# Patient Record
Sex: FEMALE | Race: OTHER | Employment: FULL TIME | URBAN - METROPOLITAN AREA
[De-identification: names, ages, dates, MRNs, and addresses within clinical notes are randomized per-mention and may not be internally consistent; named-entity substitution may affect disease eponyms.]

---

## 2019-04-29 LAB — PAP SMEAR: NORMAL

## 2019-09-10 LAB — HIV AG/AB: NORMAL

## 2020-07-10 ENCOUNTER — OFFICE VISIT (OUTPATIENT)
Dept: PRIMARY CARE CLINIC | Age: 35
End: 2020-07-10
Payer: COMMERCIAL

## 2020-07-10 VITALS
SYSTOLIC BLOOD PRESSURE: 130 MMHG | WEIGHT: 207 LBS | HEART RATE: 79 BPM | HEIGHT: 67 IN | DIASTOLIC BLOOD PRESSURE: 93 MMHG | OXYGEN SATURATION: 96 % | TEMPERATURE: 98.2 F | BODY MASS INDEX: 32.49 KG/M2

## 2020-07-10 PROCEDURE — 99205 OFFICE O/P NEW HI 60 MIN: CPT | Performed by: PHYSICIAN ASSISTANT

## 2020-07-10 PROCEDURE — G8417 CALC BMI ABV UP PARAM F/U: HCPCS | Performed by: PHYSICIAN ASSISTANT

## 2020-07-10 PROCEDURE — 1036F TOBACCO NON-USER: CPT | Performed by: PHYSICIAN ASSISTANT

## 2020-07-10 PROCEDURE — G8427 DOCREV CUR MEDS BY ELIG CLIN: HCPCS | Performed by: PHYSICIAN ASSISTANT

## 2020-07-10 RX ORDER — ESCITALOPRAM OXALATE 10 MG/1
TABLET ORAL
COMMUNITY
Start: 2020-07-06 | End: 2021-02-19

## 2020-07-10 RX ORDER — NAPROXEN 500 MG/1
500 TABLET ORAL 2 TIMES DAILY WITH MEALS
Qty: 60 TABLET | Refills: 0 | Status: SHIPPED | OUTPATIENT
Start: 2020-07-10 | End: 2020-12-01

## 2020-07-10 RX ORDER — CYCLOBENZAPRINE HCL 5 MG
5 TABLET ORAL 2 TIMES DAILY PRN
Qty: 7 TABLET | Refills: 0 | Status: SHIPPED | OUTPATIENT
Start: 2020-07-10 | End: 2020-08-12 | Stop reason: SDUPTHER

## 2020-07-10 RX ORDER — GABAPENTIN 100 MG/1
CAPSULE ORAL
Qty: 30 CAPSULE | Refills: 0 | Status: SHIPPED | OUTPATIENT
Start: 2020-07-10 | End: 2020-09-11 | Stop reason: SDUPTHER

## 2020-07-10 SDOH — HEALTH STABILITY: MENTAL HEALTH: HOW MANY STANDARD DRINKS CONTAINING ALCOHOL DO YOU HAVE ON A TYPICAL DAY?: 1 OR 2

## 2020-07-10 SDOH — HEALTH STABILITY: MENTAL HEALTH: HOW OFTEN DO YOU HAVE A DRINK CONTAINING ALCOHOL?: MONTHLY OR LESS

## 2020-07-10 ASSESSMENT — PATIENT HEALTH QUESTIONNAIRE - PHQ9
2. FEELING DOWN, DEPRESSED OR HOPELESS: 0
SUM OF ALL RESPONSES TO PHQ QUESTIONS 1-9: 0
1. LITTLE INTEREST OR PLEASURE IN DOING THINGS: 0
SUM OF ALL RESPONSES TO PHQ QUESTIONS 1-9: 0
SUM OF ALL RESPONSES TO PHQ9 QUESTIONS 1 & 2: 0

## 2020-07-10 NOTE — PROGRESS NOTES
Visit Information    Have you changed or started any medications since your last visit including any over-the-counter medicines, vitamins, or herbal medicines? no   Are you having any side effects from any of your medications? -  no  Have you stopped taking any of your medications? Is so, why? -  no    Have you seen any other physician or provider since your last visit? Yes - Records Obtained  Have you had any other diagnostic tests since your last visit? No  Have you been seen in the emergency room and/or had an admission to a hospital since we last saw you? No  Have you had your routine dental cleaning in the past 6 months? no    Have you activated your [facility] account? If not, what are your barriers?  Yes     Patient Care Team:  Shilpa Mckeon PA-C as PCP - General (Physician Assistant)    Medical History Review  Past Medical, Family, and Social History reviewed and does contribute to the patient presenting condition    Health Maintenance   Topic Date Due    Varicella vaccine (1 of 2 - 2-dose childhood series) 10/17/1985    HIV screen  10/17/1999    DTaP/Tdap/Td vaccine (1 - Tdap) 10/17/2003    Cervical cancer screen  10/17/2005    Flu vaccine (1) 09/01/2020    Hepatitis A vaccine  Aged Out    Hepatitis B vaccine  Aged Out    Hib vaccine  Aged Out    Meningococcal (ACWY) vaccine  Aged Out    Pneumococcal 0-64 years Vaccine  Aged Out

## 2020-07-10 NOTE — PROGRESS NOTES
[facility]  [facility]  [address] [place] [address]  Dept: [phone]  Dept Fax: [phone]    New Patient Visit Note  Date of patient's visit: 7/10/2020  Patient's Name:  Kia Morel YOB: 1985            Patient Care Team:  Larissa Albarran PA-C as PCP - General (Physician Assistant)  Larissa Albarran PA-C as PCP - [facility] Provider  ______________________________________________________________________    Reason for visit: Establish care/Preventative care  ______________________________________________________________________  Chief Compliant   Establish Care and Back Pain (sciatic pain, with leg pain)      ______________________________________________________________________  History of Presenting Illness:  History was obtained from the patient. Kia Morel is a 27 y.o. is here to establish care. Patient states \"last time seen PCP was at the end of 2019 in [facility]". Patient has chronic past medical history that includes chronic back pain, thyroid nodule. Patient states \"neuropathy is in bilateral lower extremities, sciatica pain in right leg\". Patient states she is being seen by neurology for symptoms, \"Cymbalta and Elavil in the past that did not help, currently on Lexapro which is not helping\", \"gabapentin did help in the past\". Patient is requesting second opinion from another neurologist.  Patient had MRI lumbar completed in 1/2020 that showed mild disc bulge at L4-5 and L5-S1, mild facet arthritis in lower lumbar spine.   Discussed chronic back pain, medications in depth with patient, informed patient she will be referred to another neurologist for second opinion, informed patient she will be prescribed 7-day supply of muscle relaxer, informed patient we will restart her on gabapentin and refill NSAID, naproxen, instructed patient to contact PCP office to update on symptoms, informed patient she will be referred to physical therapy, patient voiced understanding. Patient is complaining of cervical radiculopathy symptoms in bilateral arms, \"worse in the left\". Patient had EMG completed on 2020 that showed upper extremities was normal.  Discussed cervical radiculopathy in depth with patient, informed patient she will be sent for MRI cervical to further evaluate. Patient has history of thyroid nodule, denies any thyroid disease, informed patient we will check thyroid levels. Patient recently had postpartum visit with gynecology in 2019, patient denies any issue with her pregnancy. Patient blood pressure is elevated in office. Discussed elevated blood pressure and risk in depth with patient, informed patient will reevaluate at next appointment if still elevated will discuss, patient voiced understanding. Patient is obese. Discussed weight loss in depth with patient, encourage patient make changes in diet. Patient is a non-smoker. Labs reviewed, informed patient labs will be ordered and to have completed before follow-up appointment, patient voiced understanding. Health maintenance reviewed, will update health maintenance with Pap smear, HIV, hep C results which are in care everywhere. OARRS reviewed, prescribed Gabapentin 100 mg taken 1 tablet daily for 3 days, if no improvement increase to 2 tablets daily for 3 days, if no improvement increase to 3 tablets daily for 3 days. Medications reviewed, prescribed Flexeril 5 mg twice daily PRN for 7 days, naproxen 500 mg twice daily.      HPI  ______________________________________________________________________  Past Medical/Surgical History:        Diagnosis Date    Anxiety     Chronic back pain     Thyroid nodule            Procedure Laterality Date     SECTION      x3    TUBAL LIGATION         ______________________________________________________________________  Health Maintenance Due Topic Date Due    Varicella vaccine (1 of 2 - 2-dose childhood series) 10/17/1985    HIV screen  10/17/1999    Cervical cancer screen  10/17/2005       No Known Allergies    Current Outpatient Medications   Medication Sig Dispense Refill    gabapentin (NEURONTIN) 100 MG capsule Take 1 tablet daily daily for 3 days, if no improvement increase to 2 tablets daily for 3 days, if no improvement increase to 3 tablets daily for 3 days 30 capsule 0    naproxen (NAPROSYN) 500 MG tablet Take 1 tablet by mouth 2 times daily (with meals) 60 tablet 0    escitalopram (LEXAPRO) 10 MG tablet take 1 tablet by mouth once daily       No current facility-administered medications for this visit. Social History     Tobacco Use    Smoking status: Never Smoker    Smokeless tobacco: Never Used   Substance Use Topics    Alcohol use: Not Currently     Frequency: Monthly or less     Drinks per session: 1 or 2     Binge frequency: Less than monthly    Drug use: Not on file       Family History   Problem Relation Age of Onset    Diabetes Mother     High Blood Pressure Mother     Other Mother         Tremor    Seizures Mother     Heart Disease Father     Hypertension Father     Cancer Sister         Lymphoblastic lymphoma    Diabetes Maternal Uncle     Heart Attack Maternal Grandmother     Diabetes Maternal Grandmother     Kidney Disease Maternal Grandmother     Breast Cancer Paternal Aunt       ______________________________________________________________________  Review of Systems:    Review of Systems   Constitutional: Negative for chills, fatigue and fever. HENT: Negative for congestion, ear pain, hearing loss, rhinorrhea and sore throat. Eyes: Negative for visual disturbance. Respiratory: Negative for cough, shortness of breath and wheezing. Cardiovascular: Negative for chest pain. Gastrointestinal: Negative for abdominal pain, constipation, diarrhea, nausea and vomiting.    Genitourinary: Negative for dysuria, frequency and urgency. Musculoskeletal: Positive for back pain and neck pain. Negative for myalgias. Skin: Negative for rash. Neurological: Positive for numbness. Negative for dizziness and headaches. Psychiatric/Behavioral: The patient is not nervous/anxious. ______________________________________________________________________  Physical Exam:  Vitals:    07/10/20 0833 07/10/20 1005   BP: (!) 126/92 (!) 130/93   Site: Left Upper Arm Left Upper Arm   Position: Sitting Sitting   Cuff Size: Medium Adult Large Adult   Pulse: 85 79   Temp: 98.2 °F (36.8 °C)    SpO2: 96%    Weight: 207 lb (93.9 kg)    Height: 5' 7\" (1.702 m)      BP Readings from Last 3 Encounters:   07/10/20 (!) 130/93        Physical Exam  Vitals signs reviewed. Constitutional:       Appearance: She is well-developed. She is obese. HENT:      Head: Normocephalic and atraumatic. Right Ear: Hearing and external ear normal.      Left Ear: Hearing and external ear normal.      Nose: Nose normal.      Mouth/Throat:      Pharynx: Uvula midline. Eyes:      Conjunctiva/sclera: Conjunctivae normal.      Pupils: Pupils are equal, round, and reactive to light. Neck:      Musculoskeletal: Normal range of motion. Cardiovascular:      Rate and Rhythm: Normal rate and regular rhythm. Heart sounds: Normal heart sounds. Pulmonary:      Effort: Pulmonary effort is normal.      Breath sounds: Normal breath sounds. Abdominal:      Palpations: Abdomen is soft. There is no mass. Tenderness: There is no abdominal tenderness. Musculoskeletal:      Cervical back: She exhibits tenderness, bony tenderness and pain. Lumbar back: She exhibits tenderness, bony tenderness and pain. Back:       Right lower leg: She exhibits tenderness. She exhibits no swelling. Left lower leg: She exhibits tenderness. She exhibits no swelling. Skin:     General: Skin is warm and dry.    Neurological:      Mental Status: She is alert and oriented to person, place, and time. Psychiatric:         Speech: Speech normal.         Behavior: Behavior normal. Behavior is cooperative. Thought Content: Thought content normal.         Judgment: Judgment normal.         ______________________________________________________________________  Diagnostic findings:  CBC:No results found for: WBC, HGB, PLT    BMP:  No results found for: NA, K, CL, CO2, BUN, CREATININE, GLUCOSE    HEMOGLOBIN A1C: No results found for: LABA1C    FASTING LIPID PANEL:No results found for: CHOL, HDL, TRIG  ______________________________________________________________________  Assessment and Plan:  Odilia Arteaga was seen today for establish care and back pain. Diagnoses and all orders for this visit:    Chronic low back pain with sciatica, sciatica laterality unspecified, unspecified back pain laterality  -     gabapentin (NEURONTIN) 100 MG capsule; Take 1 tablet daily daily for 3 days, if no improvement increase to 2 tablets daily for 3 days, if no improvement increase to 3 tablets daily for 3 days  -     [facility], Neurosurgery, [facility]  -     naproxen (NAPROSYN) 500 MG tablet; Take 1 tablet by mouth 2 times daily (with meals)  -     cyclobenzaprine (FLEXERIL) 5 MG tablet; Take 1 tablet by mouth 2 times daily as needed for Muscle spasms  -     [facility] - [facility]'s    Cervical radiculopathy  -     MRI CERVICAL SPINE W WO CONTRAST; Future  -     gabapentin (NEURONTIN) 100 MG capsule;  Take 1 tablet daily daily for 3 days, if no improvement increase to 2 tablets daily for 3 days, if no improvement increase to 3 tablets daily for 3 days  -     [facility], Neurosurgery, [address] [place] [address] Drive [place]'s    Thyroid nodule  -     TSH With Reflex Ft4; Future    Class 1 obesity due to excess calories with body mass index (BMI) of 32.0 to 32.9 in adult, unspecified whether serious comorbidity

## 2020-07-10 NOTE — LETTER
[facility]  [facility] [address]  Phone: [phone]  Fax: [phone]    Luke Pate PA-C        July 10, 2020     Patient: Arabella Underwood   YOB: 1985   Date of Visit: 7/10/2020       To Whom It May Concern: It is my medical opinion that Arabella Underwood may return to light duty immediately with the following restrictions: lifting/carrying not to exceed 10 lbs. , bending/stooping not to exceed 10 x per hour, Duration of restrictions (days):  60.    If you have any questions or concerns, please don't hesitate to call.     Sincerely,            Luke Pate PA-C

## 2020-07-19 ENCOUNTER — TELEPHONE (OUTPATIENT)
Dept: PRIMARY CARE CLINIC | Age: 35
End: 2020-07-19

## 2020-07-19 PROBLEM — E04.1 THYROID NODULE: Status: ACTIVE | Noted: 2020-07-19

## 2020-07-19 PROBLEM — G89.29 CHRONIC LOW BACK PAIN WITH SCIATICA: Status: ACTIVE | Noted: 2020-07-19

## 2020-07-19 PROBLEM — M54.40 CHRONIC LOW BACK PAIN WITH SCIATICA: Status: ACTIVE | Noted: 2020-07-19

## 2020-07-19 ASSESSMENT — ENCOUNTER SYMPTOMS
SHORTNESS OF BREATH: 0
VOMITING: 0
SORE THROAT: 0
BACK PAIN: 1
WHEEZING: 0
RHINORRHEA: 0
NAUSEA: 0
DIARRHEA: 0
ABDOMINAL PAIN: 0
CONSTIPATION: 0
COUGH: 0

## 2020-07-19 NOTE — TELEPHONE ENCOUNTER
Will update cervical cancer screening and HIV and health maintenance, results are in care everywhere in 4/2019 and 9/2019, respectively

## 2020-07-23 ENCOUNTER — TELEPHONE (OUTPATIENT)
Dept: PRIMARY CARE CLINIC | Age: 35
End: 2020-07-23

## 2020-08-08 ENCOUNTER — HOSPITAL ENCOUNTER (OUTPATIENT)
Age: 35
Discharge: HOME | End: 2020-08-10
Payer: COMMERCIAL

## 2020-08-08 ENCOUNTER — HOSPITAL ENCOUNTER (OUTPATIENT)
Dept: GENERAL RADIOLOGY | Age: 35
Discharge: HOME | End: 2020-08-10
Payer: COMMERCIAL

## 2020-08-08 PROCEDURE — 72040 X-RAY EXAM NECK SPINE 2-3 VW: CPT

## 2020-08-12 ENCOUNTER — TELEPHONE (OUTPATIENT)
Dept: PRIMARY CARE CLINIC | Age: 35
End: 2020-08-12

## 2020-08-12 RX ORDER — CYCLOBENZAPRINE HCL 5 MG
5 TABLET ORAL 2 TIMES DAILY PRN
Qty: 60 TABLET | Refills: 1 | Status: SHIPPED | OUTPATIENT
Start: 2020-08-12 | End: 2020-09-11 | Stop reason: SDUPTHER

## 2020-08-12 NOTE — TELEPHONE ENCOUNTER
Will refill Flexeril, patient was referred to neurosurgery at office visit, will provide information she does not have.

## 2020-08-12 NOTE — TELEPHONE ENCOUNTER
----- Message from Merlin [place] sent at 8/11/2020  9:31 AM EDT -----  No not yet she will call to schedule, she states she having burning feeling in her lower back. She would like a refill on flexeril. Should she schedule a appointment to get her back checked out.

## 2020-09-11 ENCOUNTER — HOSPITAL ENCOUNTER (OUTPATIENT)
Dept: GENERAL RADIOLOGY | Age: 35
Discharge: HOME | End: 2020-09-13
Payer: COMMERCIAL

## 2020-09-11 ENCOUNTER — HOSPITAL ENCOUNTER (OUTPATIENT)
Age: 35
Discharge: HOME | End: 2020-09-13
Payer: COMMERCIAL

## 2020-09-11 ENCOUNTER — OFFICE VISIT (OUTPATIENT)
Dept: PRIMARY CARE CLINIC | Age: 35
End: 2020-09-11
Payer: COMMERCIAL

## 2020-09-11 VITALS
DIASTOLIC BLOOD PRESSURE: 84 MMHG | WEIGHT: 211 LBS | BODY MASS INDEX: 33.05 KG/M2 | OXYGEN SATURATION: 97 % | TEMPERATURE: 97.3 F | HEART RATE: 79 BPM | RESPIRATION RATE: 16 BRPM | SYSTOLIC BLOOD PRESSURE: 111 MMHG

## 2020-09-11 PROCEDURE — 73630 X-RAY EXAM OF FOOT: CPT

## 2020-09-11 PROCEDURE — 73562 X-RAY EXAM OF KNEE 3: CPT

## 2020-09-11 PROCEDURE — 99395 PREV VISIT EST AGE 18-39: CPT | Performed by: PHYSICIAN ASSISTANT

## 2020-09-11 RX ORDER — CYCLOBENZAPRINE HCL 5 MG
5 TABLET ORAL NIGHTLY PRN
Qty: 30 TABLET | Refills: 3 | Status: SHIPPED | OUTPATIENT
Start: 2020-09-11 | End: 2020-12-01 | Stop reason: SDUPTHER

## 2020-09-11 RX ORDER — MELOXICAM 7.5 MG/1
7.5 TABLET ORAL 2 TIMES DAILY
Qty: 14 TABLET | Refills: 0 | Status: SHIPPED | OUTPATIENT
Start: 2020-09-11 | End: 2020-12-01 | Stop reason: SDUPTHER

## 2020-09-11 RX ORDER — GABAPENTIN 100 MG/1
100 CAPSULE ORAL 2 TIMES DAILY
Qty: 60 CAPSULE | Refills: 2 | Status: SHIPPED | OUTPATIENT
Start: 2020-09-11 | End: 2021-02-01

## 2020-09-11 SDOH — ECONOMIC STABILITY: FOOD INSECURITY: WITHIN THE PAST 12 MONTHS, THE FOOD YOU BOUGHT JUST DIDN'T LAST AND YOU DIDN'T HAVE MONEY TO GET MORE.: NEVER TRUE

## 2020-09-11 SDOH — ECONOMIC STABILITY: FOOD INSECURITY: WITHIN THE PAST 12 MONTHS, YOU WORRIED THAT YOUR FOOD WOULD RUN OUT BEFORE YOU GOT MONEY TO BUY MORE.: NEVER TRUE

## 2020-09-11 SDOH — ECONOMIC STABILITY: INCOME INSECURITY: HOW HARD IS IT FOR YOU TO PAY FOR THE VERY BASICS LIKE FOOD, HOUSING, MEDICAL CARE, AND HEATING?: NOT HARD AT ALL

## 2020-09-11 SDOH — ECONOMIC STABILITY: TRANSPORTATION INSECURITY
IN THE PAST 12 MONTHS, HAS LACK OF TRANSPORTATION KEPT YOU FROM MEETINGS, WORK, OR FROM GETTING THINGS NEEDED FOR DAILY LIVING?: NO

## 2020-09-11 SDOH — ECONOMIC STABILITY: TRANSPORTATION INSECURITY
IN THE PAST 12 MONTHS, HAS THE LACK OF TRANSPORTATION KEPT YOU FROM MEDICAL APPOINTMENTS OR FROM GETTING MEDICATIONS?: NO

## 2020-09-11 ASSESSMENT — ENCOUNTER SYMPTOMS: CONSTIPATION: 1

## 2020-09-11 NOTE — PATIENT INSTRUCTIONS
· Call and schedule appointment with neurology for second opinion  · Get labs and x-rays done  · Contact PCP office to update on symptoms after finishing medication, Meloxicam (Mobic)          Patient Education        Plantar Fasciitis: Care Instructions  Your Care Instructions     Plantar fasciitis is pain and inflammation of the plantar fascia, the tissue at the bottom of your foot that connects the heel bone to the toes. The plantar fascia also supports the arch. If you strain the plantar fascia, it can develop small tears and cause heel pain when you stand or walk. Plantar fasciitis can be caused by running or other sports. It also may occur in people who are overweight or who have high arches or flat feet. You may get plantar fasciitis if you walk or stand for long periods, or have a tight Achilles tendon or calf muscles. You can improve your foot pain with rest and other care at home. It might take a few weeks to a few months for your foot to heal completely. Follow-up care is a key part of your treatment and safety. Be sure to make and go to all appointments, and call your doctor if you are having problems. It's also a good idea to know your test results and keep a list of the medicines you take. How can you care for yourself at home? · Rest your feet often. Reduce your activity to a level that lets you avoid pain. If possible, do not run or walk on hard surfaces. · Take pain medicines exactly as directed. ? If the doctor gave you a prescription medicine for pain, take it as prescribed. ? If you are not taking a prescription pain medicine, take an over-the-counter anti-inflammatory medicine for pain and swelling, such as ibuprofen (Advil, Motrin) or naproxen (Aleve). Read and follow all instructions on the label. · Use ice massage to help with pain and swelling. You can use an ice cube or an ice cup several times a day. To make an ice cup, fill a paper cup with water and freeze it.  Cut off the top of the cup until a half-inch of ice shows. Hold onto the remaining paper to use the cup. Rub the ice in small circles over the area for 5 to 7 minutes. · Contrast baths, which alternate hot and cold water, can also help reduce swelling. But because heat alone may make pain and swelling worse, end a contrast bath with a soak in cold water. · Wear a night splint if your doctor suggests it. A night splint holds your foot with the toes pointed up and the foot and ankle at a 90-degree angle. This position gives the bottom of your foot a constant, gentle stretch. · Do simple exercises such as calf stretches and towel stretches 2 to 3 times each day, especially when you first get up in the morning. These can help the plantar fascia become more flexible. They also make the muscles that support your arch stronger. Hold these stretches for 15 to 30 seconds per stretch. Repeat 2 to 4 times. ? Stand about 1 foot from a wall. Place the palms of both hands against the wall at chest level. Lean forward against the wall, keeping one leg with the knee straight and heel on the ground while bending the knee of the other leg.  ? Sit down on the floor or a mat with your feet stretched in front of you. Roll up a towel lengthwise, and loop it over the ball of your foot. Holding the towel at both ends, gently pull the towel toward you to stretch your foot. · Wear shoes with good arch support. Athletic shoes or shoes with a well-cushioned sole are good choices. · Try heel cups or shoe inserts (orthotics) to help cushion your heel. You can buy these at many shoe stores. · Put on your shoes as soon as you get out of bed. Going barefoot or wearing slippers may make your pain worse. · Reach and stay at a good weight for your height. This puts less strain on your feet. When should you call for help? Call your doctor now or seek immediate medical care if:  · You have heel pain with fever, redness, or warmth in your heel.   · You cannot put leg.  2. Keeping your back heel on the floor, bend your front knee until you feel a stretch in the back leg. 3. Hold the stretch for 15 to 30 seconds. Repeat 2 to 4 times. Plantar fascia and calf stretch   Stretching the plantar fascia and calf muscles can increase flexibility and decrease heel pain. You can do this exercise several times each day and before and after activity. 1. Stand on a step as shown above. Be sure to hold on to the banister. 2. Slowly let your heels down over the edge of the step as you relax your calf muscles. You should feel a gentle stretch across the bottom of your foot and up the back of your leg to your knee. 3. Hold the stretch about 15 to 30 seconds, and then tighten your calf muscle a little to bring your heel back up to the level of the step. Repeat 2 to 4 times. Towel curls   Make this exercise more challenging by placing a weighted object, such as a soup can, on the other end of the towel. 1. While sitting, place your foot on a towel on the floor and scrunch the towel toward you with your toes. 2. Then, also using your toes, push the towel away from you. [place] pickups   1. Put marbles on the floor next to a cup.  2. Using your toes, try to lift the marbles up from the floor and put them in the cup. Follow-up care is a key part of your treatment and safety. Be sure to make and go to all appointments, and call your doctor if you are having problems. It's also a good idea to know your test results and keep a list of the medicines you take. Where can you learn more? Go to https://[employer]. org and sign in to your [employer] account. Enter W823 in the [employer] box to learn more about \"Plantar Fasciitis: Exercises. \"     If you do not have an account, please click on the \"Sign Up Now\" link. Current as of: March 2, 2020               Content Version: 12.5  © 2308-5928 Healthwise, Incorporated.    Care instructions adapted under license by [facility] ([facility]). If you have questions about a medical condition or this instruction, always ask your healthcare professional. [place] [address] any warranty or liability for your use of this information.

## 2020-09-11 NOTE — PROGRESS NOTES
problem. The current episode started more than 1 month ago. There has been no history of extremity trauma. The problem occurs daily. The problem has been unchanged. The quality of the pain is described as burning and aching. The pain is at a severity of 6/10. Pertinent negatives include no fever, inability to bear weight, joint locking, joint swelling, numbness, stiffness or tingling. She has tried acetaminophen for the symptoms. The treatment provided mild relief. Patient states bilateral feet pain \"worse when wake up to get out of bed\". Upon physical examination, tenderness across bilateral plantar surfaces. Discussed plantar fasciitis, feet pain, medications in depth with patient, informed patient she will be sent for bilateral foot x-ray to evaluate, informed patient she will be provided stretches, informed patient she will be prescribed NSAID, Mobic, to take for the next 7 days to improve symptoms, instructed patient to contact PCP office update on symptoms, pending x-ray result, possible podiatry referral for plantar fasciitis if no improvement with stretches. Discussed neck and back pain in depth with patient, patient states has not started PT as of yet due to \"hard to find after hours PT location\" due to job. Patient states naproxen \"took the edge off\", taking Flexeril \"nightly\" for back pain. Discussed neck in back pain, medications in depth with patient, instructed patient to hold naproxen due to recent prescription for Mobic, informed patient will provide letter to excuse from work for PT evaluation appointment and will also provide letter to excuse from work for PT sessions, patient voiced understanding. Patient states \"improved\" symptoms with medication, gabapentin, \"benefited when taking twice daily\".   Discussed neuropathy, medications in depth with patient, informed patient will refill gabapentin, pt was referred to neurology for second opinion but did not have information, will provide information for second opinion. Patient blood pressure controlled in office. Patient weight is stable. Discussed weight loss in depth with patient, encourage patient make changes in diet. Labs reviewed, patient will have labs completed, reprinted labs and coverage patient to have completed next several weeks. Health maintenance reviewed, discussed influenza vaccination in depth with patient, patient declined influenza vaccine in office. Medications reviewed, prescribed Gabapentin 100 mg twice daily, meloxicam 7.5 mg twice daily for 7 days, refill Flexeril 5 mg. HPI    Patient Active Problem List   Diagnosis    Chronic low back pain with sciatica    Thyroid nodule       Health Maintenance Due   Topic Date Due    Varicella vaccine (1 of 2 - 2-dose childhood series) 10/17/1985    Flu vaccine (1) 09/01/2020       No Known Allergies      Current Outpatient Medications   Medication Sig Dispense Refill    meloxicam (MOBIC) 7.5 MG tablet Take 1 tablet by mouth 2 times daily for 7 days 14 tablet 0    gabapentin (NEURONTIN) 100 MG capsule Take 1 capsule by mouth 2 times daily for 90 days. 60 capsule 2    cyclobenzaprine (FLEXERIL) 5 MG tablet Take 1 tablet by mouth nightly as needed for Muscle spasms 30 tablet 3    naproxen (NAPROSYN) 500 MG tablet Take 1 tablet by mouth 2 times daily (with meals) 60 tablet 0    escitalopram (LEXAPRO) 10 MG tablet take 1 tablet by mouth once daily       No current facility-administered medications for this visit.         Social History     Tobacco Use    Smoking status: Never Smoker    Smokeless tobacco: Never Used   Substance Use Topics    Alcohol use: Not Currently     Frequency: Monthly or less     Drinks per session: 1 or 2     Binge frequency: Less than monthly    Drug use: Not on file       Family History   Problem Relation Age of Onset    Diabetes Mother     High Blood Pressure Mother     Other Mother         Tremor    Seizures Mother     Heart Disease Father     Hypertension Father     Cancer Sister         Lymphoblastic lymphoma    Diabetes Maternal Uncle     Heart Attack Maternal Grandmother     Diabetes Maternal Grandmother     Kidney Disease Maternal Grandmother     Breast Cancer Paternal Aunt         REVIEW OFSYSTEMS:  Review of Systems   Constitutional: Negative for chills and fever. HENT: Negative for congestion, hearing loss, rhinorrhea and sore throat. Eyes: Negative for pain and visual disturbance. Respiratory: Negative for cough, shortness of breath and wheezing. Cardiovascular: Positive for palpitations. Negative for chest pain. Gastrointestinal: Positive for constipation. Negative for abdominal pain, diarrhea, nausea and vomiting. Genitourinary: Negative for difficulty urinating, dysuria, frequency and urgency. Musculoskeletal: Positive for arthralgias and myalgias. Negative for back pain and stiffness. Neurological: Negative for dizziness, tingling, numbness and headaches. PHYSICAL EXAM:  Vitals:    09/11/20 0902   BP: 111/84   Pulse: 79   Resp: 16   Temp: 97.3 °F (36.3 °C)   TempSrc: Skin   SpO2: 97%   Weight: 211 lb (95.7 kg)     BP Readings from Last 3 Encounters:   09/11/20 111/84   07/10/20 (!) 130/93        Physical Exam  Vitals signs reviewed. Constitutional:       Appearance: Normal appearance. She is well-developed and well-groomed. She is obese. HENT:      Head: Normocephalic and atraumatic. Eyes:      Conjunctiva/sclera: Conjunctivae normal.      Pupils: Pupils are equal, round, and reactive to light. Neck:      Musculoskeletal: Normal range of motion. Cardiovascular:      Rate and Rhythm: Normal rate and regular rhythm. Pulses:           Dorsalis pedis pulses are 1+ on the right side and 1+ on the left side. Posterior tibial pulses are 1+ on the right side and 1+ on the left side. Heart sounds: Normal heart sounds.    Pulmonary:      Effort: Pulmonary effort is normal. Breath sounds: Normal breath sounds. Abdominal:      Palpations: Abdomen is soft. There is no mass. Tenderness: There is no abdominal tenderness. Musculoskeletal: Normal range of motion. Right knee: Tenderness found. Medial joint line and lateral joint line tenderness noted. Left knee: Tenderness found. Medial joint line, MCL, LCL and patellar tendon tenderness noted. No lateral joint line tenderness noted. Cervical back: She exhibits pain. Lumbar back: She exhibits pain. Right lower leg: She exhibits tenderness. Left lower leg: She exhibits tenderness. Legs:       Left foot: Tenderness present. Feet:    Skin:     General: Skin is warm and dry. Neurological:      Mental Status: She is alert and oriented to person, place, and time. Psychiatric:         Behavior: Behavior is cooperative. DIAGNOSTIC FINDINGS:  CBC:No results found for: WBC, HGB, PLT    BMP:  No results found for: NA, K, CL, CO2, BUN, CREATININE, GLUCOSE    HEMOGLOBIN A1C: No results found for: LABA1C    FASTING LIPID PANEL:No results found for: CHOL, HDL, TRIG    ASSESSMENT AND PLAN:  Sukhwinder Kingsley was seen today for results, health maintenance, knee pain and foot pain. Diagnoses and all orders for this visit:    Acute pain of left knee  -     XR KNEE LEFT (3 VIEWS); Future  -     meloxicam (MOBIC) 7.5 MG tablet; Take 1 tablet by mouth 2 times daily for 7 days    Bilateral foot pain  -     XR FOOT LEFT (MIN 3 VIEWS); Future  -     XR FOOT RIGHT (MIN 3 VIEWS); Future  -     meloxicam (MOBIC) 7.5 MG tablet; Take 1 tablet by mouth 2 times daily for 7 days    Pain in both knees, unspecified chronicity  -     XR KNEE LEFT (3 VIEWS); Future  -     meloxicam (MOBIC) 7.5 MG tablet; Take 1 tablet by mouth 2 times daily for 7 days  -     XR KNEE RIGHT (3 VIEWS);  Future    Chronic low back pain with sciatica, sciatica laterality unspecified, unspecified back pain laterality  [facility] [place], Neurology, [facility]  -     meloxicam (MOBIC) 7.5 MG tablet; Take 1 tablet by mouth 2 times daily for 7 days  -     gabapentin (NEURONTIN) 100 MG capsule; Take 1 capsule by mouth 2 times daily for 90 days. -     cyclobenzaprine (FLEXERIL) 5 MG tablet; Take 1 tablet by mouth nightly as needed for Muscle spasms    Cervical radiculopathy  -     [facility]rology, [facility]  -     gabapentin (NEURONTIN) 100 MG capsule; Take 1 capsule by mouth 2 times daily for 90 days. Class 1 obesity due to excess calories with serious comorbidity and body mass index (BMI) of 33.0 to 33.9 in adult    Annual physical exam      FOLLOW UP AND INSTRUCTIONS:  Return in about 2 months (around 11/11/2020) for Lab Review, Knee Pain, Foot Pain, Back Pain, Neck Pain, Imaging Review. · Dorothea received counseling on the following healthy behaviors:nutrition and exercise    · Discussed use, benefit, and side effects of prescribed medications. Barriers to medication compliance addressed. All patient questions answered. Pt voiced understanding. · Patient given educational materials - see patient instructions    Electronically signed by Yaron Blair PA-C on 9/11/20 at 9:14 AM EDT    This note is created with the assistance of a speech-recognition program. While intending to generate a document that actually reflects the content of the visit, the document can still have some mistakes which may not have been identified and corrected by editing.

## 2020-09-11 NOTE — LETTER
[address] [place] [address] Primary Care  [address] [place] [address]  Phone: [phone]  Fax: [phone]    Genaro Villegas PA-C        September 11, 2020     Patient: Ginny Mendoza   YOB: 1985   Date of Visit: 9/11/2020       To Whom It May Concern: It is my medical opinion that Ginny Mendoza will require to off work for 3 hours to be evaluated at physical therapy due to unable to schedule after work due to physical therapy office hours. If you have any questions or concerns, please don't hesitate to call.     Sincerely,            Genaro Villegas PA-C

## 2020-09-14 ENCOUNTER — TELEPHONE (OUTPATIENT)
Dept: PRIMARY CARE CLINIC | Age: 35
End: 2020-09-14

## 2020-09-14 NOTE — TELEPHONE ENCOUNTER
Patient had x-rays completed, negative bilateral feet x-rays, negative bilateral knee x-rays. Will inform patient will reorder physical therapy to include bilateral knees and feet along with neck and lower back for evaluation, encourage patient to call and schedule evaluation date.

## 2020-09-20 ASSESSMENT — ENCOUNTER SYMPTOMS
RHINORRHEA: 0
ABDOMINAL PAIN: 0
WHEEZING: 0
COUGH: 0
EYE PAIN: 0
SHORTNESS OF BREATH: 0
NAUSEA: 0
BACK PAIN: 0
VOMITING: 0
SORE THROAT: 0
DIARRHEA: 0

## 2020-09-21 ENCOUNTER — HOSPITAL ENCOUNTER (OUTPATIENT)
Dept: PHYSICAL THERAPY | Age: 35
Setting detail: THERAPIES SERIES
Discharge: HOME | End: 2020-09-21
Payer: COMMERCIAL

## 2020-09-21 NOTE — FLOWSHEET NOTE
[x] [facility]) [facility] &  Therapy  [address]    [phone]  F: [phone]   [] [address]  [facility]   [address]  P: [phone]  F: [phone]  [] [address]  [address]  P: [phone]  F: [phone]  [] [address]  [facility]   [address]   [place]: [phone]  F: [phone]   [] [address]  [place]: [phone]   F: [phone]     Physical Therapy Cancel/No Show note    Date: 2020  Patient: Allison Jose  : 1985  MRN: [id number]    Cancels/No Shows to date:     For today's appointment patient:    [x]  Cancelled    [] Rescheduled appointment    [] No-show     Reason given by patient:    []  Patient ill    []  Conflicting appointment    [] No transportation      [] Conflict with work    [x] No reason given    [] Weather related    [] COVID-19    [] Other:      Comments: Pt cancelled initial evaluation this morning for neck pain, low back pain, bilateral knee and bilateral foot pain.        [] Next appointment was confirmed    Electronically signed by: Gabriella Vaca, PT

## 2020-12-01 ENCOUNTER — TELEMEDICINE (OUTPATIENT)
Dept: PRIMARY CARE CLINIC | Age: 35
End: 2020-12-01
Payer: COMMERCIAL

## 2020-12-01 PROCEDURE — G8427 DOCREV CUR MEDS BY ELIG CLIN: HCPCS | Performed by: PHYSICIAN ASSISTANT

## 2020-12-01 PROCEDURE — 99214 OFFICE O/P EST MOD 30 MIN: CPT | Performed by: PHYSICIAN ASSISTANT

## 2020-12-01 RX ORDER — CYCLOBENZAPRINE HCL 5 MG
5 TABLET ORAL NIGHTLY PRN
Qty: 30 TABLET | Refills: 3 | Status: SHIPPED | OUTPATIENT
Start: 2020-12-01 | End: 2021-04-20

## 2020-12-01 RX ORDER — MELOXICAM 7.5 MG/1
7.5 TABLET ORAL 2 TIMES DAILY
Qty: 60 TABLET | Refills: 3 | Status: SHIPPED | OUTPATIENT
Start: 2020-12-01 | End: 2021-02-26

## 2020-12-01 ASSESSMENT — ENCOUNTER SYMPTOMS: BACK PAIN: 1

## 2020-12-01 NOTE — PATIENT INSTRUCTIONS
· Get labs done  · Call and schedule orthopedic for evalatuation for knee pain  · Call and schedule appointmtent with neurosurgery  · Call and schedule MRI cervical

## 2020-12-01 NOTE — LETTER
[facility] Primary Care  [address] [place] [address]  Phone: [phone]  Fax: [phone]    Vera Romberg, PA-C        December 1, 2020     Patient: Malena Mccullough   YOB: 1985   Date of Visit: 12/1/2020       To Whom It May Concern: It is my medical opinion that Malena Mccullough should work reduce hours to allow minimum three hours for physical therapy sessions until 2/1/2021. If you have any questions or concerns, please don't hesitate to call.     Sincerely,          Vera Romberg, PA-C

## 2020-12-01 NOTE — PROGRESS NOTES
Visit Information    Have you changed or started any medications since your last visit including any over-the-counter medicines, vitamins, or herbal medicines? no   Are you having any side effects from any of your medications? -  no  Have you stopped taking any of your medications? Is so, why? -  no    Have you seen any other physician or provider since your last visit? No  Have you had any other diagnostic tests since your last visit? No  Have you been seen in the emergency room and/or had an admission to a hospital since we last saw you? No  Have you had your routine dental cleaning in the past 6 months? no    Have you activated your [employer] account? If not, what are your barriers?  Yes     Patient Care Team:  Kianna Bailey PA-C as PCP - General (Physician Assistant)  Kianna Bailey PA-C as PCP - [facility] Provider    Medical History Review  Past Medical, Family, and Social History reviewed and does not contribute to the patient presenting condition    Health Maintenance   Topic Date Due    Varicella vaccine (1 of 2 - 2-dose childhood series) 10/17/1985    Flu vaccine (1) 09/01/2020    Cervical cancer screen  04/29/2022    DTaP/Tdap/Td vaccine (2 - Td) 09/10/2029    HIV screen  Completed    Hepatitis A vaccine  Aged Out    Hepatitis B vaccine  Aged Out    Hib vaccine  Aged Out    Meningococcal (ACWY) vaccine  Aged Out    Pneumococcal 0-64 years Vaccine  Aged Out no

## 2020-12-01 NOTE — PROGRESS NOTES
Marty Harper is a 38 y.o. female evaluated virtual visit via [address] video on 2020. Consent:  She and/or health care decision maker is aware that that she may receive a bill for this telephone service, depending on her insurance coverage, and has provided verbal consent to proceed: Yes      Documentation:  I communicated with the patient and/or health care decision maker about arthralgia, cervical radiculopathy, lumbar radiculopathy. Details of this discussion including any medical advice provided below      I affirm this is a Patient Initiated Episode with an Established Patient who has not had a related appointment within my department in the past 7 days or scheduled within the next 24 hours. Total Time:  MyChart video    Note: not billable if this call serves to triage the patient into an appointment for the relevant concern      Erendira Florentinoelizabeth                  2020    TELEHEALTH EVALUATION -- Audio/Visual (During CBXVD-21 public health emergency)    Patient and physician are located in their individual homes    HPI:    Marty General (:  1985) has requested an audio only/video evaluation for the following concern(s):    Patient is here to follow-up for cervical radiculopathy, lumbar radiculopathy, arthralgia of multiple joints. Patient states she is compliant with medications. Patient had x-rays of bilateral knees, bilateral feet completed, all were unremarkable. Patient states \"unable to been\" left knee. Patient was referred to physical therapy for arthralgia complaints but unable to go \"due to work schedule\".   Patient confirms arthralgia improved with medication, meloxicam.  Discussed arthralgia, left knee pain in depth with patient, informed patient letter will be provided to excuse from work for 3 hours for PT sessions, informed patient she will be referred to orthopedic for evaluation of the left knee, informed patient will refill NSAID, patient voiced understanding. Patient has cervical spine x-ray completed that was unremarkable. Instructed patient to call and schedule cervical spine MRI to further evaluate cervical radiculopathy. Patient states gabapentin \"helped\" for lumbar radiculopathy. patient requested medication refill, cyclobenzaprine. Patient reporting no weight change. Discussed weight loss in depth with patient, encourage patient make changes in diet and the importance of physical activity by exercising multiple times weekly. Labs reviewed, patient have labs completed as of yet, encourage patient have labs completed in next several weeks, patient voiced understanding. Health maintenance reviewed. Medications reviewed, refilled Flexeril 5 mg, Mobic 7.5 mg. HPI    Review of Systems   Constitutional: Negative for chills and fever. HENT: Negative for congestion. Respiratory: Negative for cough, shortness of breath and wheezing. Cardiovascular: Positive for palpitations. Negative for chest pain. Gastrointestinal: Negative for constipation, diarrhea, nausea and vomiting. Genitourinary: Negative for dysuria, frequency and urgency. Musculoskeletal: Positive for arthralgias, back pain and neck pain. Negative for myalgias. Neurological: Negative for headaches. Prior to Visit Medications    Medication Sig Taking? Authorizing Provider   meloxicam (MOBIC) 7.5 MG tablet Take 1 tablet by mouth 2 times daily Yes Toño Rosado PA-C   cyclobenzaprine (FLEXERIL) 5 MG tablet Take 1 tablet by mouth nightly as needed for Muscle spasms Yes Toño Rosado PA-C   gabapentin (NEURONTIN) 100 MG capsule Take 1 capsule by mouth 2 times daily for 90 days.  Yes Toño Rosado PA-C   escitalopram (LEXAPRO) 10 MG tablet take 1 tablet by mouth once daily Yes Historical Provider, MD       Social History     Tobacco Use    Smoking status: Never Smoker    Smokeless tobacco: Never Used   Substance Use Topics    Alcohol use: Not family, medical and social histories were reviewed and unchanged with the exceptions of what is mentioned in this note. Due to this being a TeleHealth encounter, evaluation of the following organ systems is limited: Vitals/Constitutional/EENT/Resp/CV/GI//MS/Neuro/Skin/Heme-Lymph-Imm. ASSESSMENT/PLAN:  Dieter Atkinson was seen today for follow-up. Diagnoses and all orders for this visit:    Acute pain of left knee  -     [address] [facility]  -     meloxicam (MOBIC) 7.5 MG tablet; Take 1 tablet by mouth 2 times daily    Bilateral foot pain  -     meloxicam (MOBIC) 7.5 MG tablet; Take 1 tablet by mouth 2 times daily    Pain in both knees, unspecified chronicity  -     [address] [facility]  -     meloxicam (MOBIC) 7.5 MG tablet; Take 1 tablet by mouth 2 times daily    Cervical radiculopathy    Chronic low back pain with sciatica, sciatica laterality unspecified, unspecified back pain laterality  -     meloxicam (MOBIC) 7.5 MG tablet; Take 1 tablet by mouth 2 times daily  -     cyclobenzaprine (FLEXERIL) 5 MG tablet; Take 1 tablet by mouth nightly as needed for Muscle spasms    Class 1 obesity due to excess calories with serious comorbidity in adult, unspecified BMI        Return in about 2 months (around 2/1/2021) for Neck Pain, Back Pain, Lab Review. An  electronic signature was used to authenticate this note. --Eren Chicas PA-C on 12/6/2020 at 2:20 PM    This note is created with the assistance of a speech-recognition program. While intending to generate a document that actually reflects the content of the visit, the document can still have some mistakes which may not have been identified and corrected by editing. 9    Pursuant to the emergency declaration under the [facility], [address] [place] [address] and the [employer] Act, this Virtual  Visit was conducted, with patient's consent, to reduce the patient's risk of exposure to COVID-19 and provide continuity of care for an established patient. Services were provided through a video synchronous discussion virtually to substitute for in-person clinic visit.

## 2020-12-06 ASSESSMENT — ENCOUNTER SYMPTOMS
VOMITING: 0
CONSTIPATION: 0
SHORTNESS OF BREATH: 0
WHEEZING: 0
NAUSEA: 0
COUGH: 0
DIARRHEA: 0

## 2020-12-10 ENCOUNTER — HOSPITAL ENCOUNTER (OUTPATIENT)
Age: 35
Discharge: HOME | End: 2020-12-10
Payer: COMMERCIAL

## 2020-12-10 ENCOUNTER — OFFICE VISIT (OUTPATIENT)
Dept: ORTHOPEDIC SURGERY | Age: 35
End: 2020-12-10
Payer: COMMERCIAL

## 2020-12-10 VITALS — WEIGHT: 213 LBS | BODY MASS INDEX: 33.43 KG/M2 | HEIGHT: 67 IN

## 2020-12-10 LAB
ABSOLUTE EOS #: 0.17 K/UL (ref 0–0.44)
ABSOLUTE IMMATURE GRANULOCYTE: <0.03 K/UL (ref 0–0.3)
ABSOLUTE LYMPH #: 2.48 K/UL (ref 1.1–3.7)
ABSOLUTE MONO #: 0.54 K/UL (ref 0.1–1.2)
ALBUMIN SERPL-MCNC: 3.8 G/DL (ref 3.5–5.2)
ALBUMIN/GLOBULIN RATIO: 1.4 (ref 1–2.5)
ALP BLD-CCNC: 86 U/L (ref 35–104)
ALT SERPL-CCNC: 12 U/L (ref 5–33)
ANION GAP SERPL CALCULATED.3IONS-SCNC: 7 MMOL/L (ref 9–17)
AST SERPL-CCNC: 12 U/L
BASOPHILS # BLD: 1 % (ref 0–2)
BASOPHILS ABSOLUTE: 0.07 K/UL (ref 0–0.2)
BILIRUB SERPL-MCNC: 0.41 MG/DL (ref 0.3–1.2)
BUN BLDV-MCNC: 14 MG/DL (ref 6–20)
BUN/CREAT BLD: ABNORMAL (ref 9–20)
CALCIUM SERPL-MCNC: 8.8 MG/DL (ref 8.6–10.4)
CHLORIDE BLD-SCNC: 107 MMOL/L (ref 98–107)
CHOLESTEROL/HDL RATIO: 2.6
CHOLESTEROL: 157 MG/DL
CO2: 25 MMOL/L (ref 20–31)
CREAT SERPL-MCNC: 0.73 MG/DL (ref 0.5–0.9)
DIFFERENTIAL TYPE: NORMAL
EOSINOPHILS RELATIVE PERCENT: 2 % (ref 1–4)
ESTIMATED AVERAGE GLUCOSE: 100 MG/DL
GFR AFRICAN AMERICAN: >60 ML/MIN
GFR NON-AFRICAN AMERICAN: >60 ML/MIN
GFR SERPL CREATININE-BSD FRML MDRD: ABNORMAL ML/MIN/{1.73_M2}
GFR SERPL CREATININE-BSD FRML MDRD: ABNORMAL ML/MIN/{1.73_M2}
GLUCOSE BLD-MCNC: 91 MG/DL (ref 70–99)
HBA1C MFR BLD: 5.1 % (ref 4–6)
HCT VFR BLD CALC: 43 % (ref 36.3–47.1)
HDLC SERPL-MCNC: 60 MG/DL
HEMOGLOBIN: 13.7 G/DL (ref 11.9–15.1)
IMMATURE GRANULOCYTES: 0 %
LDL CHOLESTEROL: 83 MG/DL (ref 0–130)
LYMPHOCYTES # BLD: 33 % (ref 24–43)
MCH RBC QN AUTO: 29.7 PG (ref 25.2–33.5)
MCHC RBC AUTO-ENTMCNC: 31.9 G/DL (ref 28.4–34.8)
MCV RBC AUTO: 93.3 FL (ref 82.6–102.9)
MONOCYTES # BLD: 7 % (ref 3–12)
NRBC AUTOMATED: 0 PER 100 WBC
PDW BLD-RTO: 13 % (ref 11.8–14.4)
PLATELET # BLD: 348 K/UL (ref 138–453)
PLATELET ESTIMATE: NORMAL
PMV BLD AUTO: 10.4 FL (ref 8.1–13.5)
POTASSIUM SERPL-SCNC: 4.8 MMOL/L (ref 3.7–5.3)
RBC # BLD: 4.61 M/UL (ref 3.95–5.11)
RBC # BLD: NORMAL 10*6/UL
SEG NEUTROPHILS: 57 % (ref 36–65)
SEGMENTED NEUTROPHILS ABSOLUTE COUNT: 4.27 K/UL (ref 1.5–8.1)
SODIUM BLD-SCNC: 139 MMOL/L (ref 135–144)
THYROXINE, FREE: 1.13 NG/DL (ref 0.93–1.7)
TOTAL PROTEIN: 6.5 G/DL (ref 6.4–8.3)
TRIGL SERPL-MCNC: 71 MG/DL
TSH SERPL DL<=0.05 MIU/L-ACNC: 0.24 MIU/L (ref 0.3–5)
VLDLC SERPL CALC-MCNC: NORMAL MG/DL (ref 1–30)
WBC # BLD: 7.6 K/UL (ref 3.5–11.3)
WBC # BLD: NORMAL 10*3/UL

## 2020-12-10 PROCEDURE — 1036F TOBACCO NON-USER: CPT | Performed by: ORTHOPAEDIC SURGERY

## 2020-12-10 PROCEDURE — G8484 FLU IMMUNIZE NO ADMIN: HCPCS | Performed by: ORTHOPAEDIC SURGERY

## 2020-12-10 PROCEDURE — 84439 ASSAY OF FREE THYROXINE: CPT

## 2020-12-10 PROCEDURE — G8427 DOCREV CUR MEDS BY ELIG CLIN: HCPCS | Performed by: ORTHOPAEDIC SURGERY

## 2020-12-10 PROCEDURE — 99203 OFFICE O/P NEW LOW 30 MIN: CPT | Performed by: ORTHOPAEDIC SURGERY

## 2020-12-10 PROCEDURE — 80053 COMPREHEN METABOLIC PANEL: CPT

## 2020-12-10 PROCEDURE — 85025 COMPLETE CBC W/AUTO DIFF WBC: CPT

## 2020-12-10 PROCEDURE — 83036 HEMOGLOBIN GLYCOSYLATED A1C: CPT

## 2020-12-10 PROCEDURE — 84443 ASSAY THYROID STIM HORMONE: CPT

## 2020-12-10 PROCEDURE — 36415 COLL VENOUS BLD VENIPUNCTURE: CPT

## 2020-12-10 PROCEDURE — G8417 CALC BMI ABV UP PARAM F/U: HCPCS | Performed by: ORTHOPAEDIC SURGERY

## 2020-12-10 PROCEDURE — 80061 LIPID PANEL: CPT

## 2020-12-10 RX ORDER — ACETAMINOPHEN 500 MG
1000 TABLET ORAL EVERY 6 HOURS PRN
Qty: 120 TABLET | Refills: 3 | Status: SHIPPED | OUTPATIENT
Start: 2020-12-10 | End: 2021-04-02

## 2020-12-10 NOTE — PROGRESS NOTES
[facility]  [facility]  [address] [address]  Dept: [address] [facility] New Patient Visit      Subjective:   CHIEF COMPLAINT:    Chief Complaint   Patient presents with    Knee Pain     bilateral       HISTORY OF PRESENT ILLNESS:    The patient is a 38 y.o. female who is being seen as a new patient for  B/l knee pain since . Pain has however gotten worse in the left knee since November last year when she experienced a fall. Patient states that her pain is exacerbated by walking up and down the stairs. Patient states that the pain is located around her kneecap. She has tried Mobic that has been given by her primary care physician. Patient has not tried any physical therapy for pain. Pain is rated 6 out of 10 there is no alleviating factors. Right knee pain is similar in nature but is less severe than the left knee pain. Past Medical History:    Past Medical History:   Diagnosis Date    Anxiety     Chronic back pain     Thyroid nodule        Past Surgical History:    Past Surgical History:   Procedure Laterality Date     SECTION      x3    TUBAL LIGATION         Current Medications:   Current Outpatient Medications   Medication Sig Dispense Refill    acetaminophen (APAP EXTRA STRENGTH) 500 MG tablet Take 2 tablets by mouth every 6 hours as needed for Pain 120 tablet 3    meloxicam (MOBIC) 7.5 MG tablet Take 1 tablet by mouth 2 times daily 60 tablet 3    cyclobenzaprine (FLEXERIL) 5 MG tablet Take 1 tablet by mouth nightly as needed for Muscle spasms 30 tablet 3    gabapentin (NEURONTIN) 100 MG capsule Take 1 capsule by mouth 2 times daily for 90 days. 60 capsule 2    escitalopram (LEXAPRO) 10 MG tablet take 1 tablet by mouth once daily       No current facility-administered medications for this visit. Allergies:    Patient has no known allergies.     Social History:   Social History     Socioeconomic History    Marital status:      Spouse name: Not on file    Number of children: Not on file    Years of education: Not on file    Highest education level: Not on file   Occupational History    Not on file   Social Needs    Financial resource strain: Not hard at all    Food insecurity     Worry: Never true     Inability: Never true   [employer] needs     Medical: No     Non-medical: No   Tobacco Use    Smoking status: Never Smoker    Smokeless tobacco: Never Used   Substance and Sexual Activity    Alcohol use: Not Currently     Frequency: Monthly or less     Drinks per session: 1 or 2     Binge frequency: Less than monthly    Drug use: Not on file    Sexual activity: Not on file   Lifestyle    Physical activity     Days per week: Not on file     Minutes per session: Not on file    Stress: Not on file   Relationships    Social connections     Talks on phone: Not on file     Gets together: Not on file     Attends Faith service: Not on file     Active member of club or organization: Not on file     Attends meetings of clubs or organizations: Not on file     Relationship status: Not on file    Intimate partner violence     Fear of current or ex partner: Not on file     Emotionally abused: Not on file     Physically abused: Not on file     Forced sexual activity: Not on file   Other Topics Concern    Not on file   Social History Narrative    Not on file       Family History:  Family History   Problem Relation Age of Onset    Diabetes Mother     High Blood Pressure Mother     Other Mother         Tremor    Seizures Mother     Heart Disease Father     Hypertension Father     Cancer Sister         Lymphoblastic lymphoma    Diabetes Maternal Uncle     Heart Attack Maternal Grandmother     Diabetes Maternal Grandmother     Kidney Disease Maternal Grandmother     Breast Cancer Paternal Aunt        REVIEW OF SYSTEMS:  Gen: no fever, chills, malaise  CV: no chest pain or palpitations  Resp: no cough or shortness of breath  GI: no nausea, vomiting, diarrhea, or constipation  Neuro: no numbness, tingling, or weakness  Msk: Tenderness around the patellar region of the left knee and right knee  10 remaining systems reviewed and negative    Objective :   PHYSICAL EXAM:  Ht 5' 7\" (1.702 m)   Wt 213 lb (96.6 kg)   BMI 33.36 kg/m² Body mass index is 33.36 kg/m². Physical Exam  Gen: Alert and oriented, no acute distress, resting comfortably in the clinic  Psych: Patient has good fund of knowledge and displays understanging of exam, diagnosis, and plan  Neuro: Alert, oriented  Head: Normocephalic atraumatic   Eyes: Extra-ocular muscles intact  Chest: Symmetric chest excursion, respirations unlabored and regular  Skin: Warm, well perfused  MSK: tenderness to palpation around the patellofemoral joint region of the right and left knee. No joint effusion appreciated. No bruising noted. Range of motion to flexion at the knee restricted to pain. Radiology:   History: Chronic left knee pain    Findings:4 views of the right and the left knee showing no significant findings    Impression: patellofemoral strain of the left and right knee    Assessment:   38y.o. year old female with presenting with chronic left and right knee pain started in 2004. Pain has been exacerbated since last November after she experienced a fall. Plan:      Case was discussed with  and x-rays reviewed. Patient presenting symptoms patient's pain is likely secondary to patellofemoral strain of the left knee. Physical therapy was offered to patient which she agreed to proceed with. Patient is currently receiving Mobic prescription from her primary care physician. We will also provide her with extra strength Tylenol. Follow-up in 4 weeks for reassessment in office. Return in about 4 weeks (around 1/7/2021) for left knee patellofemoral strain.     Orders Placed This Encounter   Medications    acetaminophen (APAP EXTRA STRENGTH) 500 MG tablet     Sig: Take 2 tablets by mouth every 6 hours as needed for Pain     Dispense:  120 tablet     Refill:  3       Orders Placed This Encounter   Procedures   [address] [facility]     Referral Priority:   Routine     Referral Type:   Eval and Treat     Referral Reason:   Specialty Services Required     Requested Specialty:   Physical Therapy     Number of Visits Requested:   1        Electronically signed by Rebecca Savage MD on12/10/2020 at 8:52 AM

## 2020-12-28 ENCOUNTER — TELEPHONE (OUTPATIENT)
Dept: PRIMARY CARE CLINIC | Age: 35
End: 2020-12-28

## 2020-12-28 DIAGNOSIS — N64.4 PAIN OF BOTH BREASTS: Primary | ICD-10-CM

## 2021-01-11 ENCOUNTER — HOSPITAL ENCOUNTER (OUTPATIENT)
Dept: MAMMOGRAPHY | Age: 36
Discharge: HOME | End: 2021-01-13
Payer: COMMERCIAL

## 2021-01-11 ENCOUNTER — OFFICE VISIT (OUTPATIENT)
Dept: PRIMARY CARE CLINIC | Age: 36
End: 2021-01-11
Payer: COMMERCIAL

## 2021-01-11 ENCOUNTER — HOSPITAL ENCOUNTER (OUTPATIENT)
Dept: ULTRASOUND IMAGING | Age: 36
Discharge: HOME | End: 2021-01-13
Payer: COMMERCIAL

## 2021-01-11 VITALS
TEMPERATURE: 97.9 F | BODY MASS INDEX: 32.26 KG/M2 | DIASTOLIC BLOOD PRESSURE: 88 MMHG | HEART RATE: 84 BPM | SYSTOLIC BLOOD PRESSURE: 123 MMHG | WEIGHT: 206 LBS | OXYGEN SATURATION: 96 %

## 2021-01-11 DIAGNOSIS — N61.0 MASTITIS, LEFT, ACUTE: Primary | ICD-10-CM

## 2021-01-11 DIAGNOSIS — N64.4 PAIN OF BOTH BREASTS: ICD-10-CM

## 2021-01-11 DIAGNOSIS — R92.8 ABNORMAL MAMMOGRAM: ICD-10-CM

## 2021-01-11 PROCEDURE — 1036F TOBACCO NON-USER: CPT | Performed by: NURSE PRACTITIONER

## 2021-01-11 PROCEDURE — 76642 ULTRASOUND BREAST LIMITED: CPT

## 2021-01-11 PROCEDURE — G8484 FLU IMMUNIZE NO ADMIN: HCPCS | Performed by: NURSE PRACTITIONER

## 2021-01-11 PROCEDURE — G8427 DOCREV CUR MEDS BY ELIG CLIN: HCPCS | Performed by: NURSE PRACTITIONER

## 2021-01-11 PROCEDURE — G0279 TOMOSYNTHESIS, MAMMO: HCPCS

## 2021-01-11 PROCEDURE — G8417 CALC BMI ABV UP PARAM F/U: HCPCS | Performed by: NURSE PRACTITIONER

## 2021-01-11 PROCEDURE — 99213 OFFICE O/P EST LOW 20 MIN: CPT | Performed by: NURSE PRACTITIONER

## 2021-01-11 RX ORDER — CEPHALEXIN 500 MG/1
500 CAPSULE ORAL 4 TIMES DAILY
Qty: 28 CAPSULE | Refills: 0 | Status: SHIPPED | OUTPATIENT
Start: 2021-01-11 | End: 2021-01-18

## 2021-01-11 ASSESSMENT — PATIENT HEALTH QUESTIONNAIRE - PHQ9
2. FEELING DOWN, DEPRESSED OR HOPELESS: 0
1. LITTLE INTEREST OR PLEASURE IN DOING THINGS: 0
SUM OF ALL RESPONSES TO PHQ QUESTIONS 1-9: 0
SUM OF ALL RESPONSES TO PHQ9 QUESTIONS 1 & 2: 0

## 2021-01-11 ASSESSMENT — ENCOUNTER SYMPTOMS
ABDOMINAL PAIN: 0
VOMITING: 0
COUGH: 0
SHORTNESS OF BREATH: 0
BACK PAIN: 0
NAUSEA: 0

## 2021-01-11 NOTE — PROGRESS NOTES
Visit Information    Have you changed or started any medications since your last visit including any over-the-counter medicines, vitamins, or herbal medicines? no   Are you having any side effects from any of your medications? -  no  Have you stopped taking any of your medications? Is so, why? -  no    Have you seen any other physician or provider since your last visit? No  Have you had any other diagnostic tests since your last visit? Yes, grecia, CT scan   Have you been seen in the emergency room and/or had an admission to a hospital since we last saw you? Yes   Have you had your routine dental cleaning in the past 6 months? no    Have you activated your [email or web address] account? If not, what are your barriers?  Yes     Patient Care Team:  Ihsan Davidson PA-C as PCP - General (Physician Assistant)  Ihsan Davidson PA-C as PCP - [facility]    Medical History Review  Past Medical, Family, and Social History reviewed and does contribute to the patient presenting condition    Health Maintenance   Topic Date Due    Hepatitis C screen  1984    Varicella vaccine (1 of 2 - 2-dose childhood series) 10/17/1985    Flu vaccine (1) 09/01/2020    Cervical cancer screen  04/29/2022    DTaP/Tdap/Td vaccine (2 - Td) 09/10/2029    HIV screen  Completed    Hepatitis A vaccine  Aged Out    Hepatitis B vaccine  Aged Out    Hib vaccine  Aged Out    Meningococcal (ACWY) vaccine  Aged Out    Pneumococcal 0-64 years Vaccine  Aged Out
escitalopram (LEXAPRO) 10 MG tablet take 1 tablet by mouth once daily Yes Historical Provider, MD   gabapentin (NEURONTIN) 100 MG capsule Take 1 capsule by mouth 2 times daily for 90 days. Mane Howell PA-C        Social History     Tobacco Use    Smoking status: Never Smoker    Smokeless tobacco: Never Used   Substance Use Topics    Alcohol use: Not Currently     Frequency: Monthly or less     Drinks per session: 1 or 2     Binge frequency: Less than monthly        Vitals:    01/11/21 1356   BP: 123/88   Site: Right Upper Arm   Position: Sitting   Cuff Size: Medium Adult   Pulse: 84   Temp: 97.9 °F (36.6 °C)   SpO2: 96%   Weight: 206 lb (93.4 kg)     Estimated body mass index is 32.26 kg/m² as calculated from the following:    Height as of 12/10/20: 5' 7\" (1.702 m). Weight as of this encounter: 206 lb (93.4 kg). DIAGNOSTIC FINDINGS:  CBC:  Lab Results   Component Value Date    WBC 7.6 12/10/2020    HGB 13.7 12/10/2020     12/10/2020       BMP:    Lab Results   Component Value Date     12/10/2020    K 4.8 12/10/2020     12/10/2020    CO2 25 12/10/2020    BUN 14 12/10/2020    CREATININE 0.73 12/10/2020    GLUCOSE 91 12/10/2020       HEMOGLOBIN A1C:   Lab Results   Component Value Date    LABA1C 5.1 12/10/2020       FASTING LIPID PANEL:  Lab Results   Component Value Date    CHOL 157 12/10/2020    HDL 60 12/10/2020    TRIG 71 12/10/2020       Physical Exam  Constitutional:       Appearance: Normal appearance. Neck:      Musculoskeletal: Neck supple. Cardiovascular:      Rate and Rhythm: Normal rate and regular rhythm. Pulmonary:      Effort: Pulmonary effort is normal.      Breath sounds: Normal breath sounds. Chest:      Chest wall: Tenderness present. No deformity or crepitus. Breasts: Breasts are symmetrical.         Right: Normal.         Left: Tenderness present. No swelling, bleeding, inverted nipple, mass, nipple discharge or skin change.

## 2021-01-12 ENCOUNTER — TELEPHONE (OUTPATIENT)
Dept: PRIMARY CARE CLINIC | Age: 36
End: 2021-01-12

## 2021-01-12 NOTE — TELEPHONE ENCOUNTER
patient called the office stating that she went home after her appointment and did another self exam, she reports finding a knot on the underside of her left breast that feels like a hard knot that increases the pain when she touches/ pushes it. Please advise.

## 2021-01-12 NOTE — TELEPHONE ENCOUNTER
That would be consistent with an infectious process. Please go ahead and continue with antibiotics. The pain should not prove initially, it may take the body little longer to absorb the hard knot.

## 2021-01-15 NOTE — TELEPHONE ENCOUNTER
Have her complete the course of medication, she is roughly shelter through. We will forward her message to her PCP.

## 2021-01-15 NOTE — TELEPHONE ENCOUNTER
Patient called back she states that she has had no relief from medication therapy. She has not yet finished script but would like to inform of update.

## 2021-01-21 ENCOUNTER — TELEPHONE (OUTPATIENT)
Dept: PRIMARY CARE CLINIC | Age: 36
End: 2021-01-21

## 2021-01-21 DIAGNOSIS — M54.12 CERVICAL RADICULOPATHY: ICD-10-CM

## 2021-01-21 DIAGNOSIS — M54.14 THORACIC RADICULOPATHY: Primary | ICD-10-CM

## 2021-01-21 NOTE — TELEPHONE ENCOUNTER
Patient called w/ c/o still having sharp pain and burning sensation in both breast, stated the more pain on left side than versus right side. Patient stated that on 1/19 she completed the 7 day supply of antibiotics that were prescribed to her, denies sonido other symptoms, pls advise.     Health Maintenance   Topic Date Due    Hepatitis C screen  1985    Varicella vaccine (1 of 2 - 2-dose childhood series) 10/17/1985    Flu vaccine (1) 09/01/2020    Cervical cancer screen  04/29/2022    DTaP/Tdap/Td vaccine (2 - Td) 09/10/2029    HIV screen  Completed    Hepatitis A vaccine  Aged Out    Hepatitis B vaccine  Aged Out    Hib vaccine  Aged Out    Meningococcal (ACWY) vaccine  Aged Out    Pneumococcal 0-64 years Vaccine  Aged Out             (applicable per patient's age: Cancer Screenings, Depression Screening, Fall Risk Screening, Immunizations)    Hemoglobin A1C (%)   Date Value   12/10/2020 5.1     LDL Cholesterol (mg/dL)   Date Value   12/10/2020 83     AST (U/L)   Date Value   12/10/2020 12     ALT (U/L)   Date Value   12/10/2020 12     BUN (mg/dL)   Date Value   12/10/2020 14      (goal A1C is < 7)   (goal LDL is <100) need 30-50% reduction from baseline     BP Readings from Last 3 Encounters:   01/11/21 123/88   09/11/20 111/84   07/10/20 (!) 130/93    (goal /80)      All Future Testing planned in [facility]:  Lab Frequency Next Occurrence   MRI CERVICAL SPINE W WO CONTRAST Once 10/28/2020       Next Visit Date:  Future Appointments   Date Time Provider Thuy Tao   2/1/2021  3:20 PM Valentina Funez PA-C [facility]            Patient Active Problem List:     Chronic low back pain with sciatica     Thyroid nodule

## 2021-01-22 ENCOUNTER — HOSPITAL ENCOUNTER (OUTPATIENT)
Dept: GENERAL RADIOLOGY | Age: 36
Discharge: HOME | End: 2021-01-24
Payer: COMMERCIAL

## 2021-01-22 ENCOUNTER — HOSPITAL ENCOUNTER (OUTPATIENT)
Age: 36
Discharge: HOME | End: 2021-01-24
Payer: COMMERCIAL

## 2021-01-22 DIAGNOSIS — M54.14 THORACIC RADICULOPATHY: ICD-10-CM

## 2021-01-22 PROCEDURE — 72072 X-RAY EXAM THORAC SPINE 3VWS: CPT

## 2021-01-22 NOTE — TELEPHONE ENCOUNTER
Patient still complaining of \"sharp and burning sensation in both breasts, more on the left side\". Patient was treated for mastitis and completed antibiotic already. At this point in time will consider neuropathy possibly due to thoracic spine, will order thoracic spine x-ray to evaluate. Patient was prescribed gabapentin in the past, will confirm if patient has been taking medication and if so if any improvement, if not will refill medication to see if that will help.

## 2021-01-25 RX ORDER — PREGABALIN 50 MG/1
50-100 CAPSULE ORAL DAILY
Qty: 14 CAPSULE | Refills: 0 | Status: SHIPPED | OUTPATIENT
Start: 2021-01-25 | End: 2021-02-01

## 2021-01-25 NOTE — TELEPHONE ENCOUNTER
Pt informed. Provided scheduling info. Pt states gabapentin has not been helpful. Would like to try to Lyrica for a trial run.  Please advise

## 2021-01-25 NOTE — TELEPHONE ENCOUNTER
Patient had thoracic spine x-ray completed that was unremarkable. With patient have symptoms of thoracic radiculopathy, with some history of cervical radiculopathy, will order MRI thoracic spine to evaluate. MRI cervical spine was ordered back in July 2020, will instruct patient to schedule both imaging.   Will confirm if patient has been taking gabapentin, and any improvement with possible medication adjustment or Lyrica trial.

## 2021-02-01 ENCOUNTER — TELEMEDICINE (OUTPATIENT)
Dept: PRIMARY CARE CLINIC | Age: 36
End: 2021-02-01
Payer: COMMERCIAL

## 2021-02-01 DIAGNOSIS — E66.09 CLASS 1 OBESITY DUE TO EXCESS CALORIES WITH SERIOUS COMORBIDITY AND BODY MASS INDEX (BMI) OF 32.0 TO 32.9 IN ADULT: ICD-10-CM

## 2021-02-01 DIAGNOSIS — M50.30 DDD (DEGENERATIVE DISC DISEASE), CERVICAL: Primary | ICD-10-CM

## 2021-02-01 DIAGNOSIS — M54.12 CERVICAL RADICULOPATHY: ICD-10-CM

## 2021-02-01 DIAGNOSIS — M54.14 THORACIC RADICULOPATHY: ICD-10-CM

## 2021-02-01 PROCEDURE — 99214 OFFICE O/P EST MOD 30 MIN: CPT | Performed by: PHYSICIAN ASSISTANT

## 2021-02-01 PROCEDURE — G8427 DOCREV CUR MEDS BY ELIG CLIN: HCPCS | Performed by: PHYSICIAN ASSISTANT

## 2021-02-01 RX ORDER — PREGABALIN 75 MG/1
75 CAPSULE ORAL 2 TIMES DAILY
Qty: 20 CAPSULE | Refills: 0 | Status: SHIPPED | OUTPATIENT
Start: 2021-02-01 | End: 2021-02-11 | Stop reason: SDUPTHER

## 2021-02-01 ASSESSMENT — ENCOUNTER SYMPTOMS: BACK PAIN: 1

## 2021-02-01 NOTE — PROGRESS NOTES
James Cortes is a 38 y.o. female evaluated virtual visit via [address] video on 2021. Consent:  She and/or health care decision maker is aware that that she may receive a bill for this telephone service, depending on her insurance coverage, and has provided verbal consent to proceed: NA - Consent obtained within past 12 months      Documentation:  I communicated with the patient and/or health care decision maker about back pain. Details of this discussion including any medical advice provided below      I affirm this is a Patient Initiated Episode with an Established Patient who has not had a related appointment within my department in the past 7 days or scheduled within the next 24 hours. Total Time: minutes: 11-20 minutes    Note: not billable if this call serves to triage the patient into an appointment for the relevant concern      Daiana Milan                  2021    TELEHEALTH EVALUATION -- Audio/Visual (During [id number] public health emergency)    Patient and physician are located in their individual homes    HPI:    James Cortes (:  1985) has requested an audio only/video evaluation for the following concern(s):    Patient is here for virtual visit via video for follow-up for back pain, radiculopathy. Patient states he is compliant with medications. Patient was treated for mastitis d/t breast discharge and pain, mammogram was also completed that was negative, symptoms did improve with antibiotics. Patient had thoracic spine x-ray completed for thoracic radiculopathy symptoms, negative, x-ray did show degenerative changes at C4-C6, greatest at C5-C6. Patient had MRI cervical spine ordered in 2020 but \"insurance denied it\". Patient does voice \"some improvement\" with symptoms with Lyrica. Discussed thoracic and cervical radiculopathy, medications in depth with patient, informed patient will reorder MRI thoracic and cervical for evaluation, appointment with neurology due to imaging to be completed, encourage patient to call and schedule that appointment now, informed patient will increase Lyrica to 75 mg twice daily, instructed patient to contact PCP office to update on symptoms after finishing medication, patient voiced understanding. Patient is limited at work, discussed FMLA paperwork which PCP agrees to complete 2 restricted duty and to allow to be excused from work during flareups and for medical appointments, patient was provided fax number. Patient reporting no weight change. Discussed weight loss in depth with patient, encourage patient make changes in diet. Labs reviewed. Health maintenance reviewed. OARRS reviewed. Medication reviewed and prescribed. HPI    Review of Systems   Constitutional: Negative for chills and fever. HENT: Negative for congestion. Respiratory: Negative for cough, shortness of breath and wheezing. Cardiovascular: Positive for palpitations. Negative for chest pain. Gastrointestinal: Negative for constipation, diarrhea, nausea and vomiting. Genitourinary: Negative for dysuria, frequency and urgency. Musculoskeletal: Positive for back pain. Negative for myalgias and neck pain. Neurological: Negative for headaches. Prior to Visit Medications    Medication Sig Taking? Authorizing Provider   pregabalin (LYRICA) 75 MG capsule Take 1 capsule by mouth 2 times daily for 10 days.  Yes Ward Fitzgerald PA-C Nose: Nose normal.      Mouth/Throat:      Lips: Pink. Eyes:      General: Lids are normal.      Conjunctiva/sclera: Conjunctivae normal.   Neck:      Musculoskeletal: Neck supple. Pulmonary:      Effort: Pulmonary effort is normal.   Musculoskeletal:         General: No deformity or signs of injury. Neurological:      Mental Status: She is alert and oriented to person, place, and time. Psychiatric:         Attention and Perception: Attention and perception normal.         Mood and Affect: Mood normal.         Speech: Speech normal.         Behavior: Behavior is cooperative. Thought Content: Thought content normal.         Cognition and Memory: Cognition normal.         Judgment: Judgment normal.           Other pertinent observable physical exam findings:-     RECORD REVIEW: Previous medical records were reviewed at today's visit. The past family, medical and social histories were reviewed and unchanged with the exceptions of what is mentioned in this note. Due to this being a TeleHealth encounter, evaluation of the following organ systems is limited: Vitals/Constitutional/EENT/Resp/CV/GI//MS/Neuro/Skin/Heme-Lymph-Imm. ASSESSMENT/PLAN:  Yayo Dawson was seen today for follow-up. Diagnoses and all orders for this visit:    DDD (degenerative disc disease), cervical  -     MRI CERVICAL SPINE W WO CONTRAST; Future    Thoracic radiculopathy  -     pregabalin (LYRICA) 75 MG capsule; Take 1 capsule by mouth 2 times daily for 10 days. Cervical radiculopathy  -     pregabalin (LYRICA) 75 MG capsule; Take 1 capsule by mouth 2 times daily for 10 days.  -     MRI CERVICAL SPINE W WO CONTRAST; Future    Class 1 obesity due to excess calories with serious comorbidity and body mass index (BMI) of 32.0 to 32.9 in adult        Return in about 1 month (around 3/1/2021) for Imaging Review, Back Pain. An  electronic signature was used to authenticate this note. --Valentina Funez PA-C on 2/4/2021 at 10:58 AM    This note is created with the assistance of a speech-recognition program. While intending to generate a document that actually reflects the content of the visit, the document can still have some mistakes which may not have been identified and corrected by editing. 9    Pursuant to the emergency declaration under the [address] [place] [address], [facility] waiver authority and the [employer] Act, this Virtual  Visit was conducted, with patient's consent, to reduce the patient's risk of exposure to COVID-19 and provide continuity of care for an established patient. Services were provided through a video synchronous discussion virtually to substitute for in-person clinic visit.

## 2021-02-01 NOTE — PROGRESS NOTES
Visit Information    Have you changed or started any medications since your last visit including any over-the-counter medicines, vitamins, or herbal medicines? no   Are you having any side effects from any of your medications? -  no  Have you stopped taking any of your medications? Is so, why? -  no    Have you seen any other physician or provider since your last visit? No  Have you had any other diagnostic tests since your last visit? No  Have you been seen in the emergency room and/or had an admission to a hospital since we last saw you? No  Have you had your routine dental cleaning in the past 6 months? no    Have you activated your [employer] account? If not, what are your barriers?  Yes     Patient Care Team:  Yarelis Buchanan PA-C as PCP - General (Physician Assistant)  Yarelis Buchanan PA-C as PCP - [facility]    Medical History Review  Past Medical, Family, and Social History reviewed and does not contribute to the patient presenting condition    Health Maintenance   Topic Date Due    Hepatitis C screen  1984    Varicella vaccine (1 of 2 - 2-dose childhood series) 10/17/1985    Flu vaccine (1) 09/01/2020    Cervical cancer screen  04/29/2022    DTaP/Tdap/Td vaccine (2 - Td) 09/10/2029    HIV screen  Completed    Hepatitis A vaccine  Aged Out    Hepatitis B vaccine  Aged Out    Hib vaccine  Aged Out    Meningococcal (ACWY) vaccine  Aged Out    Pneumococcal 0-64 years Vaccine  Aged Out

## 2021-02-01 NOTE — PATIENT INSTRUCTIONS
· Call and schedule appointment with neurology for evaluation of radiculopathy pain  · Call and schedule MRI cervical and thoracic spine, [phone]  · Take Lyrica 75 mg twice daily for the next 10 days, contact PCP office to update on symptoms after finishing medication

## 2021-02-04 ASSESSMENT — ENCOUNTER SYMPTOMS
NAUSEA: 0
SHORTNESS OF BREATH: 0
VOMITING: 0
DIARRHEA: 0
WHEEZING: 0
CONSTIPATION: 0
COUGH: 0

## 2021-02-09 ENCOUNTER — HOSPITAL ENCOUNTER (OUTPATIENT)
Dept: MRI IMAGING | Age: 36
Discharge: HOME | End: 2021-02-11
Payer: COMMERCIAL

## 2021-02-09 DIAGNOSIS — M54.12 CERVICAL RADICULOPATHY: ICD-10-CM

## 2021-02-09 DIAGNOSIS — M50.30 DDD (DEGENERATIVE DISC DISEASE), CERVICAL: ICD-10-CM

## 2021-02-09 PROCEDURE — 72156 MRI NECK SPINE W/O & W/DYE: CPT

## 2021-02-09 PROCEDURE — A9579 GAD-BASE MR CONTRAST NOS,1ML: HCPCS | Performed by: PHYSICIAN ASSISTANT

## 2021-02-09 PROCEDURE — 6360000004 HC RX CONTRAST MEDICATION: Performed by: PHYSICIAN ASSISTANT

## 2021-02-09 RX ADMIN — GADOTERIDOL 17 ML: 279.3 INJECTION, SOLUTION INTRAVENOUS at 16:09

## 2021-02-11 ENCOUNTER — TELEPHONE (OUTPATIENT)
Dept: PRIMARY CARE CLINIC | Age: 36
End: 2021-02-11

## 2021-02-11 DIAGNOSIS — M54.12 CERVICAL RADICULOPATHY: ICD-10-CM

## 2021-02-11 DIAGNOSIS — M54.14 THORACIC RADICULOPATHY: ICD-10-CM

## 2021-02-11 DIAGNOSIS — R59.0 LOCALIZED ENLARGED LYMPH NODES: Primary | ICD-10-CM

## 2021-02-11 DIAGNOSIS — M50.30 DDD (DEGENERATIVE DISC DISEASE), CERVICAL: ICD-10-CM

## 2021-02-11 RX ORDER — PREGABALIN 100 MG/1
100 CAPSULE ORAL 2 TIMES DAILY
Qty: 20 CAPSULE | Refills: 0 | Status: SHIPPED | OUTPATIENT
Start: 2021-02-11 | End: 2021-02-23 | Stop reason: SDUPTHER

## 2021-02-11 NOTE — TELEPHONE ENCOUNTER
Patient had MRI cervical spine completed that showed disc protrusion at C5-6 causing moderate stenosis, also multiple small lymph nodes scattered throughout the neck that are nonspecific specific, radiology believes possible reactive but concerning due to the number and patient age. Will order CT soft neck to further evaluate lymph nodes. Will refer patient to neurosurgery for evaluation.

## 2021-02-11 NOTE — TELEPHONE ENCOUNTER
Pt informed. Gave verbal understanding. States she would like a refill on Muscle relexar, and almost done with 10day lyrica. States still in pain. Referral can go to [facility], [facility] or surrounding area.

## 2021-02-12 NOTE — TELEPHONE ENCOUNTER
Pt informed. Gave verbal understanding. Requesting motrin refill to take for pain in between the times of taking lyrica.

## 2021-02-13 RX ORDER — IBUPROFEN 800 MG/1
800 TABLET ORAL 2 TIMES DAILY PRN
Qty: 60 TABLET | Refills: 2 | Status: SHIPPED | OUTPATIENT
Start: 2021-02-13 | End: 2021-04-20 | Stop reason: SDUPTHER

## 2021-02-15 ENCOUNTER — HOSPITAL ENCOUNTER (OUTPATIENT)
Dept: CT IMAGING | Age: 36
Discharge: HOME | End: 2021-02-17
Payer: COMMERCIAL

## 2021-02-15 DIAGNOSIS — R59.0 LOCALIZED ENLARGED LYMPH NODES: ICD-10-CM

## 2021-02-15 PROCEDURE — 6360000004 HC RX CONTRAST MEDICATION: Performed by: PHYSICIAN ASSISTANT

## 2021-02-15 PROCEDURE — 70491 CT SOFT TISSUE NECK W/DYE: CPT

## 2021-02-15 PROCEDURE — 2580000003 HC RX 258: Performed by: PHYSICIAN ASSISTANT

## 2021-02-15 RX ORDER — 0.9 % SODIUM CHLORIDE 0.9 %
80 INTRAVENOUS SOLUTION INTRAVENOUS ONCE
Status: COMPLETED | OUTPATIENT
Start: 2021-02-15 | End: 2021-02-15

## 2021-02-15 RX ORDER — SODIUM CHLORIDE 0.9 % (FLUSH) 0.9 %
10 SYRINGE (ML) INJECTION PRN
Status: DISCONTINUED | OUTPATIENT
Start: 2021-02-15 | End: 2021-02-18 | Stop reason: HOSPADM

## 2021-02-15 RX ADMIN — IOPAMIDOL 75 ML: 755 INJECTION, SOLUTION INTRAVENOUS at 16:35

## 2021-02-15 RX ADMIN — Medication 10 ML: at 16:36

## 2021-02-15 RX ADMIN — SODIUM CHLORIDE 80 ML: 9 INJECTION, SOLUTION INTRAVENOUS at 16:36

## 2021-02-17 ENCOUNTER — TELEPHONE (OUTPATIENT)
Dept: PRIMARY CARE CLINIC | Age: 36
End: 2021-02-17

## 2021-02-17 DIAGNOSIS — R59.0 LOCALIZED ENLARGED LYMPH NODES: Primary | ICD-10-CM

## 2021-02-17 RX ORDER — DOXYCYCLINE HYCLATE 100 MG
100 TABLET ORAL 2 TIMES DAILY
Qty: 14 TABLET | Refills: 0 | Status: SHIPPED | OUTPATIENT
Start: 2021-02-17 | End: 2021-02-23

## 2021-02-17 NOTE — TELEPHONE ENCOUNTER
Patient has CT soft tissue neck completed that revealed shotty cervical adenopathy with mildly enlarged lymph nodes without evidence of necrosis. Will prescribe patient doxycycline 100 mg twice daily for 7 days for improvement of possible infection.

## 2021-02-19 ENCOUNTER — HOSPITAL ENCOUNTER (OUTPATIENT)
Age: 36
Discharge: HOME | End: 2021-02-21
Payer: COMMERCIAL

## 2021-02-19 ENCOUNTER — HOSPITAL ENCOUNTER (OUTPATIENT)
Dept: GENERAL RADIOLOGY | Age: 36
Discharge: HOME | End: 2021-02-21
Payer: COMMERCIAL

## 2021-02-19 ENCOUNTER — OFFICE VISIT (OUTPATIENT)
Dept: NEUROLOGY | Age: 36
End: 2021-02-19
Payer: COMMERCIAL

## 2021-02-19 ENCOUNTER — HOSPITAL ENCOUNTER (OUTPATIENT)
Age: 36
Discharge: HOME | End: 2021-02-19
Payer: COMMERCIAL

## 2021-02-19 ENCOUNTER — OFFICE VISIT (OUTPATIENT)
Dept: NEUROSURGERY | Age: 36
End: 2021-02-19
Payer: COMMERCIAL

## 2021-02-19 VITALS
WEIGHT: 206 LBS | DIASTOLIC BLOOD PRESSURE: 79 MMHG | HEIGHT: 67 IN | OXYGEN SATURATION: 98 % | SYSTOLIC BLOOD PRESSURE: 117 MMHG | BODY MASS INDEX: 32.33 KG/M2 | HEART RATE: 73 BPM

## 2021-02-19 VITALS
HEART RATE: 77 BPM | WEIGHT: 206 LBS | BODY MASS INDEX: 32.33 KG/M2 | SYSTOLIC BLOOD PRESSURE: 117 MMHG | DIASTOLIC BLOOD PRESSURE: 79 MMHG | OXYGEN SATURATION: 98 % | HEIGHT: 67 IN

## 2021-02-19 DIAGNOSIS — G89.29 CHRONIC LOW BACK PAIN WITH SCIATICA, SCIATICA LATERALITY UNSPECIFIED, UNSPECIFIED BACK PAIN LATERALITY: Primary | ICD-10-CM

## 2021-02-19 DIAGNOSIS — R20.2 PARESTHESIA AND PAIN OF BOTH UPPER EXTREMITIES: ICD-10-CM

## 2021-02-19 DIAGNOSIS — M79.602 PARESTHESIA AND PAIN OF BOTH UPPER EXTREMITIES: ICD-10-CM

## 2021-02-19 DIAGNOSIS — G99.2 STENOSIS OF CERVICAL SPINE WITH MYELOPATHY (HCC): Primary | ICD-10-CM

## 2021-02-19 DIAGNOSIS — M54.40 CHRONIC LOW BACK PAIN WITH SCIATICA, SCIATICA LATERALITY UNSPECIFIED, UNSPECIFIED BACK PAIN LATERALITY: Primary | ICD-10-CM

## 2021-02-19 DIAGNOSIS — G89.29 CHRONIC NECK PAIN: ICD-10-CM

## 2021-02-19 DIAGNOSIS — M79.609 PARESTHESIA AND PAIN OF EXTREMITY: ICD-10-CM

## 2021-02-19 DIAGNOSIS — M79.601 PARESTHESIA AND PAIN OF BOTH UPPER EXTREMITIES: ICD-10-CM

## 2021-02-19 DIAGNOSIS — G99.2 STENOSIS OF CERVICAL SPINE WITH MYELOPATHY (HCC): ICD-10-CM

## 2021-02-19 DIAGNOSIS — R20.2 PARESTHESIA AND PAIN OF EXTREMITY: ICD-10-CM

## 2021-02-19 DIAGNOSIS — M62.838 MUSCLE SPASMS OF BOTH LOWER EXTREMITIES: ICD-10-CM

## 2021-02-19 DIAGNOSIS — M48.02 STENOSIS OF CERVICAL SPINE WITH MYELOPATHY (HCC): ICD-10-CM

## 2021-02-19 DIAGNOSIS — M54.2 CHRONIC NECK PAIN: ICD-10-CM

## 2021-02-19 DIAGNOSIS — M48.02 STENOSIS OF CERVICAL SPINE WITH MYELOPATHY (HCC): Primary | ICD-10-CM

## 2021-02-19 LAB
C-REACTIVE PROTEIN: 3.4 MG/L (ref 0–5)
CERULOPLASMIN: 28 MG/DL (ref 16–45)
FOLATE: 12.4 NG/ML
SEDIMENTATION RATE, ERYTHROCYTE: 4 MM (ref 0–20)
VITAMIN B-12: 577 PG/ML (ref 232–1245)

## 2021-02-19 PROCEDURE — G8417 CALC BMI ABV UP PARAM F/U: HCPCS | Performed by: STUDENT IN AN ORGANIZED HEALTH CARE EDUCATION/TRAINING PROGRAM

## 2021-02-19 PROCEDURE — 86140 C-REACTIVE PROTEIN: CPT

## 2021-02-19 PROCEDURE — 84446 ASSAY OF VITAMIN E: CPT

## 2021-02-19 PROCEDURE — 99205 OFFICE O/P NEW HI 60 MIN: CPT | Performed by: STUDENT IN AN ORGANIZED HEALTH CARE EDUCATION/TRAINING PROGRAM

## 2021-02-19 PROCEDURE — 85652 RBC SED RATE AUTOMATED: CPT

## 2021-02-19 PROCEDURE — 86038 ANTINUCLEAR ANTIBODIES: CPT

## 2021-02-19 PROCEDURE — 82390 ASSAY OF CERULOPLASMIN: CPT

## 2021-02-19 PROCEDURE — G8417 CALC BMI ABV UP PARAM F/U: HCPCS | Performed by: NURSE PRACTITIONER

## 2021-02-19 PROCEDURE — G8484 FLU IMMUNIZE NO ADMIN: HCPCS | Performed by: NURSE PRACTITIONER

## 2021-02-19 PROCEDURE — 99203 OFFICE O/P NEW LOW 30 MIN: CPT | Performed by: NURSE PRACTITIONER

## 2021-02-19 PROCEDURE — 1036F TOBACCO NON-USER: CPT | Performed by: NURSE PRACTITIONER

## 2021-02-19 PROCEDURE — 82746 ASSAY OF FOLIC ACID SERUM: CPT

## 2021-02-19 PROCEDURE — 84207 ASSAY OF VITAMIN B-6: CPT

## 2021-02-19 PROCEDURE — 36415 COLL VENOUS BLD VENIPUNCTURE: CPT

## 2021-02-19 PROCEDURE — 1036F TOBACCO NON-USER: CPT | Performed by: STUDENT IN AN ORGANIZED HEALTH CARE EDUCATION/TRAINING PROGRAM

## 2021-02-19 PROCEDURE — 82607 VITAMIN B-12: CPT

## 2021-02-19 PROCEDURE — 72040 X-RAY EXAM NECK SPINE 2-3 VW: CPT

## 2021-02-19 PROCEDURE — G8484 FLU IMMUNIZE NO ADMIN: HCPCS | Performed by: STUDENT IN AN ORGANIZED HEALTH CARE EDUCATION/TRAINING PROGRAM

## 2021-02-19 PROCEDURE — G8427 DOCREV CUR MEDS BY ELIG CLIN: HCPCS | Performed by: NURSE PRACTITIONER

## 2021-02-19 PROCEDURE — 82525 ASSAY OF COPPER: CPT

## 2021-02-19 PROCEDURE — G8427 DOCREV CUR MEDS BY ELIG CLIN: HCPCS | Performed by: STUDENT IN AN ORGANIZED HEALTH CARE EDUCATION/TRAINING PROGRAM

## 2021-02-19 RX ORDER — DULOXETIN HYDROCHLORIDE 30 MG/1
30 CAPSULE, DELAYED RELEASE ORAL DAILY
Qty: 30 CAPSULE | Refills: 3 | Status: SHIPPED | OUTPATIENT
Start: 2021-02-19 | End: 2021-04-02 | Stop reason: SDUPTHER

## 2021-02-19 ASSESSMENT — ENCOUNTER SYMPTOMS
PHOTOPHOBIA: 0
SHORTNESS OF BREATH: 0
COUGH: 0
SINUS PAIN: 0
BACK PAIN: 1
NAUSEA: 0
CONSTIPATION: 0
VOMITING: 0
ABDOMINAL PAIN: 0
EYE PAIN: 0
EYE DISCHARGE: 0
SORE THROAT: 0
DIARRHEA: 0
EYE REDNESS: 0

## 2021-02-19 NOTE — PROGRESS NOTES
Marisol Garcia  [facility] [address] [facility] [address]  Dept: [phone]    Patient:  Kale Wells  YOB: 1985  Date: 2/19/21    The patient is a 38 y.o. female who presents today for consult of the following problems:     Chief Complaint   Patient presents with    New Patient     Cervical Radiculopathy         HPI:     Kale Wells is a 38 y.o. female on whom neurosurgical consultation was requested by Jammie Martinez PA-C for management of neck pain and arm symptoms. Patient has had neck pain for the last several years. Pain has progressed and is affecting her ability to use her arms/function. Pain today is 8-9/10, described as sharp, pulsating. Movement worsens the pain, being still helps improve the pain. Mild relief with heat/cold. Has been having new issues with fingers \"locking. \" Does have tingling to bilateral palms, aching sensation to fingers. Decreased  strength. Frequently drops objects. Trouble with handwriting. Occasional dexterity problems, usually following increased activity/fatigue. Had PT more than a year ago while pregnant, aquatic therapy was the most beneficial. No recent PT. Reports feeling overall generalized fatigue to arms and legs. Occasional gait instability, does have numbness and tingling to bilateral lower extremities in varying, nondermatomal patterns.     Nocturnal Awakening: Yes  Reason: pain in legs    MYELOPATHY    Frequently dropping things: Yes  Difficulty with buttoning clothes, using zipper, putting on watch OR jewelry: Yes  Changes in handwriting: Yes  Numbness or tingling: Yes    LIMITATIONS    Pain significantly limiting on a daily basis   Daily pharmacologic pain control include: lyrica; mobic  Neck : Arm pain: 75/25    MANAGEMENT     Prior Surgery: No  Prior to 1yr ago: PT  In the last year:    Physical Therapy: No   Chiropractic Interventions: No   Injections: No  Improvement: n/a    Injections/response: n/a    History:     Past Medical History:   Diagnosis Date    Anxiety     Chronic back pain     Thyroid nodule      Past Surgical History:   Procedure Laterality Date     SECTION      x3    TUBAL LIGATION       Family History   Problem Relation Age of Onset    Diabetes Mother     High Blood Pressure Mother     Other Mother         Tremor    Seizures Mother     Heart Disease Father     Hypertension Father     Cancer Sister         Lymphoblastic lymphoma    Diabetes Maternal Uncle     Heart Attack Maternal Grandmother     Diabetes Maternal Grandmother     Kidney Disease Maternal Grandmother     Breast Cancer Paternal Aunt      Current Outpatient Medications on File Prior to Visit   Medication Sig Dispense Refill    doxycycline hyclate (VIBRA-TABS) 100 MG tablet Take 1 tablet by mouth 2 times daily for 7 days 14 tablet 0    ibuprofen (ADVIL;MOTRIN) 800 MG tablet Take 1 tablet by mouth 2 times daily as needed for Pain 60 tablet 2    pregabalin (LYRICA) 100 MG capsule Take 1 capsule by mouth 2 times daily for 10 days. 20 capsule 0    acetaminophen (APAP EXTRA STRENGTH) 500 MG tablet Take 2 tablets by mouth every 6 hours as needed for Pain 120 tablet 3    meloxicam (MOBIC) 7.5 MG tablet Take 1 tablet by mouth 2 times daily 60 tablet 3    cyclobenzaprine (FLEXERIL) 5 MG tablet Take 1 tablet by mouth nightly as needed for Muscle spasms 30 tablet 3    escitalopram (LEXAPRO) 10 MG tablet take 1 tablet by mouth once daily       No current facility-administered medications on file prior to visit.       Social History     Tobacco Use    Smoking status: Never Smoker    Smokeless tobacco: Never Used   Substance Use Topics    Alcohol use: Not Currently     Frequency: Monthly or less     Drinks per session: 1 or 2     Binge frequency: Less than monthly    Drug use: Not on file       No Known Allergies    Review of Systems  Constitutional: Negative for activity change and extension 4/5  L intrinsics 4/5; R intrinsics 4/5     L iliopsoas 5/5 , R iliopsoas 5/5  L quadriceps 5/5; R quadriceps 5/5  L Dorsiflexion 5/5; R dorsiflexion 5/5  L Plantarflexion 5/5; R plantarflexion 5/5  L EHL 5/5; R EHL 5/5    Reflexes  L Brachioradialis 2+/4; R brachioradialis 2+/4  L Biceps 2+/4; R Biceps 2+/4  L Triceps 2+/4; R Triceps 2+/4  L Patellar 1+/4: R Patellar 1+/4  L Achilles 2+/4; R Achilles 2+/4    hoffmans L: neg  hoffmans R: neg  Clonus L: neg  Clonus R: neg  Babinski L: neg  Babinski R: neg    Spurlings: Yes  Lhermittes: Yes    Tinels at elbow: Yes  Tinels at wrist: Yes  Phalens: Yes  Ok sign: No  Froments: No  Benedictine Hand: No    Atrophy of thenar: No  Atrophy of hypothenar: No    Studies Review:     EMG 7/8/2020:  1- Normal study of the bilateral upper extremities.  There is no   electrodiagnostic evidence of a generalized large fiber peripheral   polyneuropathy or a cervical radiculopathy.  Clinical correlation is   required.  Please see full EMG report. MRI cervical spine 2/9/2021:  FINDINGS:   BONES/ALIGNMENT: There is normal alignment of the spine. The vertebral body   heights are maintained.  The bone marrow signal appears unremarkable.  There   is degenerative disc disease with disc space narrowing and osteophytes at   C4-5, C5-6 and C6-7.       SPINAL CORD:  No abnormal signal is identified within the spinal cord.       SOFT TISSUES: No abnormal enhancement of the cervical spine. No paraspinal   mass identified.       There are small lymph nodes scattered throughout the neck that are   nonspecific.  They could be reactive.  There are slightly prominent for a   patient of this age.  Clinically correlate to exclude underlying pathology.       C2-C3: The thecal sac and neural foramina are intact.       C3-C4: There is a disc protrusion measuring 2 mm. The thecal sac and neural   foramina are intact.       C4-C5: There is a disc protrusion measuring 2 mm.  The thecal sac and neural foramina are intact.       C5-C6: There is a disc protrusion measuring 3-4 mm toward the left causing an   anterior indentation on the thecal sac.  The thecal sac is moderately   stenotic measuring 8.3 mm. Disc and/or osteophytes result in mild narrowing   of the neural foramina bilaterally.       C6-C7: Disc and/or osteophytes cause minimal narrowing of the neural foramina   bilaterally.  The thecal sac is not stenotic.       C7-T1:  The thecal sac and neural foramina are intact. Assessment and Plan:      1. Stenosis of cervical spine with myelopathy (HCC)          Plan: Patient presents with several year history of neck pain that has been progressive, and is now affecting both of her arms. Reports that she has a constant feeling of fatigue, weakness to bilateral upper extremities. Has started to appreciate some issues with dexterity, dropping objects, difficulty with dexterity at times, particularly after increased physical activity. Did have recent MRI complete that does show stenosis at C5-6 with indentation of the anterior portion of cord. Patient has had remote physical therapy, nothing recent. Recommend initiation of physical therapy, will also obtain flexion-extension imaging to ensure no instability. We will have the patient follow-up in the next 4 weeks with surgeon for review/recommendations. Followup: Return in about 4 weeks (around 3/19/2021), or if symptoms worsen or fail to improve. Prescriptions Ordered:  No orders of the defined types were placed in this encounter.        Orders Placed:  Orders Placed This Encounter   Procedures    XR CERVICAL SPINE FLEXION AND EXTENSION     Standing Status:   Future     Standing Expiration Date:   5/20/2021     Scheduling Instructions:      Standing flex/ex     Order Specific Question:   Reason for exam:     Answer:   evaluate for instability    [facility]     Referral Priority:   Routine     Referral Type:   [place] and

## 2021-02-19 NOTE — PROGRESS NOTES
[address] [place] [address], [facility] [address], [facility] [address], [address] [facility], [address] [place] [address]  P: [phone]  F: [phone]    NEUROLOGY CLINIC NOTE     PATIENT NAME: Edgardo Galvin  PATIENT MRN: [id number]  PRIMARY CARE PHYSICIAN: Melani Calvert PA-C    HPI:      Edgardo Galvin is a 38 y.o. right handed  female with PMH significant for chronic back pain, thyroid nodule, anxiety was seen in the clinic for neuropathy     History obtained from Patient and medical chart review. Patient has a history of chronic neck pain, getting worse for last 1-1/2-year, endorses sharp stabbing pain in the neck radiating down to bilateral upper extremities, worse on the left side as compared to the right side. She endorses constant tingling and numbness in her hands bilaterally, endorses intermittent tingling and numbness in bilateral upper extremities involving the forearm and. She also complains of weakness in her bilateral upper extremities, endorses dropping things from her hands, difficulty with her hand , difficulty holding the objects. Difficulty holding baby, endorses her hands locking up while eating. Difficulty with zipping and buttoning and dressing. She had MRI of the cervical spine done on 2/9/2021 which showed disc protrusion at C5-C6 causing moderate stenosis of the thecal sac and narrowing of the neural foramina. Multiple small lymph nodes scattered throughout the neck, mostly nonspecific and reactive. Patient was evaluated by neurosurgery today, recommend cervical flexion-extension x-rays and was referred to physical therapy. She also had EMG nerve conduction study done of bilateral upper extremities on 7/8/2020, this was done at [facility].  No images available to review, as per the report EMG study was normal.  There was no electrodiagnostic evidence of generalized large fiber peripheral polyneuropathy or cervical radiculopathy.       Patient also gives a history mg as needed for muscle spasm, endorses improvement in her symptoms with that. Denies any side effects from Flexeril.   Taking Mobic 7.5 mg 2 times a day every day      PATIENT HISTORY:     Past Medical History:   Diagnosis Date    Anxiety     Chronic back pain     Thyroid nodule         Past Surgical History:   Procedure Laterality Date     SECTION      x3    TUBAL LIGATION          Social History     Socioeconomic History    Marital status:      Spouse name: Not on file    Number of children: Not on file    Years of education: Not on file    Highest education level: Not on file   Occupational History    Not on file   Social Needs    Financial resource strain: Not hard at all   shoply insecurity     Worry: Never true     Inability: Never true   [employer] needs     Medical: No     Non-medical: No   Tobacco Use    Smoking status: Never Smoker    Smokeless tobacco: Never Used   Substance and Sexual Activity    Alcohol use: Not Currently     Frequency: Monthly or less     Drinks per session: 1 or 2     Binge frequency: Less than monthly    Drug use: Not on file    Sexual activity: Not on file   Lifestyle    Physical activity     Days per week: Not on file     Minutes per session: Not on file    Stress: Not on file   Relationships    Social connections     Talks on phone: Not on file     Gets together: Not on file     Attends Sabianist service: Not on file     Active member of club or organization: Not on file     Attends meetings of clubs or organizations: Not on file     Relationship status: Not on file    Intimate partner violence     Fear of current or ex partner: Not on file     Emotionally abused: Not on file     Physically abused: Not on file     Forced sexual activity: Not on file   Other Topics Concern    Not on file   Social History Narrative    Not on file        Current Outpatient Medications   Medication Sig Dispense Refill    DULoxetine (CYMBALTA) 30 MG extended release capsule Take 1 capsule by mouth daily 30 capsule 3    doxycycline hyclate (VIBRA-TABS) 100 MG tablet Take 1 tablet by mouth 2 times daily for 7 days 14 tablet 0    ibuprofen (ADVIL;MOTRIN) 800 MG tablet Take 1 tablet by mouth 2 times daily as needed for Pain 60 tablet 2    pregabalin (LYRICA) 100 MG capsule Take 1 capsule by mouth 2 times daily for 10 days. 20 capsule 0    acetaminophen (APAP EXTRA STRENGTH) 500 MG tablet Take 2 tablets by mouth every 6 hours as needed for Pain 120 tablet 3    meloxicam (MOBIC) 7.5 MG tablet Take 1 tablet by mouth 2 times daily 60 tablet 3    cyclobenzaprine (FLEXERIL) 5 MG tablet Take 1 tablet by mouth nightly as needed for Muscle spasms 30 tablet 3     No current facility-administered medications for this visit. No Known Allergies     REVIEW OF SYSTEMS:     Review of Systems   Constitutional: Negative for chills, diaphoresis, fever and unexpected weight change. HENT: Negative for congestion, ear discharge, ear pain, hearing loss, sinus pain and sore throat. Eyes: Negative for photophobia, pain, discharge, redness and visual disturbance. Respiratory: Negative for cough and shortness of breath. Cardiovascular: Negative for chest pain, palpitations and leg swelling. Gastrointestinal: Negative for abdominal pain, constipation, diarrhea, nausea and vomiting. Endocrine: Negative for polydipsia and polyuria. Genitourinary: Negative for difficulty urinating and hematuria. Musculoskeletal: Positive for arthralgias, back pain, gait problem, neck pain and neck stiffness. Skin: Negative for pallor and rash. Neurological: Positive for numbness. Negative for dizziness, tremors, seizures, syncope, facial asymmetry, speech difficulty, weakness, light-headedness and headaches. Hematological: Does not bruise/bleed easily. Psychiatric/Behavioral: Negative for agitation, behavioral problems and hallucinations.         VITALS  /79 (Site: Right Upper equivocal.     Gait                  Normal station and gait  Unable to perform heel walking, was able to perform toe and tandem walking. PRIOR TESTS AND IMAGING: Following images and Labs were reviewed by the examiner     MRI lumbar spine without contrast 1/24/2020  FINDINGS: No comparisons. Normal alignment of the lumbar spine. No acute fractures or subluxations. The conus terminates at L1 which is within normal limits. Marrow signal is within normal limits. No significant spinal stenosis. At T12-L1, L1-L2, L2-L3, and L3-L4 there is no significant disc   herniation, spinal stenosis, or neuroforaminal narrowing. At L4-L5 there is mild broad base disc bulge and mild degenerative facet joint disease causing minimal narrowing of the inferior neural foramina bilaterally without effacement of the exiting nerve roots. At L5-S1, there is minimal posterior disc bulge not causing significant spinal stenosis or neuroforaminal narrowing. IMPRESSION:  1. Mild disc bulge at L4-5 and L5-S1 not causing significant spinal stenosis or neuroforaminal narrowing. 2.  Mild facet joint arthritis in the lower lumbar spine. 3.  Normal alignment of the lumbar spine without fractures or subluxations seen. EMG nerve conduction study 12/23/2019 bilateral lower extremities  1-Abnormal study of the left lower extremity and normal study of the right   lower extremity.  There is electrodiagnostic evidence suggestive of a   chronic mild left L5 radiculopathy without active denervation.  There is   no electrodiagnostic evidence of a generalized large fiber peripheral   polyneuropathy.  Clinical correlation is required.  Please see full EMG   report.       EMG nerve conduction studies bilateral upper extremity 7/8/2020  1- Normal study of the bilateral upper extremities.  There is no   electrodiagnostic evidence of a generalized large fiber peripheral   polyneuropathy or a cervical radiculopathy.  Clinical correlation is   required.  Please see full EMG report. MRI brain without contrast 7/15/2020  IMPRESSION:  Unremarkable unenhanced MRI of the brain. MRI cervical spine without contrast 2/9/2021  FINDINGS:   BONES/ALIGNMENT: There is normal alignment of the spine. The vertebral body   heights are maintained.  The bone marrow signal appears unremarkable.  There   is degenerative disc disease with disc space narrowing and osteophytes at   C4-5, C5-6 and C6-7.       SPINAL CORD:  No abnormal signal is identified within the spinal cord.       SOFT TISSUES: No abnormal enhancement of the cervical spine. No paraspinal   mass identified.       There are small lymph nodes scattered throughout the neck that are   nonspecific.  They could be reactive.  There are slightly prominent for a   patient of this age.  Clinically correlate to exclude underlying pathology.       C2-C3: The thecal sac and neural foramina are intact.       C3-C4: There is a disc protrusion measuring 2 mm. The thecal sac and neural   foramina are intact.       C4-C5: There is a disc protrusion measuring 2 mm. The thecal sac and neural   foramina are intact.       C5-C6: There is a disc protrusion measuring 3-4 mm toward the left causing an   anterior indentation on the thecal sac.  The thecal sac is moderately   stenotic measuring 8.3 mm.  Disc and/or osteophytes result in mild narrowing   of the neural foramina bilaterally.       C6-C7: Disc and/or osteophytes cause minimal narrowing of the neural foramina   bilaterally.  The thecal sac is not stenotic.       C7-T1:  The thecal sac and neural foramina are intact.           Impression   Disc protrusion at C5-6 causing moderate stenosis of the thecal sac and   narrowing of the neural foramina as discussed above.       Multiple small lymph nodes scattered throughout the neck that are   nonspecific.  They may be reactive.  The number of lymph nodes is concerning   in a patient of this age.  Clinically correlate habits and coordinating care.         Electronically signed by Sam Thibodeaux MD on 2/19/2021 at 11:05 AM

## 2021-02-21 LAB — COPPER: 140.9 UG/DL (ref 80–155)

## 2021-02-22 DIAGNOSIS — M54.14 THORACIC RADICULOPATHY: ICD-10-CM

## 2021-02-22 DIAGNOSIS — Z76.0 MEDICATION REFILL: Primary | ICD-10-CM

## 2021-02-22 DIAGNOSIS — M54.12 CERVICAL RADICULOPATHY: ICD-10-CM

## 2021-02-22 LAB
ALPHA-TOCOPHEROL: 8.1 MG/L (ref 5.5–18)
ANTI-NUCLEAR ANTIBODY (ANA): NEGATIVE
GAMMA-TOCOPHEROL: 1.2 MG/L (ref 0–6)

## 2021-02-23 ENCOUNTER — TELEPHONE (OUTPATIENT)
Dept: PAIN MANAGEMENT | Age: 36
End: 2021-02-23

## 2021-02-23 LAB — VITAMIN B6: 34.1 NMOL/L (ref 20–125)

## 2021-02-23 RX ORDER — PREGABALIN 100 MG/1
100 CAPSULE ORAL 2 TIMES DAILY
Qty: 60 CAPSULE | Refills: 0 | Status: SHIPPED | OUTPATIENT
Start: 2021-02-23 | End: 2021-03-15 | Stop reason: SDUPTHER

## 2021-02-23 NOTE — TELEPHONE ENCOUNTER
Spoke with patient and given consult appt. For 4/12/21 at 1:20pm. Chart review with RN scheduled for 4/5/21 at 1:00pm. Patient verbalized understanding.

## 2021-02-23 NOTE — TELEPHONE ENCOUNTER
Patient states she is still having symptoms. She is nauseous but she is trying to finish her last 2 tabs. She also wanted you to know that the lyrica 100 mg is working and that she is taking it with Cymbalta at night. They work well together. She is out of Lyrica and need a refill.

## 2021-02-25 ENCOUNTER — HOSPITAL ENCOUNTER (OUTPATIENT)
Dept: PHYSICAL THERAPY | Age: 36
Setting detail: THERAPIES SERIES
Discharge: HOME | End: 2021-02-25
Payer: COMMERCIAL

## 2021-02-25 ENCOUNTER — TELEPHONE (OUTPATIENT)
Dept: PAIN MANAGEMENT | Age: 36
End: 2021-02-25

## 2021-02-25 PROCEDURE — 97110 THERAPEUTIC EXERCISES: CPT

## 2021-02-25 PROCEDURE — 97162 PT EVAL MOD COMPLEX 30 MIN: CPT

## 2021-02-25 NOTE — CONSULTS
[x] HERMIN[facility]  Outpatient Physical Therapy  [address]  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 She endorses constant tingling and numbness in her hands bilaterally, endorses intermittent tingling and numbness in bilateral upper extremities involving the forearm and. She also complains of weakness in her bilateral upper extremities, endorses dropping things from her hands, difficulty with her hand , difficulty holding the objects. Difficulty holding baby, endorses her hands locking up while eating. Difficulty with zipping and buttoning and dressing. Patient also gives a history of chronic back pain, started in . She had received epidural injection for her labor, post epidural injection she started having back pain. She endorses sharp stabbing pain in her lower back radiating to bilateral lower extremities, pain worse on the right side specially her right buttock radiating to bilateral lower extremities, she feels more weaker in the left lower extremity. Endorses constant tingling and numbness in her bilateral lower extremities, specially at nighttime she feels as if her repeat feet on fire. Endorses difficulty with walking specially for a long period of time.   Denies any problem with bowel or bladder function, has a history of urinary urgency but denies any bladder accidents. Feels unsteady while ambulation, denies any falls. Does not use any aids for walking. Denies any perineal numbness or saddle anesthesia. PMHx: [] Unremarkable [] Diabetes [] HTN  [] Pacemaker   [] MI/Heart Problems [] Cancer [] Arthritis [] Asthma                         [x] refer to full medical chart  In [facility]  [] Other:        Comorbidities:   [x] Obesity [] Dialysis  [] Other:   [] Asthma/COPD [] Dementia [] Other:   [] Stroke [] Sleep apnea [] Other:   [] Vascular disease [] Rheumatic disease [] Other:     Tests: [x] X-Ray: [x] MRI:  [x] Other: EMG  She had MRI of the cervical spine done on 2/9/2021 which showed disc protrusion at C5-C6 causing moderate stenosis of the thecal sac and narrowing of the neural foramina. She also had EMG nerve conduction study done of bilateral upper extremities on 7/8/2020, this was done at [facility].  No images available to review, as per the report EMG study was normal.  There was no electrodiagnostic evidence of generalized large fiber peripheral polyneuropathy or cervical radiculopathy. MRI of the lumbar spine done on 1/24/2020 showed mild disc bulge at L4-L5 and L5-S1 not causing significant spinal stenosis or neuroforaminal narrowing. Mild facet joint arthritis in the lower lumbar spine. Normal alignment of the lumbar spine without fractures or subluxations. EMG nerve conduction study of bilateral lower extremities was done on 12/23/2019 which showed chronic mild left L5 radiculopathy without active denervation. No electrodiagnostic evidence of generalized large fiber peripheral polyneuropathy.     Medications: [x] Refer to full medical record [] None [] Other:  Allergies:      [x] Refer to full medical record  [] None [] Other:    Working:  [x] Full-time  [] Part-time  [] Off d/t condition  [] Retired  [] Disability  [] N/A  Job/Employer:  Works at home on [employer], [employer]    Pain:  [x] Yes  [] No Location: neck,B UE, low back  Pain Rating: (0-10 scale) 7/10  Pain altered Tx:  [] Yes  [x] No  Action:    Objective: p = pain, L = Lacks      ROM  ° A/P STRENGTH  ROM    Left Right Left Right Cervical    Shoulder Flex   4-/5 4-/5 Flexion 30   Abduction   4-/5 4-/5 Extension 10,p   ER   4-/5 4-/5 Rotation L 40 R 40   IR   4-/5 4-/5 Side bending L 30 R 30   Elbow Flex   4-/5 4-/5      Ext   4-/5 4-/5      Wrist Flex         Ext     Lumbar    Hand    25 25 Flexion 50%   Hip Flexion      Extension 10%,p   Ext     Rotation L 75% R 75%   Abd     Sidebend L 75% R75%   Add     -------------------- --------- --------   ER     STRENGTH     IR     Abdominals 4-/5    Knee Flex     Erector Spinae 4-/5    Ext      Scapular L R   Ankle DF     -Scaption     PF      -Retraction     Inversion     -Horz Abd     Eversion     -Extension        Special Tests:   Positive:Spurlings bilateral        Negative:     OBSERVATION Comments   Posture Mild fwd head    Joint Alignment No deficit    Gait Slow speed, wide SHAE, no instance of instability during today's session, patient does report near falls where left leg gives out   Palpation Diffuse tenderness along cervical and lumbar paraspinals   Edema No deficit    Neurological Bilateral numbness, tingling, weakness in UE and LE. Patient notes severe burning sensation on the soles of her feet daily. Assessment: Patient presents with significant cervical myelopathy and lumbar radiculopathy. Patient may benefit form PT to restore UE strength, core strength, tolerance to physical activity, and attenuate pain. Problems:    [x] ? Pain: 7/10 neck, UE, low back   [x] ? ROM: Cervical all planes of motion, Lumbar flexion and extension   [x] ? Flexibility: upper traps, hamstrings  [x] ? Strength: deltoids, , general weakness in LE   [x] ? Function: NDI 52% impaired  [] Other:    STG: (to be met in 8 treatments)  1. ? Pain: 4/10 neck and UE pain with daily activity. 2. ? ROM: Improve cervical rotation to 50 degrees and extension to any pain free motion. 3. ? Strength:B  strength to 35 lbs to improve grasping and lifting. 4. ? Function: NDI score to 32% impaired or better to improve ADLs. 5. Independent with Home Exercise Programs    LTG: (to be met in 12 treatments)  1. Patient is will report improved sleep quality. 2. Patient will be able to lift and carry her child with decreased pain. Patient goals: Reduce neck and back pain, improve UE function and activity tolerance. Rehab Potential:  [x] Good  [] Fair  [] Poor   Suggested Professional Referral:  [x] No  [] Yes:  Barriers to Goal Achievement[de-identified]  [x] No  [] Yes:  Domestic Concerns:  [x] No  [] Yes:    Pt. Education:  [x] Plans/Goals, Risks/Benefits discussed  [x] Home exercise program: See chart below  Method of Education: [x] Verbal  [x] Demo  [x] Written  Comprehension of Education:  [x] Verbalizes understanding. [x] Demonstrates understanding. [x] Needs Review. [] Demonstrates/verbalizes understanding of HEP/Ed previously given. Treatment Plan:  [x] Therapeutic Exercise   27449  [x] Vasopneumatic cold with compression  31811    [x] Therapeutic Activity  34171 [x] Cold/hotpack    [x] Gait Training   71602 [x] Lumbar/Cervical Traction  [id number]   [x] Neuromuscular Re-education  08890 [x] Electrical Stimulation Unattended  32912   [x] Manual Therapy    84153 [x] Electrical Stimulation Attended  [id number]   [] Iontophoresis: 4 mg/mL Dexamethasone Sodium Phosphate  mAmin  29260 []  Medication allergies reviewed for use of   Dexamethasone Sodium Phosphate 4mg/ml with iontophoresis treatments. Pt is not allergic. Frequency:  2x/week for 12 visits    Todays Treatment:  Precautions:  Modalities:   Exercises:  Exercise Reps/ Time Weight/ Level Comments   Cervical ROM  10x     Scap retraction  10x     Rev.  Shoulder rolls  10x      Ball   10x Other:Patient very limited with all attempted exercises    Specific Instructions for next treatment[de-identified] Gentle cervical ROM and postural exercises trial manual cervical traction.        Evaluation Complexity:  History (Personal factors, comorbidities) [] 0 [] 1-2 [x] 3+   Exam (limitations, restrictions) [] 1-2 [] 3 [x] 4+   Clinical presentation (progression) [] Stable [x] Evolving  [] Unstable   Decision Making [] Low [x] Moderate [] High    [] Low Complexity [x] Moderate Complexity [] High Complexity       Treatment Charges: Mins Units   [x] Evaluation       []  Low       [x]  Moderate       []  High 30 1   []  Modalities     [x]  Ther Exercise 15 1   []  Manual Therapy     []  Ther Activities     []  Aquatics     []  Vasocompression     []  Other       TOTAL TREATMENT TIME: 45      Time DR:9289     Time out:1700    Electronically signed by: Justin Luciano PT

## 2021-02-26 ENCOUNTER — TELEPHONE (OUTPATIENT)
Dept: PRIMARY CARE CLINIC | Age: 36
End: 2021-02-26

## 2021-02-26 ASSESSMENT — PAIN DESCRIPTION - ORIENTATION: ORIENTATION: RIGHT;LEFT

## 2021-02-26 ASSESSMENT — ENCOUNTER SYMPTOMS
SINUS PRESSURE: 1
BACK PAIN: 1
EYES NEGATIVE: 1
CONSTIPATION: 1
ALLERGIC/IMMUNOLOGIC NEGATIVE: 1
RESPIRATORY NEGATIVE: 1

## 2021-02-26 ASSESSMENT — PAIN - FUNCTIONAL ASSESSMENT: PAIN_FUNCTIONAL_ASSESSMENT: PREVENTS OR INTERFERES SOME ACTIVE ACTIVITIES AND ADLS

## 2021-02-26 ASSESSMENT — PAIN DESCRIPTION - FREQUENCY: FREQUENCY: CONTINUOUS

## 2021-02-26 ASSESSMENT — PAIN DESCRIPTION - ONSET: ONSET: ON-GOING
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
## 2021-02-26 NOTE — TELEPHONE ENCOUNTER
Patient called on 2/25/21, is requesting a work excuse for needing time off due to PT and other appointments.  Patients stated that she has discussed this previously with provider and agreed, pls advise    Health Maintenance   Topic Date Due    Hepatitis C screen  1985    Varicella vaccine (1 of 2 - 2-dose childhood series) 10/17/1985    Flu vaccine (1) 09/01/2020    Cervical cancer screen  04/29/2022    DTaP/Tdap/Td vaccine (2 - Td) 09/10/2029    HIV screen  Completed    Hepatitis A vaccine  Aged Out    Hepatitis B vaccine  Aged Out    Hib vaccine  Aged Out    Meningococcal (ACWY) vaccine  Aged Out    Pneumococcal 0-64 years Vaccine  Aged Out             (applicable per patient's age: Cancer Screenings, Depression Screening, Fall Risk Screening, Immunizations)    Hemoglobin A1C (%)   Date Value   12/10/2020 5.1     LDL Cholesterol (mg/dL)   Date Value   12/10/2020 83     AST (U/L)   Date Value   12/10/2020 12     ALT (U/L)   Date Value   12/10/2020 12     BUN (mg/dL)   Date Value   12/10/2020 14      (goal A1C is < 7)   (goal LDL is <100) need 30-50% reduction from baseline     BP Readings from Last 3 Encounters:   02/19/21 117/79   02/19/21 117/79   01/11/21 123/88    (goal /80)      All Future Testing planned in [facility]:  Lab Frequency Next Occurrence   MRI THORACIC SPINE W WO CONTRAST Once 05/09/2021       Next Visit Date:  Future Appointments   Date Time Provider Thuy Tao   3/1/2021  5:15 PM Kelly Carrington PT [facility]   3/2/2021 11:40 AM Cliff Delong PA-C [address]   3/4/2021  2:00 PM Andre Montemayor MD [address]   3/4/2021  5:15 PM Kelly Carrington PT [facility]   3/19/2021  3:30 PM [address], APRN - CNP [facility]   5/20/2021  8:30 AM [facility]            Patient Active Problem List:     Chronic low back pain with sciatica     Thyroid nodule

## 2021-03-01 ENCOUNTER — HOSPITAL ENCOUNTER (OUTPATIENT)
Dept: PHYSICAL THERAPY | Age: 36
Setting detail: THERAPIES SERIES
Discharge: HOME | End: 2021-03-01
Payer: COMMERCIAL

## 2021-03-01 PROCEDURE — 97110 THERAPEUTIC EXERCISES: CPT

## 2021-03-01 NOTE — FLOWSHEET NOTE
[x] Pl[facility]  Outpatient Rehabilitation &  Therapy  [address]  [phone]  F: [phone] [] [address]  [facility]   [address]  P: [phone]  F: [phone] [] [place]  [address]  P: [phone]  F: [phone] [] [address]  P: [phone]  F: [phone] [] [address]  [facility]   [address]   [place]: [phone]  F: [phone]      Physical Therapy Daily Treatment Note    Date:  3/1/2021  Patient Name:  Jerel Arizmendi    :  1985  MRN: [id number]  Physician: Denise Earl CNP         Insurance: [facility] 21 remaining  Medical Diagnosis: Stenosis of cervical spine with myelopathy M48.02                        Rehab Codes: M48.02, M54.5, R26.2  Onset Date: 21                                Next 's appt: Pain Mgmt 3/4, NS 3/19  Visit# / total visits:     Cancels/No Shows: 0/0    Subjective:    Pain:  [x] Yes  [] No Location: neck, B UE  Pain Rating: (0-10 scale) 8/10  Pain altered Tx:  [] No  [x] Yes  Action:limited exercise   Comments:Patient reports intense pain yesterday and today neck to mid back and R shoulder, very tender to touch and moving her R shoulder increases her pain.      Objective:  Modalities:   Precautions:  Exercises:  Exercise Reps/ Time Weight/ Level Comments   UBE  2 min  0 Unable to tolerate beyond listed time   Band rows 8x           Seated shoulder rolls  5x  Unable to tolerate   Foam   15x green    Seated bicep curl  20x 1 lb    Seated chest press  10x     Seated shoulder flexion  15x     Foam blocks  20x  Left hand only, transfer blocks from lid to bucket                      Other:      Treatment Charges: Mins Units   []  Modalities     [x]  Ther Exercise 30 2   [] Manual Therapy     []  Ther Activities     []  Aquatics     []  Vasocompression     []  Other     Total Treatment time 30 2       Assessment: [] Progressing toward goals. [x] No change. Very poor tolerance to all attempted exercises. Repetitions we're very limited due to patient's severe pain with all UE movement. B UE weakness is unchanged. Very challenging treatment this date. [] Other:  [x]  Patient may benefit form PT to restore UE strength, core strength, tolerance to physical activity, and attenuate pain. STG/LTG  STG: (to be met in 8 treatments)  1. ? Pain: 4/10 neck and UE pain with daily activity. 2. ? ROM: Improve cervical rotation to 50 degrees and extension to any pain free motion. 3. ? Strength:B  strength to 35 lbs to improve grasping and lifting. 4. ? Function: NDI score to 32% impaired or better to improve ADLs. 5. Independent with Home Exercise Programs     LTG: (to be met in 12 treatments)  1. Patient is will report improved sleep quality. 2. Patient will be able to lift and carry her child with decreased pain.      Pt. Education:  [x] Yes  [] No  [x] Reviewed Prior HEP/Ed  Method of Education: [x] Verbal  [] Demo  [] Written  Comprehension of Education:  [x] Verbalizes understanding. [x] Demonstrates understanding. [x] Needs review. [] Demonstrates/verbalizes HEP/Ed previously given. Plan: [x] Continue current frequency toward long and short term goals.     [x] Specific Instructions for subsequent treatments: B shoulder ROM exercises      Time In:1725          Time Out: 1800    Electronically signed by:  Kimberly Ness PT

## 2021-03-02 ENCOUNTER — TELEMEDICINE (OUTPATIENT)
Dept: PRIMARY CARE CLINIC | Age: 36
End: 2021-03-02
Payer: COMMERCIAL

## 2021-03-02 DIAGNOSIS — M50.30 DDD (DEGENERATIVE DISC DISEASE), CERVICAL: ICD-10-CM

## 2021-03-02 DIAGNOSIS — G89.29 CHRONIC BACK PAIN, UNSPECIFIED BACK LOCATION, UNSPECIFIED BACK PAIN LATERALITY: ICD-10-CM

## 2021-03-02 DIAGNOSIS — E66.09 CLASS 1 OBESITY DUE TO EXCESS CALORIES WITH SERIOUS COMORBIDITY AND BODY MASS INDEX (BMI) OF 32.0 TO 32.9 IN ADULT: ICD-10-CM

## 2021-03-02 DIAGNOSIS — R29.898 WEAKNESS OF BOTH UPPER EXTREMITIES: ICD-10-CM

## 2021-03-02 DIAGNOSIS — M54.12 CERVICAL RADICULOPATHY: Primary | ICD-10-CM

## 2021-03-02 DIAGNOSIS — M54.9 CHRONIC BACK PAIN, UNSPECIFIED BACK LOCATION, UNSPECIFIED BACK PAIN LATERALITY: ICD-10-CM

## 2021-03-02 PROCEDURE — 99214 OFFICE O/P EST MOD 30 MIN: CPT | Performed by: PHYSICIAN ASSISTANT

## 2021-03-02 PROCEDURE — G8427 DOCREV CUR MEDS BY ELIG CLIN: HCPCS | Performed by: PHYSICIAN ASSISTANT

## 2021-03-02 ASSESSMENT — ENCOUNTER SYMPTOMS
CHEST TIGHTNESS: 1
SHORTNESS OF BREATH: 0
WHEEZING: 0

## 2021-03-02 NOTE — PROGRESS NOTES
Visit Information    Have you changed or started any medications since your last visit including any over-the-counter medicines, vitamins, or herbal medicines? no   Are you having any side effects from any of your medications? -  no  Have you stopped taking any of your medications? Is so, why? -  no    Have you seen any other physician or provider since your last visit? No  Have you had any other diagnostic tests since your last visit? No  Have you been seen in the emergency room and/or had an admission to a hospital since we last saw you? No  Have you had your routine dental cleaning in the past 6 months? no    Have you activated your fitaborate account? If not, what are your barriers?  Yes     Patient Care Team:  Mackenzie Yen PA-C as PCP - General (Physician Assistant)  Mackenzie Yen PA-C as PCP - [facility]    Medical History Review  Past Medical, Family, and Social History reviewed and does not contribute to the patient presenting condition    Health Maintenance   Topic Date Due    Hepatitis C screen  1985    Varicella vaccine (1 of 2 - 2-dose childhood series) 10/17/1985    Flu vaccine (1) 09/01/2020    Cervical cancer screen  04/29/2022    DTaP/Tdap/Td vaccine (2 - Td) 09/10/2029    HIV screen  Completed    Hepatitis A vaccine  Aged Out    Hepatitis B vaccine  Aged Out    Hib vaccine  Aged Out    Meningococcal (ACWY) vaccine  Aged Out    Pneumococcal 0-64 years Vaccine  Aged Out

## 2021-03-02 NOTE — PROGRESS NOTES
James Mohr is a 38 y.o. female evaluated virtual visit via Doxy. me video on 3/2/2021. Consent:  She and/or health care decision maker is aware that that she may receive a bill for this telephone service, depending on her insurance coverage, and has provided verbal consent to proceed: NA - Consent obtained within past 12 months      Documentation:  I communicated with the patient and/or health care decision maker about cervical radiculopathy. Details of this discussion including any medical advice provided below      I affirm this is a Patient Initiated Episode with an Established Patient who has not had a related appointment within my department in the past 7 days or scheduled within the next 24 hours. Total Time: minutes: 21-30 minutes    Note: not billable if this call serves to triage the patient into an appointment for the relevant concern      Ihsan Davidson                  3/2/2021    TELEHEALTH EVALUATION -- Audio/Visual (During MTRVS-27 public health emergency)    Patient and physician are located in their individual homes    HPI:    James Mohr (:  1985) has requested an audio only/video evaluation for the following concern(s):    Patient is here for virtual visit via video for follow-up for cervical radiculopathy. Patient states she is compliant with medications. Patient was seen by neurosurgery in 2021, cervical spine x-ray was repeated. Patient was referred to physical therapy, patient has completed 2/12 sessions, will be follow-up with neurosurgery in 3/2021. Patient was seen by neurology in 2021, started on Cymbalta which has had \"some improvement\" with her symptoms. Patient was sent for lab work that she completed, unremarkable. Patient is scheduled to see pain management on 3/4/2021 for chronic back pain. Patient voiced \"worsening weakness\" in upper extremities, patient states \"have not been able to drive\".     Patient was offered FMLA to be completed for her job but states \"have not been a job for a year, do not qualify for [employer]". Discussed letter to excuse patient from work but patient was informed job does not have to keep her position open for her, also instructed patient to see if she qualifies for disability for her job or from the state. Discussed chronic back pain, cervical radiculopathy, medications in depth with patient, patient is on Lyrica which PCP instructed patient is increased to 3 times daily, per [place] she will have 15 days of medication from today, will need a refill on 3/1720 21, instructed patient to contact PCP office on 3/10/2021, inform patient we will also increase his Cymbalta to 60 mg to see if better improvement of pain, patient voiced understanding. Patient is reporting 5 pound weight loss. Discussed weight loss in depth with patient. Labs reviewed. Health maintenance reviewed. Medication reviewed. HPI    Review of Systems   Constitutional: Negative for chills and fever. HENT: Negative for congestion. Respiratory: Positive for chest tightness. Negative for cough, shortness of breath and wheezing. Cardiovascular: Negative for chest pain. Gastrointestinal: Negative for constipation, diarrhea, nausea and vomiting. Genitourinary: Positive for urgency (\"not urgency\"). Negative for dysuria and frequency. Musculoskeletal: Negative for back pain, myalgias and neck pain. Neurological: Negative for headaches. Prior to Visit Medications    Medication Sig Taking? Authorizing Provider   pregabalin (LYRICA) 100 MG capsule Take 1 capsule by mouth 2 times daily for 30 days.  Yes Socorro Lau PA-C   DULoxetine (CYMBALTA) 30 MG extended release capsule Take 1 capsule by mouth daily Yes Deacon Chavarria MD   ibuprofen (ADVIL;MOTRIN) 800 MG tablet Take 1 tablet by mouth 2 times daily as needed for Pain Yes Socorro Lau PA-C   acetaminophen (APAP EXTRA STRENGTH) 500 MG tablet Take 2 tablets by mouth every 6 hours as needed for Pain Yes Carmen Porras MD   cyclobenzaprine (FLEXERIL) 5 MG tablet Take 1 tablet by mouth nightly as needed for Muscle spasms Yes Malinda Barba PA-C   Handicap Placard MISC by Does not apply route 1 years  Malinda Barba PA-C       Social History     Tobacco Use    Smoking status: Never Smoker    Smokeless tobacco: Never Used   Substance Use Topics    Alcohol use: Yes     Frequency: Monthly or less     Drinks per session: 1 or 2     Binge frequency: Less than monthly     Comment: occasional    Drug use: Never        Past Medical History:   Diagnosis Date    Anxiety     Chronic back pain     Thyroid nodule             CBC:  Lab Results   Component Value Date    WBC 7.6 12/10/2020    HGB 13.7 12/10/2020     12/10/2020       BMP:    Lab Results   Component Value Date     12/10/2020    K 4.8 12/10/2020     12/10/2020    CO2 25 12/10/2020    BUN 14 12/10/2020    CREATININE 0.73 12/10/2020    GLUCOSE 91 12/10/2020       HEMOGLOBIN A1C:   Lab Results   Component Value Date    LABA1C 5.1 12/10/2020       FASTING LIPID PANEL:  Lab Results   Component Value Date    CHOL 157 12/10/2020    HDL 60 12/10/2020    TRIG 71 12/10/2020         RECORD REVIEW: Previous medical records were reviewed at today's visit. PHYSICAL EXAMINATION:    Vital Signs: (As obtained by patient/caregiver at home)    Patient-Reported Vitals 12/1/2020   Patient-Reported Weight 210.0lb   Patient-Reported Height 5'7.5          Physical Exam  Constitutional:       General: She is awake. Appearance: Normal appearance. She is well-developed and well-groomed. She is obese. HENT:      Head: Normocephalic and atraumatic. Right Ear: Hearing and external ear normal.      Left Ear: Hearing and external ear normal.      Nose: Nose normal.      Mouth/Throat:      Lips: Pink.    Eyes:      General: Lids are normal.      Conjunctiva/sclera: Conjunctivae normal.   Neck: Musculoskeletal: Neck supple. Pulmonary:      Effort: Pulmonary effort is normal.   Musculoskeletal:         General: No deformity or signs of injury. Neurological:      Mental Status: She is alert and oriented to person, place, and time. Psychiatric:         Attention and Perception: Attention and perception normal.         Mood and Affect: Mood normal.         Speech: Speech normal.         Behavior: Behavior is cooperative. Thought Content: Thought content normal.         Cognition and Memory: Cognition normal.         Judgment: Judgment normal.           Other pertinent observable physical exam findings:-     RECORD REVIEW: Previous medical records were reviewed at today's visit. The past family, medical and social histories were reviewed and unchanged with the exceptions of what is mentioned in this note. Due to this being a TeleHealth encounter, evaluation of the following organ systems is limited: Vitals/Constitutional/EENT/Resp/CV/GI//MS/Neuro/Skin/Heme-Lymph-Imm. ASSESSMENT/PLAN:  Ronni Carl was seen today for follow-up. Diagnoses and all orders for this visit:    Cervical radiculopathy    Chronic back pain, unspecified back location, unspecified back pain laterality    Weakness of both upper extremities    DDD (degenerative disc disease), cervical    Class 1 obesity due to excess calories with serious comorbidity and body mass index (BMI) of 32.0 to 32.9 in adult        Return in about 1 month (around 4/2/2021) for Neck Pain, Back Pain. An  electronic signature was used to authenticate this note. --Jesus Alberto Garcia PA-C on 3/14/2021 at 7:42 PM    This note is created with the assistance of a speech-recognition program. While intending to generate a document that actually reflects the content of the visit, the document can still have some mistakes which may not have been identified and corrected by editing. 9    Pursuant to the emergency declaration under the [address] and the [employer] Act, [facility] waiver authority and the Coronavirus Preparedness and [employer] Act, this Virtual  Visit was conducted, with patient's consent, to reduce the patient's risk of exposure to COVID-19 and provide continuity of care for an established patient. Services were provided through a video synchronous discussion virtually to substitute for in-person clinic visit.

## 2021-03-04 ENCOUNTER — HOSPITAL ENCOUNTER (OUTPATIENT)
Dept: PAIN MANAGEMENT | Age: 36
Discharge: HOME | End: 2021-03-04
Payer: COMMERCIAL

## 2021-03-04 VITALS
HEART RATE: 85 BPM | HEIGHT: 67 IN | OXYGEN SATURATION: 98 % | DIASTOLIC BLOOD PRESSURE: 89 MMHG | SYSTOLIC BLOOD PRESSURE: 127 MMHG | BODY MASS INDEX: 32.33 KG/M2 | TEMPERATURE: 98.7 F | RESPIRATION RATE: 16 BRPM | WEIGHT: 206 LBS

## 2021-03-04 DIAGNOSIS — M51.36 DEGENERATION OF INTERVERTEBRAL DISC OF LUMBAR SPINE WITHOUT DISC HERNIATION: ICD-10-CM

## 2021-03-04 DIAGNOSIS — G89.29 CHRONIC LOW BACK PAIN WITH SCIATICA, SCIATICA LATERALITY UNSPECIFIED, UNSPECIFIED BACK PAIN LATERALITY: ICD-10-CM

## 2021-03-04 DIAGNOSIS — M54.16 LUMBAR RADICULOPATHY: Primary | ICD-10-CM

## 2021-03-04 DIAGNOSIS — M54.40 CHRONIC LOW BACK PAIN WITH SCIATICA, SCIATICA LATERALITY UNSPECIFIED, UNSPECIFIED BACK PAIN LATERALITY: ICD-10-CM

## 2021-03-04 PROCEDURE — 99203 OFFICE O/P NEW LOW 30 MIN: CPT

## 2021-03-04 PROCEDURE — 80307 DRUG TEST PRSMV CHEM ANLYZR: CPT

## 2021-03-04 ASSESSMENT — ENCOUNTER SYMPTOMS
ABDOMINAL PAIN: 0
NAUSEA: 0
DIARRHEA: 0
EYE PAIN: 0
SORE THROAT: 0
SHORTNESS OF BREATH: 0
APNEA: 0

## 2021-03-08 ENCOUNTER — HOSPITAL ENCOUNTER (OUTPATIENT)
Dept: PHYSICAL THERAPY | Age: 36
Setting detail: THERAPIES SERIES
Discharge: HOME | End: 2021-03-08
Payer: COMMERCIAL

## 2021-03-08 PROCEDURE — 97110 THERAPEUTIC EXERCISES: CPT

## 2021-03-08 NOTE — FLOWSHEET NOTE
[x] [facility]) [facility] &  Therapy  [address]  [phone]  F: [phone] [] [address]  [facility]   [address]  P: [phone]  F: [phone] [] [address] &  Therapy  [address]  P: [phone]  F: [phone] [] [address]  P: [phone]  F: [phone] [] [address]  [facility]   [address]   [place]: [phone]  F: [phone]      Physical Therapy Daily Treatment Note    Date:  3/8/2021  Patient Name:  Kale Wells    :  1985  MRN: [id number]  Physician: Cristi Murcia CNP         Insurance: [facility] 20 remaining  Medical Diagnosis: Stenosis of cervical spine with myelopathy M48.02                        Rehab Codes: M48.02, M54.5, R26.2  Onset Date: 21                                Next 's appt: Pain Mgmt 3/4, NS 3/19  Visit# / total visits: 3/12    Cancels/No Shows: 0/0    Subjective:    Pain:  [x] Yes  [] No Location: neck, B UE  Pain Rating: (0-10 scale) 7/10  Pain altered Tx:  [] No  [x] Yes  Action:limited exercise   Comments:Patient reports shoulders, low back and R LE are painful today. Had appointment with pain management, offering an Epidural for her lumbar symptoms.      Objective:  Modalities:   Precautions:  Exercises:  Exercise Reps/ Time Weight/ Level Comments   Nustep    Try next   UBE   min  0 Unable to tolerate beyond listed time   Band rows x           Seated shoulder rolls  5x  Unable to tolerate   Foam   15x green    Seated bicep curl  20x 1 lb    Seated chest press  10x     Seated shoulder flexion  15x     Foam blocks  xea  Left hand only, transfer blocks from lid to bucket    Flexbar  10x  red    Shoulder abduction  10x     Wrist flexion/extension Standing       March  10x     Hip abd  10x     Alternating step taps 10x  Poor balance                      Other:Frequent breaks due to pain. Treatment Charges: Mins Units   []  Modalities     [x]  Ther Exercise 40 3   []  Manual Therapy     []  Ther Activities     []  Aquatics     []  Vasocompression     []  Other     Total Treatment time 40 3       Assessment: [] Progressing toward goals. [x] No change. Patient continues to demonstrate poor tolerance to all exercise. She also demonstrated poor coordination and balance with LE tasks requiring UE support at all times  [x]  Patient may benefit form PT to restore UE strength, core strength, tolerance to physical activity, and attenuate pain. STG/LTG  STG: (to be met in 8 treatments)  1. ? Pain: 4/10 neck and UE pain with daily activity. 2. ? ROM: Improve cervical rotation to 50 degrees and extension to any pain free motion. 3. ? Strength:B  strength to 35 lbs to improve grasping and lifting. 4. ? Function: NDI score to 32% impaired or better to improve ADLs. 5. Independent with Home Exercise Programs     LTG: (to be met in 12 treatments)  1. Patient is will report improved sleep quality. 2. Patient will be able to lift and carry her child with decreased pain.      Pt. Education:  [x] Yes  [] No  [x] Reviewed Prior HEP/Ed  Method of Education: [x] Verbal  [] Demo  [] Written  Comprehension of Education:  [x] Verbalizes understanding. [x] Demonstrates understanding. [x] Needs review. [] Demonstrates/verbalizes HEP/Ed previously given. Plan: [x] Continue current frequency toward long and short term goals.     [x] Specific Instructions for subsequent treatments: B shoulder ROM exercises      Time In:1720         Time Out: 1800    Electronically signed by:  Lyle Naylor PT

## 2021-03-10 ENCOUNTER — TELEPHONE (OUTPATIENT)
Dept: PRIMARY CARE CLINIC | Age: 36
End: 2021-03-10

## 2021-03-10 DIAGNOSIS — R26.81 UNSTABLE GAIT: ICD-10-CM

## 2021-03-10 DIAGNOSIS — M50.30 DDD (DEGENERATIVE DISC DISEASE), CERVICAL: ICD-10-CM

## 2021-03-10 DIAGNOSIS — M54.14 THORACIC RADICULOPATHY: Primary | ICD-10-CM

## 2021-03-10 DIAGNOSIS — M54.12 CERVICAL RADICULOPATHY: ICD-10-CM

## 2021-03-10 NOTE — TELEPHONE ENCOUNTER
Patient is requesting a prescription for a temporary Handicap Placard due to unable to walk long distances, pls advise    Health Maintenance   Topic Date Due    Hepatitis C screen  Never done    Varicella vaccine (1 of 2 - 2-dose childhood series) Never done    Flu vaccine (1) 09/01/2020    Cervical cancer screen  04/29/2022    DTaP/Tdap/Td vaccine (2 - Td) 09/10/2029    HIV screen  Completed    Hepatitis A vaccine  Aged Out    Hepatitis B vaccine  Aged Out    Hib vaccine  Aged Out    Meningococcal (ACWY) vaccine  Aged Out    Pneumococcal 0-64 years Vaccine  Aged Out             (applicable per patient's age: Cancer Screenings, Depression Screening, Fall Risk Screening, Immunizations)    Hemoglobin A1C (%)   Date Value   12/10/2020 5.1     LDL Cholesterol (mg/dL)   Date Value   12/10/2020 83     AST (U/L)   Date Value   12/10/2020 12     ALT (U/L)   Date Value   12/10/2020 12     BUN (mg/dL)   Date Value   12/10/2020 14      (goal A1C is < 7)   (goal LDL is <100) need 30-50% reduction from baseline     BP Readings from Last 3 Encounters:   03/04/21 127/89   02/19/21 117/79   02/19/21 117/79    (goal /80)      All Future Testing planned in [facility]:  Lab Frequency Next Occurrence   MRI THORACIC SPINE W WO CONTRAST Once 05/09/2021   Saline lock IV Once 09/04/2021   FLUORO FOR SURGICAL PROCEDURES Once 09/04/2021   Lumbar Epidural Steroid Injection/Caudal Once 03/04/2021   PT eval and treat Once 03/04/2021   PT aquatic therapy Once 03/04/2021       Next Visit Date:  Future Appointments   Date Time Provider Thuy Tao   3/11/2021  5:30 PM Dc Jay PTA [facility]   3/19/2021  3:30 PM [address], APRN - CNP [facility]   3/22/2021  5:15 PM Russell Williamson, PT [facility]   3/25/2021  9:40 AM Fer Juares MD [address]   3/25/2021  5:15 PM Russell Williamson, PT [facility]   4/2/2021  8:20 AM KHLOE Augiar

## 2021-03-10 NOTE — LETTER
[facility]  [address] [place] [id number]  Phone: [phone]  Fax: [phone]    Faylene Dubin, PA-C        March 12, 2021     Patient: Peyton Garvin   YOB: 1985   Date of Visit: 3/10/2021       To Whom It May Concern: It is my medical opinion that Stephen Boswell requires a disability parking placard for the following reasons:  She cannot walk 200 feet without stopping to rest.  She has limited walking ability due to an arthritic and a neurologic condition. Duration of need: 1 year    If you have any questions or concerns, please don't hesitate to call.     Sincerely,          Faylene Dubin, PA-C

## 2021-03-11 ENCOUNTER — HOSPITAL ENCOUNTER (OUTPATIENT)
Dept: PHYSICAL THERAPY | Age: 36
Setting detail: THERAPIES SERIES
Discharge: HOME | End: 2021-03-11
Payer: COMMERCIAL

## 2021-03-11 ENCOUNTER — CLINICAL DOCUMENTATION (OUTPATIENT)
Dept: PAIN MANAGEMENT | Age: 36
End: 2021-03-11

## 2021-03-11 LAB
6-ACETYLMORPHINE, UR: NOT DETECTED
7-AMINOCLONAZEPAM, URINE: NOT DETECTED
ALPHA-OH-ALPRAZ, URINE: NOT DETECTED
ALPHA-OH-MIDAZOLAM, URINE: NOT DETECTED
ALPRAZOLAM, URINE: NOT DETECTED
AMPHETAMINES, URINE: NOT DETECTED
BARBITURATES, URINE: NOT DETECTED
BENZOYLECGONINE, UR: NOT DETECTED
BUPRENORPHINE URINE: NOT DETECTED
CARISOPRODOL, UR: NOT DETECTED
CLONAZEPAM, URINE: NOT DETECTED
CODEINE, URINE: NOT DETECTED
CREATININE URINE: 137 MG/DL (ref 20–400)
DIAZEPAM, URINE: NOT DETECTED
DRUGS EXPECTED, UR: NORMAL
EER HI RES INTERP UR: NORMAL
ETHYL GLUCURONIDE UR: NOT DETECTED
FENTANYL URINE: NOT DETECTED
GABAPENTIN: NOT DETECTED
HYDROCODONE, URINE: NOT DETECTED
HYDROMORPHONE, URINE: NOT DETECTED
LORAZEPAM, URINE: NOT DETECTED
MARIJUANA METAB, UR: NOT DETECTED
MDA, UR: NOT DETECTED
MDEA, EVE, UR: NOT DETECTED
MDMA URINE: NOT DETECTED
MEPERIDINE METAB, UR: NOT DETECTED
METHADONE, URINE: NOT DETECTED
METHAMPHETAMINE, URINE: NOT DETECTED
METHYLPHENIDATE: NOT DETECTED
MIDAZOLAM, URINE: NOT DETECTED
MORPHINE URINE: NOT DETECTED
NALOXONE URINE: NOT DETECTED
NORBUPRENORPHINE, URINE: NOT DETECTED
NORDIAZEPAM, URINE: NOT DETECTED
NORFENTANYL, URINE: NOT DETECTED
NORHYDROCODONE, URINE: NOT DETECTED
NOROXYCODONE, URINE: NOT DETECTED
NOROXYMORPHONE, URINE: NOT DETECTED
OXAZEPAM, URINE: NOT DETECTED
OXYCODONE URINE: NOT DETECTED
OXYMORPHONE, URINE: NOT DETECTED
PAIN MANAGEMENT DRUG PANEL INTERP, URINE: NORMAL
PAIN MGT DRUG PANEL, HI RES, UR: NORMAL
PCP,URINE: NOT DETECTED
PHENTERMINE, UR: NOT DETECTED
PREGABALIN: PRESENT
TAPENTADOL, URINE: NOT DETECTED
TAPENTADOL-O-SULFATE, URINE: NOT DETECTED
TEMAZEPAM, URINE: NOT DETECTED
TRAMADOL, URINE: NOT DETECTED
ZOLPIDEM METABOLITE (ZCA), URINE: NOT DETECTED
ZOLPIDEM, URINE: NOT DETECTED

## 2021-03-11 PROCEDURE — 97110 THERAPEUTIC EXERCISES: CPT

## 2021-03-11 PROCEDURE — G0283 ELEC STIM OTHER THAN WOUND: HCPCS

## 2021-03-11 NOTE — FLOWSHEET NOTE
[x] [facility]) [facility] &  Therapy  [address]  [phone]  F: [phone] [] [address]  [facility]   [address]  P: [phone]  F: [phone] [] [address] &  Therapy  [address]  P: [phone]  F: [phone] [] [address]  P: [phone]  F: [phone] [] [address]  [facility]   [address]   [place]: [phone]  F: [phone]      Physical Therapy Daily Treatment Note    Date:  3/11/2021  Patient Name:  Darcy Barreto    :  1985  MRN: [id number]  Physician: Nisha Lee CNP         Insurance: [facility] 21 remaining  Medical Diagnosis: Stenosis of cervical spine with myelopathy M48.02                        Rehab Codes: M48.02, M54.5, R26.2  Onset Date: 21                                Next 's appt: Pain Mgmt 3/4, NS 3/19  Visit# / total visits:     Cancels/No Shows: 0/0    Subjective:    Pain:  [x] Yes  [] No Location: neck, B UE  Pain Rating: (0-10 scale) 7-8/10  Pain altered Tx:  [] No  [x] Yes  Action:limited exercise   Comments: Patient with 7-8/10 pain in back of arms and tingling in legs, feet hurt patient did not give numeric rating.     Objective:  Modalities: HP/estim to neck with patient in supine with LE elevated x15 min   Precautions:  Exercises:  Exercise Reps/ Time Weight/ Level Comments   Nustep  5min  2 Added 3/11   UBE   min  0 Held 3/11   Band rows      Anoop ER  Lime  Attempted, painful   Horizontal abd   Lime  Attempted, painful                     Seated shoulder rolls  10x     Foam   15x green    Seated bicep curl  20x 1 lb    Seated chest press  10x     Seated shoulder flexion  15x     Foam blocks  xea  Left hand only, transfer blocks from lid to bucket    Amnis 10x  red    Shoulder abduction  10x     Wrist flexion/extension             Standing       March  10x     Hip abd  10x     Alternating step taps 10x                       Other:Frequent breaks due to pain. Treatment Charges: Mins Units   [x]  Modalities HP/stim 15/15 0/1   [x]  Ther Exercise 30 2   []  Manual Therapy     []  Ther Activities     []  Aquatics     []  Vasocompression     []  Other     Total Treatment time 45 3       Assessment: [] Progressing toward goals. [x] No change. Patient displayed poor tolerance to treatment this date due to increased pain level noted upon arrival. Attempted resisted emily ER and horizontal abduction, patient was unable to tolerate secondary to pain thus held exercises. Patient willing to try all exercises given however, unable to complete due to complaints of sharp radiating pain in arms and shoulders throughout, states R UE is more intense. Educated and discussed benefits of HP/estim for pain. Patient states she will try anything to relieve symptoms of pain thus ended treatment with modality application as noted above. No adverse response noted post treatment. Will continue with progressions as patient is able to tolerate. [x]  Patient may benefit form PT to restore UE strength, core strength, tolerance to physical activity, and attenuate pain. STG/LTG  STG: (to be met in 8 treatments)  1. ? Pain: 4/10 neck and UE pain with daily activity. 2. ? ROM: Improve cervical rotation to 50 degrees and extension to any pain free motion. 3. ? Strength:B  strength to 35 lbs to improve grasping and lifting. 4. ? Function: NDI score to 32% impaired or better to improve ADLs. 5. Independent with Home Exercise Programs     LTG: (to be met in 12 treatments)  1. Patient is will report improved sleep quality. 2. Patient will be able to lift and carry her child with decreased pain.      Pt.  Education:  [x] Yes  [] No  [x] Reviewed Prior HEP/Ed  Method of Education: [x] Verbal  [] Demo  [] Written  Comprehension of Education:  [x] Verbalizes understanding. [x] Demonstrates understanding. [x] Needs review. [] Demonstrates/verbalizes HEP/Ed previously given. Plan: [x] Continue current frequency toward long and short term goals. [x] Specific Instructions for subsequent treatments: B shoulder ROM exercises      Time In: 5:30pm         Time Out: 6:20pm    Electronically signed by:  Zhanna Graham  SPTA  Patient treated and note written by Zhanna Graham, Student Physical Therapist Assistant under supervision of Candi Fitch PTA.

## 2021-03-14 ASSESSMENT — ENCOUNTER SYMPTOMS
COUGH: 0
VOMITING: 0
DIARRHEA: 0
BACK PAIN: 0
NAUSEA: 0
CONSTIPATION: 0

## 2021-03-15 ENCOUNTER — HOSPITAL ENCOUNTER (OUTPATIENT)
Dept: PHYSICAL THERAPY | Age: 36
Setting detail: THERAPIES SERIES
Discharge: HOME | End: 2021-03-15
Payer: COMMERCIAL

## 2021-03-15 DIAGNOSIS — M54.12 CERVICAL RADICULOPATHY: ICD-10-CM

## 2021-03-15 DIAGNOSIS — M54.14 THORACIC RADICULOPATHY: ICD-10-CM

## 2021-03-15 DIAGNOSIS — Z76.0 MEDICATION REFILL: ICD-10-CM

## 2021-03-15 PROCEDURE — 97110 THERAPEUTIC EXERCISES: CPT

## 2021-03-15 RX ORDER — PREGABALIN 100 MG/1
100 CAPSULE ORAL 3 TIMES DAILY
Qty: 90 CAPSULE | Refills: 0 | Status: SHIPPED | OUTPATIENT
Start: 2021-03-15 | End: 2021-03-26 | Stop reason: SDUPTHER

## 2021-03-15 NOTE — TELEPHONE ENCOUNTER
Patient is requesting med refill on this medication, stating that she hs vy taking it 3 x daily as per directed by PCP and has seen improvements with this increase, pls advise    Health Maintenance   Topic Date Due    Hepatitis C screen  Never done    Varicella vaccine (1 of 2 - 2-dose childhood series) Never done    Flu vaccine (1) 09/01/2020    Cervical cancer screen  04/29/2022    DTaP/Tdap/Td vaccine (2 - Td) 09/10/2029    HIV screen  Completed    Hepatitis A vaccine  Aged Out    Hepatitis B vaccine  Aged Out    Hib vaccine  Aged Out    Meningococcal (ACWY) vaccine  Aged Out    Pneumococcal 0-64 years Vaccine  Aged Out             (applicable per patient's age: Cancer Screenings, Depression Screening, Fall Risk Screening, Immunizations)    Hemoglobin A1C (%)   Date Value   12/10/2020 5.1     LDL Cholesterol (mg/dL)   Date Value   12/10/2020 83     AST (U/L)   Date Value   12/10/2020 12     ALT (U/L)   Date Value   12/10/2020 12     BUN (mg/dL)   Date Value   12/10/2020 14      (goal A1C is < 7)   (goal LDL is <100) need 30-50% reduction from baseline     BP Readings from Last 3 Encounters:   03/04/21 127/89   02/19/21 117/79   02/19/21 117/79    (goal /80)      All Future Testing planned in [facility]:  Lab Frequency Next Occurrence   MRI THORACIC SPINE W WO CONTRAST Once 05/09/2021   Saline lock IV Once 09/04/2021   FLUORO FOR SURGICAL PROCEDURES Once 09/04/2021   Lumbar Epidural Steroid Injection/Caudal Once 03/04/2021   PT eval and treat Once 03/04/2021   PT aquatic therapy Once 03/04/2021       Next Visit Date:  Future Appointments   Date Time Provider Thuy Tao   3/19/2021  3:30 PM MORE Krueger - CNP [facility]   3/22/2021  5:15 PM Jhonny Rodas PT [facility]   3/25/2021  9:40 AM Silvia López MD [address]   3/25/2021  5:15 PM Jhonny Rodas PT [facility]   4/2/2021  8:20 AM KHLOE Purdy WALK IN [facility]   4/8/2021  8:30 AM MORE Bardales - CNP [address]   5/20/2021  8:30 AM Duane Carrasquillo MD Neuro [facility] 20            Patient Active Problem List:     Chronic low back pain with sciatica     Thyroid nodule

## 2021-03-15 NOTE — FLOWSHEET NOTE
[x] [facility]) [facility] &  Therapy  [address]  [phone]  F: [phone] [] [address]  [facility]   [address]  P: [phone]  F: [phone] [] [address] &  Therapy  [address]  P: [phone]  F: [phone] [] [address]  P: [phone]  F: [phone] [] [address]  [facility]   [address]   [place]: [phone]  F: [phone]      Physical Therapy Daily Treatment Note    Date:  3/15/2021  Patient Name:  Ramona Wynn    :  1985  MRN: [id number]  Physician: Lashay Stubbs CNP         Insurance: [facility] 21 remaining  Medical Diagnosis: Stenosis of cervical spine with myelopathy M48.02                        Rehab Codes: M48.02, M54.5, R26.2  Onset Date: 21                                Next 's appt: Pain Mgmt 3/4, NS 3/19  Visit# / total visits:     Cancels/No Shows: 0/0    Subjective:    Pain:  [x] Yes  [] No Location: neck, B UE  Pain Rating: (0-10 scale) 7-8/10  Pain altered Tx:  [] No  [x] Yes  Action:limited exercise   Comments: Patient bought a weighted heating pad for her shoulders and it helps to some degree. Patient arrived on a day she was not scheduled, possible unintentional error by staff, was able to accommodate. R hand feels weak and has been cramping.     Objective:  Modalities: HP/estim to neck with patient in supine with LE elevated x15 min   Precautions:  Exercises:  Exercise Reps/ Time Weight/ Level Comments   Nustep    2 Added 3/11   UBE   0 Held 3/11   Band rows      Anoop ER  Lime  Attempted, painful   Horizontal abd   Lime  Attempted, painful               Seated shoulder rolls  15x     Foam   15x red    Seated bicep curl  20x 1 lb    Seated chest press  15x Seated shoulder flexion  15x     Shoulder ER 15x     Foam blocks  xea  Left hand only, transfer blocks from lid to bucket    Flexbar  10x  red    Shoulder abduction  10x  Painful    Wrist flexion/extension  15x 1 lb    Wrist supination and pronation  15x .5 lb    Finger thump opposition 15x           Standing       March  x     Hip abd  x     Alternating step taps x                       Other:       Treatment Charges: Mins Units   [x]  Modalities HP/stim     [x]  Ther Exercise 30 2   []  Manual Therapy     []  Ther Activities     []  Aquatics     []  Vasocompression     []  Other     Total Treatment time 30 2       Assessment: [] Progressing toward goals. [x] No change. Patient remains extremely limited with all exercises. Noted R coordination deficits with finger thumb opposition and poor control of supination/pronation. [x]  Patient may benefit form PT to restore UE strength, core strength, tolerance to physical activity, and attenuate pain. STG/LTG  STG: (to be met in 8 treatments)  1. ? Pain: 4/10 neck and UE pain with daily activity. 2. ? ROM: Improve cervical rotation to 50 degrees and extension to any pain free motion. 3. ? Strength:B  strength to 35 lbs to improve grasping and lifting. 4. ? Function: NDI score to 32% impaired or better to improve ADLs. 5. Independent with Home Exercise Programs     LTG: (to be met in 12 treatments)  1. Patient is will report improved sleep quality. 2. Patient will be able to lift and carry her child with decreased pain.      Pt. Education:  [x] Yes  [] No  [x] Reviewed Prior HEP/Ed  Method of Education: [x] Verbal  [] Demo  [] Written  Comprehension of Education:  [x] Verbalizes understanding. [x] Demonstrates understanding. [x] Needs review. [] Demonstrates/verbalizes HEP/Ed previously given. Plan: [x] Continue current frequency toward long and short term goals.     [x] Specific Instructions for subsequent treatments: UE ROM exercise as patient tolerates.        Time In: 5:30pm         Time Out: 6:00 pm    Electronically signed by:  Ra Justice PT

## 2021-03-19 ENCOUNTER — OFFICE VISIT (OUTPATIENT)
Dept: NEUROSURGERY | Age: 36
End: 2021-03-19
Payer: COMMERCIAL

## 2021-03-19 VITALS
OXYGEN SATURATION: 96 % | SYSTOLIC BLOOD PRESSURE: 121 MMHG | HEART RATE: 100 BPM | BODY MASS INDEX: 32.33 KG/M2 | WEIGHT: 206 LBS | HEIGHT: 67 IN | DIASTOLIC BLOOD PRESSURE: 88 MMHG

## 2021-03-19 DIAGNOSIS — G89.29 CHRONIC LOW BACK PAIN WITH SCIATICA, SCIATICA LATERALITY UNSPECIFIED, UNSPECIFIED BACK PAIN LATERALITY: ICD-10-CM

## 2021-03-19 DIAGNOSIS — M54.40 CHRONIC LOW BACK PAIN WITH SCIATICA, SCIATICA LATERALITY UNSPECIFIED, UNSPECIFIED BACK PAIN LATERALITY: ICD-10-CM

## 2021-03-19 DIAGNOSIS — M48.02 STENOSIS OF CERVICAL SPINE WITH MYELOPATHY (HCC): Primary | ICD-10-CM

## 2021-03-19 DIAGNOSIS — G99.2 STENOSIS OF CERVICAL SPINE WITH MYELOPATHY (HCC): Primary | ICD-10-CM

## 2021-03-19 PROCEDURE — 1036F TOBACCO NON-USER: CPT | Performed by: NURSE PRACTITIONER

## 2021-03-19 PROCEDURE — G8484 FLU IMMUNIZE NO ADMIN: HCPCS | Performed by: NURSE PRACTITIONER

## 2021-03-19 PROCEDURE — G8427 DOCREV CUR MEDS BY ELIG CLIN: HCPCS | Performed by: NURSE PRACTITIONER

## 2021-03-19 PROCEDURE — G8417 CALC BMI ABV UP PARAM F/U: HCPCS | Performed by: NURSE PRACTITIONER

## 2021-03-19 PROCEDURE — 99214 OFFICE O/P EST MOD 30 MIN: CPT | Performed by: NURSE PRACTITIONER

## 2021-03-19 NOTE — PROGRESS NOTES
Marisol Garcia  [facility] [address]  [address]  Dept: [phone]    Patient:  Deyanira Staley  YOB: 1985  Date: 2/19/21    The patient is a 38 y.o. female who presents today for consult of the following problems:     Chief Complaint   Patient presents with    Follow-up    Results         HPI:     Deyanira Staley is a 38 y.o. female on whom neurosurgical consultation was requested by Valentina Funez PA-C for management of neck pain and arm symptoms. Patient has had ongoing progressive neck and back pain for several years. Does feel that she has had progression in upper extremity function. Pain 8/10 on average, described as sharp. Any movement worsens the pain, remaining still, heat/ice somewhat helps. Reports having progressive issues with bilateral hands particularly strength, frequently dropping objects. Reports that she just recently dropped and broke glass bowls. Has started wearing pants with no buttons and has stopped tying and untying shoes as her dexterity worsens. Has also experienced some episodes of fingers locking. Has tingling to bilateral hands. Does have some gait instability. Has also had worsening of handwriting. Has been undergoing physical therapy with no significant improvement.     Additionally has issues with chronic low back pain, was recently evaluated by pain management, does have upcoming plans for lumbar epidural.    Nocturnal Awakening: Yes  Reason: pain in legs    MYELOPATHY    Frequently dropping things: Yes  Difficulty with buttoning clothes, using zipper, putting on watch OR jewelry: Yes  Changes in handwriting: Yes  Numbness or tingling: Yes    LIMITATIONS    Pain significantly limiting on a daily basis   Daily pharmacologic pain control include: lyrica; mobic  Neck : Arm pain: 75/25    MANAGEMENT     Prior Surgery: No  Prior to 1yr ago: PT  In the last year:    Physical Therapy: Yesongoing   Chiropractic Interventions: No   Injections: No  Improvement: n/a    Injections/response: Plans for lumbar epidural 3/25/2021    History:     Past Medical History:   Diagnosis Date    Anxiety     Chronic back pain     Thyroid nodule      Past Surgical History:   Procedure Laterality Date     SECTION      x3    TUBAL LIGATION       Family History   Problem Relation Age of Onset    Diabetes Mother     High Blood Pressure Mother     Other Mother         Tremor    Seizures Mother     Heart Disease Father     Hypertension Father     Cancer Sister         Lymphoblastic lymphoma    Diabetes Maternal Uncle     Heart Attack Maternal Grandmother     Diabetes Maternal Grandmother     Kidney Disease Maternal Grandmother     Breast Cancer Paternal Aunt      Current Outpatient Medications on File Prior to Visit   Medication Sig Dispense Refill    pregabalin (LYRICA) 100 MG capsule Take 1 capsule by mouth 3 times daily for 30 days. 90 capsule 0    Handicap Placard MISC by Does not apply route 1 years 1 each 0    DULoxetine (CYMBALTA) 30 MG extended release capsule Take 1 capsule by mouth daily 30 capsule 3    ibuprofen (ADVIL;MOTRIN) 800 MG tablet Take 1 tablet by mouth 2 times daily as needed for Pain 60 tablet 2    cyclobenzaprine (FLEXERIL) 5 MG tablet Take 1 tablet by mouth nightly as needed for Muscle spasms 30 tablet 3    acetaminophen (APAP EXTRA STRENGTH) 500 MG tablet Take 2 tablets by mouth every 6 hours as needed for Pain (Patient not taking: Reported on 3/19/2021) 120 tablet 3     No current facility-administered medications on file prior to visit.       Social History     Tobacco Use    Smoking status: Never Smoker    Smokeless tobacco: Never Used   Substance Use Topics    Alcohol use: Yes     Frequency: Monthly or less     Drinks per session: 1 or 2     Binge frequency: Less than monthly     Comment: occasional    Drug use: Never       Allergies   Allergen Reactions    Bee Venom Anaphylaxis    Doxycycline Nausea Only       Review of Systems  Constitutional: Negative for activity change and appetite change. HENT: Negative for ear pain and facial swelling. Eyes: Negative for discharge and itching. Respiratory: Negative for choking and chest tightness. Cardiovascular: Negative for chest pain and leg swelling. Gastrointestinal: Negative for nausea and abdominal pain. Endocrine: Negative for cold intolerance and heat intolerance. Genitourinary: Negative for frequency and flank pain. Musculoskeletal: Negative for myalgias and joint swelling. Skin: Negative for rash and wound. Allergic/Immunologic: Negative for environmental allergies and food allergies. Hematological: Negative for adenopathy. Does not bruise/bleed easily. Psychiatric/Behavioral: Negative for self-injury. The patient is not nervous/anxious. Physical Exam:      /88   Pulse 100   Ht 5' 7\" (1.702 m)   Wt 206 lb (93.4 kg)   SpO2 96%   BMI 32.26 kg/m²   Estimated body mass index is 32.26 kg/m² as calculated from the following:    Height as of this encounter: 5' 7\" (1.702 m). Weight as of this encounter: 206 lb (93.4 kg). General:  Rena Yo is a 38y.o. year old female who appears her stated age. HEENT: Normocephalic atraumatic. Neck supple. Chest: regular rate; pulses equal  Abdomen: Soft nontender nondistended.    Ext: DP and PT pulses 2+, good cap refill  Neuro    Mentation  Appropriate affect  Registration intact  Orientation intact  3 item recall intact  Judgement intact to situation    Cranial Nerves:   Pupils equal and reactive to light  Extraocular motion intact  Face and shrug symmetric  Tongue midline  No dysarthria  v1-3 sensation symmetric, masseter tone symmetric  Hearing symmetric and intact    Sensation: Mildly decreased to hands  Ring finger split: Denies change in visual acuity    Motor  L deltoid 5/5; R deltoid 5/5  L biceps 5/5; R biceps 5/5  L triceps 5/5; R triceps 5/5  L wrist extension 4/5; R wrist extension 4/5  L intrinsics 4/5; R intrinsics 4/5     L iliopsoas 5/5 , R iliopsoas 5/5  L quadriceps 5/5; R quadriceps 5/5  L Dorsiflexion 5/5; R dorsiflexion 5/5  L Plantarflexion 5/5; R plantarflexion 5/5  L EHL 5/5; R EHL 5/5    Reflexes  L Brachioradialis 2+/4; R brachioradialis 2+/4  L Biceps 2+/4; R Biceps 2+/4  L Triceps 2+/4; R Triceps 2+/4  L Patellar 1+/4: R Patellar 1+/4  L Achilles 2+/4; R Achilles 2+/4    hoffmans L: neg  hoffmans R: neg  Clonus L: neg  Clonus R: neg  Babinski L: neg  Babinski R: neg    Spurlings: Yes  Lhermittes: Yes    Tinels at elbow: Yes  Tinels at wrist: Yes  Phalens: Yes  Ok sign: No  Froments: No  Benedictine Hand: No    Atrophy of thenar: No  Atrophy of hypothenar: No    Studies Review:     EMG 7/8/2020:  1- Normal study of the bilateral upper extremities.  There is no   electrodiagnostic evidence of a generalized large fiber peripheral   polyneuropathy or a cervical radiculopathy.  Clinical correlation is   required.  Please see full EMG report. MRI cervical spine 2/9/2021 (images reviewed): FINDINGS:   BONES/ALIGNMENT: There is normal alignment of the spine. The vertebral body   heights are maintained.  The bone marrow signal appears unremarkable.  There   is degenerative disc disease with disc space narrowing and osteophytes at   C4-5, C5-6 and C6-7.       SPINAL CORD:  No abnormal signal is identified within the spinal cord.       SOFT TISSUES: No abnormal enhancement of the cervical spine. No paraspinal   mass identified.       There are small lymph nodes scattered throughout the neck that are   nonspecific.  They could be reactive.  There are slightly prominent for a   patient of this age.  Clinically correlate to exclude underlying pathology.       C2-C3: The thecal sac and neural foramina are intact.       C3-C4: There is a disc protrusion measuring 2 mm.  The thecal sac and neural   foramina are intact.     C4-C5: There is a disc protrusion measuring 2 mm. The thecal sac and neural   foramina are intact.       C5-C6: There is a disc protrusion measuring 3-4 mm toward the left causing an   anterior indentation on the thecal sac.  The thecal sac is moderately   stenotic measuring 8.3 mm. Disc and/or osteophytes result in mild narrowing   of the neural foramina bilaterally.       C6-C7: Disc and/or osteophytes cause minimal narrowing of the neural foramina   bilaterally.  The thecal sac is not stenotic.       C7-T1:  The thecal sac and neural foramina are intact. X-ray cervical spine flexion-extension 2/19/2021 (images reviewed): FINDINGS:   Disc height loss with degenerative endplate changes C4 through C6. Approximately 2 mm anterolisthesis C4 on C5 inflection with approximately 1   mm retrolisthesis at that level on extension. Assessment and Plan:      1. Stenosis of cervical spine with myelopathy ([address]    2. Chronic low back pain with sciatica, sciatica laterality unspecified, unspecified back pain laterality          Plan: Patient presents with several year history of neck pain that has been progressive, and is now affecting both of her arms. Reports that she has a constant feeling of fatigue, weakness to bilateral upper extremities. Has started to appreciate some issues with dexterity, dropping objects, difficulty with dexterity at times, particularly after increased physical activity. Recent MRI that does show stenosis at C5-6 with indentation of the anterior portion of cord. Patient has been undergoing physical therapy with no improvement in symptoms. Additionally struggling with low back pain with radiation into lower extremities at times, does have plans for upcoming injection per pain specialist.  Recommend follow-up in the next few weeks with surgeon for evaluation of cervical stenosis. Followup: Return in about 3 weeks (around 4/9/2021), or if symptoms worsen or fail to improve. Prescriptions Ordered:  No orders of the defined types were placed in this encounter. Orders Placed:  No orders of the defined types were placed in this encounter. Electronically signed by MORE Mendoza CNP on 3/22/2021 at 8:49 AM    Please note that this chart was generated using voice recognition Dragon dictation software. Although every effort was made to ensure the accuracy of this automated transcription, some errors in transcription may have occurred.

## 2021-03-22 ENCOUNTER — HOSPITAL ENCOUNTER (OUTPATIENT)
Dept: PHYSICAL THERAPY | Age: 36
Setting detail: THERAPIES SERIES
Discharge: HOME | End: 2021-03-22
Payer: COMMERCIAL

## 2021-03-22 PROCEDURE — 97110 THERAPEUTIC EXERCISES: CPT

## 2021-03-22 NOTE — FLOWSHEET NOTE
[x] [facility]) [facility] &  Therapy  [address]  [phone]  F: [phone] [] [address]  [place] [address]   [address]  P: [phone]  F: [phone] [] [address] &  Therapy  [address] [facility] [address]  P: [phone]  F: [phone] [] [address]  P: [phone]  F: [phone] [] [address]  [facility]   [address]   [place]: [phone]  F: [phone]      Physical Therapy Daily Treatment Note    Date:  3/22/2021  Patient Name:  Jerel Arizmendi    :  1985  MRN: [id number]  Physician: Denise Earl CNP         Insurance: [facility] 20 remaining  Medical Diagnosis: Stenosis of cervical spine with myelopathy M48.02                        Rehab Codes: M48.02, M54.5, R26.2  Onset Date: 21                                Next 's appt: Pain Mgmt 3/4, NS 3/19  Visit# / total visits:     Cancels/No Shows: 0/0    Subjective:    Pain:  [x] Yes  [] No Location: neck, B UE  Pain Rating: (0-10 scale) 7-8/10  Pain altered Tx:  [] No  [x] Yes  Action:limited exercise   Comments: Patient reports continued difficulty using her upper extremities and low back is painful today. Felt pop in low back earlier.       Objective:  Modalities: HP/estim to neck with patient in supine with LE elevated x15 min Held  Precautions:  Exercises:  Exercise Reps/ Time Weight/ Level Comments   Nustep    2 Added 3/   UBE   0 Held 3/11   Band rows      Anoop ER  Lime  Attempted, painful   Horizontal abd   Lime  Attempted, painful               Seated shoulder rolls  15x     Foam   15x red    Seated bicep curl  20x 1 lb    Seated chest press  15x 1 lb    Seated shoulder flexion  15x 1 lb    Shoulder ER 15x     Foam blocks  15xea  Left hand only, transfer blocks from lid to bucket    Flexbar  10x  red    Shoulder abduction  10x  Painful    Wrist flexion/extension  15x 1 lb    Wrist supination and pronation  15x .5 lb    Finger thumb opposition x           Standing       March  x     Hip abd  x     Alternating step taps x                       Other:       Treatment Charges: Mins Units   [x]  Modalities HP/stim     [x]  Ther Exercise 30 2   []  Manual Therapy     []  Ther Activities     []  Aquatics     []  Vasocompression     []  Other     Total Treatment time 30 2       Assessment: [] Progressing toward goals. [x] No change. Patient has not made any significant imprvement with PT. Struggled through all exercises again today. [x]  Patient may benefit form PT to restore UE strength, core strength, tolerance to physical activity, and attenuate pain. STG/LTG  STG: (to be met in 8 treatments)  1. ? Pain: 4/10 neck and UE pain with daily activity. 2. ? ROM: Improve cervical rotation to 50 degrees and extension to any pain free motion. 3. ? Strength:B  strength to 35 lbs to improve grasping and lifting. 4. ? Function: NDI score to 32% impaired or better to improve ADLs. 5. Independent with Home Exercise Programs     LTG: (to be met in 12 treatments)  1. Patient is will report improved sleep quality. 2. Patient will be able to lift and carry her child with decreased pain.      Pt. Education:  [x] Yes  [] No  [x] Reviewed Prior HEP/Ed  Method of Education: [x] Verbal  [] Demo  [] Written  Comprehension of Education:  [x] Verbalizes understanding. [x] Demonstrates understanding. [x] Needs review. [] Demonstrates/verbalizes HEP/Ed previously given. Plan: [x] Continue current frequency toward long and short term goals. [x] Specific Instructions for subsequent treatments: UE ROM exercise as patient tolerates.        Time In: 5:30pm         Time Out: 6:00 pm    Electronically signed by:  Kishan Hermosillo PT

## 2021-03-23 ENCOUNTER — TELEPHONE (OUTPATIENT)
Dept: PAIN MANAGEMENT | Age: 36
End: 2021-03-23

## 2021-03-23 NOTE — TELEPHONE ENCOUNTER
Spoke with patient and reviewed pre-procedure instructions. Questions answered. Verbalized understanding.

## 2021-03-24 ENCOUNTER — TELEPHONE (OUTPATIENT)
Dept: PAIN MANAGEMENT | Age: 36
End: 2021-03-24

## 2021-03-24 NOTE — TELEPHONE ENCOUNTER
I received a fax from [facility] denying injection for tomorrow. I called the patient and told her; reason for denial is she hasn't completed a recent 6-week course of PT or chiropractic care. Patient states she is currently in PT; finished 4 weeks. I advised she needs to complete the PT and then call the office to to have the [facility] request re-submitted. To Note patient may need to have another OV to discuss injection if it goes over 30 days since last visit.

## 2021-03-25 ENCOUNTER — APPOINTMENT (OUTPATIENT)
Dept: PHYSICAL THERAPY | Age: 36
End: 2021-03-25
Payer: COMMERCIAL

## 2021-03-25 ENCOUNTER — HOSPITAL ENCOUNTER (OUTPATIENT)
Dept: PAIN MANAGEMENT | Age: 36
Discharge: HOME | End: 2021-03-25
Payer: COMMERCIAL

## 2021-03-25 DIAGNOSIS — G89.29 CHRONIC LOW BACK PAIN WITH SCIATICA, SCIATICA LATERALITY UNSPECIFIED, UNSPECIFIED BACK PAIN LATERALITY: Primary | ICD-10-CM

## 2021-03-25 DIAGNOSIS — M54.16 LUMBAR RADICULOPATHY: ICD-10-CM

## 2021-03-25 DIAGNOSIS — M51.36 DEGENERATION OF INTERVERTEBRAL DISC OF LUMBAR SPINE WITHOUT DISC HERNIATION: ICD-10-CM

## 2021-03-25 DIAGNOSIS — M54.40 CHRONIC LOW BACK PAIN WITH SCIATICA, SCIATICA LATERALITY UNSPECIFIED, UNSPECIFIED BACK PAIN LATERALITY: Primary | ICD-10-CM

## 2021-03-26 DIAGNOSIS — M54.12 CERVICAL RADICULOPATHY: ICD-10-CM

## 2021-03-26 DIAGNOSIS — M54.14 THORACIC RADICULOPATHY: ICD-10-CM

## 2021-03-26 DIAGNOSIS — Z76.0 MEDICATION REFILL: ICD-10-CM

## 2021-03-26 RX ORDER — PREGABALIN 100 MG/1
100 CAPSULE ORAL 3 TIMES DAILY
Qty: 90 CAPSULE | Refills: 0 | Status: SHIPPED | OUTPATIENT
Start: 2021-03-26 | End: 2021-04-02 | Stop reason: DRUGHIGH

## 2021-03-26 NOTE — TELEPHONE ENCOUNTER
Patient has transferred prescription to [address], information has been updated in chart.

## 2021-03-28 ENCOUNTER — HOSPITAL ENCOUNTER (EMERGENCY)
Age: 36
Discharge: HOME | End: 2021-03-28
Attending: EMERGENCY MEDICINE
Payer: COMMERCIAL

## 2021-03-28 VITALS
SYSTOLIC BLOOD PRESSURE: 110 MMHG | OXYGEN SATURATION: 99 % | BODY MASS INDEX: 32.26 KG/M2 | TEMPERATURE: 98 F | HEART RATE: 80 BPM | WEIGHT: 206 LBS | RESPIRATION RATE: 16 BRPM | DIASTOLIC BLOOD PRESSURE: 78 MMHG

## 2021-03-28 DIAGNOSIS — M79.89 LEFT UPPER EXTREMITY SWELLING: ICD-10-CM

## 2021-03-28 DIAGNOSIS — M54.12 CERVICAL RADICULOPATHY: Primary | ICD-10-CM

## 2021-03-28 LAB — D-DIMER QUANTITATIVE: 0.89 MG/L FEU (ref 0–0.59)

## 2021-03-28 PROCEDURE — 85379 FIBRIN DEGRADATION QUANT: CPT

## 2021-03-28 PROCEDURE — 6360000002 HC RX W HCPCS: Performed by: EMERGENCY MEDICINE

## 2021-03-28 PROCEDURE — 93971 EXTREMITY STUDY: CPT

## 2021-03-28 PROCEDURE — 6370000000 HC RX 637 (ALT 250 FOR IP): Performed by: EMERGENCY MEDICINE

## 2021-03-28 PROCEDURE — 96372 THER/PROPH/DIAG INJ SC/IM: CPT

## 2021-03-28 PROCEDURE — 99283 EMERGENCY DEPT VISIT LOW MDM: CPT

## 2021-03-28 RX ORDER — GABAPENTIN 300 MG/1
600 CAPSULE ORAL ONCE
Status: COMPLETED | OUTPATIENT
Start: 2021-03-28 | End: 2021-03-28

## 2021-03-28 RX ORDER — FENTANYL CITRATE 50 UG/ML
75 INJECTION, SOLUTION INTRAMUSCULAR; INTRAVENOUS ONCE
Status: COMPLETED | OUTPATIENT
Start: 2021-03-28 | End: 2021-03-28

## 2021-03-28 RX ORDER — DIAZEPAM 5 MG/1
5 TABLET ORAL ONCE
Status: COMPLETED | OUTPATIENT
Start: 2021-03-28 | End: 2021-03-28

## 2021-03-28 RX ORDER — OXYCODONE HYDROCHLORIDE AND ACETAMINOPHEN 5; 325 MG/1; MG/1
1 TABLET ORAL EVERY 6 HOURS PRN
Qty: 20 TABLET | Refills: 0 | Status: SHIPPED | OUTPATIENT
Start: 2021-03-28 | End: 2021-04-02

## 2021-03-28 RX ORDER — KETOROLAC TROMETHAMINE 30 MG/ML
30 INJECTION, SOLUTION INTRAMUSCULAR; INTRAVENOUS ONCE
Status: COMPLETED | OUTPATIENT
Start: 2021-03-28 | End: 2021-03-28

## 2021-03-28 RX ORDER — OXYCODONE HYDROCHLORIDE AND ACETAMINOPHEN 5; 325 MG/1; MG/1
2 TABLET ORAL ONCE
Status: COMPLETED | OUTPATIENT
Start: 2021-03-28 | End: 2021-03-28

## 2021-03-28 RX ADMIN — KETOROLAC TROMETHAMINE 30 MG: 30 INJECTION, SOLUTION INTRAMUSCULAR at 17:17

## 2021-03-28 RX ADMIN — GABAPENTIN 600 MG: 300 CAPSULE ORAL at 16:16

## 2021-03-28 RX ADMIN — FENTANYL CITRATE 75 MCG: 50 INJECTION, SOLUTION INTRAMUSCULAR; INTRAVENOUS at 16:16

## 2021-03-28 RX ADMIN — DIAZEPAM 5 MG: 5 TABLET ORAL at 16:16

## 2021-03-28 RX ADMIN — OXYCODONE HYDROCHLORIDE AND ACETAMINOPHEN 2 TABLET: 5; 325 TABLET ORAL at 17:17

## 2021-03-28 ASSESSMENT — PAIN SCALES - GENERAL
PAINLEVEL_OUTOF10: 10
PAINLEVEL_OUTOF10: 10

## 2021-03-28 NOTE — ED NOTES
Patient concerned left arm swollen Dr Leeann Tucker into evaluate and orders received.      Perlita Arreguin RN  03/28/21 7641

## 2021-03-28 NOTE — ED PROVIDER NOTES
EMERGENCY DEPARTMENT ENCOUNTER    Pt Name: Andreas Easton  MRN: [id number]  [place] 1985  Date of evaluation: 3/28/21  CHIEF COMPLAINT       Chief Complaint   Patient presents with    Neck Pain    Arm Pain     HISTORY OF PRESENT ILLNESS   HPI  79-year-old female with a history of anxiety, chronic neck and back pain, cervical stenosis with radiculopathy who presents with worsening of her neck and left upper extremity pain and radiculopathy the past few days. Patient states that she has been dealing with this pain for a long time, has had MRIs which showed cervical stenosis, she has an appointment to see a neurosurgeon tomorrow to discuss possible surgical options. Patient states that the pain today is the same as her usual but more intense, she endorses pain radiating from her neck down her left upper extremity with tingling, she endorses chronic weakness and clumsiness with her left upper extremity but is no worse than usual today. Patient also notes that there might be some swelling to her left arm and hand. She denies history of blood clots, recent prolonged immobilization, fever/chills, nausea/vomiting, trauma or any other acute complaints. REVIEW OF SYSTEMS     Review of Systems   All other systems reviewed and are negative.     PASTMEDICAL HISTORY     Past Medical History:   Diagnosis Date    Anxiety     Chronic back pain     Thyroid nodule      SURGICAL HISTORY       Past Surgical History:   Procedure Laterality Date     SECTION      x3    TUBAL LIGATION       CURRENT MEDICATIONS       Previous Medications    ACETAMINOPHEN (APAP EXTRA STRENGTH) 500 MG TABLET    Take 2 tablets by mouth every 6 hours as needed for Pain    CYCLOBENZAPRINE (FLEXERIL) 5 MG TABLET    Take 1 tablet by mouth nightly as needed for Muscle spasms    DULOXETINE (CYMBALTA) 30 MG EXTENDED RELEASE CAPSULE    Take 1 capsule by mouth daily    HANDICAP PLACARD MISC    by Does not apply route 1 years    IBUPROFEN (ADVIL;MOTRIN) 800 MG TABLET    Take 1 tablet by mouth 2 times daily as needed for Pain    PREGABALIN (LYRICA) 100 MG CAPSULE    Take 1 capsule by mouth 3 times daily for 30 days. ALLERGIES     is allergic to bee venom and doxycycline. FAMILY HISTORY     She indicated that her mother is alive. She indicated that her father is alive. She indicated that the status of her sister is unknown. She indicated that her maternal grandmother is . She indicated that the status of her maternal uncle is unknown. She indicated that the status of her paternal aunt is unknown. SOCIAL HISTORY       Social History     Tobacco Use    Smoking status: Never Smoker    Smokeless tobacco: Never Used   Substance Use Topics    Alcohol use: Yes     Frequency: Monthly or less     Drinks per session: 1 or 2     Binge frequency: Less than monthly     Comment: occasional    Drug use: Never     PHYSICAL EXAM     INITIAL VITALS: /78   Pulse 80   Temp 98 °F (36.7 °C)   Resp 16   Wt 206 lb (93.4 kg)   SpO2 99%   BMI 32.26 kg/m²    Physical Exam  Constitutional:       General: She is not in acute distress. Appearance: Normal appearance. She is normal weight. HENT:      Head: Normocephalic and atraumatic. Right Ear: Tympanic membrane and external ear normal.      Left Ear: Tympanic membrane and external ear normal.      Nose: Nose normal.      Mouth/Throat:      Mouth: Mucous membranes are moist.   Eyes:      Extraocular Movements: Extraocular movements intact. Conjunctiva/sclera: Conjunctivae normal.      Pupils: Pupils are equal, round, and reactive to light. Neck:      Musculoskeletal: Normal range of motion and neck supple. No neck rigidity. Cardiovascular:      Rate and Rhythm: Normal rate and regular rhythm. Pulses: Normal pulses. Heart sounds: Normal heart sounds. No murmur. Pulmonary:      Effort: Pulmonary effort is normal. No respiratory distress.       Breath sounds: Normal breath sounds. No wheezing or rhonchi. Abdominal:      General: Bowel sounds are normal. There is no distension. Palpations: Abdomen is soft. There is no mass. Tenderness: There is no abdominal tenderness. There is no right CVA tenderness, left CVA tenderness, guarding or rebound. Musculoskeletal:         General: No deformity. Comments: Mild swelling of the left upper extremity and hand when compared to the right, distal pulses intact in both arms, compartments soft throughout both arms, no gross deformity   Skin:     Capillary Refill: Capillary refill takes less than 2 seconds. Coloration: Skin is not jaundiced. Findings: No bruising, erythema, lesion or rash. Neurological:      General: No focal deficit present. Mental Status: She is alert and oriented to person, place, and time. Cranial Nerves: No cranial nerve deficit. Sensory: No sensory deficit. Comments: Strength 5 out of 5 in both upper extremities, good  strength bilaterally, sensation intact and equal to light touch in the bilateral upper extremities   Psychiatric:         Mood and Affect: Mood normal.         Behavior: Behavior normal.         MEDICAL DECISION MAKING:     - D-dimer  - IM fentanyl, IM toradol, po valium, po percocet, po gabapentin for pain during her ED stay      DIAGNOSTIC RESULTS   EKG:All EKG's are interpreted by the Emergency Department Physician who either signs or Co-signs this chart in the absence of a cardiologist.        RADIOLOGY:All plain film, CT, MRI, and formal ultrasound images (except ED bedside ultrasound) are read by the radiologist, see reports below, unless otherwisenoted in MDM or here. VL Upper Extremity Venous Duplex Left    (Results Pending)     LABS: All lab results were reviewed by myself, and all abnormals are listed below.   Labs Reviewed   D-DIMER, QUANTITATIVE - Abnormal; Notable for the following components:       Result Value    D-Dimer, Quant 0.89 (*) All other components within normal limits       EMERGENCY DEPARTMENTCOURSE:   8:27 PM    Patient's pain is controlled in the ED but required multiple oral and IM medications. D-dimer was positive so DVT ultrasound of the left upper extremity was ordered. At this time the ultrasound is pending, patient is aware the plan, if her ultrasound is positive she will be started on Eliquis and discharged. If negative she will be discharged without blood thinner, I did provide her a prescription for 20 tabs of Percocet for breakthrough pain. At this point patient care signed out to Dr. Jamie Ross with plan to follow up [address] [facility].     Vitals:    Vitals:    03/28/21 1534 03/28/21 1904   BP: (!) 140/98 110/78   Pulse: 89 80   Resp: 16    Temp: 98 °F (36.7 °C)    SpO2: 98% 99%   Weight: 206 lb (93.4 kg)        The patient was given the following medications while in the emergency department:  Orders Placed This Encounter   Medications    diazePAM (VALIUM) tablet 5 mg    fentaNYL (SUBLIMAZE) injection 75 mcg    gabapentin (NEURONTIN) capsule 600 mg    ketorolac (TORADOL) injection 30 mg    oxyCODONE-acetaminophen (PERCOCET) 5-325 MG per tablet 2 tablet     CONSULTS:  None    FINAL IMPRESSION      Visit Diagnoses       Codes    Cervical radiculopathy    -  Primary M54.12    Left upper extremity swelling     M79.89            DISPOSITION/PLAN   DISPOSITION    Pending    PATIENT REFERRED TO:  Neurosurgeon  PCP    DISCHARGE MEDICATIONS:  Percocet 5-325 #20    Junie Cantu MD  Attending Emergency Physician                    Junie Cantu MD  03/28/21 2028

## 2021-03-28 NOTE — ED NOTES
Pt states she is lightheaded and her heart is racing. Pt's vitals taken by RN. Dr. Daren Atkinson notified.        Jennifer Goetz RN  03/28/21 1911

## 2021-03-29 ENCOUNTER — OFFICE VISIT (OUTPATIENT)
Dept: NEUROSURGERY | Age: 36
End: 2021-03-29
Payer: COMMERCIAL

## 2021-03-29 VITALS
SYSTOLIC BLOOD PRESSURE: 132 MMHG | DIASTOLIC BLOOD PRESSURE: 80 MMHG | BODY MASS INDEX: 32.33 KG/M2 | WEIGHT: 206 LBS | HEIGHT: 67 IN | HEART RATE: 97 BPM | OXYGEN SATURATION: 98 %

## 2021-03-29 DIAGNOSIS — G99.2 STENOSIS OF CERVICAL SPINE WITH MYELOPATHY (HCC): ICD-10-CM

## 2021-03-29 DIAGNOSIS — M48.02 STENOSIS OF CERVICAL SPINE WITH MYELOPATHY (HCC): ICD-10-CM

## 2021-03-29 PROCEDURE — G8427 DOCREV CUR MEDS BY ELIG CLIN: HCPCS | Performed by: NEUROLOGICAL SURGERY

## 2021-03-29 PROCEDURE — G8417 CALC BMI ABV UP PARAM F/U: HCPCS | Performed by: NEUROLOGICAL SURGERY

## 2021-03-29 PROCEDURE — 99214 OFFICE O/P EST MOD 30 MIN: CPT | Performed by: NEUROLOGICAL SURGERY

## 2021-03-29 PROCEDURE — G8484 FLU IMMUNIZE NO ADMIN: HCPCS | Performed by: NEUROLOGICAL SURGERY

## 2021-03-29 PROCEDURE — 1036F TOBACCO NON-USER: CPT | Performed by: NEUROLOGICAL SURGERY

## 2021-03-29 NOTE — PROGRESS NOTES
Department of Neurosurgery                                                      Follow up visit      History Obtained From:  patient    CHIEF COMPLAINT:         Chief Complaint   Patient presents with    Follow-up       HISTORY OF PRESENT ILLNESS:       The patient is a 38 y.o. female who presents for follow up for 4-5 months of progressive  Shooting pain from left shoulder to arm to fingers   She can't button buttons   She has poor balance    PAST MEDICAL HISTORY :       Past Medical History:        Diagnosis Date    Anxiety     Chronic back pain     Thyroid nodule        Past Surgical History:        Procedure Laterality Date     SECTION      x3    TUBAL LIGATION         Social History:   Social History     Socioeconomic History    Marital status:      Spouse name: Not on file    Number of children: Not on file    Years of education: Not on file    Highest education level: Not on file   Occupational History    Not on file   Social Needs    Financial resource strain: Not hard at all   [employer] insecurity     Worry: Never true     Inability: Never true   [employer] needs     Medical: No     Non-medical: No   Tobacco Use    Smoking status: Never Smoker    Smokeless tobacco: Never Used   Substance and Sexual Activity    Alcohol use: Yes     Frequency: Monthly or less     Drinks per session: 1 or 2     Binge frequency: Less than monthly     Comment: occasional    Drug use: Never    Sexual activity: Not on file   Lifestyle    Physical activity     Days per week: Not on file     Minutes per session: Not on file    Stress: Not on file   Relationships    Social connections     Talks on phone: Not on file     Gets together: Not on file     Attends Spiritism service: Not on file     Active member of club or organization: Not on file     Attends meetings of clubs or organizations: Not on file     Relationship status: Not on file    Intimate partner violence     Fear of current or ex partner: Not on file     Emotionally abused: Not on file     Physically abused: Not on file     Forced sexual activity: Not on file   Other Topics Concern    Not on file   Social History Narrative    Not on file       Family History:       Problem Relation Age of Onset    Diabetes Mother     High Blood Pressure Mother     Other Mother         Tremor    Seizures Mother     Heart Disease Father     Hypertension Father     Cancer Sister         Lymphoblastic lymphoma    Diabetes Maternal Uncle     Heart Attack Maternal Grandmother     Diabetes Maternal Grandmother     Kidney Disease Maternal Grandmother     Breast Cancer Paternal Aunt        Allergies:  Bee venom and Doxycycline    Home Medications:  Prior to Admission medications    Medication Sig Start Date End Date Taking? Authorizing Provider   oxyCODONE-acetaminophen (PERCOCET) 5-325 MG per tablet Take 1 tablet by mouth every 6 hours as needed for Pain for up to 5 days. Intended supply: 5 days. Take lowest dose possible to manage pain 3/28/21 4/2/21 Yes Julio Cesar Daly MD   pregabalin (LYRICA) 100 MG capsule Take 1 capsule by mouth 3 times daily for 30 days.  3/26/21 4/25/21 Yes Melodie Cabrera PA-C   Handicap Placard MISC by Does not apply route 1 years 3/12/21  Yes Melodie Cabrera PA-C   DULoxetine (CYMBALTA) 30 MG extended release capsule Take 1 capsule by mouth daily 2/19/21  Yes Esther Carpenter MD   ibuprofen (ADVIL;MOTRIN) 800 MG tablet Take 1 tablet by mouth 2 times daily as needed for Pain 2/13/21  Yes Melodie Cabrera PA-C   cyclobenzaprine (FLEXERIL) 5 MG tablet Take 1 tablet by mouth nightly as needed for Muscle spasms 12/1/20  Yes Melodie Cabrera PA-C   acetaminophen (APAP EXTRA STRENGTH) 500 MG tablet Take 2 tablets by mouth every 6 hours as needed for Pain  Patient not taking: Reported on 3/19/2021 12/10/20   Wily Arana MD       Current Medications:   No current facility-administered medications for this visit.       PHYSICAL EXAM:       /80   Pulse 97   Ht 5' 7\" (1.702 m)   Wt 206 lb (93.4 kg)   SpO2 98%   BMI 32.26 kg/m²   Physical Exam     Gen: NAD  HEENT: moist mucus membranes  Cardio: RRR  Pulm: chest rise symmetrically  GI: abd soft  Ext: no edema  Skin: warm    Neuro:    AOX3  CN 2-12 grossly intact  Speech articulate  No pronator drift  Sensation symmetrical    4/5 in left, right 4+/5  Left deltoid/wrist extension 4+/5  No allen  Slow tandem gait  Radiology Review:        ASSESSMENT AND PLAN:       Patient Active Problem List   Diagnosis    Chronic low back pain with sciatica    Thyroid nodule    Stenosis of cervical spine with myelopathy [facility])         A/P:  This is a 38 y.o. female with cervical myelopathy      Patient and/or family was counseled on the diagnosis and treatment plan    Cindy Daily, DO     Board Certified Neurosurgeon  3/29/2021  3:35 PM      This note was created using voice recognition software. There may be inaccuracies of transcription  that are inadvertently overlooked prior to the signature. There is any questions about the transcription please contact me.

## 2021-04-02 ENCOUNTER — OFFICE VISIT (OUTPATIENT)
Dept: PRIMARY CARE CLINIC | Age: 36
End: 2021-04-02
Payer: COMMERCIAL

## 2021-04-02 VITALS
WEIGHT: 216.6 LBS | BODY MASS INDEX: 33.92 KG/M2 | SYSTOLIC BLOOD PRESSURE: 128 MMHG | OXYGEN SATURATION: 100 % | TEMPERATURE: 97.1 F | DIASTOLIC BLOOD PRESSURE: 95 MMHG | HEART RATE: 107 BPM

## 2021-04-02 DIAGNOSIS — G44.201 ACUTE INTRACTABLE TENSION-TYPE HEADACHE: ICD-10-CM

## 2021-04-02 DIAGNOSIS — M54.14 THORACIC RADICULOPATHY: ICD-10-CM

## 2021-04-02 DIAGNOSIS — G99.2 STENOSIS OF CERVICAL SPINE WITH MYELOPATHY (HCC): Primary | ICD-10-CM

## 2021-04-02 DIAGNOSIS — M48.02 STENOSIS OF CERVICAL SPINE WITH MYELOPATHY (HCC): Primary | ICD-10-CM

## 2021-04-02 DIAGNOSIS — E66.09 CLASS 1 OBESITY DUE TO EXCESS CALORIES WITH SERIOUS COMORBIDITY AND BODY MASS INDEX (BMI) OF 33.0 TO 33.9 IN ADULT: ICD-10-CM

## 2021-04-02 DIAGNOSIS — M54.12 CERVICAL RADICULOPATHY: ICD-10-CM

## 2021-04-02 DIAGNOSIS — Z76.0 MEDICATION REFILL: ICD-10-CM

## 2021-04-02 PROCEDURE — G8417 CALC BMI ABV UP PARAM F/U: HCPCS | Performed by: PHYSICIAN ASSISTANT

## 2021-04-02 PROCEDURE — 99214 OFFICE O/P EST MOD 30 MIN: CPT | Performed by: PHYSICIAN ASSISTANT

## 2021-04-02 PROCEDURE — 1036F TOBACCO NON-USER: CPT | Performed by: PHYSICIAN ASSISTANT

## 2021-04-02 PROCEDURE — G8427 DOCREV CUR MEDS BY ELIG CLIN: HCPCS | Performed by: PHYSICIAN ASSISTANT

## 2021-04-02 RX ORDER — TOPIRAMATE 25 MG/1
TABLET ORAL
Qty: 30 TABLET | Refills: 0 | Status: SHIPPED | OUTPATIENT
Start: 2021-04-02 | End: 2021-05-24

## 2021-04-02 RX ORDER — PREGABALIN 100 MG/1
100 CAPSULE ORAL 3 TIMES DAILY
Qty: 90 CAPSULE | Refills: 2 | Status: SHIPPED | OUTPATIENT
Start: 2021-04-02 | End: 2021-05-21 | Stop reason: SDUPTHER

## 2021-04-02 RX ORDER — SUMATRIPTAN 25 MG/1
25 TABLET, FILM COATED ORAL DAILY PRN
Qty: 30 TABLET | Refills: 0 | Status: SHIPPED | OUTPATIENT
Start: 2021-04-02 | End: 2021-05-24

## 2021-04-02 RX ORDER — DULOXETIN HYDROCHLORIDE 60 MG/1
60 CAPSULE, DELAYED RELEASE ORAL DAILY
Qty: 30 CAPSULE | Refills: 2 | Status: SHIPPED | OUTPATIENT
Start: 2021-04-02 | End: 2021-05-19 | Stop reason: SDUPTHER

## 2021-04-02 ASSESSMENT — ENCOUNTER SYMPTOMS
EYE REDNESS: 0
NAUSEA: 0
SCALP TENDERNESS: 0
EYE WATERING: 1
PHOTOPHOBIA: 1
VOMITING: 0
BACK PAIN: 1
VISUAL CHANGE: 0
EYE PAIN: 0
BLURRED VISION: 1

## 2021-04-02 NOTE — PROGRESS NOTES
[facility]  [id number] [facility] [address]  Dept: [phone]  Dept Fax: [phone]    Office Progress/Follow Up Note    Date of patient's visit: 4/2/2021    Patient's Name:  Ana Lilia Winter YOB: 1985            Patient Care Team:  Samuel Arroyo PA-C as PCP - General (Physician Assistant)  Samuel Arroyo PA-C as PCP - [facility]  ================================================================    REASON FOR VISIT/CHIEF COMPLAINT:  1 Month Follow-Up (Neck pain, Back pain)    HISTORY OF PRESENTING ILLNESS:  History was obtained from: patient. Ana Lilia Winter is a 38 y.o. is here for follow-up for chronic neck pain, cervical and thoracic radiculopathy, complaining of headaches. Patient states she is compliant with medications. Headache   This is a new problem. The current episode started 1 to 4 weeks ago. The problem occurs daily. The problem has been unchanged. The pain is located in the occipital and temporal region. The pain does not radiate. The pain quality is not similar to prior headaches. Associated symptoms include back pain, blurred vision, eye watering, neck pain, phonophobia and photophobia. Pertinent negatives include no coughing, ear pain, eye pain, eye redness, fever, loss of balance, nausea, scalp tenderness, seizures, tinnitus, visual change or vomiting. She has tried nothing for the symptoms. The treatment provided no relief. Discussed acute headaches, medications in depth with patient, informed patient she will be started on Topamax and prescribed Imitrex to take as needed for headaches. Informed patient more than likely headaches are worsened by chronic neck pain. Patient states \"off work on a leave of absence for 6 weeks\" due to chronic neck pain. Patient was seen by neurosurgery who is recommending surgery.   Patient is going to physical therapy but \"unable to complete due to pain\". Patient is requesting chronic neck pain medication refill. Patient does voice concern with left arm swelling, patient has more symptoms in the left arm. Patient was seen in ER for symptoms on 3/28/2021, had D-dimer positive, ultrasound was negative. Patient does confirm \"comes and goes\". Informed patient swelling is due to cervical spine stenosis. 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 Patient is being seen by pain management for lower back concerns, was scheduled for back injection but \"insurance denied\" procedure. Patient blood pressures controlled in office. Patient weight is stable. Discussed weight loss in depth with patient, encourage patient make changes in diet. Labs reviewed. Health maintenance reviewed. OARRS reviewed. Medications reviewed and prescribed. Patient was informed that PCP will be residing in April 2021, encourage patient to get established with another provider in office or contact PCP office to be referred to another primary care patient, patient voiced understanding.     HPI    Patient Active Problem List   Diagnosis    Chronic low back pain with sciatica    Thyroid nodule    Stenosis of cervical spine with myelopathy (HCC)    Cervical radiculopathy    Thoracic radiculopathy    Class 1 obesity due to excess calories with serious comorbidity and body mass index (BMI) of 33.0 to 33.9 in adult       Health Maintenance Due   Topic Date Due    Hepatitis C screen  Never done    Varicella vaccine (1 of 2 - 2-dose childhood series) Never done    COVID-19 Vaccine (1) Never done       Allergies   Allergen Reactions    Bee Venom Anaphylaxis    Doxycycline Nausea Only         Current Outpatient Medications   Medication Sig Dispense Refill    DULoxetine (CYMBALTA) 60 MG extended release capsule Take 1 capsule by mouth daily 30 capsule 2    SUMAtriptan (IMITREX) 25 MG tablet Take 1 tablet by mouth daily as needed for Migraine 30 tablet 0    topiramate (TOPAMAX) 25 MG tablet Take 1 tablet daily for 5 days, then increase to 1 tablet twice daily 30 tablet 0    pregabalin (LYRICA) 100 MG capsule Take 1 capsule by mouth 3 times daily for 90 days. 90 capsule 2    Handicap Placard MISC by Does not apply route 1 years 1 each 0    ibuprofen (ADVIL;MOTRIN) 800 MG tablet Take 1 tablet by mouth 2 times daily as needed for Pain 60 tablet 2    cyclobenzaprine (FLEXERIL) 5 MG tablet Take 1 tablet by mouth nightly as needed for Muscle spasms 30 tablet 3     No current facility-administered medications for this visit. Social History     Tobacco Use    Smoking status: Never Smoker    Smokeless tobacco: Never Used   Substance Use Topics    Alcohol use: Yes     Frequency: Monthly or less     Drinks per session: 1 or 2     Binge frequency: Less than monthly     Comment: occasional    Drug use: Never       Family History   Problem Relation Age of Onset    Diabetes Mother     High Blood Pressure Mother     Other Mother         Tremor    Seizures Mother     Heart Disease Father     Hypertension Father     Cancer Sister         Lymphoblastic lymphoma    Diabetes Maternal Uncle     Heart Attack Maternal Grandmother     Diabetes Maternal Grandmother     Kidney Disease Maternal Grandmother     Breast Cancer Paternal Aunt         REVIEW OFSYSTEMS:  Review of Systems   Constitutional: Negative for chills and fever. HENT: Negative for congestion, ear pain and tinnitus. Eyes: Positive for blurred vision and photophobia. Negative for pain and redness. Respiratory: Negative for cough, shortness of breath and wheezing. Cardiovascular: Negative for chest pain. Gastrointestinal: Negative for constipation, diarrhea, nausea and vomiting. Genitourinary: Positive for urgency. Negative for dysuria and frequency.    Musculoskeletal: Positive for back pain, myalgias, neck pain and neck stiffness. Neurological: Positive for headaches. Negative for seizures and loss of balance. PHYSICAL EXAM:  Vitals:    04/02/21 1014 04/02/21 1116   BP: (!) 133/92 (!) 128/95   Site: Right Upper Arm    Position: Sitting    Cuff Size: Large Adult    Pulse: 110 107   Temp: 97.1 °F (36.2 °C)    TempSrc: Temporal    SpO2: 100%    Weight: 216 lb 9.6 oz (98.2 kg)      BP Readings from Last 3 Encounters:   04/02/21 (!) 128/95   03/29/21 132/80   03/28/21 110/78        Physical Exam  Vitals signs reviewed. Constitutional:       Appearance: Normal appearance. She is well-developed and well-groomed. She is obese. HENT:      Head: Normocephalic and atraumatic. Right Ear: External ear normal.      Left Ear: External ear normal.      Nose: Nose normal.   Eyes:      General: Lids are normal.      Conjunctiva/sclera: Conjunctivae normal.      Pupils: Pupils are equal, round, and reactive to light. Cardiovascular:      Rate and Rhythm: Normal rate and regular rhythm. Heart sounds: Normal heart sounds. Pulmonary:      Effort: Pulmonary effort is normal.      Breath sounds: Normal breath sounds. Abdominal:      Palpations: Abdomen is soft. There is no mass. Tenderness: There is no abdominal tenderness. Musculoskeletal:      Cervical back: She exhibits decreased range of motion, tenderness, bony tenderness and pain. Skin:     General: Skin is warm and dry. Neurological:      Mental Status: She is alert and oriented to person, place, and time. Motor: Weakness (Upper extremities) present. Gait: Gait is intact. Psychiatric:         Behavior: Behavior is cooperative.            DIAGNOSTIC FINDINGS:  CBC:  Lab Results   Component Value Date    WBC 7.6 12/10/2020    HGB 13.7 12/10/2020     12/10/2020       BMP:    Lab Results   Component Value Date     12/10/2020    K 4.8 12/10/2020     12/10/2020    CO2 25 12/10/2020    BUN 14 12/10/2020    CREATININE 0.73 12/10/2020    GLUCOSE 91 12/10/2020       HEMOGLOBIN A1C:   Lab Results   Component Value Date    LABA1C 5.1 12/10/2020       FASTING LIPID PANEL:  Lab Results   Component Value Date    CHOL 157 12/10/2020    HDL 60 12/10/2020    TRIG 71 12/10/2020       ASSESSMENT AND PLAN:  Celia Deleon was seen today for 1 month follow-up. Diagnoses and all orders for this visit:    Stenosis of cervical spine with myelopathy (HCC)  -     DULoxetine (CYMBALTA) 60 MG extended release capsule; Take 1 capsule by mouth daily    Cervical radiculopathy  -     DULoxetine (CYMBALTA) 60 MG extended release capsule; Take 1 capsule by mouth daily  -     pregabalin (LYRICA) 100 MG capsule; Take 1 capsule by mouth 3 times daily for 90 days. Acute intractable tension-type headache  -     SUMAtriptan (IMITREX) 25 MG tablet; Take 1 tablet by mouth daily as needed for Migraine  -     topiramate (TOPAMAX) 25 MG tablet; Take 1 tablet daily for 5 days, then increase to 1 tablet twice daily    Thoracic radiculopathy  -     pregabalin (LYRICA) 100 MG capsule; Take 1 capsule by mouth 3 times daily for 90 days. Class 1 obesity due to excess calories with serious comorbidity and body mass index (BMI) of 33.0 to 33.9 in adult    Medication refill  -     pregabalin (LYRICA) 100 MG capsule; Take 1 capsule by mouth 3 times daily for 90 days. FOLLOW UP AND INSTRUCTIONS:  Return in about 3 months (around 7/2/2021) for Neck Pain, Headache. · Dorothea received counseling on the following healthy behaviors:nutrition    · Discussed use, benefit, and side effects of prescribed medications. Barriers to medication compliance addressed. All patient questions answered. Pt voiced understanding.      · Patient given educational materials - see patient instructions    Electronically signed by Cb Atwood PA-C on 4/2/21 at 10:24 AM EDT    This note is created with the assistance of a speech-recognition program. While intending to generate a document

## 2021-04-02 NOTE — PATIENT INSTRUCTIONS
· Take Topamax daily for 5 days, then increase to 1 tablet twice daily  · Take Imitrex as needed for any breakthrough headaches  · Contact PCP office in 10 days to update on headache symptoms         Patient Education        topiramate  Pronunciation:  toe PYRE a mate  Brand:  Qudexy XR Sprinkle, Topamax, Topamax Sprinkle, Trokendi XR  What is the most important information I should know about topiramate? Topiramate may cause vision problems that can be permanent if not treated quickly. Call your doctor right away if you have a sudden decrease in vision. This medicine may increase the risk of a birth defect called cleft lip and palate in a . There may be other medications that are safer to use during pregnancy. Tell your doctor right away if you become pregnant. Topiramate can increase body temperature and decrease sweating, which may lead to life-threatening dehydration. Tell your doctor if you have decreased sweating, high fever, and hot dry skin. Topiramate can also increase the level of acid in your blood (metabolic acidosis). Tell your doctor if you have  irregular heartbeats, shortness of breath, loss of appetite, or trouble thinking. Some people have thoughts about suicide while taking seizure medicine. Stay alert to changes in your mood or symptoms. Report any new or worsening symptoms to your doctor. Do not stop using topiramate suddenly or you could have increased seizures. What is topiramate? Topiramate is a seizure medicine, also called an anticonvulsant. Topiramate is used to treat certain types of seizures in adults and children who are at least 3years old. Trokendi XR is for use in adults and children who are at least 10years old. Some brands of topiramate are also used to prevent migraine headaches in adults and teenagers who are at least 15years old.  These medicines will only prevent  migraine headaches or reduce the number of attacks, but will not treat a headache that has already begun. Topiramate may also be used for purposes not listed in this medication guide. What should I discuss with my healthcare provider before taking topiramate? You should not use topiramate if you are allergic to it. Do not take Trokendi XR within 6 hours before or 6 hours after drinking alcohol. Tell your doctor if you are sick with diarrhea, or if you have ever had:  · glaucoma or other eye problems;  · metabolic acidosis (high levels of acid in your blood);  · kidney disease, kidney stones, or dialysis;  · lung disease, breathing problems;  · mood problems, depression, or suicidal thoughts or actions;  · liver disease;  · a growth disorder; or  · soft or brittle bones (osteoporosis, osteomalacia). Topiramate can increase the level of acid in your blood (metabolic acidosis). This can weaken your bones, cause kidney stones, or cause growth problems in children or harm to an unborn baby. You may need blood tests to make sure you do not have metabolic acidosis, especially if you are pregnant. Some people have thoughts about suicide while taking an anticonvulsant. Your doctor should check your progress at regular visits. Your family or other caregivers should also be alert to changes in your mood or symptoms. Taking topiramate during pregnancy may increase the risk of cleft lip and/or cleft palate, a birth defect that can develop early in pregnancy even before you know you are pregnant. However, having a seizure during pregnancy could harm both the mother and the baby. Tell your doctor right away if you become pregnant. If you take topiramate during pregnancy: The benefit of preventing seizures may outweigh any risks posed by this medicine. There may be other medications that are safer to use. Do not start or stop taking topiramate without your doctor's advice. If you are not pregnant or planning to become pregnant, use effective birth control to prevent pregnancy while taking topiramate. Topiramate can make birth control pills less effective. Ask your doctor about using a non-hormonal birth control (condom, diaphragm, cervical cap, or contraceptive sponge) to prevent pregnancy. It may not be safe to breastfeed a baby while you are using this medicine. Ask your doctor about any risks. How should I take topiramate? Follow all directions on your prescription label and read all medication guides or instruction sheets. Your doctor may occasionally change your dose. Use the medicine exactly as directed. Topiramate can be taken with or without food. Swallow the tablet  whole and do not crush, chew, or break it. The Trokendi XR extended-release capsule must be swallowed whole. Do not break or open. If you cannot swallow a Qudexy XR or Topamax Sprinkle Capsule whole, open it and sprinkle the medicine into a spoonful of applesauce or other soft food. Swallow the mixture right away without chewing. Do not save it for later use. Carefully follow the swallowing instructions for your medicine. Topiramate doses are sometimes based on weight in children. Your child's dose needs may change if the child gains or loses weight. Drink plenty of liquids while you are taking topiramate, to prevent kidney stones or an electrolyte imbalance. You will need frequent medical tests. If you need surgery, tell the surgeon ahead of time that you are using topiramate. Any medical care provider who treats you should know that you take seizure medication. Do not stop using topiramate suddenly, even if you feel fine. Stopping suddenly may cause increased seizures. Follow your doctor's instructions about tapering your dose. Call your doctor if your seizures get worse or you have them more often while taking topiramate. Store at cool room temperature away from moisture, light, and high heat. What happens if I miss a dose? Take the medicine as soon as you can. Do not take two doses at one time.  Skip a missed Topamax  dose if your next dose is due in less than 6 hours. Call your doctor if you have missed more than one dose. What happens if I overdose? Seek emergency medical attention or call the Poison Help line at 1-407.847.3004. An overdose of topiramate can be fatal. Overdose can cause drowsiness, agitation, depression, double vision, thinking problems, problems with speech or coordination, fainting, and seizure (convulsions). What should I avoid while taking topiramate? Do not drink alcohol. Dangerous side effects or increased seizures may occur. Avoid becoming overheated or dehydrated in hot weather. Topiramate can increase body temperature and decrease sweating, leading to life-threatening dehydration (especially in children). Avoid the use of a ketogenic or \"ketosis\" diet (high in fat, low in carbohydrates) while you are taking topiramate. Topiramate may cause blurred vision or impair your thinking or reactions. Avoid driving or operating machinery until you know how this medicine will affect you. Also avoid activities that could be dangerous if you have an unexpected seizure, such as swimming or climbing in high places. What are the possible side effects of topiramate? Get emergency medical help if you have signs of an allergic reaction (hives, difficult breathing, swelling in your face or throat) or a severe skin reaction (fever, sore throat, burning eyes, skin pain, red or purple skin rash with blistering and peeling). Report any new or worsening mood symptoms to your doctor, such as: mood or behavior changes, anxiety, panic attacks, trouble sleeping, or if you feel impulsive, irritable, agitated, hostile, aggressive, restless, hyperactive (mentally or physically), depressed, or have thoughts about suicide or hurting yourself.   Call your doctor at once if you have:  · a skin rash, no matter how mild;  · vision problems, blurred vision, eye pain or redness, sudden vision loss (can be permanent if not treated quickly);  · confusion, problems with thinking or memory, trouble concentrating, problems with speech;  · dehydration symptoms --decreased sweating, high fever, hot and dry skin;  · signs of a kidney stone --severe pain in your side or lower back, painful or difficult urination;  · signs of too much acid in your blood --irregular heartbeats, feeling tired, loss of appetite, trouble thinking, feeling short of breath; or  · signs of too much ammonia in your blood --vomiting, unexplained weakness, feeling like you might pass out. Common side effects may include:  · dizziness, drowsiness, tired feeling, slow reactions;  · problems with speech or memory;  · abnormal vision;  · numbness or tingling in your arms and legs, decreased sensation (especially in the skin);  · changes in your sense of taste;  · feeling nervous;  · nausea, diarrhea, stomach pain, loss of appetite;  · fever, weight loss; or  · cold symptoms such as stuffy nose, sneezing, sore throat. This is not a complete list of side effects and others may occur. Call your doctor for medical advice about side effects. You may report side effects to FDA at 0-782-FDA-4159. What other drugs will affect topiramate? Using topiramate with other drugs that make you drowsy can worsen this effect. Ask your doctor before using opioid medication, a sleeping pill, a muscle relaxer, or medicine for anxiety or depression. Tell your doctor about all your other medicines, especially:  · zonisamide;  · birth control pills;  · divalproex, valproic acid; or  · other glaucoma medications, including eye drops. This list is not complete. Other drugs may affect topiramate, including prescription and over-the-counter medicines, vitamins, and herbal products. Not all possible drug interactions are listed here. Where can I get more information? Your pharmacist can provide more information about topiramate.   Remember, keep this and all other medicines out of the reach of children, never share your medicines with others, and use this medication only for the indication prescribed. Every effort has been made to ensure that the information provided by [address] is accurate, up-to-date, and complete, but no guarantee is made to that effect. Drug information contained herein may be time sensitive. [facility] information has been compiled for use by healthcare practitioners and consumers in the [place] and therefore [facility] does not warrant that uses outside of the [place] are appropriate, unless specifically indicated otherwise. [facility]'s drug information does not endorse drugs, diagnose patients or recommend therapy. [facility]'s drug information is an informational resource designed to assist licensed healthcare practitioners in caring for their patients and/or to serve consumers viewing this service as a supplement to, and not a substitute for, the expertise, skill, knowledge and judgment of healthcare practitioners. The absence of a warning for a given drug or drug combination in no way should be construed to indicate that the drug or drug combination is safe, effective or appropriate for any given patient. [facility] does not assume any responsibility for any aspect of healthcare administered with the aid of information [facility] provides. The information contained herein is not intended to cover all possible uses, directions, precautions, warnings, drug interactions, allergic reactions, or adverse effects. If you have questions about the drugs you are taking, check with your doctor, nurse or pharmacist.  Copyright 3518-6922 [address]: 20.01. Revision date: 11/24/2020. Care instructions adapted under license by [employer]. If you have questions about a medical condition or this instruction, always ask your healthcare professional. [place] [address] any warranty or liability for your use of this information.        Patient Education sumatriptan (oral/nasal)  Pronunciation:  carla ma TRIP tan  Brand:  Imitrex, Imitrex Nasal, Sammuel Amas, Tosymra  What is the most important information I should know about sumatriptan? You should not use this medicine if you have uncontrolled high blood pressure, heart problems, certain heart rhythm disorders, a history of heart attack or stroke, or circulation problems that cause a lack of blood supply within the body. Do not use this medicine if you have used an MAO inhibitor in the past 14 days, such as isocarboxazid, linezolid, methylene blue injection, phenelzine, rasagiline, selegiline, or tranylcypromine. Do not use sumatriptan within 24 hours before or after using any other migraine headache medicine. What is sumatriptan? Sumatriptan is a headache medicine that narrows blood vessels around the brain. Sumatriptan also reduces substances in the body that can trigger headache pain, nausea, sensitivity to light and sound, and other migraine symptoms. Sumatriptan is used to treat migraine headaches in adults. Sumatriptan will only treat a headache. This medicine will not prevent headaches or reduce the number of attacks. Sumatriptan should not be used to treat a common tension headache or a headache that causes loss of movement on one side of your body. Use this medicine only if your condition has been confirmed by a doctor as migraine headaches. Sumatriptan may also be used for purposes not listed in this medication guide. What should I discuss with my healthcare provider before using sumatriptan?   You should not use sumatriptan if you are allergic to it, or if you have ever had:  · heart problems, or a stroke (including \"mini-stroke\");  · coronary artery disease, angina (chest pain), blood circulation problems, lack of blood supply to the heart;  · circulation problems affecting your legs, arms, stomach, intestines, or kidneys;  · a heart disorder called Max-Parkinson-White syndrome;  · not understand these instructions. Take a sumatriptan tablet  whole with a full glass of water. Do not split the tablet. Do not take a sumatriptan nosepiece capsule by mouth. It is for use only in the nasal inhaler device provided with this medicine. After using sumatriptan: If your headache does not completely go away, or goes away and comes back, use a second dose if it has been at least 2 hours since your first dose. Never use more than your recommended dose. Overuse of migraine headache medicine can make headaches worse. Tell your doctor if the medicine seems to stop working as well in treating your migraine attacks. Call your doctor if your symptoms do not improve, or if you have more than 4 headaches in one month (30 days). Store sumatriptan at room temperature away from moisture, heat, and light. Do not store in a refrigerator. Do not freeze. What happens if I miss a dose? Since sumatriptan is used when needed, it does not have a daily dosing schedule. Call your doctor if your symptoms do not improve after using this medicine. What happens if I overdose? Seek emergency medical attention or call the Poison Help line at 1-788.785.6307. Overdose symptoms may include tremors, skin redness in your arms or legs, weakness, loss of coordination, breathing problems, blue-colored lips or fingernails, vision problems, or a seizure (convulsions). What should I avoid while using sumatriptan? Do not use sumatriptan within 24 hours before or after using another migraine headache medicine, including:  · sumatriptan injection, almotriptan, eletriptan, frovatriptan, naratriptan, rizatriptan, zolmitriptan; or  · ergot medicine such as dihydroergotamine, ergotamine, ergonovine, or methylergonovine. Avoid driving or hazardous activity until you know how this medicine will affect you. Your reactions could be impaired. What are the possible side effects of sumatriptan?   Get emergency medical help if you have signs of an allergic reaction: hives; difficulty breathing; swelling of your face, lips, tongue, or throat. Stop using sumatriptan and call your doctor at once if you have:  · sudden and severe stomach pain and bloody diarrhea;  · severe chest pain, shortness of breath, irregular heartbeats;  · a seizure (convulsions);  · severe headache, blurred vision, pounding in your neck or ears;  · blood circulation problems in your legs or feet --cramps, tight or heavy feeling, numbness or tingling, muscle weakness, burning pain, cold feeling, color changes (pale or blue), hip pain;  · heart attack symptoms --chest pain or pressure, pain spreading to your jaw or shoulder, nausea, sweating;  · high levels of serotonin in the body --agitation, hallucinations, fever, sweating, shivering, fast heart rate, muscle stiffness, twitching, loss of coordination, vomiting, diarrhea; or  · signs of a stroke --sudden numbness or weakness (especially on one side of the body), sudden severe headache, slurred speech, problems with vision or balance. Common side effects may include:  · pain or tight feeling in your chest, throat, or jaw;  · pressure or heavy feeling in any part of your body;  · numbness or tingling, feeling hot or cold;  · dizziness, drowsiness, weakness;  · unusual or unpleasant taste in your mouth after using the nasal medicine;  · pain, burning, numbness, or tingling in your nose or throat after using the nasal medicine; or  · runny or stuffy nose after using the nasal medicine. This is not a complete list of side effects and others may occur. Call your doctor for medical advice about side effects. You may report side effects to FDA at 0-412-FDA-4179. What other drugs will affect sumatriptan? Tell your doctor about all your other medicines, especially any type of antidepressant. Other drugs may affect sumatriptan, including prescription and over-the-counter medicines, vitamins, and herbal products.  Tell your doctor about Health. If you have questions about a medical condition or this instruction, always ask your healthcare professional. [place] [address] any warranty or liability for your use of this information.

## 2021-04-02 NOTE — PROGRESS NOTES
Visit Information    Have you changed or started any medications since your last visit including any over-the-counter medicines, vitamins, or herbal medicines? no   Are you having any side effects from any of your medications? -  no  Have you stopped taking any of your medications? Is so, why? -  no    Have you seen any other physician or provider since your last visit? Yes - Records Obtained  Have you had any other diagnostic tests since your last visit? Yes - Records Obtained  Have you been seen in the emergency room and/or had an admission to a hospital since we last saw you? Yes - Records Obtained  Have you had your routine dental cleaning in the past 6 months? no    Have you activated your [employer] account? If not, what are your barriers?  Yes     Patient Care Team:  Aissatou Mon PA-C as PCP - General (Physician Assistant)  Aissatou Mon PA-C as PCP - [facility] Provider    Medical History Review  Past Medical, Family, and Social History reviewed and does not contribute to the patient presenting condition    Health Maintenance   Topic Date Due    Hepatitis C screen  Never done    Varicella vaccine (1 of 2 - 2-dose childhood series) Never done    COVID-19 Vaccine (1) Never done    Flu vaccine (Season Ended) 09/01/2021    Cervical cancer screen  04/29/2022    DTaP/Tdap/Td vaccine (2 - Td) 09/10/2029    HIV screen  Completed    Hepatitis A vaccine  Aged Out    Hepatitis B vaccine  Aged Out    Hib vaccine  Aged Out    Meningococcal (ACWY) vaccine  Aged Out    Pneumococcal 0-64 years Vaccine  Aged Out

## 2021-04-02 NOTE — LETTER
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 If you have any questions or concerns, please don't hesitate to call.     Sincerely,            Saint Gain, PA-C

## 2021-04-05 ENCOUNTER — TELEPHONE (OUTPATIENT)
Dept: NEUROSURGERY | Age: 36
End: 2021-04-05

## 2021-04-05 NOTE — TELEPHONE ENCOUNTER
Patient called to schedule surgery - informed unable to schedule at this time as we are waiting on more information from the doctor. Told her I would reach back out to the doctor again to requesting information and would call her as soon as I was able to schedule.  Please advise hardware for surgery booking as OR will not book without it

## 2021-04-05 NOTE — TELEPHONE ENCOUNTER
----- Message from Janes Tang DO sent at 3/29/2021  3:53 PM EDT -----  Regarding: OR  C5-6 disk arthroplasty  Dx: cervical myeloradiculopathy  Not sure of hardware yet

## 2021-04-12 PROBLEM — E66.811 CLASS 1 OBESITY DUE TO EXCESS CALORIES WITH SERIOUS COMORBIDITY AND BODY MASS INDEX (BMI) OF 33.0 TO 33.9 IN ADULT: Status: ACTIVE | Noted: 2021-04-12

## 2021-04-12 PROBLEM — M54.14 THORACIC RADICULOPATHY: Status: ACTIVE | Noted: 2021-04-12

## 2021-04-12 PROBLEM — M54.12 CERVICAL RADICULOPATHY: Status: ACTIVE | Noted: 2021-04-12

## 2021-04-12 PROBLEM — E66.09 CLASS 1 OBESITY DUE TO EXCESS CALORIES WITH SERIOUS COMORBIDITY AND BODY MASS INDEX (BMI) OF 33.0 TO 33.9 IN ADULT: Status: ACTIVE | Noted: 2021-04-12

## 2021-04-12 ASSESSMENT — ENCOUNTER SYMPTOMS
COUGH: 0
WHEEZING: 0
SHORTNESS OF BREATH: 0
DIARRHEA: 0
CONSTIPATION: 0

## 2021-04-15 ENCOUNTER — HOSPITAL ENCOUNTER (OUTPATIENT)
Dept: PREADMISSION TESTING | Age: 36
Discharge: HOME | End: 2021-04-19
Payer: COMMERCIAL

## 2021-04-15 VITALS
RESPIRATION RATE: 18 BRPM | HEART RATE: 86 BPM | HEIGHT: 67 IN | SYSTOLIC BLOOD PRESSURE: 137 MMHG | OXYGEN SATURATION: 99 % | BODY MASS INDEX: 33.43 KG/M2 | WEIGHT: 213 LBS | TEMPERATURE: 97.8 F | DIASTOLIC BLOOD PRESSURE: 96 MMHG

## 2021-04-15 LAB
ABO/RH: NORMAL
ANTIBODY SCREEN: NEGATIVE
ARM BAND NUMBER: NORMAL
EXPIRATION DATE: NORMAL

## 2021-04-15 PROCEDURE — 86900 BLOOD TYPING SEROLOGIC ABO: CPT

## 2021-04-15 PROCEDURE — 36415 COLL VENOUS BLD VENIPUNCTURE: CPT

## 2021-04-15 PROCEDURE — 86901 BLOOD TYPING SEROLOGIC RH(D): CPT

## 2021-04-15 PROCEDURE — 86850 RBC ANTIBODY SCREEN: CPT

## 2021-04-19 ENCOUNTER — TELEPHONE (OUTPATIENT)
Dept: PRIMARY CARE CLINIC | Age: 36
End: 2021-04-19

## 2021-04-19 NOTE — TELEPHONE ENCOUNTER
Patient is calling to request any updates on FMLA paperwork that was received by office staff and also to inform provider that she is going to increase the Topamax to 2x daily and call office back due to current dose of Imitrex is not helping with migraine symptoms.

## 2021-04-26 ENCOUNTER — HOSPITAL ENCOUNTER (OUTPATIENT)
Dept: LAB | Age: 36
Setting detail: SPECIMEN
Discharge: HOME | End: 2021-04-26
Payer: COMMERCIAL

## 2021-04-26 DIAGNOSIS — Z01.818 PREOP TESTING: Primary | ICD-10-CM

## 2021-04-26 PROCEDURE — U0003 INFECTIOUS AGENT DETECTION BY NUCLEIC ACID (DNA OR RNA); SEVERE ACUTE RESPIRATORY SYNDROME CORONAVIRUS 2 (SARS-COV-2) (CORONAVIRUS DISEASE [COVID-19]), AMPLIFIED PROBE TECHNIQUE, MAKING USE OF HIGH THROUGHPUT TECHNOLOGIES AS DESCRIBED BY CMS-2020-01-R: HCPCS

## 2021-04-26 PROCEDURE — U0005 INFEC AGEN DETEC AMPLI PROBE: HCPCS

## 2021-04-27 LAB
SARS-COV-2: NORMAL
SARS-COV-2: NOT DETECTED
SOURCE: NORMAL

## 2021-04-29 ENCOUNTER — ANESTHESIA EVENT (OUTPATIENT)
Dept: OPERATING ROOM | Age: 36
End: 2021-04-29
Payer: COMMERCIAL

## 2021-04-30 ENCOUNTER — APPOINTMENT (OUTPATIENT)
Dept: GENERAL RADIOLOGY | Age: 36
End: 2021-04-30
Attending: NEUROLOGICAL SURGERY
Payer: COMMERCIAL

## 2021-04-30 ENCOUNTER — HOSPITAL ENCOUNTER (OUTPATIENT)
Age: 36
Setting detail: OBSERVATION
Discharge: HOME | End: 2021-05-01
Attending: NEUROLOGICAL SURGERY | Admitting: NEUROLOGICAL SURGERY
Payer: COMMERCIAL

## 2021-04-30 ENCOUNTER — ANESTHESIA (OUTPATIENT)
Dept: OPERATING ROOM | Age: 36
End: 2021-04-30
Payer: COMMERCIAL

## 2021-04-30 VITALS — OXYGEN SATURATION: 99 % | SYSTOLIC BLOOD PRESSURE: 125 MMHG | TEMPERATURE: 98.5 F | DIASTOLIC BLOOD PRESSURE: 74 MMHG

## 2021-04-30 DIAGNOSIS — G99.2 STENOSIS OF CERVICAL SPINE WITH MYELOPATHY (HCC): ICD-10-CM

## 2021-04-30 DIAGNOSIS — M50.90 CERVICAL DISC DISEASE: ICD-10-CM

## 2021-04-30 DIAGNOSIS — M48.02 STENOSIS OF CERVICAL SPINE WITH MYELOPATHY (HCC): ICD-10-CM

## 2021-04-30 DIAGNOSIS — M54.12 CERVICAL RADICULOPATHY: Primary | ICD-10-CM

## 2021-04-30 LAB — HCG, PREGNANCY URINE (POC): NEGATIVE

## 2021-04-30 PROCEDURE — 6360000002 HC RX W HCPCS: Performed by: NEUROLOGICAL SURGERY

## 2021-04-30 PROCEDURE — 2720000010 HC SURG SUPPLY STERILE: Performed by: NEUROLOGICAL SURGERY

## 2021-04-30 PROCEDURE — 3700000000 HC ANESTHESIA ATTENDED CARE: Performed by: NEUROLOGICAL SURGERY

## 2021-04-30 PROCEDURE — 2580000003 HC RX 258: Performed by: ANESTHESIOLOGY

## 2021-04-30 PROCEDURE — 2500000003 HC RX 250 WO HCPCS

## 2021-04-30 PROCEDURE — 3600000014 HC SURGERY LEVEL 4 ADDTL 15MIN: Performed by: NEUROLOGICAL SURGERY

## 2021-04-30 PROCEDURE — 2580000003 HC RX 258: Performed by: NURSE PRACTITIONER

## 2021-04-30 PROCEDURE — 2580000003 HC RX 258: Performed by: NEUROLOGICAL SURGERY

## 2021-04-30 PROCEDURE — 6360000002 HC RX W HCPCS: Performed by: ANESTHESIOLOGY

## 2021-04-30 PROCEDURE — APPSS15 APP SPLIT SHARED TIME 0-15 MINUTES: Performed by: NURSE PRACTITIONER

## 2021-04-30 PROCEDURE — 2709999900 HC NON-CHARGEABLE SUPPLY: Performed by: NEUROLOGICAL SURGERY

## 2021-04-30 PROCEDURE — 7100000001 HC PACU RECOVERY - ADDTL 15 MIN: Performed by: NEUROLOGICAL SURGERY

## 2021-04-30 PROCEDURE — 96376 TX/PRO/DX INJ SAME DRUG ADON: CPT

## 2021-04-30 PROCEDURE — 6370000000 HC RX 637 (ALT 250 FOR IP): Performed by: NEUROLOGICAL SURGERY

## 2021-04-30 PROCEDURE — 3700000001 HC ADD 15 MINUTES (ANESTHESIA): Performed by: NEUROLOGICAL SURGERY

## 2021-04-30 PROCEDURE — 6370000000 HC RX 637 (ALT 250 FOR IP): Performed by: NURSE PRACTITIONER

## 2021-04-30 PROCEDURE — 2500000003 HC RX 250 WO HCPCS: Performed by: NURSE ANESTHETIST, CERTIFIED REGISTERED

## 2021-04-30 PROCEDURE — 6360000002 HC RX W HCPCS: Performed by: NURSE PRACTITIONER

## 2021-04-30 PROCEDURE — 22856 TOT DISC ARTHRP 1NTRSPC CRV: CPT | Performed by: NEUROLOGICAL SURGERY

## 2021-04-30 PROCEDURE — 96374 THER/PROPH/DIAG INJ IV PUSH: CPT

## 2021-04-30 PROCEDURE — G0378 HOSPITAL OBSERVATION PER HR: HCPCS

## 2021-04-30 PROCEDURE — 7100000000 HC PACU RECOVERY - FIRST 15 MIN: Performed by: NEUROLOGICAL SURGERY

## 2021-04-30 PROCEDURE — 2780000010 HC IMPLANT OTHER: Performed by: NEUROLOGICAL SURGERY

## 2021-04-30 PROCEDURE — 6360000002 HC RX W HCPCS

## 2021-04-30 PROCEDURE — 96375 TX/PRO/DX INJ NEW DRUG ADDON: CPT

## 2021-04-30 PROCEDURE — 6360000002 HC RX W HCPCS: Performed by: NURSE ANESTHETIST, CERTIFIED REGISTERED

## 2021-04-30 PROCEDURE — 81025 URINE PREGNANCY TEST: CPT

## 2021-04-30 PROCEDURE — 2500000003 HC RX 250 WO HCPCS: Performed by: NEUROLOGICAL SURGERY

## 2021-04-30 PROCEDURE — 3600000004 HC SURGERY LEVEL 4 BASE: Performed by: NEUROLOGICAL SURGERY

## 2021-04-30 PROCEDURE — 3209999900 FLUORO FOR SURGICAL PROCEDURES

## 2021-04-30 PROCEDURE — 1200000000 HC SEMI PRIVATE

## 2021-04-30 PROCEDURE — C1713 ANCHOR/SCREW BN/BN,TIS/BN: HCPCS | Performed by: NEUROLOGICAL SURGERY

## 2021-04-30 DEVICE — IMPLANTABLE DEVICE: Type: IMPLANTABLE DEVICE | Site: SPINE CERVICAL | Status: FUNCTIONAL

## 2021-04-30 DEVICE — PRODISC-C IMPLANT LARGE 5MM-STERILE
Type: IMPLANTABLE DEVICE | Site: SPINE CERVICAL | Status: FUNCTIONAL
Brand: PRODISC C

## 2021-04-30 RX ORDER — MIDAZOLAM HYDROCHLORIDE 1 MG/ML
INJECTION INTRAMUSCULAR; INTRAVENOUS PRN
Status: DISCONTINUED | OUTPATIENT
Start: 2021-04-30 | End: 2021-04-30 | Stop reason: SDUPTHER

## 2021-04-30 RX ORDER — SENNA PLUS 8.6 MG/1
1 TABLET ORAL DAILY PRN
Status: DISCONTINUED | OUTPATIENT
Start: 2021-04-30 | End: 2021-05-01 | Stop reason: HOSPADM

## 2021-04-30 RX ORDER — SUMATRIPTAN 25 MG/1
25 TABLET, FILM COATED ORAL DAILY PRN
Status: DISCONTINUED | OUTPATIENT
Start: 2021-04-30 | End: 2021-05-01 | Stop reason: HOSPADM

## 2021-04-30 RX ORDER — POLYETHYLENE GLYCOL 3350 17 G/17G
17 POWDER, FOR SOLUTION ORAL DAILY
Status: DISCONTINUED | OUTPATIENT
Start: 2021-04-30 | End: 2021-05-01 | Stop reason: HOSPADM

## 2021-04-30 RX ORDER — PREGABALIN 100 MG/1
100 CAPSULE ORAL 3 TIMES DAILY
Status: DISCONTINUED | OUTPATIENT
Start: 2021-04-30 | End: 2021-05-01 | Stop reason: HOSPADM

## 2021-04-30 RX ORDER — IBUPROFEN 200 MG
600 TABLET ORAL
Status: DISCONTINUED | OUTPATIENT
Start: 2021-04-30 | End: 2021-05-01 | Stop reason: HOSPADM

## 2021-04-30 RX ORDER — TOPIRAMATE 25 MG/1
25 TABLET ORAL 2 TIMES DAILY
Status: DISCONTINUED | OUTPATIENT
Start: 2021-04-30 | End: 2021-05-01 | Stop reason: HOSPADM

## 2021-04-30 RX ORDER — DEXAMETHASONE SODIUM PHOSPHATE 10 MG/ML
INJECTION INTRAMUSCULAR; INTRAVENOUS PRN
Status: DISCONTINUED | OUTPATIENT
Start: 2021-04-30 | End: 2021-04-30 | Stop reason: SDUPTHER

## 2021-04-30 RX ORDER — PHENYLEPHRINE HCL IN 0.9% NACL 1 MG/10 ML
SYRINGE (ML) INTRAVENOUS PRN
Status: DISCONTINUED | OUTPATIENT
Start: 2021-04-30 | End: 2021-04-30 | Stop reason: SDUPTHER

## 2021-04-30 RX ORDER — CYCLOBENZAPRINE HCL 10 MG
5 TABLET ORAL NIGHTLY PRN
Status: DISCONTINUED | OUTPATIENT
Start: 2021-04-30 | End: 2021-05-01 | Stop reason: HOSPADM

## 2021-04-30 RX ORDER — LIDOCAINE HYDROCHLORIDE AND EPINEPHRINE 10; 10 MG/ML; UG/ML
INJECTION, SOLUTION INFILTRATION; PERINEURAL PRN
Status: DISCONTINUED | OUTPATIENT
Start: 2021-04-30 | End: 2021-04-30 | Stop reason: ALTCHOICE

## 2021-04-30 RX ORDER — DULOXETIN HYDROCHLORIDE 30 MG/1
60 CAPSULE, DELAYED RELEASE ORAL EVERY EVENING
Status: DISCONTINUED | OUTPATIENT
Start: 2021-04-30 | End: 2021-05-01 | Stop reason: HOSPADM

## 2021-04-30 RX ORDER — ONDANSETRON 2 MG/ML
4 INJECTION INTRAMUSCULAR; INTRAVENOUS EVERY 6 HOURS PRN
Status: DISCONTINUED | OUTPATIENT
Start: 2021-04-30 | End: 2021-05-01 | Stop reason: HOSPADM

## 2021-04-30 RX ORDER — HYDRALAZINE HYDROCHLORIDE 20 MG/ML
5 INJECTION INTRAMUSCULAR; INTRAVENOUS EVERY 10 MIN PRN
Status: DISCONTINUED | OUTPATIENT
Start: 2021-04-30 | End: 2021-04-30 | Stop reason: HOSPADM

## 2021-04-30 RX ORDER — CEFAZOLIN SODIUM 2 G/50ML
SOLUTION INTRAVENOUS PRN
Status: DISCONTINUED | OUTPATIENT
Start: 2021-04-30 | End: 2021-04-30 | Stop reason: SDUPTHER

## 2021-04-30 RX ORDER — ONDANSETRON 2 MG/ML
INJECTION INTRAMUSCULAR; INTRAVENOUS PRN
Status: DISCONTINUED | OUTPATIENT
Start: 2021-04-30 | End: 2021-04-30 | Stop reason: SDUPTHER

## 2021-04-30 RX ORDER — MORPHINE SULFATE 2 MG/ML
2 INJECTION, SOLUTION INTRAMUSCULAR; INTRAVENOUS ONCE
Status: COMPLETED | OUTPATIENT
Start: 2021-04-30 | End: 2021-04-30

## 2021-04-30 RX ORDER — NEOSTIGMINE METHYLSULFATE 5 MG/5 ML
SYRINGE (ML) INTRAVENOUS PRN
Status: DISCONTINUED | OUTPATIENT
Start: 2021-04-30 | End: 2021-04-30 | Stop reason: SDUPTHER

## 2021-04-30 RX ORDER — DIPHENHYDRAMINE HYDROCHLORIDE 50 MG/ML
12.5 INJECTION INTRAMUSCULAR; INTRAVENOUS
Status: DISCONTINUED | OUTPATIENT
Start: 2021-04-30 | End: 2021-04-30 | Stop reason: HOSPADM

## 2021-04-30 RX ORDER — METOCLOPRAMIDE HYDROCHLORIDE 5 MG/ML
10 INJECTION INTRAMUSCULAR; INTRAVENOUS
Status: DISCONTINUED | OUTPATIENT
Start: 2021-04-30 | End: 2021-04-30 | Stop reason: HOSPADM

## 2021-04-30 RX ORDER — SODIUM CHLORIDE 0.9 % (FLUSH) 0.9 %
10 SYRINGE (ML) INJECTION EVERY 12 HOURS SCHEDULED
Status: DISCONTINUED | OUTPATIENT
Start: 2021-04-30 | End: 2021-05-01 | Stop reason: HOSPADM

## 2021-04-30 RX ORDER — LIDOCAINE HYDROCHLORIDE 10 MG/ML
INJECTION, SOLUTION EPIDURAL; INFILTRATION; INTRACAUDAL; PERINEURAL PRN
Status: DISCONTINUED | OUTPATIENT
Start: 2021-04-30 | End: 2021-04-30 | Stop reason: SDUPTHER

## 2021-04-30 RX ORDER — MAGNESIUM HYDROXIDE 1200 MG/15ML
LIQUID ORAL CONTINUOUS PRN
Status: COMPLETED | OUTPATIENT
Start: 2021-04-30 | End: 2021-04-30

## 2021-04-30 RX ORDER — SODIUM CHLORIDE 9 MG/ML
INJECTION, SOLUTION INTRAVENOUS CONTINUOUS
Status: DISCONTINUED | OUTPATIENT
Start: 2021-04-30 | End: 2021-05-01 | Stop reason: HOSPADM

## 2021-04-30 RX ORDER — PROMETHAZINE HYDROCHLORIDE 25 MG/ML
6.25 INJECTION, SOLUTION INTRAMUSCULAR; INTRAVENOUS
Status: DISCONTINUED | OUTPATIENT
Start: 2021-04-30 | End: 2021-04-30 | Stop reason: HOSPADM

## 2021-04-30 RX ORDER — FENTANYL CITRATE 50 UG/ML
INJECTION, SOLUTION INTRAMUSCULAR; INTRAVENOUS PRN
Status: DISCONTINUED | OUTPATIENT
Start: 2021-04-30 | End: 2021-04-30 | Stop reason: SDUPTHER

## 2021-04-30 RX ORDER — SODIUM CHLORIDE, SODIUM LACTATE, POTASSIUM CHLORIDE, CALCIUM CHLORIDE 600; 310; 30; 20 MG/100ML; MG/100ML; MG/100ML; MG/100ML
INJECTION, SOLUTION INTRAVENOUS CONTINUOUS
Status: DISCONTINUED | OUTPATIENT
Start: 2021-04-30 | End: 2021-04-30

## 2021-04-30 RX ORDER — GLYCOPYRROLATE 1 MG/5 ML
SYRINGE (ML) INTRAVENOUS PRN
Status: DISCONTINUED | OUTPATIENT
Start: 2021-04-30 | End: 2021-04-30 | Stop reason: SDUPTHER

## 2021-04-30 RX ORDER — HYDROCODONE BITARTRATE AND ACETAMINOPHEN 5; 325 MG/1; MG/1
1 TABLET ORAL
Status: DISCONTINUED | OUTPATIENT
Start: 2021-04-30 | End: 2021-04-30

## 2021-04-30 RX ORDER — ROCURONIUM BROMIDE 10 MG/ML
INJECTION, SOLUTION INTRAVENOUS PRN
Status: DISCONTINUED | OUTPATIENT
Start: 2021-04-30 | End: 2021-04-30 | Stop reason: SDUPTHER

## 2021-04-30 RX ORDER — PROPOFOL 10 MG/ML
INJECTION, EMULSION INTRAVENOUS PRN
Status: DISCONTINUED | OUTPATIENT
Start: 2021-04-30 | End: 2021-04-30 | Stop reason: SDUPTHER

## 2021-04-30 RX ORDER — HYDROCODONE BITARTRATE AND ACETAMINOPHEN 5; 325 MG/1; MG/1
1 TABLET ORAL EVERY 6 HOURS PRN
Status: DISCONTINUED | OUTPATIENT
Start: 2021-04-30 | End: 2021-05-01 | Stop reason: HOSPADM

## 2021-04-30 RX ORDER — VANCOMYCIN HYDROCHLORIDE 1 G/20ML
INJECTION, POWDER, LYOPHILIZED, FOR SOLUTION INTRAVENOUS PRN
Status: DISCONTINUED | OUTPATIENT
Start: 2021-04-30 | End: 2021-04-30 | Stop reason: ALTCHOICE

## 2021-04-30 RX ORDER — MEPERIDINE HYDROCHLORIDE 50 MG/ML
12.5 INJECTION INTRAMUSCULAR; INTRAVENOUS; SUBCUTANEOUS EVERY 5 MIN PRN
Status: DISCONTINUED | OUTPATIENT
Start: 2021-04-30 | End: 2021-04-30 | Stop reason: HOSPADM

## 2021-04-30 RX ADMIN — SODIUM CHLORIDE, POTASSIUM CHLORIDE, SODIUM LACTATE AND CALCIUM CHLORIDE: 600; 310; 30; 20 INJECTION, SOLUTION INTRAVENOUS at 09:46

## 2021-04-30 RX ADMIN — SODIUM CHLORIDE, POTASSIUM CHLORIDE, SODIUM LACTATE AND CALCIUM CHLORIDE: 600; 310; 30; 20 INJECTION, SOLUTION INTRAVENOUS at 06:43

## 2021-04-30 RX ADMIN — Medication 200 MCG: at 09:45

## 2021-04-30 RX ADMIN — ONDANSETRON 4 MG: 2 INJECTION INTRAMUSCULAR; INTRAVENOUS at 10:46

## 2021-04-30 RX ADMIN — PREGABALIN 100 MG: 100 CAPSULE ORAL at 16:31

## 2021-04-30 RX ADMIN — Medication 4 MG: at 10:43

## 2021-04-30 RX ADMIN — SODIUM CHLORIDE, POTASSIUM CHLORIDE, SODIUM LACTATE AND CALCIUM CHLORIDE: 600; 310; 30; 20 INJECTION, SOLUTION INTRAVENOUS at 07:10

## 2021-04-30 RX ADMIN — FENTANYL CITRATE 50 MCG: 50 INJECTION, SOLUTION INTRAMUSCULAR; INTRAVENOUS at 07:27

## 2021-04-30 RX ADMIN — HYDROCODONE BITARTRATE AND ACETAMINOPHEN 1 TABLET: 5; 325 TABLET ORAL at 14:37

## 2021-04-30 RX ADMIN — DULOXETINE HYDROCHLORIDE 60 MG: 30 CAPSULE, DELAYED RELEASE ORAL at 21:37

## 2021-04-30 RX ADMIN — Medication 200 MCG: at 08:47

## 2021-04-30 RX ADMIN — DEXTROSE MONOHYDRATE 2000 MG: 50 INJECTION, SOLUTION INTRAVENOUS at 16:31

## 2021-04-30 RX ADMIN — ROCURONIUM BROMIDE 10 MG: 10 INJECTION INTRAVENOUS at 09:48

## 2021-04-30 RX ADMIN — HYDROCODONE BITARTRATE AND ACETAMINOPHEN 1 TABLET: 5; 325 TABLET ORAL at 21:37

## 2021-04-30 RX ADMIN — TOPIRAMATE 25 MG: 25 TABLET, FILM COATED ORAL at 21:37

## 2021-04-30 RX ADMIN — HYDROMORPHONE HYDROCHLORIDE 0.25 MG: 1 INJECTION, SOLUTION INTRAMUSCULAR; INTRAVENOUS; SUBCUTANEOUS at 12:55

## 2021-04-30 RX ADMIN — Medication 200 MCG: at 09:09

## 2021-04-30 RX ADMIN — CEFAZOLIN SODIUM 2000 MG: 2 SOLUTION INTRAVENOUS at 08:24

## 2021-04-30 RX ADMIN — CYCLOBENZAPRINE 5 MG: 10 TABLET, FILM COATED ORAL at 21:37

## 2021-04-30 RX ADMIN — ROCURONIUM BROMIDE 50 MG: 10 INJECTION INTRAVENOUS at 07:28

## 2021-04-30 RX ADMIN — HYDROMORPHONE HYDROCHLORIDE 0.5 MG: 1 INJECTION, SOLUTION INTRAMUSCULAR; INTRAVENOUS; SUBCUTANEOUS at 11:50

## 2021-04-30 RX ADMIN — IBUPROFEN 600 MG: 200 TABLET, FILM COATED ORAL at 16:31

## 2021-04-30 RX ADMIN — ROCURONIUM BROMIDE 30 MG: 10 INJECTION INTRAVENOUS at 08:36

## 2021-04-30 RX ADMIN — SODIUM CHLORIDE: 9 INJECTION, SOLUTION INTRAVENOUS at 15:27

## 2021-04-30 RX ADMIN — DEXAMETHASONE SODIUM PHOSPHATE 10 MG: 10 INJECTION INTRAMUSCULAR; INTRAVENOUS at 07:45

## 2021-04-30 RX ADMIN — ROCURONIUM BROMIDE 20 MG: 10 INJECTION INTRAVENOUS at 09:15

## 2021-04-30 RX ADMIN — PROPOFOL 160 MG: 10 INJECTION, EMULSION INTRAVENOUS at 07:27

## 2021-04-30 RX ADMIN — FENTANYL CITRATE 25 MCG: 50 INJECTION, SOLUTION INTRAMUSCULAR; INTRAVENOUS at 10:43

## 2021-04-30 RX ADMIN — ROCURONIUM BROMIDE 10 MG: 10 INJECTION INTRAVENOUS at 10:16

## 2021-04-30 RX ADMIN — MIDAZOLAM HYDROCHLORIDE 2 MG: 1 INJECTION, SOLUTION INTRAMUSCULAR; INTRAVENOUS at 07:21

## 2021-04-30 RX ADMIN — Medication 100 MCG: at 10:07

## 2021-04-30 RX ADMIN — PREGABALIN 100 MG: 100 CAPSULE ORAL at 21:37

## 2021-04-30 RX ADMIN — Medication 0.6 MG: at 10:40

## 2021-04-30 RX ADMIN — Medication 100 MCG: at 10:26

## 2021-04-30 RX ADMIN — Medication 0.25 MG: at 12:30

## 2021-04-30 RX ADMIN — LIDOCAINE HYDROCHLORIDE 50 MG: 10 INJECTION, SOLUTION EPIDURAL; INFILTRATION; INTRACAUDAL; PERINEURAL at 07:27

## 2021-04-30 RX ADMIN — HYDROMORPHONE HYDROCHLORIDE 0.25 MG: 1 INJECTION, SOLUTION INTRAMUSCULAR; INTRAVENOUS; SUBCUTANEOUS at 12:30

## 2021-04-30 RX ADMIN — FENTANYL CITRATE 50 MCG: 50 INJECTION, SOLUTION INTRAMUSCULAR; INTRAVENOUS at 10:57

## 2021-04-30 RX ADMIN — Medication 200 MCG: at 10:35

## 2021-04-30 RX ADMIN — FENTANYL CITRATE 25 MCG: 50 INJECTION, SOLUTION INTRAMUSCULAR; INTRAVENOUS at 10:44

## 2021-04-30 RX ADMIN — MORPHINE SULFATE 2 MG: 2 INJECTION, SOLUTION INTRAMUSCULAR; INTRAVENOUS at 18:32

## 2021-04-30 RX ADMIN — FENTANYL CITRATE 50 MCG: 50 INJECTION, SOLUTION INTRAMUSCULAR; INTRAVENOUS at 08:37

## 2021-04-30 RX ADMIN — FENTANYL CITRATE 50 MCG: 50 INJECTION, SOLUTION INTRAMUSCULAR; INTRAVENOUS at 09:38

## 2021-04-30 RX ADMIN — HYDROMORPHONE HYDROCHLORIDE 0.5 MG: 1 INJECTION, SOLUTION INTRAMUSCULAR; INTRAVENOUS; SUBCUTANEOUS at 11:56

## 2021-04-30 ASSESSMENT — PULMONARY FUNCTION TESTS
PIF_VALUE: 24
PIF_VALUE: 24
PIF_VALUE: 19
PIF_VALUE: 24
PIF_VALUE: 23
PIF_VALUE: 23
PIF_VALUE: 1
PIF_VALUE: 23
PIF_VALUE: 18
PIF_VALUE: 24
PIF_VALUE: 0
PIF_VALUE: 31
PIF_VALUE: 24
PIF_VALUE: 25
PIF_VALUE: 23
PIF_VALUE: 24
PIF_VALUE: 24
PIF_VALUE: 19
PIF_VALUE: 24
PIF_VALUE: 24
PIF_VALUE: 23
PIF_VALUE: 17
PIF_VALUE: 24
PIF_VALUE: 24
PIF_VALUE: 31
PIF_VALUE: 23
PIF_VALUE: 24
PIF_VALUE: 20
PIF_VALUE: 25
PIF_VALUE: 23
PIF_VALUE: 23
PIF_VALUE: 24
PIF_VALUE: 25
PIF_VALUE: 23
PIF_VALUE: 24
PIF_VALUE: 25
PIF_VALUE: 5
PIF_VALUE: 24
PIF_VALUE: 24
PIF_VALUE: 0
PIF_VALUE: 25
PIF_VALUE: 23
PIF_VALUE: 24
PIF_VALUE: 23
PIF_VALUE: 24
PIF_VALUE: 24
PIF_VALUE: 23
PIF_VALUE: 24
PIF_VALUE: 24
PIF_VALUE: 5
PIF_VALUE: 24
PIF_VALUE: 25
PIF_VALUE: 24
PIF_VALUE: 2
PIF_VALUE: 24
PIF_VALUE: 17
PIF_VALUE: 24
PIF_VALUE: 24
PIF_VALUE: 25
PIF_VALUE: 23
PIF_VALUE: 19
PIF_VALUE: 23
PIF_VALUE: 24
PIF_VALUE: 24
PIF_VALUE: 23
PIF_VALUE: 24
PIF_VALUE: 23
PIF_VALUE: 24
PIF_VALUE: 23
PIF_VALUE: 24
PIF_VALUE: 25
PIF_VALUE: 24
PIF_VALUE: 23
PIF_VALUE: 24
PIF_VALUE: 19
PIF_VALUE: 24
PIF_VALUE: 0
PIF_VALUE: 23
PIF_VALUE: 24
PIF_VALUE: 23
PIF_VALUE: 1
PIF_VALUE: 24
PIF_VALUE: 8
PIF_VALUE: 24
PIF_VALUE: 23
PIF_VALUE: 25
PIF_VALUE: 24
PIF_VALUE: 31
PIF_VALUE: 24
PIF_VALUE: 23
PIF_VALUE: 24
PIF_VALUE: 25
PIF_VALUE: 18
PIF_VALUE: 25
PIF_VALUE: 23
PIF_VALUE: 25
PIF_VALUE: 21
PIF_VALUE: 24
PIF_VALUE: 23

## 2021-04-30 ASSESSMENT — PAIN DESCRIPTION - ORIENTATION: ORIENTATION: RIGHT;LEFT

## 2021-04-30 ASSESSMENT — PAIN SCALES - GENERAL
PAINLEVEL_OUTOF10: 9
PAINLEVEL_OUTOF10: 6
PAINLEVEL_OUTOF10: 7
PAINLEVEL_OUTOF10: 9
PAINLEVEL_OUTOF10: 10
PAINLEVEL_OUTOF10: 9

## 2021-04-30 ASSESSMENT — PAIN DESCRIPTION - PAIN TYPE
TYPE: SURGICAL PAIN
TYPE: ACUTE PAIN;SURGICAL PAIN
TYPE: SURGICAL PAIN

## 2021-04-30 ASSESSMENT — PAIN DESCRIPTION - ONSET: ONSET: ON-GOING

## 2021-04-30 ASSESSMENT — PAIN DESCRIPTION - DESCRIPTORS: DESCRIPTORS: ACHING;SHARP

## 2021-04-30 ASSESSMENT — PAIN - FUNCTIONAL ASSESSMENT: PAIN_FUNCTIONAL_ASSESSMENT: 0-10

## 2021-04-30 ASSESSMENT — PAIN DESCRIPTION - LOCATION
LOCATION: NECK;SHOULDER
LOCATION: NECK

## 2021-04-30 ASSESSMENT — PAIN DESCRIPTION - FREQUENCY
FREQUENCY: CONTINUOUS
FREQUENCY: CONTINUOUS

## 2021-04-30 NOTE — PLAN OF CARE
Nicholas Randall was evaluated today and a DME order was entered for a wheeled walker with seat because she requires this to successfully complete daily living tasks of bathing, toileting and personal cares. A wheeled walker with seat is necessary due to the patient's unsteady gait, upper body weakness, inability to  and ambulation device, ambulating only short distances by pushing a walker, and the need to sit for a short time before resuming ambulation. These tasks cannot be completed with a lesser ambulation device such as a cane, crutch, or standard walker. The need for this equipment was discussed with the patient and she understands and is in agreement.       Electronically signed by MORE Hernandez NP on 4/30/2021 at 5:06 PM

## 2021-04-30 NOTE — ANESTHESIA POSTPROCEDURE EVALUATION
POST- ANESTHESIA EVALUATION       Pt Name: Cong Peres  MRN: [id number]  [place]: 1985  Date of evaluation: 4/30/2021  Time:  12:43 PM      /75   Pulse 100   Temp 96.8 °F (36 °C) (Temporal)   Resp 17   Ht 5' 7\" (1.702 m)   Wt 213 lb (96.6 kg)   LMP 04/25/2021 Comment: negative result  SpO2 95%   BMI 33.36 kg/m²      Consciousness Level  Awake  Cardiopulmonary Status  Stable  Pain Adequately Treated YES  Nausea / Vomiting  NO  Adequate Hydration  YES  Anesthesia Related Complications NONE      Electronically signed by Jose Mcneil MD on 4/30/2021 at 12:43 PM       Department of Anesthesiology  Postprocedure Note    Patient: Cong Peres  MRN: [id number]  [place]: 1985  Date of evaluation: 4/30/2021  Time:  12:42 PM     Procedure Summary     Date: 04/30/21 Room / Location: [address]    Anesthesia Start: 3746 Anesthesia Stop: 1100    Procedure: ANTERIOR C5-6 DISC ARTHROPLASTY (N/A Spine Cervical) Diagnosis: (CERVICAL MYELORADICULOPATHY)    Surgeons: Jose Calderon DO Responsible Provider: Jose Mcneil MD    Anesthesia Type: general ASA Status: 2          Anesthesia Type: general    Jose Phase I: Jose Score: 9    Jose Phase II:      Last vitals: Reviewed and per EMR flowsheets.        Anesthesia Post Evaluation

## 2021-04-30 NOTE — ANESTHESIA PRE PROCEDURE
of cervical spine with myelopathy (HCC) M48.02, G99.2    Cervical radiculopathy M54.12    Thoracic radiculopathy M54.14    Class 1 obesity due to excess calories with serious comorbidity and body mass index (BMI) of 33.0 to 33.9 in adult E66.09, Z68.33       Past Medical History:        Diagnosis Date    Anxiety     Blood loss     after     Cervical disc disease     Cervical myopathy     Chronic back pain     GERD (gastroesophageal reflux disease)     Hx of migraine headaches     Neuropathy     both legs    Thyroid nodule     Weakness generalized     due to neck issues       Past Surgical History:        Procedure Laterality Date     SECTION      x3    DILATION AND CURETTAGE OF UTERUS      TUBAL LIGATION         Social History:    Social History     Tobacco Use    Smoking status: Former Smoker    Smokeless tobacco: Never Used    Tobacco comment: [id number], smoker then   Substance Use Topics    Alcohol use: Yes     Frequency: Monthly or less     Drinks per session: 1 or 2     Binge frequency: Less than monthly     Comment: occasional                                Counseling given: Not Answered  Comment: [phone], smoker then      Vital Signs (Current):   Vitals:    21 0646   Pulse: 57   Resp: 16   Temp: 97.7 °F (36.5 °C)   TempSrc: Temporal   SpO2: 100%   Weight: 213 lb (96.6 kg)   Height: 5' 7\" (1.702 m)                                              BP Readings from Last 3 Encounters:   04/15/21 (!) 137/96   21 (!) 128/95   21 132/80       NPO Status: Time of last liquid consumption:                         Time of last solid consumption:                         Date of last liquid consumption: 21                        Date of last solid food consumption: 21    BMI:   Wt Readings from Last 3 Encounters:   21 213 lb (96.6 kg)   04/15/21 213 lb (96.6 kg)   21 216 lb 9.6 oz (98.2 kg)     Body mass index is 33.36 kg/m².     CBC: patient. Plan discussed with CRNA.                   Jen Mckeon MD   4/30/2021

## 2021-04-30 NOTE — PROGRESS NOTES
Neurosurgery Post op Progress Note      POD# 0    s/p Anterior cervical C5-6 arthroplasty     SUBJECTIVE:      Patient presents with cervical disc disease with complaints of neck pain, bilateral arm pain and leg pan with numbness and tingling. She currently denies numbness and tingling to bilateral upper and lower extremities. Does report chronic left arm and left weakness that is not new.       OBJECTIVE      Physical exam   VITALS:    Vitals:    04/30/21 1636   BP: 125/81   Pulse: 104   Resp: 16   Temp: 97.6 °F (36.4 °C)   SpO2: 95%     INTAKE:      Intake/Output Summary (Last 24 hours) at 4/30/2021 1640  Last data filed at 4/30/2021 1449  Gross per 24 hour   Intake 1800 ml   Output 175 ml   Net 1625 ml            Neurological exam   Alert and oriented x 3, affect appropriate, no focal neurological deficits, she is weaker on left side 4+/5 left tricep and plantarflexion, moving all extremities well    Wound   Post op wound:  Dressing is clean/dry/intact with no signs of drainage       ASSESSMENT AND PLAN    38 y.o. female status post Anterior cervical C5-6 arthroplasty  post op day # 0    - Analgesia:  Norco 1 tab every 56 hours PRN pain and Motrin 600 TID   - continue Bowel regimen  - ambulate POD 0  - encourage incentive spirometer  - Periop Antibiotics: Ancef 2g q8hrs for 3 doses   - Activity: As tolerates  - DVT prophylaxis: SCDs  - Lovenox to be started tomorrow  - Diet: Advance as tolerated  - Xray cervical flexion/extension  - likely discharge home tomorrow and she can follow up in the office with [address] [facility] in 2 weeks for wound check      Electronically signed by MORE Campbell NP on 4/30/2021 at 4:40 PM

## 2021-04-30 NOTE — H&P
History and Physical    Pt Name: Lucille Marie  MRN: [id number]  YOB: 1985  Date of evaluation: 2021    SUBJECTIVE:   History of Chief Complaint:    Patient presents preprocedure for anterior cervical arthroplasty. She reports neck pain, bilateral arm and leg pain with numbness and tingling. She says that she has had progressive symptoms over the years. She has failed conservative treatment and has been scheduled for procedure today. Past Medical History    has a past medical history of Anxiety, Blood loss, Cervical disc disease, Cervical myopathy, Chronic back pain, GERD (gastroesophageal reflux disease), Hx of migraine headaches, Neuropathy, Thyroid nodule, and Weakness generalized. Past Surgical History   has a past surgical history that includes  section and Tubal ligation. Medications  Prior to Admission medications    Medication Sig Start Date End Date Taking? Authorizing Provider   DULoxetine (CYMBALTA) 60 MG extended release capsule Take 1 capsule by mouth daily  Patient taking differently: Take 60 mg by mouth every evening  21  Yes Cliff Delong PA-C   SUMAtriptan (IMITREX) 25 MG tablet Take 1 tablet by mouth daily as needed for Migraine 21  Yes Cliff Delong PA-C   pregabalin (LYRICA) 100 MG capsule Take 1 capsule by mouth 3 times daily for 90 days. 21 Yes Cliff Delong PA-C   cyclobenzaprine (FLEXERIL) 5 MG tablet TAKE 1 TABLET BY MOUTH EACH EVENING AS NEEDED FOR MUSCLE SPASMS 21   Cliff Delong PA-C   ibuprofen (ADVIL;MOTRIN) 800 MG tablet Take 1 tablet by mouth 2 times daily as needed for Pain 21   Cliff Delong PA-C   topiramate (TOPAMAX) 25 MG tablet Take 1 tablet daily for 5 days, then increase to 1 tablet twice daily 21   Cliff Delong PA-C   Handicap Placard MISC by Does not apply route 1 years 3/12/21   Cliff Delong PA-C     Allergies  is allergic to bee venom and doxycycline.   Family History  family history includes Breast Cancer in her paternal aunt; Cancer in her sister; Diabetes in her maternal grandmother, maternal uncle, and mother; Heart Attack in her maternal grandmother; Heart Disease in her father; High Blood Pressure in her mother; Hypertension in her father; Kidney Disease in her maternal grandmother; Other in her mother; Seizures in her mother. Social History   reports that she has quit smoking. She has never used smokeless tobacco.   reports current alcohol use. reports no history of drug use. Marital Status   Occupation none    OBJECTIVE:   VITALS:  height is 5' 7\" (1.702 m) and weight is 213 lb (96.6 kg). Her temporal temperature is 97.7 °F (36.5 °C). Her pulse is 57. Her respiration is 16 and oxygen saturation is 100%. CONSTITUTIONAL:alert & oriented x 3, no acute distress. Pleasant. SKIN:  Warm and dry, no rashes on exposed areas of skin. HEAD:  Normocephalic, atraumatic. EYES: EOMs intact. EARS:  Hearing grossly WNL. NOSE:  Nares patent. No rhinorrhea. MOUTH/THROAT:  benign  NECK:supple, no lymphadenopathy  LUNGS: Clear to auscultation bilaterally, no wheezes. CARDIOVASCULAR: Heart sounds are normal.  Regular rate and rhythm without murmur. ABDOMEN: soft, non tender, non distended. Rotund. EXTREMITIES: no edema bilateral lower extremities. IMPRESSIONS:   Cervical disc disease   has a past medical history of Anxiety, Blood loss, Cervical disc disease, Cervical myopathy, Chronic back pain, GERD (gastroesophageal reflux disease), migraine headaches, Neuropathy, Thyroid nodule, and Weakness generalized.    PLANS:   Anterior cervical arthroplasty    ZAKIYA GUZMÁN PA-C  Electronically signed 4/30/2021 at 6:50 AM

## 2021-05-01 ENCOUNTER — APPOINTMENT (OUTPATIENT)
Dept: GENERAL RADIOLOGY | Age: 36
End: 2021-05-01
Attending: NEUROLOGICAL SURGERY
Payer: COMMERCIAL

## 2021-05-01 VITALS
HEIGHT: 67 IN | SYSTOLIC BLOOD PRESSURE: 117 MMHG | OXYGEN SATURATION: 100 % | BODY MASS INDEX: 33.43 KG/M2 | DIASTOLIC BLOOD PRESSURE: 78 MMHG | HEART RATE: 84 BPM | TEMPERATURE: 97.8 F | WEIGHT: 213 LBS | RESPIRATION RATE: 17 BRPM

## 2021-05-01 LAB
HCT VFR BLD CALC: 39.1 % (ref 36.3–47.1)
HEMOGLOBIN: 12.4 G/DL (ref 11.9–15.1)
MCH RBC QN AUTO: 29.7 PG (ref 25.2–33.5)
MCHC RBC AUTO-ENTMCNC: 31.7 G/DL (ref 28.4–34.8)
MCV RBC AUTO: 93.5 FL (ref 82.6–102.9)
NRBC AUTOMATED: 0 PER 100 WBC
PDW BLD-RTO: 14.1 % (ref 11.8–14.4)
PLATELET # BLD: 315 K/UL (ref 138–453)
PMV BLD AUTO: 11 FL (ref 8.1–13.5)
RBC # BLD: 4.18 M/UL (ref 3.95–5.11)
WBC # BLD: 15.9 K/UL (ref 3.5–11.3)

## 2021-05-01 PROCEDURE — 72040 X-RAY EXAM NECK SPINE 2-3 VW: CPT

## 2021-05-01 PROCEDURE — 2580000003 HC RX 258: Performed by: NURSE PRACTITIONER

## 2021-05-01 PROCEDURE — 96372 THER/PROPH/DIAG INJ SC/IM: CPT

## 2021-05-01 PROCEDURE — G0378 HOSPITAL OBSERVATION PER HR: HCPCS

## 2021-05-01 PROCEDURE — 36415 COLL VENOUS BLD VENIPUNCTURE: CPT

## 2021-05-01 PROCEDURE — 6370000000 HC RX 637 (ALT 250 FOR IP): Performed by: NURSE PRACTITIONER

## 2021-05-01 PROCEDURE — 6360000002 HC RX W HCPCS: Performed by: NURSE PRACTITIONER

## 2021-05-01 PROCEDURE — 85027 COMPLETE CBC AUTOMATED: CPT

## 2021-05-01 RX ORDER — HYDROCODONE BITARTRATE AND ACETAMINOPHEN 5; 325 MG/1; MG/1
1 TABLET ORAL EVERY 6 HOURS PRN
Qty: 20 TABLET | Refills: 0 | Status: SHIPPED | OUTPATIENT
Start: 2021-05-01 | End: 2021-05-07 | Stop reason: SDUPTHER

## 2021-05-01 RX ORDER — AMOXICILLIN 250 MG
2 CAPSULE ORAL DAILY PRN
Qty: 60 TABLET | Refills: 0 | Status: SHIPPED | OUTPATIENT
Start: 2021-05-01 | End: 2021-09-22

## 2021-05-01 RX ADMIN — DEXTROSE MONOHYDRATE 2000 MG: 50 INJECTION, SOLUTION INTRAVENOUS at 00:46

## 2021-05-01 RX ADMIN — POLYETHYLENE GLYCOL 3350 17 G: 17 POWDER, FOR SOLUTION ORAL at 08:52

## 2021-05-01 RX ADMIN — HYDROCODONE BITARTRATE AND ACETAMINOPHEN 1 TABLET: 5; 325 TABLET ORAL at 10:36

## 2021-05-01 RX ADMIN — DEXTROSE MONOHYDRATE 2000 MG: 50 INJECTION, SOLUTION INTRAVENOUS at 08:41

## 2021-05-01 RX ADMIN — ENOXAPARIN SODIUM 40 MG: 40 INJECTION, SOLUTION INTRAVENOUS; SUBCUTANEOUS at 08:53

## 2021-05-01 RX ADMIN — IBUPROFEN 600 MG: 200 TABLET, FILM COATED ORAL at 08:52

## 2021-05-01 RX ADMIN — HYDROCODONE BITARTRATE AND ACETAMINOPHEN 1 TABLET: 5; 325 TABLET ORAL at 04:40

## 2021-05-01 RX ADMIN — PREGABALIN 100 MG: 100 CAPSULE ORAL at 08:52

## 2021-05-01 RX ADMIN — TOPIRAMATE 25 MG: 25 TABLET, FILM COATED ORAL at 08:52

## 2021-05-01 RX ADMIN — SODIUM CHLORIDE, PRESERVATIVE FREE 10 ML: 5 INJECTION INTRAVENOUS at 10:21

## 2021-05-01 ASSESSMENT — PAIN SCALES - GENERAL
PAINLEVEL_OUTOF10: 9
PAINLEVEL_OUTOF10: 7
PAINLEVEL_OUTOF10: 9
PAINLEVEL_OUTOF10: 7

## 2021-05-01 ASSESSMENT — PAIN DESCRIPTION - ONSET: ONSET: ON-GOING

## 2021-05-01 NOTE — PLAN OF CARE
Problem: Pain:  Goal: Pain level will decrease  Description: Pain level will decrease  Outcome: Ongoing  Goal: Control of acute pain  Description: Control of acute pain  Outcome: Ongoing  Goal: Control of chronic pain  Description: Control of chronic pain  Outcome: Ongoing     Problem: Skin Integrity:  Goal: Will show no infection signs and symptoms  Description: Will show no infection signs and symptoms  Outcome: Ongoing  Goal: Absence of new skin breakdown  Description: Absence of new skin breakdown  Outcome: Ongoing     Problem: Falls - Risk of:  Goal: Will remain free from falls  Description: Will remain free from falls  Outcome: Met This Shift  Goal: Absence of physical injury  Description: Absence of physical injury  Outcome: Met This Shift

## 2021-05-01 NOTE — PROGRESS NOTES
Sarah  22.    Neurosurgery Service  Resident Daily Progress Note   5/1/2021 10:59 AM     Subjective    No acute events overnight. Reported mild pain in neck and bilateral arms. In recap, patient is a 53-year-old female who presented with complaint of neck pain, bilateral arm and leg pain along with numbness tingling. She had progressive symptoms for years status post anterior cervical C5-6 disc arthroplasty.     Objective    Vitals:    04/30/21 1953 05/01/21 0045 05/01/21 0440 05/01/21 0715   BP: 134/86 123/82 127/89 124/87   Pulse: 98  81 74   Resp: 17  17 16   Temp: 98.1 °F (36.7 °C) 98.2 °F (36.8 °C) 97.8 °F (36.6 °C) 97.9 °F (36.6 °C)   TempSrc: Oral Oral Oral Oral   SpO2: 97%  99% 100%   Weight:       Height:           Physical Exam:  Gen: Resting comfortably, no acute distress  CV: RRR  Resp: CTAB  GI: Abdomen soft, non-tender  Skin: Cap refill< 2 seconds, surgical incision in anterior neck C/D/I  Neuro:    A&Ox3   PERRL, EOMI   CNII-XII intact   Normal speech and mentation  Motor Exam:  L deltoid 5/5; R deltoid 5/5  L biceps 5/5; R biceps 5/5  L triceps 4/5; R triceps 5/5  L wrist extension 5/5; R wrist extension 5/5  L intrinsics 5/5; R intrinsics 5/5      L iliopsoas 5/5 , R iliopsoas 5/5  L quadriceps 5/5; R quadriceps 5/5  L Dorsiflexion 5/5; R dorsiflexion 5/5  L Plantarflexion 5/5; R plantarflexion 5/5  L EHL 5/5; R EHL 5/5     Drift:  absent  Tone:  normal  Sensory:    Right Upper Extremity:  normal  Left Upper Extremity:  normal  Right Lower Extremity:  normal  Left Lower Extremity:  normal     Deep Tendon Reflexes:    Right Bicep:  2+  Left Bicep:  2+  Right Knee:  2+  Left Knee:  2+      Labs:  Lab Results   Component Value Date    WBC 15.9 (H) 05/01/2021    HGB 12.4 05/01/2021    HCT 39.1 05/01/2021     05/01/2021    CHOL 157 12/10/2020    TRIG 71 12/10/2020    HDL 60 12/10/2020    ALT 12 12/10/2020    AST 12 12/10/2020     12/10/2020    K 4.8 12/10/2020  12/10/2020    CREATININE 0.73 12/10/2020    BUN 14 12/10/2020    CO2 25 12/10/2020    TSH 0.24 (L) 12/10/2020    LABA1C 5.1 12/10/2020       Radiology (last 24 hours):  XR Cervical Spine Flex/Ex 5/1/2021  Impression   Postsurgical changes related to placement of an inter vertebral disc   prosthetic at C5-C6.  No spondylolisthesis. ASSESSMENT & PLAN:    Hilaria Morel is a 38 y.o. female who presents with complaint of chronic neck pain, bilateral arm and leg pain. POD#1 s/p anterior cervical C5-6 disc arthroplasty    Labs and imaging were reviewed with Dr. Christelle Graham     - Continue pain control   - Bowel regimen   - Lovenox starting today   - Ambulate as tolerated   - Hilaria Morel was evaluated today and a DME order was entered for a wheeled walker with seat because she requires this to successfully complete daily living tasks of bathing, toileting and personal cares. A wheeled walker with seat is necessary due to the patient's unsteady gait, upper body weakness, inability to  and ambulation device, ambulating only short distances by pushing a walker, and the need to sit for a short time before resuming ambulation. These tasks cannot be completed with a lesser ambulation device such as a cane, crutch, or standard walker. The need for this equipment was discussed with the patient and she understands and is in agreement. - Likely discharge today with outpatient follow up with neurosurgery in 2 weeks. Please contact neurosurgery with any changes in patient's neurologic status.       Cayden Zazueta MD  Neurology PGY-2  Neurosurgery Service  5/1/2021 10:59 AM

## 2021-05-01 NOTE — DISCHARGE SUMMARY
Neurosurgery Discharge Summary     Patient Identification:  Elvis Tiwari is a 38 y.o. female. :  1985  Admit Date:  2021  Discharge Date: 2021   Attending Provider: Qian Rucker DO     Account Number: [de-identified]                                   Admission Diagnoses:   Cervical radiculopathy [M54.12]  Cervical disc disease [M50.90]    Discharge Diagnoses: Active Problems:    Cervical radiculopathy    Cervical disc disease  Resolved Problems:    * No resolved hospital problems. *      Discharge Medications:    Current Discharge Medication List           Details   HYDROcodone-acetaminophen (NORCO) 5-325 MG per tablet Take 1 tablet by mouth every 6 hours as needed for Pain for up to 5 days. Qty: 20 tablet, Refills: 0    Comments: Reduce doses taken as pain becomes manageable  Associated Diagnoses: Cervical radiculopathy; Cervical disc disease; Stenosis of cervical spine with myelopathy ([facility])      senna-docusate (PERICOLACE) 8.6-50 MG per tablet Take 2 tablets by mouth daily as needed for Constipation  Qty: 60 tablet, Refills: 0              Details   cyclobenzaprine (FLEXERIL) 5 MG tablet TAKE 1 TABLET BY MOUTH EACH EVENING AS NEEDED FOR MUSCLE SPASMS  Qty: 30 tablet, Refills: 3    Associated Diagnoses: Chronic low back pain with sciatica, sciatica laterality unspecified, unspecified back pain laterality      ibuprofen (ADVIL;MOTRIN) 800 MG tablet Take 1 tablet by mouth 2 times daily as needed for Pain  Qty: 60 tablet, Refills: 3    Associated Diagnoses: DDD (degenerative disc disease), cervical      DULoxetine (CYMBALTA) 60 MG extended release capsule Take 1 capsule by mouth daily  Qty: 30 capsule, Refills: 2    Associated Diagnoses: Stenosis of cervical spine with myelopathy ([facility] [address]  Cervical radiculopathy      SUMAtriptan (IMITREX) 25 MG tablet Take 1 tablet by mouth daily as needed for Migraine  Qty: 30 tablet, Refills: 0    Associated Diagnoses: Acute intractable tension-type headache topiramate (TOPAMAX) 25 MG tablet Take 1 tablet daily for 5 days, then increase to 1 tablet twice daily  Qty: 30 tablet, Refills: 0    Associated Diagnoses: Acute intractable tension-type headache      pregabalin (LYRICA) 100 MG capsule Take 1 capsule by mouth 3 times daily for 90 days. Qty: 90 capsule, Refills: 2    Associated Diagnoses: Thoracic radiculopathy; Cervical radiculopathy; Medication refill      Handicap Placard MISC by Does not apply route 1 years  Qty: 1 each, Refills: 0    Associated Diagnoses: Cervical radiculopathy; DDD (degenerative disc disease), cervical; Unstable gait           Current Discharge Medication List            Consults:   none    Hospital Course:  38 F admitted with complaint of chronic neck pain, bilateral arm and leg pain along with paresthesias. Failed conservative treatment and presented for anterior cervical C5-C6 disc arthroplasty. Did well post procedure without acute complication noted. Had post procedure Cervical flex/ex done which is stable. Reports adequate pain control on po meds. Given DME order for walker to assist with ambulation. Stable for discharge with outpatient follow up. Significant Diagnostics:   As mentioned in hospital course    Discharge Condition:  Stable    Disposition:   Home    Patient Instructions: Activity: activity as tolerated  Diet: regular diet  Follow-up:  Follow-up With  Details  Why  Contact Info   MORE Tylre CNP  Go on 5/17/2021  2 week post op follow up with neurosurgery @ [address] [facility] [address],   Go on 6/29/2021  8 week post op follow up with neurosurgery @ [address] [place] [address] [address] [facility]  [phone]         Time Spent on discharge is  33 mins in patient examination, evaluation, counseling as well as medication reconciliation, prescriptions for required medications, discharge plan and follow up.     Augustin Santiago MD  Neurology PGY-2  Neurosurgery Service  05/01/21

## 2021-05-01 NOTE — PROGRESS NOTES
CLINICAL PHARMACY NOTE: MEDS TO [address] Select Patient?: Yes  Total # of Prescriptions Filled: 2   The following medications were delivered to the patient:  · norco 5-325 mg  · Senna-docusate 8.6-50 mg  Total # of Interventions Completed: 0  Time Spent (min): 5    Additional Documentation: meds to beds delivered to patient

## 2021-05-01 NOTE — CARE COORDINATION
Transitional planning:  Kartik Quigley from [facility] declined for insurance. [facility] ordered. Pt said she doesn't want HC now, she would like outpatient therapy. PS attending to notify. 1400 [facility] that pt does not want the walker anymore, she will use her 's he used before. Called [facility] and spoke with Courtney Duong and cancelled order.
walker as well as home care order.       Electronically signed by Jeannie Reyez RN on 4/30/21 at 4:15 PM EDT

## 2021-05-02 NOTE — OP NOTE
Operative Note      Patient: Joce Rebollar  YOB: 1985  MRN: [id number]    Date of Procedure: 4/30/2021    Pre-Op Diagnosis: CERVICAL MYELORADICULOPATHY    Post-Op Diagnosis: Same       Procedure(s):  ANTERIOR C5-6 DISC ARTHROPLASTY  centinal prodisk C  Surgeon(s):  Migel Gore DO    Assistant:   First Assistant: Logan Salas; Luke Weinstein RN    Anesthesia: General    Estimated Blood Loss (mL): Minimal    Complications: None    Specimens:   * No specimens in log *    Implants:  Implant Name Type Inv. Item Serial No.  Lot No. LRB No. Used Action   DISK ARTIFICIAL L H5MM CERV INTERVERTEBRAL CO CHROM [[id number]] [[employer]]  DISK ARTIFICIAL L H5MM CERV INTERVERTEBRAL CO CHROM [[id number]] [[employer] [id number]  INC-WD [id number] N/A 1 Implanted         Drains:   [REMOVED] Urethral Catheter Non-latex 16 fr (Removed)       Findings: satisfactory placement of artifical disk. Brief history and indication for surgery: This is a 38year-old woman who presented with sign and symptoms of cervical myeloradiculopathy. Workup shows cervical stenosis at C5-6 with disk osteophyte complex compressing the cord and nerve root on the left. I counseled the patient, showed her the imagings, and recommended the best treatment for cervical stenosis with cord compression is surgery,  in this case the most direct and effective way is a anterior cervical discectomy. Because of patient's young age I recommended a disc arthroplasty to keep her range of motion and prevent future adjacent segment degeneration. The goal of surgery is to reduce the risk of further cord and nerve dysfunction/damage. Alternatively the patient could choose not have surgery or do surgery at a later time. Some patient may expect some immediate improvement but the goal is maintain the current clinical status, and small portion of patient experience worsening symptoms even after surgery and then stabilize.  The visualized. A 18-gauge spinal needle was placed in the C5-6 disc space and lateral fluoroscopy was used to confirm the position. Using monopolar cautery at setting of 25, the  bony surfaces exposed, the longus coli was elevated and mobilized laterally exposing the osteophytes and uncovertebral joints. Then a self-retaining retractor was placed underneath the longus coli to maintain working space protecting the carotid as well as the tracheoesophageal complex. The anterior osteophytes were removed using combination of drill and rongeur. Keno pins were placed at superior and inferior 1/3 of the adjacent vertebral bodies and distracted. Using curettes, a total discectomy was completed without injuring the cortical endplate. There was expected posterior osteophytes in the depth of the disc space, and this required high-speed drill. This was done bilaterally and probed for excellent decompression the nerve root. There is some soft disc material removed on the left side. The posterior longitudinal ligament and the remaining osteophyte was removed with Kerrisons and fully exposing the underlying dura visually confirming good decompression of the cord. Adequate decompression of the nerve root was confirmed by probing with small nerve hook. Hemostasis obtained with Surgi-Parviz and gentle tamponade. The subchondral bony surfaces was inspected to ensure evenness and drilled if there was any residual disc material.  A trial was used to size the disc arthroplasty graft using lateral and AP flouroscopy The 5mm high, 17mm wide 14mm deep Prestige LP disk was chosen. First a trial was placed in the disc base. This was followed with  hole maker for the keel cut. Then a milling drill was used to make a keel cuts and followed with keel cutter  until flush with the trial and verified with AP lateral fluoroscopy to be in good position.   Then the prodisk C disc was placed into the disc space and tamped down until in good  AP lateral fluoroscopy position. Then under live fluoroscopy, the anesthesiology flexed and extended the neck and good range of motion was demonstrated at the operated level. We then thoroughly irrigated the surgical field and ensured no evidence of active bleeding. We doused the wound with 1g of Vancomyin powder. And we closed the platysma followed by the deep dermis with interrupted sutures. The skin was dressed with Dermabond. Patient was extubated and recovery in the PACU area without complications and neurologically at baseline. All counts were correct prior to and end of closure. Patient was then extubated and recovery in the PACU area without complications.           Electronically signed by Vincenzo Escobar DO on 5/2/2021 at 5:35 PM

## 2021-05-05 ENCOUNTER — TELEPHONE (OUTPATIENT)
Dept: NEUROSURGERY | Age: 36
End: 2021-05-05

## 2021-05-05 ENCOUNTER — TELEPHONE (OUTPATIENT)
Dept: PRIMARY CARE CLINIC | Age: 36
End: 2021-05-05

## 2021-05-05 NOTE — TELEPHONE ENCOUNTER
Pt called stating she had surgery on 4/30. Pt c/o Post surgical sx's: Pain radiating down right arm, feels heavy and hard to lift. States pain also radiates down into right sided sciatic nerve. Inability to sleep. Stays in bed or in recliner. This has persisted for 5 days  Other symptoms such as fever or chills No  Are there any new injuries to the area No  Pain scale 1-10 10/10  Did anything help with the pain Keeps pillows under arm to keep arm lifted (only if completely still) , medications? Pain meds are not helping at all. Surgery: Anterior C5/6 Arthroplasty  Date of surgery: 4/30/21    Appointment scheduled 5/17/21. Pt advised will receive callback prior to appointment if further instructions.

## 2021-05-05 NOTE — TELEPHONE ENCOUNTER
It is not uncommon to experience new or different pain postoperatively, and this is typically self-limiting and will resolve with time. If the patient is having severe pain, or is concerned, please offer her an appointment to come in and be evaluated either tomorrow morning in [facility] or here at [facility] on Friday.

## 2021-05-07 ENCOUNTER — OFFICE VISIT (OUTPATIENT)
Dept: NEUROSURGERY | Age: 36
End: 2021-05-07

## 2021-05-07 VITALS
HEIGHT: 67 IN | HEART RATE: 92 BPM | WEIGHT: 213 LBS | BODY MASS INDEX: 33.43 KG/M2 | DIASTOLIC BLOOD PRESSURE: 81 MMHG | SYSTOLIC BLOOD PRESSURE: 140 MMHG

## 2021-05-07 DIAGNOSIS — G99.2 STENOSIS OF CERVICAL SPINE WITH MYELOPATHY (HCC): ICD-10-CM

## 2021-05-07 DIAGNOSIS — M50.90 CERVICAL DISC DISEASE: ICD-10-CM

## 2021-05-07 DIAGNOSIS — M54.12 CERVICAL RADICULOPATHY: ICD-10-CM

## 2021-05-07 DIAGNOSIS — M48.02 STENOSIS OF CERVICAL SPINE WITH MYELOPATHY (HCC): ICD-10-CM

## 2021-05-07 PROCEDURE — 99024 POSTOP FOLLOW-UP VISIT: CPT | Performed by: NURSE PRACTITIONER

## 2021-05-07 RX ORDER — CYCLOBENZAPRINE HCL 5 MG
5 TABLET ORAL 3 TIMES DAILY PRN
Qty: 21 TABLET | Refills: 0 | Status: SHIPPED | OUTPATIENT
Start: 2021-05-07 | End: 2021-05-14

## 2021-05-07 RX ORDER — HYDROCODONE BITARTRATE AND ACETAMINOPHEN 5; 325 MG/1; MG/1
1 TABLET ORAL EVERY 6 HOURS PRN
Qty: 28 TABLET | Refills: 0 | Status: SHIPPED | OUTPATIENT
Start: 2021-05-07 | End: 2021-05-17 | Stop reason: SDUPTHER

## 2021-05-07 NOTE — PROGRESS NOTES
Marisol Garcia  [facility] [place] [address] [facility] [address]  Dept: [phone]    Patient:  Chinyere Lucas  YOB: 1985  Date: 5/7/21    The patient is a 38 y.o. female who presents today for consult of the following problems:     Chief Complaint   Patient presents with    Post-Op Check     Issues with pain          HPI:     Chinyere Lucas is a 38 y.o. female who presents to the office for post-op evaluation s/p C5-C6 arthroplasty. Incision is healing well, denies any discharge or drainage. Improving dysphagia. Presented today with concerns regarding right upper extremity pain. States that she is having pain radiating down her bicep that waxes and wanes in intensity. Denies any fevers, chills. Left upper extremity symptoms improving, reports improved strength to left hand grasp. Strength 5/5, somewhat limited exam secondary to pain for right upper extremity  Incision CDI  Sensation intact    Date of surgery: 4/30/2021    Assessment and Plan:      1. Cervical radiculopathy    2. Cervical disc disease    3. Stenosis of cervical spine with myelopathy ([facility] [address]          Plan: Patient reassured that it does take time for postoperative pain to improve. No clear motor deficits present, some limitation to right arm movements secondary to pain. Improved left upper extremity symptoms. Postoperative medications were helpful in controlling current symptoms, however patient did run out of these. Muscle relaxer and pain medication sent to pharmacy. Will continue to plan on next postop visit in 1 week for reevaluation. Contact office sooner with any new or worsening symptoms. Followup: Return in about 10 days (around 5/17/2021), or if symptoms worsen or fail to improve.     Prescriptions Ordered:  Orders Placed This Encounter   Medications    HYDROcodone-acetaminophen (NORCO) 5-325 MG per tablet     Sig: Take 1 tablet by mouth every 6 hours as needed for Pain for up to 7 days. Dispense:  28 tablet     Refill:  0     Reduce doses taken as pain becomes manageable    cyclobenzaprine (FLEXERIL) 5 MG tablet     Sig: Take 1 tablet by mouth 3 times daily as needed for Muscle spasms     Dispense:  21 tablet     Refill:  0        Orders Placed:  No orders of the defined types were placed in this encounter. Electronically signed by MORE Begum CNP on 5/7/2021 at 3:26 PM    Please note that this chart was generated using voice recognition Dragon dictation software. Although every effort was made to ensure the accuracy of this automated transcription, some errors in transcription may have occurred.

## 2021-05-07 NOTE — LETTER
[address]  Phone: [phone]  Fax: MORE Edwards CNP        May 7, 2021     Patient: Renetta Macias   YOB: 1985   Date of Visit: 5/7/2021       To Whom it May Concern:    Tien Wilkinson was seen in my clinic on 5/7/2021. She may return to work on 6/7/2021 after being cleared by neurosurgery. .    If you have any questions or concerns, please don't hesitate to call.     Sincerely,         MORE Dickson - CNP

## 2021-05-07 NOTE — PATIENT INSTRUCTIONS
Schedule a Vaccine  When you qualify to receive the vaccine, call the [facility]) COVID-19 Vaccination Hotline to schedule your appointment or to get additional information about the [facility]) locations which are offering the COVID-19 vaccine. To be 94% effective, it's important that you receive two doses of one of the COVID-19 vaccines. -If you are receiving the Ding Peter vaccine, your second shot will be scheduled as close to 21 days after the first shot as possible. -If you are receiving the Moderna vaccine, your second shot will be scheduled as close to 28 days after the first shot as possible. [facility]) COVID-19 Vaccination Hotline: [phone]    Links to [facility]) website and [facility] website:    [facility]-monitoring-coronavirus-covid-[email or web address]-[email or web address]/[email or web address]    [email or web address]

## 2021-05-13 ENCOUNTER — TELEPHONE (OUTPATIENT)
Dept: NEUROSURGERY | Age: 36
End: 2021-05-13

## 2021-05-13 DIAGNOSIS — M54.12 CERVICAL RADICULOPATHY: Primary | ICD-10-CM

## 2021-05-17 ENCOUNTER — OFFICE VISIT (OUTPATIENT)
Dept: NEUROSURGERY | Age: 36
End: 2021-05-17

## 2021-05-17 VITALS
OXYGEN SATURATION: 97 % | BODY MASS INDEX: 33.43 KG/M2 | HEIGHT: 67 IN | DIASTOLIC BLOOD PRESSURE: 78 MMHG | SYSTOLIC BLOOD PRESSURE: 112 MMHG | WEIGHT: 213 LBS | HEART RATE: 88 BPM

## 2021-05-17 DIAGNOSIS — M50.90 CERVICAL DISC DISEASE: ICD-10-CM

## 2021-05-17 DIAGNOSIS — Z98.890 S/P SPINAL SURGERY: ICD-10-CM

## 2021-05-17 DIAGNOSIS — M54.12 CERVICAL RADICULOPATHY: ICD-10-CM

## 2021-05-17 DIAGNOSIS — G99.2 STENOSIS OF CERVICAL SPINE WITH MYELOPATHY (HCC): Primary | ICD-10-CM

## 2021-05-17 DIAGNOSIS — M48.02 STENOSIS OF CERVICAL SPINE WITH MYELOPATHY (HCC): Primary | ICD-10-CM

## 2021-05-17 PROCEDURE — 99024 POSTOP FOLLOW-UP VISIT: CPT | Performed by: NURSE PRACTITIONER

## 2021-05-17 RX ORDER — HYDROCODONE BITARTRATE AND ACETAMINOPHEN 5; 325 MG/1; MG/1
1 TABLET ORAL
Qty: 24 TABLET | Refills: 0 | Status: SHIPPED | OUTPATIENT
Start: 2021-05-17 | End: 2021-05-24 | Stop reason: SDUPTHER

## 2021-05-17 NOTE — PROGRESS NOTES
2021), or if symptoms worsen or fail to improve. Prescriptions Ordered:  Orders Placed This Encounter   Medications    Elastic Bandages & Supports (FUTURO SOFT CERVICAL COLLAR) MISC     Si Units by Does not apply route as needed (neck pain)     Dispense:  1 each     Refill:  0    HYDROcodone-acetaminophen (NORCO) 5-325 MG per tablet     Sig: Take 1 tablet by mouth every 6-8 hours as needed for Pain for up to 7 days. Dispense:  24 tablet     Refill:  0     Reduce doses taken as pain becomes manageable        Orders Placed:  Orders Placed This Encounter   Procedures    XR CERVICAL SPINE FLEXION AND EXTENSION     Standing Status:   Future     Standing Expiration Date:   8/15/2021     Order Specific Question:   Reason for exam:     Answer:   s/p cervical arthroplasty    [facility]     Referral Priority:   Routine     Referral Type:   Eval and Treat     Referral Reason:   Specialty Services Required     Requested Specialty:   Physical Therapy     Number of Visits Requested:   1        Electronically signed by MORE Ye CNP on 2021 at 12:09 PM    Please note that this chart was generated using voice recognition Dragon dictation software. Although every effort was made to ensure the accuracy of this automated transcription, some errors in transcription may have occurred.

## 2021-05-19 DIAGNOSIS — M48.02 STENOSIS OF CERVICAL SPINE WITH MYELOPATHY (HCC): ICD-10-CM

## 2021-05-19 DIAGNOSIS — G99.2 STENOSIS OF CERVICAL SPINE WITH MYELOPATHY (HCC): ICD-10-CM

## 2021-05-19 DIAGNOSIS — Z76.0 MEDICATION REFILL: ICD-10-CM

## 2021-05-19 DIAGNOSIS — M54.14 THORACIC RADICULOPATHY: ICD-10-CM

## 2021-05-19 DIAGNOSIS — M54.12 CERVICAL RADICULOPATHY: ICD-10-CM

## 2021-05-21 RX ORDER — DULOXETIN HYDROCHLORIDE 60 MG/1
60 CAPSULE, DELAYED RELEASE ORAL DAILY
Qty: 30 CAPSULE | Refills: 2 | Status: SHIPPED | OUTPATIENT
Start: 2021-05-21 | End: 2021-08-16 | Stop reason: SDUPTHER

## 2021-05-21 RX ORDER — PREGABALIN 100 MG/1
100 CAPSULE ORAL 3 TIMES DAILY
Qty: 90 CAPSULE | Refills: 2 | Status: SHIPPED | OUTPATIENT
Start: 2021-05-21 | End: 2021-05-24 | Stop reason: SDUPTHER

## 2021-05-24 ENCOUNTER — HOSPITAL ENCOUNTER (OUTPATIENT)
Age: 36
Discharge: HOME | End: 2021-05-26
Payer: COMMERCIAL

## 2021-05-24 ENCOUNTER — HOSPITAL ENCOUNTER (OUTPATIENT)
Dept: GENERAL RADIOLOGY | Age: 36
Discharge: HOME | End: 2021-05-26
Payer: COMMERCIAL

## 2021-05-24 ENCOUNTER — NURSE TRIAGE (OUTPATIENT)
Dept: OTHER | Facility: CLINIC | Age: 36
End: 2021-05-24

## 2021-05-24 ENCOUNTER — TELEPHONE (OUTPATIENT)
Dept: NEUROSURGERY | Age: 36
End: 2021-05-24

## 2021-05-24 ENCOUNTER — OFFICE VISIT (OUTPATIENT)
Dept: NEUROLOGY | Age: 36
End: 2021-05-24
Payer: COMMERCIAL

## 2021-05-24 VITALS
WEIGHT: 213 LBS | OXYGEN SATURATION: 98 % | SYSTOLIC BLOOD PRESSURE: 128 MMHG | BODY MASS INDEX: 33.43 KG/M2 | DIASTOLIC BLOOD PRESSURE: 72 MMHG | HEIGHT: 67 IN | HEART RATE: 65 BPM

## 2021-05-24 DIAGNOSIS — M54.12 CERVICAL RADICULOPATHY: ICD-10-CM

## 2021-05-24 DIAGNOSIS — G89.29 CHRONIC NECK PAIN: ICD-10-CM

## 2021-05-24 DIAGNOSIS — M79.601 PARESTHESIA AND PAIN OF BOTH UPPER EXTREMITIES: ICD-10-CM

## 2021-05-24 DIAGNOSIS — M54.2 CHRONIC NECK PAIN: ICD-10-CM

## 2021-05-24 DIAGNOSIS — M48.02 STENOSIS OF CERVICAL SPINE WITH MYELOPATHY (HCC): ICD-10-CM

## 2021-05-24 DIAGNOSIS — Z98.890 S/P SPINAL SURGERY: ICD-10-CM

## 2021-05-24 DIAGNOSIS — Z76.0 MEDICATION REFILL: ICD-10-CM

## 2021-05-24 DIAGNOSIS — G89.29 CHRONIC LOW BACK PAIN WITH SCIATICA, SCIATICA LATERALITY UNSPECIFIED, UNSPECIFIED BACK PAIN LATERALITY: Primary | ICD-10-CM

## 2021-05-24 DIAGNOSIS — M54.14 THORACIC RADICULOPATHY: ICD-10-CM

## 2021-05-24 DIAGNOSIS — M62.838 MUSCLE SPASMS OF BOTH LOWER EXTREMITIES: ICD-10-CM

## 2021-05-24 DIAGNOSIS — R20.2 PARESTHESIA AND PAIN OF BOTH UPPER EXTREMITIES: ICD-10-CM

## 2021-05-24 DIAGNOSIS — M50.90 CERVICAL DISC DISEASE: ICD-10-CM

## 2021-05-24 DIAGNOSIS — M79.602 PARESTHESIA AND PAIN OF BOTH UPPER EXTREMITIES: ICD-10-CM

## 2021-05-24 DIAGNOSIS — G99.2 STENOSIS OF CERVICAL SPINE WITH MYELOPATHY (HCC): ICD-10-CM

## 2021-05-24 DIAGNOSIS — M79.609 PARESTHESIA AND PAIN OF EXTREMITY: ICD-10-CM

## 2021-05-24 DIAGNOSIS — R20.2 PARESTHESIA AND PAIN OF EXTREMITY: ICD-10-CM

## 2021-05-24 DIAGNOSIS — M54.40 CHRONIC LOW BACK PAIN WITH SCIATICA, SCIATICA LATERALITY UNSPECIFIED, UNSPECIFIED BACK PAIN LATERALITY: Primary | ICD-10-CM

## 2021-05-24 PROCEDURE — 72040 X-RAY EXAM NECK SPINE 2-3 VW: CPT

## 2021-05-24 PROCEDURE — G8417 CALC BMI ABV UP PARAM F/U: HCPCS | Performed by: STUDENT IN AN ORGANIZED HEALTH CARE EDUCATION/TRAINING PROGRAM

## 2021-05-24 PROCEDURE — 1036F TOBACCO NON-USER: CPT | Performed by: STUDENT IN AN ORGANIZED HEALTH CARE EDUCATION/TRAINING PROGRAM

## 2021-05-24 PROCEDURE — 99214 OFFICE O/P EST MOD 30 MIN: CPT | Performed by: STUDENT IN AN ORGANIZED HEALTH CARE EDUCATION/TRAINING PROGRAM

## 2021-05-24 PROCEDURE — G8427 DOCREV CUR MEDS BY ELIG CLIN: HCPCS | Performed by: STUDENT IN AN ORGANIZED HEALTH CARE EDUCATION/TRAINING PROGRAM

## 2021-05-24 RX ORDER — PREGABALIN 150 MG/1
150 CAPSULE ORAL 3 TIMES DAILY
Qty: 90 CAPSULE | Refills: 3 | Status: SHIPPED | OUTPATIENT
Start: 2021-05-24 | End: 2021-08-16 | Stop reason: SDUPTHER

## 2021-05-24 RX ORDER — SUMATRIPTAN 100 MG/1
100 TABLET, FILM COATED ORAL
Qty: 10 TABLET | Refills: 3 | Status: SHIPPED | OUTPATIENT
Start: 2021-05-24 | End: 2021-06-22

## 2021-05-24 RX ORDER — CYCLOBENZAPRINE HCL 5 MG
5 TABLET ORAL 3 TIMES DAILY PRN
COMMUNITY
End: 2021-06-03 | Stop reason: SDUPTHER

## 2021-05-24 RX ORDER — HYDROCODONE BITARTRATE AND ACETAMINOPHEN 5; 325 MG/1; MG/1
1 TABLET ORAL EVERY 8 HOURS PRN
Qty: 21 TABLET | Refills: 0 | Status: SHIPPED | OUTPATIENT
Start: 2021-05-24 | End: 2021-06-03 | Stop reason: SDUPTHER

## 2021-05-24 RX ORDER — TOPIRAMATE 50 MG/1
50 TABLET, FILM COATED ORAL 2 TIMES DAILY
Qty: 60 TABLET | Refills: 3 | OUTPATIENT
Start: 2021-05-25 | End: 2021-09-19

## 2021-05-24 ASSESSMENT — ENCOUNTER SYMPTOMS
VOMITING: 0
NAUSEA: 0
DIARRHEA: 0
EYE REDNESS: 0
PHOTOPHOBIA: 0
BACK PAIN: 1
ABDOMINAL PAIN: 0
EYE PAIN: 0
SINUS PAIN: 0
CONSTIPATION: 0
SORE THROAT: 0
COUGH: 0
EYE DISCHARGE: 0
SHORTNESS OF BREATH: 0

## 2021-05-24 NOTE — PROGRESS NOTES
[address] [place] [address], [place] [address], [facility] [place] [facility], [facility], [address] [place] [address]  P: [phone]  F: [phone]    NEUROLOGY CLINIC NOTE     PATIENT NAME: Chiara Booth  PATIENT MRN: [id number]  PRIMARY CARE PHYSICIAN: Ramirez Duarte PA-C      Interval History: 5/24/2021   Patient was last seen in the clinic in February 2021. Patient had anterior cervical C5-C6 disc arthroplasty done on 4/30/2021. Postprocedure cervical flexion extension x-rays were stable. Since then patient continues to complain of neck pain radiating down to the right upper extremity associated with tingling and numbness in the right upper extremity, endorses improvement in the tingling and numbness along with the weakness in the left upper extremity, continues to have some weakness in the right upper extremity. She will be following up with Dr. Brandt Alonso on 6/29/2021. X-ray cervical spine flexion extension 5/1/2021  Impression   Postsurgical changes related to placement of an inter vertebral disc   prosthetic at C5-C6.  No spondylolisthesis. She continues to have back pain with radicular symptoms in bilateral lower extremities, endorses worsening of paresthesias in her left foot. Currently she is taking Cymbalta 60 mg at nighttime. Taking Flexeril 5 mg at nighttime. Also taking Lyrica 100 mg 3 times a day. Endorses some improvement in the symptoms with that. Denies any side effects from Lyrica or Cymbalta or Flexeril. Patient saw the pain physician, was not started on any intervention at present as she needs to complete 6 weeks of physical therapy as per the pain management prior to any intervention.     2/9/2021  Vitamin E, alpha-tocopherol 8.1, gamma tocopherol  1.2  Vitamin B12 577  Folate 12.4  Vitamin B6 34.1  ESR 4  CRP 3.4  Ceruloplasmin normal 28  Copper 140.9 normal  LOUISE negative    Notes from 2/19/2021  HPI:      Chiara Booth is a 38 y.o. right handed  female with PMH significant for chronic back pain, thyroid nodule, anxiety was seen in the clinic for neuropathy     History obtained from Patient and medical chart review. Patient has a history of chronic neck pain, getting worse for last 1-1/2-year, endorses sharp stabbing pain in the neck radiating down to bilateral upper extremities, worse on the left side as compared to the right side. She endorses constant tingling and numbness in her hands bilaterally, endorses intermittent tingling and numbness in bilateral upper extremities involving the forearm and. She also complains of weakness in her bilateral upper extremities, endorses dropping things from her hands, difficulty with her hand , difficulty holding the objects. Difficulty holding baby, endorses her hands locking up while eating. Difficulty with zipping and buttoning and dressing. She had MRI of the cervical spine done on 2/9/2021 which showed disc protrusion at C5-C6 causing moderate stenosis of the thecal sac and narrowing of the neural foramina. Multiple small lymph nodes scattered throughout the neck, mostly nonspecific and reactive. Patient was evaluated by neurosurgery today, recommend cervical flexion-extension x-rays and was referred to physical therapy. She also had EMG nerve conduction study done of bilateral upper extremities on 7/8/2020, this was done at [facility].  No images available to review, as per the report EMG study was normal.  There was no electrodiagnostic evidence of generalized large fiber peripheral polyneuropathy or cervical radiculopathy. Patient also gives a history of chronic back pain, started in 2004. She had received epidural injection for her labor, post epidural injection she started having back pain.   She endorses sharp stabbing pain in her lower back radiating to bilateral lower extremities, pain worse on the right side specially her right buttock radiating to bilateral lower extremities, she feels more  Marital Status:    Intimate Partner Violence:     Fear of Current or Ex-Partner:     Emotionally Abused:     Physically Abused:     Sexually Abused:         Current Outpatient Medications   Medication Sig Dispense Refill    cyclobenzaprine (FLEXERIL) 5 MG tablet Take 5 mg by mouth 3 times daily as needed for Muscle spasms      SUMAtriptan (IMITREX) 100 MG tablet Take 1 tablet by mouth once as needed for Migraine Take 1 pill immediately when you start having the aura or at the onset of headache. Can take second tablet if headache has not resolved after 2 hours    Avoid taking more than 2 pills in 24 hours and not more than 4 pills in a week 10 tablet 3    [START ON 5/25/2021] topiramate (TOPAMAX) 50 MG tablet Take 1 tablet by mouth 2 times daily 60 tablet 3    pregabalin (LYRICA) 150 MG capsule Take 1 capsule by mouth 3 times daily for 90 days. 90 capsule 3    DULoxetine (CYMBALTA) 60 MG extended release capsule Take 1 capsule by mouth daily 30 capsule 2    Elastic Bandages & Supports (FUTURO SOFT CERVICAL COLLAR) MISC 1 Units by Does not apply route as needed (neck pain) 1 each 0    HYDROcodone-acetaminophen (NORCO) 5-325 MG per tablet Take 1 tablet by mouth every 6-8 hours as needed for Pain for up to 7 days. 24 tablet 0    senna-docusate (PERICOLACE) 8.6-50 MG per tablet Take 2 tablets by mouth daily as needed for Constipation 60 tablet 0    ibuprofen (ADVIL;MOTRIN) 800 MG tablet Take 1 tablet by mouth 2 times daily as needed for Pain 60 tablet 3    Handicap Placard MISC by Does not apply route 1 years 1 each 0     No current facility-administered medications for this visit. Allergies   Allergen Reactions    Bee Venom Anaphylaxis    Doxycycline Nausea Only        REVIEW OF SYSTEMS:     Review of Systems   Constitutional: Negative for chills, diaphoresis, fever and unexpected weight change.    HENT: Negative for congestion, ear discharge, ear pain, hearing loss, sinus pain and sore throat. Eyes: Negative for photophobia, pain, discharge, redness and visual disturbance. Respiratory: Negative for cough and shortness of breath. Cardiovascular: Negative for chest pain, palpitations and leg swelling. Gastrointestinal: Negative for abdominal pain, constipation, diarrhea, nausea and vomiting. Endocrine: Negative for polydipsia and polyuria. Genitourinary: Negative for difficulty urinating and hematuria. Musculoskeletal: Positive for arthralgias, back pain, gait problem, neck pain and neck stiffness. Skin: Negative for pallor and rash. Neurological: Positive for numbness. Negative for dizziness, tremors, seizures, syncope, facial asymmetry, speech difficulty, weakness, light-headedness and headaches. Hematological: Does not bruise/bleed easily. Psychiatric/Behavioral: Negative for agitation, behavioral problems and hallucinations. VITALS  /72   Pulse 65   Ht 5' 7\" (1.702 m)   Wt 213 lb (96.6 kg)   LMP 04/25/2021 Comment: negative result  SpO2 98%   BMI 33.36 kg/m²      PHYSICAL EXAMINATION:     Constitutional: Well developed, well nourished and in no acute distress. Head:  normocephalic, atraumatic. Neck: supple, no carotid bruits  Respiratory: Clear to auscultation bilaterally.    Cardiovascular: normal rate, regular rhythm  Abdomen: Soft, nontender, nondistended  Extremities:  peripheral pulses palpable, no pedal edema   Psych: normal affect      NEUROLOGICAL EXAMINATION:     Mental status   Alert and oriented; intact memory with no confusion, speech or language problems; no hallucinations or delusions     Cranial nerves   II - visual fields intact to confrontation                                                III, IV, VI  extra-ocular muscles full: no pupillary defect; no AMANDA, no nystagmus, no ptosis   V - normal facial sensation                                                               VII - normal facial symmetry VIII - intact hearing                                                                             IX, X - symmetrical palate                                                                  XI - symmetrical shoulder shrug                                                       XII - midline tongue without atrophy or fasciculation     Motor function  Normal muscle bulk and tone  Muscle strength:   Bilateral upper extremities 4+/5, bilateral hand  4+/5. Effort related weakness  Bilateral lower extremities 4+/5. Effort related weakness     Sensory function  decreased light touch and pinprick in the left lower extremity and right upper extremity as compared to the other side. Cerebellar No involuntary movements or tremors     Reflex function  reflexes 1+ in bilateral upper and lower extremities  No clonus. Plantars equivocal.     Gait                  Normal station and gait  Unable to perform heel walking, was able to perform toe and tandem walking. PRIOR TESTS AND IMAGING: Following images and Labs were reviewed by the examiner     MRI lumbar spine without contrast 1/24/2020  FINDINGS: No comparisons. Normal alignment of the lumbar spine. No acute fractures or subluxations. The conus terminates at L1 which is within normal limits. Marrow signal is within normal limits. No significant spinal stenosis. At T12-L1, L1-L2, L2-L3, and L3-L4 there is no significant disc   herniation, spinal stenosis, or neuroforaminal narrowing. At L4-L5 there is mild broad base disc bulge and mild degenerative facet joint disease causing minimal narrowing of the inferior neural foramina bilaterally without effacement of the exiting nerve roots. At L5-S1, there is minimal posterior disc bulge not causing significant spinal stenosis or neuroforaminal narrowing. IMPRESSION:  1. Mild disc bulge at L4-5 and L5-S1 not causing significant spinal stenosis or neuroforaminal narrowing.   2. Mild facet joint arthritis in the lower lumbar spine. 3.  Normal alignment of the lumbar spine without fractures or subluxations seen. EMG nerve conduction study 12/23/2019 bilateral lower extremities  1-Abnormal study of the left lower extremity and normal study of the right   lower extremity.  There is electrodiagnostic evidence suggestive of a   chronic mild left L5 radiculopathy without active denervation.  There is   no electrodiagnostic evidence of a generalized large fiber peripheral   polyneuropathy.  Clinical correlation is required.  Please see full EMG   report. EMG nerve conduction studies bilateral upper extremity 7/8/2020  1- Normal study of the bilateral upper extremities.  There is no   electrodiagnostic evidence of a generalized large fiber peripheral   polyneuropathy or a cervical radiculopathy.  Clinical correlation is   required.  Please see full EMG report. MRI brain without contrast 7/15/2020  IMPRESSION:  Unremarkable unenhanced MRI of the brain. MRI cervical spine without contrast 2/9/2021  FINDINGS:   BONES/ALIGNMENT: There is normal alignment of the spine. The vertebral body   heights are maintained.  The bone marrow signal appears unremarkable.  There   is degenerative disc disease with disc space narrowing and osteophytes at   C4-5, C5-6 and C6-7.       SPINAL CORD:  No abnormal signal is identified within the spinal cord.       SOFT TISSUES: No abnormal enhancement of the cervical spine. No paraspinal   mass identified.       There are small lymph nodes scattered throughout the neck that are   nonspecific.  They could be reactive.  There are slightly prominent for a   patient of this age.  Clinically correlate to exclude underlying pathology.       C2-C3: The thecal sac and neural foramina are intact.       C3-C4: There is a disc protrusion measuring 2 mm. The thecal sac and neural   foramina are intact.       C4-C5: There is a disc protrusion measuring 2 mm.  The a day to 150 mg 3 times a day. Patient has already tried gabapentin in the past, denies any improvement in the symptoms with gabapentin, does not remember the dose of gabapentin tried.    -Continue Cymbalta 60 mg at nighttime. Discussed possible side effects with the patient. Instructed patient to call the clinic if develop any side effects. Patient not taking Lexapro anymore. -Flexeril 5 mg nightly as needed for muscle spasms      -Checked  2/9/2021  Vitamin ED, alpha-tocopherol 8.1, gamma tocopherol  1.2  Vitamin B12 577  Folate 12.4  Vitamin B6 34.1  ESR 4  CRP 3.4  Ceruloplasmin normal 28  Copper 140.9 normal  LOUISE negative      Patient had anterior cervical C5-C6 disc arthroplasty done on 4/30/2021. Postprocedure cervical flexion extension x-rays were stable. She will be following up with Dr. Nick Victoria on 6/29/2021. X-ray cervical spine flexion extension 5/1/2021  Impression   Postsurgical changes related to placement of an inter vertebral disc   prosthetic at C5-C6.  No spondylolisthesis. -physical therapy as per neurosurg    -F/u pain management for chronic back pain and neck pain    - Follow up in the clinic in 3-4 months  - Instructed patient to call the clinic if symptoms worsen or develop any new symptoms. I have spent total 31 minutes  with the patient more than 50% of this time was spent reviewing medical records, discussing imaging findings, counseling regarding medications side effects and compliance, healthy habits and coordinating care and documentation.        Electronically signed by Duane Carrasquillo MD on 5/24/2021 at 10:02 AM

## 2021-05-24 NOTE — TELEPHONE ENCOUNTER
Received call from Adger at pre-service [facility] with [employer] Complaint. Brief description of triage: patient is calling to establish care with a new pcp, she is currently under the care of a surgeon for her back pain and is less than 30 days post op however she states since she will not be going back to work soon she is in need of a pcp. Triage indicates for patient to speak to pcp office when open - will transfer to [facility] for establishing care    Care advice provided, patient verbalizes understanding; denies any other questions or concerns; instructed to call back for any new or worsening symptoms. Writer provided warm transfer to [facility] for appointment scheduling. Attention Provider: Thank you for allowing me to participate in the care of your patient. The patient was connected to triage in response to information provided to the ECC. Please do not respond through this encounter as the response is not directed to a shared pool.     Reason for Disposition   Requesting regular office appointment    Protocols used: INFORMATION ONLY CALL - NO TRIAGE-ADULT-

## 2021-06-03 ENCOUNTER — OFFICE VISIT (OUTPATIENT)
Dept: PRIMARY CARE CLINIC | Age: 36
End: 2021-06-03
Payer: COMMERCIAL

## 2021-06-03 VITALS
SYSTOLIC BLOOD PRESSURE: 138 MMHG | WEIGHT: 216 LBS | TEMPERATURE: 97 F | OXYGEN SATURATION: 97 % | HEART RATE: 81 BPM | DIASTOLIC BLOOD PRESSURE: 98 MMHG | BODY MASS INDEX: 33.83 KG/M2

## 2021-06-03 DIAGNOSIS — E66.09 CLASS 1 OBESITY DUE TO EXCESS CALORIES WITH SERIOUS COMORBIDITY IN ADULT, UNSPECIFIED BMI: ICD-10-CM

## 2021-06-03 DIAGNOSIS — G99.2 STENOSIS OF CERVICAL SPINE WITH MYELOPATHY (HCC): ICD-10-CM

## 2021-06-03 DIAGNOSIS — G89.29 CHRONIC LOW BACK PAIN WITH SCIATICA, SCIATICA LATERALITY UNSPECIFIED, UNSPECIFIED BACK PAIN LATERALITY: ICD-10-CM

## 2021-06-03 DIAGNOSIS — M48.02 STENOSIS OF CERVICAL SPINE WITH MYELOPATHY (HCC): ICD-10-CM

## 2021-06-03 DIAGNOSIS — Z76.89 ESTABLISHING CARE WITH NEW DOCTOR, ENCOUNTER FOR: Primary | ICD-10-CM

## 2021-06-03 DIAGNOSIS — M54.40 CHRONIC LOW BACK PAIN WITH SCIATICA, SCIATICA LATERALITY UNSPECIFIED, UNSPECIFIED BACK PAIN LATERALITY: ICD-10-CM

## 2021-06-03 DIAGNOSIS — M54.12 CERVICAL RADICULOPATHY: ICD-10-CM

## 2021-06-03 DIAGNOSIS — M21.611 BUNION OF GREAT TOE OF RIGHT FOOT: ICD-10-CM

## 2021-06-03 DIAGNOSIS — M50.90 CERVICAL DISC DISEASE: ICD-10-CM

## 2021-06-03 LAB — HBA1C MFR BLD: 5.1 %

## 2021-06-03 PROCEDURE — 1036F TOBACCO NON-USER: CPT | Performed by: PHYSICIAN ASSISTANT

## 2021-06-03 PROCEDURE — 99213 OFFICE O/P EST LOW 20 MIN: CPT | Performed by: PHYSICIAN ASSISTANT

## 2021-06-03 PROCEDURE — 83036 HEMOGLOBIN GLYCOSYLATED A1C: CPT | Performed by: PHYSICIAN ASSISTANT

## 2021-06-03 PROCEDURE — G8427 DOCREV CUR MEDS BY ELIG CLIN: HCPCS | Performed by: PHYSICIAN ASSISTANT

## 2021-06-03 PROCEDURE — G8417 CALC BMI ABV UP PARAM F/U: HCPCS | Performed by: PHYSICIAN ASSISTANT

## 2021-06-03 RX ORDER — HYDROCODONE BITARTRATE AND ACETAMINOPHEN 5; 325 MG/1; MG/1
1 TABLET ORAL EVERY 8 HOURS PRN
Qty: 21 TABLET | Refills: 0 | Status: SHIPPED | OUTPATIENT
Start: 2021-06-03 | End: 2021-06-14 | Stop reason: SDUPTHER

## 2021-06-03 RX ORDER — CYCLOBENZAPRINE HCL 5 MG
5 TABLET ORAL 3 TIMES DAILY PRN
Qty: 21 TABLET | Refills: 0 | Status: SHIPPED | OUTPATIENT
Start: 2021-06-03 | End: 2021-06-10

## 2021-06-03 ASSESSMENT — ENCOUNTER SYMPTOMS
BACK PAIN: 1
SHORTNESS OF BREATH: 0
COUGH: 0
EYE PAIN: 0
EYE REDNESS: 0
VOMITING: 0
CONSTIPATION: 0
COLOR CHANGE: 0
RHINORRHEA: 0
NAUSEA: 0
DIARRHEA: 0
ABDOMINAL PAIN: 0

## 2021-06-03 NOTE — PROGRESS NOTES
[facility]  [id number] [facility] [address]  Dept: [phone]  Dept Fax: [phone]    New Patient Visit Note  Date of patient's visit: 6/3/2021  Patient's Name:  Aldo Walton YOB: 1985            Allen Judd PA-C  ______________________________________________________________________    Reason for visit: Establish care/Preventative care    Aldo Walton is a 38 y.o. female who is a Established patient, who presents   today for her medical conditions/complaints as noted below:  Chief Complaint   Patient presents with    New Patient     establish care    Other     right toe pain (big toe)     NTP  ______________________________________________________________________  History of Presenting Illness:  History was obtained from the patient. Aldo Walton is a 38 y.o. is here to establish care. Presents today to discuss routine conditions and right great toe pain. Patient has hx of cervical radiculopathy and is s/p C5-C6 fusion on 4/30. Notes that her radicular symptoms and weakness in LUE are improving but still present. She is still complaining of chronic lower back pain with associated sciatica that has not changed and she will discuss this with her neurosurgeon in the future. She is also still experiencing breakthrough headaches that she reports are managed well with her topamax and her sumatriptan PRN. She's noticed over the course of the past 2-3 months she's developed a slight growth or deformity over the medial aspect of her right great toe, with this her big toe has started to deviate laterally. She reports it isn't necessarily painful but rather uncomfortable and it makes wearing shoes difficult. She denies any systemic symptoms with this including no fevers or chills, no joint redness, warmth or significant swelling per her report.  She denies any CP, SOB, abd pain, n/v/d/c, no vision or speech changes, no blurry or double vision, no new weakness and no other complaints.     ______________________________________________________________________  Past Medical/Surgical History:        Diagnosis Date    Anxiety     Blood loss     after     Cervical disc disease     Cervical myopathy     Chronic back pain     GERD (gastroesophageal reflux disease)     Hx of migraine headaches     Neuropathy     both legs    Thyroid nodule     Weakness generalized     due to neck issues           Procedure Laterality Date    CERVICAL DISC ARTHROPLASTY  2021    ANTERIOR C5-6 DISC ARTHROPLASTY     CERVICAL FUSION N/A 2021    ANTERIOR C5-6 DISC ARTHROPLASTY performed by Cindy De La Torre, DO at [facility] 3      x3   [facility]         ______________________________________________________________________  Health Maintenance Due   Topic Date Due    Hepatitis C screen  Never done    Varicella vaccine (1 of 2 - 2-dose childhood series) Never done       Allergies   Allergen Reactions    Bee Venom Anaphylaxis    Doxycycline Nausea Only         Current Outpatient Medications   Medication Sig Dispense Refill    cyclobenzaprine (FLEXERIL) 5 MG tablet Take 5 mg by mouth 3 times daily as needed for Muscle spasms      SUMAtriptan (IMITREX) 100 MG tablet Take 1 tablet by mouth once as needed for Migraine Take 1 pill immediately when you start having the aura or at the onset of headache. Can take second tablet if headache has not resolved after 2 hours    Avoid taking more than 2 pills in 24 hours and not more than 4 pills in a week 10 tablet 3    topiramate (TOPAMAX) 50 MG tablet Take 1 tablet by mouth 2 times daily 60 tablet 3    pregabalin (LYRICA) 150 MG capsule Take 1 capsule by mouth 3 times daily for 90 days.  90 capsule 3    DULoxetine (CYMBALTA) 60 MG extended release capsule Take 1 capsule by mouth daily 30 capsule 2    Elastic Bandages & Supports (FUTURO SOFT CERVICAL COLLAR) MISC 1 Units by Does not apply route as needed (neck pain) 1 each 0    senna-docusate (PERICOLACE) 8.6-50 MG per tablet Take 2 tablets by mouth daily as needed for Constipation 60 tablet 0    ibuprofen (ADVIL;MOTRIN) 800 MG tablet Take 1 tablet by mouth 2 times daily as needed for Pain 60 tablet 3    Handicap Placard MISC by Does not apply route 1 years 1 each 0     No current facility-administered medications for this visit. Social History     Tobacco Use    Smoking status: Former Smoker    Smokeless tobacco: Never Used    Tobacco comment: [id number], smoker then   Vaping Use    Vaping Use: Never used   Substance Use Topics    Alcohol use: Yes     Comment: occasional    Drug use: Never       Family History   Problem Relation Age of Onset    Diabetes Mother     High Blood Pressure Mother     Other Mother         Tremor    Seizures Mother     Heart Disease Father     Hypertension Father     Cancer Sister         Lymphoblastic lymphoma    Diabetes Maternal Uncle     Heart Attack Maternal Grandmother     Diabetes Maternal Grandmother     Kidney Disease Maternal Grandmother     Breast Cancer Paternal Aunt       ______________________________________________________________________  Review of Systems   Constitutional: Negative for fatigue and fever. HENT: Negative for congestion, ear pain and rhinorrhea. Eyes: Negative for pain and redness. Respiratory: Negative for cough and shortness of breath. Cardiovascular: Negative for chest pain. Gastrointestinal: Negative for abdominal pain, constipation, diarrhea, nausea and vomiting. Musculoskeletal: Positive for back pain and neck pain. Right great toe pain    Skin: Negative for color change and rash. Neurological: Positive for weakness (chronic weakness d/t cervical radiculopathy). Negative for dizziness, light-headedness and headaches. ______________________________________________________________________  Physical Exam  Vitals and nursing note reviewed. Constitutional:       Appearance: Normal appearance. HENT:      Head: Normocephalic and atraumatic. Nose: Nose normal. No congestion. Mouth/Throat:      Mouth: Mucous membranes are moist.   Eyes:      Extraocular Movements: Extraocular movements intact. Pupils: Pupils are equal, round, and reactive to light. Cardiovascular:      Rate and Rhythm: Normal rate and regular rhythm. Pulses: Normal pulses. Heart sounds: Normal heart sounds. Pulmonary:      Effort: Pulmonary effort is normal.      Breath sounds: Normal breath sounds. No wheezing. Abdominal:      General: Abdomen is flat. Palpations: Abdomen is soft. Tenderness: There is no abdominal tenderness. Musculoskeletal:         General: Tenderness (tenderness over the Right 1st MTP more so medially consistent with a bunion. No joint erythema, warmth or swelling) present. Normal range of motion. Cervical back: Normal range of motion and neck supple. No tenderness. Lymphadenopathy:      Cervical: No cervical adenopathy. Skin:     General: Skin is warm and dry. Capillary Refill: Capillary refill takes less than 2 seconds. Neurological:      General: No focal deficit present. Mental Status: She is alert and oriented to person, place, and time. Cranial Nerves: No cranial nerve deficit. Sensory: Sensory deficit (sensation is 4/5 in LUE compared to right which is chronic) present. Motor: Weakness (strength 4/5 in LUE compared to right, pt reports this is baseline) present.    Psychiatric:         Mood and Affect: Mood normal.         Vitals:    06/03/21 0743 06/03/21 0809   BP: (!) 133/91 (!) 138/98   Site: Right Upper Arm    Position: Sitting    Cuff Size: Medium Adult    Pulse: 81    Temp: 97 °F (36.1 °C)    TempSrc: Temporal    SpO2: 97%    Weight: 216 lb (98 kg) ______________________________________________________________________  Follow up and instructions:  Return in about 6 months (around 12/3/2021). · Discussed use, benefit, and side effects of prescribed medications. Barriers to medication compliance addressed. All patient questions answered. Pt voiced understanding. · Patient given educational materials - see patient instructions    · Patient instructed to call the office or go directly to the ER for any worsening problems or concerns. Patient voiced understanding    Fabienne Mackenzie PA-C  [facility]  6/3/2021, 8:14 AM    This note is created with the assistance of a speech-recognition program. While intending to generate a document that actually reflects the content of the visit, the document can still have some mistakes which may not have been identified and corrected by editing.

## 2021-06-03 NOTE — TELEPHONE ENCOUNTER
Patient calling for refill of norco.    Date of last fill: 5.24.2021  Surgery: 4.30.2021  Time elapsed since last surgery: 4 weeks  Date of next follow up appointment: 6.29.2021    Pt states she will be starting physical therapy and would like to maintain the amount just in case pain is increased.     Pt notified response time for refills is 24-48 hours

## 2021-06-08 ENCOUNTER — HOSPITAL ENCOUNTER (OUTPATIENT)
Dept: PHYSICAL THERAPY | Age: 36
Setting detail: THERAPIES SERIES
Discharge: HOME | End: 2021-06-08
Payer: COMMERCIAL

## 2021-06-08 PROCEDURE — 97110 THERAPEUTIC EXERCISES: CPT

## 2021-06-08 PROCEDURE — 97161 PT EVAL LOW COMPLEX 20 MIN: CPT

## 2021-06-08 NOTE — CONSULTS
[x] [facility]  Outpatient Physical Therapy  [address]  Phone: [phone]  Fax: [phone] [] [facility] at [address] [place] [address]  Phone: [phone]   Fax: [phone]     Physical Therapy Evaluation    Date:  2021  Patient: Chiara Booth  : 1985  MRN: [id number]  Physician: Miguel Ruggiero CNP/ Dr. Brandt Alonso, DO  Insurance: [facility] 24 visits remaining  Medical Diagnosis: Stenosis of cervical spine with myelopathy ([facility] [address] (M48.02,G99.2 [ICD-10-CM]); S/P spinal surgery (Z98.890 [ICD-10-CM])    Rehab Codes: M54.2, M79.601, R20.2  Onset Date: DOS: 21                                 Next 's appt: Neurosurg.     Subjective:   CC:Patient reports cervical and R upper extremity pain from her shoulder to her 5th digit. She also experiences numbness and tingling in this region. Patient notes her left sided UE pain and function has improved since surgery. 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 She has to take frequent breaks and change her position to standing and pacing during her work shift. Patient also is experiencing daily headaches that start at the back of her neck and radiate to her right eye ball. HPI:  S/p C5-C6 arthroplasty on 21. Patient attempted PT prior to surgery. She is also current with Neurology and pain management.      PMHx: [] Unremarkable [] Diabetes [] HTN  [] Pacemaker   [] MI/Heart Problems [] Cancer [] Arthritis [] Asthma                         [x] refer to full medical chart  In [facility]  [] Other:        Comorbidities:   [x] Obesity [] Dialysis  [] Other:   [] Asthma/COPD [] Dementia [] Other:   [] Stroke [] Sleep apnea [] Other:   [] Vascular disease [] Rheumatic disease [] Other:     Tests: [x] X-Ray:21 [] MRI:  [] Other:     Hardware fixing the C5-6 disc space without evidence for Raza    Exercises  Standing Shoulder Flexion to 90 Degrees - 1 x daily - 7 x weekly - 10 reps - 3 sets  Shoulder Abduction - Thumbs Up - 1 x daily - 7 x weekly - 10 reps - 3 sets  Standing Bicep Curls Supinated with Dumbbells - 1 x daily - 7 x weekly - 10 reps - 3 sets  Wrist Flexion Extension AROM - Palms Down - 1 x daily - 7 x weekly - 10 reps - 3 sets  Standing Backward Shoulder Rolls - 1 x daily - 7 x weekly - 10 reps - 3 sets    Specific Instructions for next treatment: Gentle cervical ROM exercise, postural training, R UE strengthening.        Evaluation Complexity:  History (Personal factors, comorbidities) [] 0 [x] 1-2 [] 3+   Exam (limitations, restrictions) [] 1-2 [x] 3 [] 4+   Clinical presentation (progression) [x] Stable [] Evolving  [] Unstable   Decision Making [x] Low [] Moderate [] High    [x] Low Complexity [] Moderate Complexity [] High Complexity       Treatment Charges: Mins Units   [x] Evaluation       [x]  Low       []  Moderate       []  High 25 1   []  Modalities     [x]  Ther Exercise 15 1   []  Manual Therapy     []  Ther Activities     []  Aquatics     []  Vasocompression     []  Other       TOTAL TREATMENT TIME: 40      Time in:0730    Time out:0810    Electronically signed by: Trina Srivastava PT

## 2021-06-14 DIAGNOSIS — M50.90 CERVICAL DISC DISEASE: ICD-10-CM

## 2021-06-14 DIAGNOSIS — M48.02 STENOSIS OF CERVICAL SPINE WITH MYELOPATHY (HCC): ICD-10-CM

## 2021-06-14 DIAGNOSIS — G99.2 STENOSIS OF CERVICAL SPINE WITH MYELOPATHY (HCC): ICD-10-CM

## 2021-06-14 DIAGNOSIS — M54.12 CERVICAL RADICULOPATHY: ICD-10-CM

## 2021-06-14 RX ORDER — HYDROCODONE BITARTRATE AND ACETAMINOPHEN 5; 325 MG/1; MG/1
1 TABLET ORAL EVERY 8 HOURS PRN
Qty: 21 TABLET | Refills: 0 | Status: SHIPPED | OUTPATIENT
Start: 2021-06-14 | End: 2021-06-25 | Stop reason: SDUPTHER

## 2021-06-14 NOTE — TELEPHONE ENCOUNTER
Patient calling for refill of norco.     Date of last fill: 6.32021  Surgery: 4.30.2021  Time elapsed since last surgery: 6 weeks  Date of next follow up appointment: 6.29.2021     Pt states she tried to get threw the weekend without meds and she is in way to much pain.     Pt notified response time for refills is 24-48 hours

## 2021-06-17 ENCOUNTER — HOSPITAL ENCOUNTER (OUTPATIENT)
Dept: PHYSICAL THERAPY | Age: 36
Setting detail: THERAPIES SERIES
Discharge: HOME | End: 2021-06-17
Payer: COMMERCIAL

## 2021-06-22 NOTE — TELEPHONE ENCOUNTER
Hemoglobin A1C (%)   Date Value   06/03/2021 5.1   12/10/2020 5.1             ( goal A1C is < 7)   No results found for: LABMICR  LDL Cholesterol (mg/dL)   Date Value   12/10/2020 83       (goal LDL is <100)   AST (U/L)   Date Value   12/10/2020 12     ALT (U/L)   Date Value   12/10/2020 12     BUN (mg/dL)   Date Value   12/10/2020 14     BP Readings from Last 3 Encounters:   06/03/21 (!) 138/98   05/24/21 128/72   05/17/21 112/78          (goal 120/80)        Patient Active Problem List:     Chronic low back pain with sciatica     Thyroid nodule     Stenosis of cervical spine with myelopathy (HCC)     Cervical radiculopathy     Thoracic radiculopathy     Class 1 obesity due to excess calories with serious comorbidity in adult     Cervical disc disease     Establishing care with new doctor, encounter for     Bunion of great toe of right foot      ----Mervat Fitzgerald

## 2021-06-23 RX ORDER — SUMATRIPTAN 25 MG/1
25 TABLET, FILM COATED ORAL
Qty: 30 TABLET | Refills: 1 | Status: SHIPPED | OUTPATIENT
Start: 2021-06-23 | End: 2022-02-25

## 2021-06-24 DIAGNOSIS — M50.90 CERVICAL DISC DISEASE: ICD-10-CM

## 2021-06-24 DIAGNOSIS — G99.2 STENOSIS OF CERVICAL SPINE WITH MYELOPATHY (HCC): ICD-10-CM

## 2021-06-24 DIAGNOSIS — M54.12 CERVICAL RADICULOPATHY: ICD-10-CM

## 2021-06-24 DIAGNOSIS — M48.02 STENOSIS OF CERVICAL SPINE WITH MYELOPATHY (HCC): ICD-10-CM

## 2021-06-24 NOTE — TELEPHONE ENCOUNTER
Patient calling for refill of norco.    Date of last fill: 6.14.2021  Surgery: 4.30.2021  Time elapsed since last surgery: 7 weeks  Date of next follow up appointment: 6.29.2021    Pt notified response time for refills is 24-48 hours

## 2021-06-25 RX ORDER — HYDROCODONE BITARTRATE AND ACETAMINOPHEN 5; 325 MG/1; MG/1
1 TABLET ORAL EVERY 8 HOURS PRN
Qty: 21 TABLET | Refills: 0 | Status: SHIPPED | OUTPATIENT
Start: 2021-06-25 | End: 2021-07-06 | Stop reason: SDUPTHER

## 2021-06-29 ENCOUNTER — HOSPITAL ENCOUNTER (OUTPATIENT)
Age: 36
Discharge: HOME | End: 2021-07-01
Payer: COMMERCIAL

## 2021-06-29 ENCOUNTER — OFFICE VISIT (OUTPATIENT)
Dept: NEUROSURGERY | Age: 36
End: 2021-06-29
Payer: COMMERCIAL

## 2021-06-29 ENCOUNTER — HOSPITAL ENCOUNTER (OUTPATIENT)
Dept: GENERAL RADIOLOGY | Age: 36
Discharge: HOME | End: 2021-07-01
Payer: COMMERCIAL

## 2021-06-29 VITALS
DIASTOLIC BLOOD PRESSURE: 88 MMHG | WEIGHT: 216 LBS | TEMPERATURE: 98 F | HEART RATE: 84 BPM | BODY MASS INDEX: 33.83 KG/M2 | RESPIRATION RATE: 16 BRPM | SYSTOLIC BLOOD PRESSURE: 128 MMHG

## 2021-06-29 DIAGNOSIS — G56.21 CUBITAL TUNNEL SYNDROME, RIGHT: Primary | ICD-10-CM

## 2021-06-29 DIAGNOSIS — G89.29 CHRONIC LOW BACK PAIN WITH SCIATICA, SCIATICA LATERALITY UNSPECIFIED, UNSPECIFIED BACK PAIN LATERALITY: ICD-10-CM

## 2021-06-29 DIAGNOSIS — M54.40 CHRONIC LOW BACK PAIN WITH SCIATICA, SCIATICA LATERALITY UNSPECIFIED, UNSPECIFIED BACK PAIN LATERALITY: ICD-10-CM

## 2021-06-29 DIAGNOSIS — Z98.890 S/P CERVICAL DISCECTOMY: ICD-10-CM

## 2021-06-29 PROCEDURE — 1036F TOBACCO NON-USER: CPT | Performed by: NEUROLOGICAL SURGERY

## 2021-06-29 PROCEDURE — G8427 DOCREV CUR MEDS BY ELIG CLIN: HCPCS | Performed by: NEUROLOGICAL SURGERY

## 2021-06-29 PROCEDURE — 99214 OFFICE O/P EST MOD 30 MIN: CPT | Performed by: NEUROLOGICAL SURGERY

## 2021-06-29 PROCEDURE — G8417 CALC BMI ABV UP PARAM F/U: HCPCS | Performed by: NEUROLOGICAL SURGERY

## 2021-06-29 PROCEDURE — 72040 X-RAY EXAM NECK SPINE 2-3 VW: CPT

## 2021-06-29 NOTE — PROGRESS NOTES
Department of Neurosurgery                                                      Follow up visit      History Obtained From:  patient    CHIEF COMPLAINT:         Chief Complaint   Patient presents with    Post-Op Check       HISTORY OF PRESENT ILLNESS:       The patient is a 38 y.o. female who presents for 8 weeks post anterior C5-6 disc arthroplasty for cervical myeloradiculopathy. Her left arm pain has significantly improved. Patient reports improved of left hand/finger dexterity. She balance still abnormal but improving. She report new  right upper extremity pain radiating from the right posterior shoulder to her elbow and along the medialaspect of her forearm to her 4th/5th digit. This pain has moderately improved since her last visit here however it is impacting her ADLs because she is right handed. She admits mild neck stiffness. She minimal incisional pain. Patient also reports chronic lower back pain for several years. She reports bilateral leg weakness and numbness that onset in  after pregnancy which has stabilized/improved. Per patient the etiology was determined to be from an epidural. She admits she has neuropathy. She notes moderate difficulty with balancing, she is unable to stand on one leg. She has not had PT for her back.       PAST MEDICAL HISTORY :       Past Medical History:        Diagnosis Date    Anxiety     Blood loss     after     Cervical disc disease     Cervical myopathy     Chronic back pain     GERD (gastroesophageal reflux disease)     Hx of migraine headaches     Neuropathy     both legs    Thyroid nodule     Weakness generalized     due to neck issues       Past Surgical History:        Procedure Laterality Date    CERVICAL DISC ARTHROPLASTY  2021    ANTERIOR C5-6 DISC ARTHROPLASTY     CERVICAL FUSION N/A 2021    ANTERIOR C5-6 DISC ARTHROPLASTY performed by Loan Ovalles DO at [facility] 3      x3   [facility] AND CURETTAGE OF UTERUS      TUBAL LIGATION         Social History:   Social History     Socioeconomic History    Marital status:      Spouse name: Not on file    Number of children: Not on file    Years of education: Not on file    Highest education level: Not on file   Occupational History    Not on file   Tobacco Use    Smoking status: Former Smoker    Smokeless tobacco: Never Used    Tobacco comment: [id number], smoker then   Vaping Use    Vaping Use: Never used   Substance and Sexual Activity    Alcohol use: Yes     Comment: occasional    Drug use: Never    Sexual activity: Not on file   Other Topics Concern    Not on file   Social History Narrative    Not on file     Social Determinants of Health     Financial Resource Strain: Low Risk     Difficulty of Paying Living Expenses: Not hard at all   Food Insecurity: No Food Insecurity    Worried About Running Out of Food in the Last Year: Never true    [address] in the Last Year: Never true   Transportation Needs: No Transportation Needs    Lack of Transportation (Medical): No    Lack of Transportation (Non-Medical):  No   Physical Activity:     Days of Exercise per Week:     Minutes of Exercise per Session:    Stress:     Feeling of Stress :    Social Connections:     Frequency of Communication with Friends and Family:     Frequency of Social Gatherings with Friends and Family:     Attends Islam Services:     Active Member of Clubs or Organizations:     Attends Club or Organization Meetings:     Marital Status:    Intimate Partner Violence:     Fear of Current or Ex-Partner:     Emotionally Abused:     Physically Abused:     Sexually Abused:        Family History:       Problem Relation Age of Onset    Diabetes Mother     High Blood Pressure Mother     Other Mother         Tremor    Seizures Mother     Heart Disease Father     Hypertension Father     Cancer Sister         Lymphoblastic lymphoma    Diabetes Maternal Uncle     Heart Attack Maternal Grandmother     Diabetes Maternal Grandmother     Kidney Disease Maternal Grandmother     Breast Cancer Paternal Aunt        Allergies:  Bee venom and Doxycycline    Home Medications:  Prior to Admission medications    Medication Sig Start Date End Date Taking? Authorizing Provider   HYDROcodone-acetaminophen (NORCO) 5-325 MG per tablet Take 1 tablet by mouth every 8 hours as needed for Pain for up to 7 days. 6/25/21 7/2/21  Brenda Bailey DO   SUMAtriptan (IMITREX) 25 MG tablet Take 1 tablet by mouth once as needed for Migraine 6/23/21 6/23/21  Maxwell Reinoso PA-C   topiramate (TOPAMAX) 50 MG tablet Take 1 tablet by mouth 2 times daily 5/25/21   King Delicia MD   pregabalin (LYRICA) 150 MG capsule Take 1 capsule by mouth 3 times daily for 90 days. 5/24/21 8/22/21  King Delicia MD   DULoxetine (CYMBALTA) 60 MG extended release capsule Take 1 capsule by mouth daily 5/21/21   MORE Hodge - CNP   Elastic Bandages & Supports (FUTURO SOFT CERVICAL COLLAR) [address] [facility] 1 Units by Does not apply route as needed (neck pain) 5/17/21 7/16/21  [address], APRN - CNP   senna-docusate (PERICOLACE) 8.6-50 MG per tablet Take 2 tablets by mouth daily as needed for Constipation 5/1/21   Hollie Vital MD   ibuprofen (ADVIL;MOTRIN) 800 MG tablet Take 1 tablet by mouth 2 times daily as needed for Pain 4/20/21   Samuel Arroyo PA-C   Handicap Placard MISC by Does not apply route 1 years 3/12/21   Samuel Arroyo PA-C       Current Medications:   No current facility-administered medications for this visit.       PHYSICAL EXAM:       /88 (Site: Right Upper Arm, Position: Sitting, Cuff Size: Large Adult)   Pulse 84   Temp 98 °F (36.7 °C)   Resp 16   Wt 216 lb (98 kg)   BMI 33.83 kg/m²   Physical Exam     Gen: NAD  HEENT: moist mucus membranes  Cardio: RRR  Pulm: chest rise symmetrically  GI: abd soft  Ext: no edema  Skin: warm, postoperative incision healing well    Neuro:    AOX3  CN 2-12 grossly intact  Speech articulate  Motor 5/5  No pronator drift  Sensation symmetrical   postive Tinel's sign right cubital tunnel      Radiology Review:    Xray Cervical spine 5/24/21 official read, \"Hardware fixing the C5-6 disc space without evidence for complication. No pathologic motion or instability. \"  My read: normal alignment no hardware failure. EMG done in July 2020 was negative for radiculopathy or peripheral neuropathy. ASSESSMENT AND PLAN:       Patient Active Problem List   Diagnosis    Chronic low back pain with sciatica    Thyroid nodule    Stenosis of cervical spine with myelopathy (HCC)    Cervical radiculopathy    Thoracic radiculopathy    Class 1 obesity due to excess calories with serious comorbidity in adult    Cervical disc disease    Establishing care with new doctor, encounter for    Bunion of great toe of right foot         A/P:  This is a 38 y.o. female with 2 months post anterior C5-6 disc arthroplasty for cervical myeloradiculopathy  Patient with significant improvement of her left radicular arm pain post surgery. Postoperatively she began experiencing right radicular arm pain from her posterior shoulder to the lateral elbow and 5th digit. These symptoms are likely due to ulnar nerve entrapment at cubital tunnel   Refer to OT for cubital tunnel syndrome  PT for neck and lower back   X-ray cervical spine today  Follow up in 6 weeks        Patient and/or family was counseled on the diagnosis and treatment plan    By signing my name below, I, Larry Jean, attest that this documentation has been prepared under the direction and in the presence of DIANA Flannery DO. Electronically signed: Zari Link, 6/29/21     IAlfred, personally performed the services described in this documentation. All medical record entries made by the gerryibnando were at my direction and in my presence.  I have reviewed the chart and discharge instructions and agree that the records reflect my personal performance and is accurate and complete. Hazel Chicas DO, Board Certified Neurosurgeon 6/29/21      This note was created using voice recognition software. There may be inaccuracies of transcription  that are inadvertently overlooked prior to the signature. There is any questions about the transcription please contact me.

## 2021-07-06 DIAGNOSIS — M48.02 STENOSIS OF CERVICAL SPINE WITH MYELOPATHY (HCC): ICD-10-CM

## 2021-07-06 DIAGNOSIS — G99.2 STENOSIS OF CERVICAL SPINE WITH MYELOPATHY (HCC): ICD-10-CM

## 2021-07-06 DIAGNOSIS — M50.90 CERVICAL DISC DISEASE: ICD-10-CM

## 2021-07-06 DIAGNOSIS — M54.12 CERVICAL RADICULOPATHY: ICD-10-CM

## 2021-07-06 RX ORDER — HYDROCODONE BITARTRATE AND ACETAMINOPHEN 5; 325 MG/1; MG/1
1 TABLET ORAL EVERY 12 HOURS PRN
Qty: 14 TABLET | Refills: 0 | Status: SHIPPED | OUTPATIENT
Start: 2021-07-06 | End: 2021-07-15 | Stop reason: SDUPTHER

## 2021-07-06 RX ORDER — HYDROCODONE BITARTRATE AND ACETAMINOPHEN 5; 325 MG/1; MG/1
1 TABLET ORAL EVERY 8 HOURS PRN
Qty: 21 TABLET | Refills: 0 | Status: CANCELLED | OUTPATIENT
Start: 2021-07-06 | End: 2021-07-13

## 2021-07-13 NOTE — DISCHARGE SUMMARY
[x] [facility]) [facility] &  Therapy  [address]  [phone]  F: [phone] [] [address]  [facility] [address]   [address]  P: [phone]  F: [phone] [] [address]  [address] [facility]  P: [phone]  F: [phone] [] [address]  [facility]   [address]   [place]: [phone]  F: [phone]         Physical Therapy Discharge Note    Date: 2021      Patient: Aldo Walton  : 1985  MRN: [id number]    Physician: Lele Edwards CNP/ Dr. Ness Cui, DO           Insurance: [employer] 24 visits remaining  Medical Diagnosis: Stenosis of cervical spine with myelopathy ([facility] [address] (M48.02,G99.2 [ICD-10-CM]); S/P spinal surgery (Z98.890 [ICD-10-CM])     Rehab Codes: M54.2, M79.601, R20.2  Onset Date: DOS: 21                                 Next 's appt: Neurosurg.      Total visits attended: 1  Cancels/No shows: 0/2  Date of initial visit: 21                Date of final visit: 21      Subjective:  Refer to initial evaluation. Objective:  Refer to initial evaluation. Assessment:  Refer to initial evaluation. Treatment to Date:  [] Therapeutic Exercise    [] Modalities:  [] Therapeutic Activity    [] Ultrasound  [] Electrical Stimulation  [] Gait Training     [] Massage       [] Lumbar/Cervical Traction  [] Neuromuscular Re-education [] Cold/hotpack [] Iontophoresis: 4 mg/mL  [] Instruction in Home Exercise Program                     Dexamethasone Sodium  [] Manual Therapy             Phosphate 40-80 mAmin  [] Aquatic Therapy                   [] Vasocompression/    [] Other:             Game Ready    Discharge Status:     [] Pt to continue exercise/home instructions independently.     [] Therapy interrupted due to:    [x] Pt has 2 or more no shows/cancels, is discontinued per our policy. [] Other:         Electronically signed by Abdoulaye Trejo PT on 7/13/2021 at 1:09 PM      If you have any questions or concerns, please don't hesitate to call.   Thank you for your referral.

## 2021-07-15 DIAGNOSIS — M54.12 CERVICAL RADICULOPATHY: ICD-10-CM

## 2021-07-15 DIAGNOSIS — G99.2 STENOSIS OF CERVICAL SPINE WITH MYELOPATHY (HCC): ICD-10-CM

## 2021-07-15 DIAGNOSIS — M48.02 STENOSIS OF CERVICAL SPINE WITH MYELOPATHY (HCC): ICD-10-CM

## 2021-07-15 DIAGNOSIS — M50.90 CERVICAL DISC DISEASE: ICD-10-CM

## 2021-07-15 RX ORDER — HYDROCODONE BITARTRATE AND ACETAMINOPHEN 5; 325 MG/1; MG/1
1 TABLET ORAL EVERY 12 HOURS PRN
Qty: 14 TABLET | Refills: 0 | Status: SHIPPED | OUTPATIENT
Start: 2021-07-15 | End: 2021-07-26 | Stop reason: SDUPTHER

## 2021-07-15 NOTE — TELEPHONE ENCOUNTER
Patient calling for refill of [address]. Date of last fill: 7.6.2021  Surgery: 4.30.2021  Time elapsed since last surgery: 8 weeks  Date of next follow up appointment: 8.12.2021    Patient is also asking if a brace will help for her cubital tunnel syndrome?     Pt notified response time for refills is 24-48 hours

## 2021-07-23 DIAGNOSIS — M50.90 CERVICAL DISC DISEASE: ICD-10-CM

## 2021-07-23 DIAGNOSIS — G99.2 STENOSIS OF CERVICAL SPINE WITH MYELOPATHY (HCC): ICD-10-CM

## 2021-07-23 DIAGNOSIS — M48.02 STENOSIS OF CERVICAL SPINE WITH MYELOPATHY (HCC): ICD-10-CM

## 2021-07-23 DIAGNOSIS — M54.12 CERVICAL RADICULOPATHY: ICD-10-CM

## 2021-07-23 NOTE — TELEPHONE ENCOUNTER
Patient calling for refill of norco andPatient is also asking if a brace will help for her cubital tunnel syndrome?  .    Date of last fill: 7.15.2021  Surgery: 4.30.2021  Time elapsed since last surgery: 11 weeks  Date of next follow up appointment: 8.12.2021    Pt notified response time for refills is 24-48 hours

## 2021-07-26 RX ORDER — HYDROCODONE BITARTRATE AND ACETAMINOPHEN 5; 325 MG/1; MG/1
1 TABLET ORAL EVERY 12 HOURS PRN
Qty: 14 TABLET | Refills: 0 | Status: SHIPPED | OUTPATIENT
Start: 2021-07-26 | End: 2021-08-02 | Stop reason: SDUPTHER

## 2021-07-27 ENCOUNTER — HOSPITAL ENCOUNTER (OUTPATIENT)
Dept: OCCUPATIONAL THERAPY | Age: 36
Setting detail: THERAPIES SERIES
Discharge: HOME | End: 2021-07-27
Payer: COMMERCIAL

## 2021-07-27 ENCOUNTER — HOSPITAL ENCOUNTER (OUTPATIENT)
Dept: PHYSICAL THERAPY | Age: 36
Setting detail: THERAPIES SERIES
Discharge: HOME | End: 2021-07-27
Payer: COMMERCIAL

## 2021-07-27 PROCEDURE — 97110 THERAPEUTIC EXERCISES: CPT

## 2021-07-27 PROCEDURE — 97165 OT EVAL LOW COMPLEX 30 MIN: CPT

## 2021-07-27 PROCEDURE — 97162 PT EVAL MOD COMPLEX 30 MIN: CPT
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
## 2021-07-27 NOTE — SIGNIFICANT EVENT
[x] [facility]  [address]    [phone]  F: [phone]     Occupational Therapy Cancel/No Show note    Date: 2021  Patient: Trevor Perry  : 1985  MRN: [id number]    Cancels/No Shows to date:     For today's appointment patient:    []  Cancelled    [] Rescheduled appointment    [x] No-show     Reason given by patient:    []  Patient ill    []  Conflicting appointment    [] No transportation      [] Conflict with work    [x] No reason given    [] Weather related    [] [id number]    [] Other:      Comments:        [] Next appointment was confirmed    Electronically signed by: Luís AVINA

## 2021-07-29 ENCOUNTER — OFFICE VISIT (OUTPATIENT)
Dept: PRIMARY CARE CLINIC | Age: 36
End: 2021-07-29
Payer: COMMERCIAL

## 2021-07-29 VITALS
SYSTOLIC BLOOD PRESSURE: 127 MMHG | WEIGHT: 220 LBS | HEART RATE: 89 BPM | OXYGEN SATURATION: 97 % | DIASTOLIC BLOOD PRESSURE: 87 MMHG | BODY MASS INDEX: 34.46 KG/M2 | TEMPERATURE: 97 F

## 2021-07-29 DIAGNOSIS — M54.41 CHRONIC LOW BACK PAIN WITH RIGHT-SIDED SCIATICA, UNSPECIFIED BACK PAIN LATERALITY: Primary | ICD-10-CM

## 2021-07-29 DIAGNOSIS — G89.29 CHRONIC LOW BACK PAIN WITH RIGHT-SIDED SCIATICA, UNSPECIFIED BACK PAIN LATERALITY: Primary | ICD-10-CM

## 2021-07-29 PROCEDURE — G8417 CALC BMI ABV UP PARAM F/U: HCPCS | Performed by: PHYSICIAN ASSISTANT

## 2021-07-29 PROCEDURE — 1036F TOBACCO NON-USER: CPT | Performed by: PHYSICIAN ASSISTANT

## 2021-07-29 PROCEDURE — 99212 OFFICE O/P EST SF 10 MIN: CPT | Performed by: PHYSICIAN ASSISTANT

## 2021-07-29 PROCEDURE — G8427 DOCREV CUR MEDS BY ELIG CLIN: HCPCS | Performed by: PHYSICIAN ASSISTANT

## 2021-07-29 RX ORDER — PREDNISONE 20 MG/1
20 TABLET ORAL 2 TIMES DAILY
Qty: 10 TABLET | Refills: 0 | Status: SHIPPED | OUTPATIENT
Start: 2021-07-29 | End: 2021-08-03

## 2021-07-29 ASSESSMENT — ENCOUNTER SYMPTOMS
COLOR CHANGE: 0
RHINORRHEA: 0
EYE PAIN: 0
EYE REDNESS: 0
ABDOMINAL PAIN: 0
BACK PAIN: 1
VOMITING: 0
SHORTNESS OF BREATH: 0
COUGH: 0
NAUSEA: 0

## 2021-07-29 NOTE — PROGRESS NOTES
Jesu Sims (:  1985) is a 38 y.o. female,Established patient, here for evaluation of the following chief complaint(s):  Pain (leg and knee)         ASSESSMENT/PLAN:  1. Chronic low back pain with right-sided sciatica, unspecified back pain laterality  -     XR LUMBAR SPINE (2-3 VIEWS); Future  -     predniSONE (DELTASONE) 20 MG tablet; Take 1 tablet by mouth 2 times daily for 5 days, Disp-10 tablet, R-0Normal  Advised conservative therapy for the lower back pain. Ordered an x-ray of the lumbar spine to rule out any acute osseous change. Discussed that these findings need to be discussed with her neurosurgeon to consider injection therapy versus surgical therapy on her lumbar spine. And the need for further imaging such as an MRI of the lumbar spine. Patient verbalizes understanding. Return if symptoms worsen or fail to improve. Subjective   SUBJECTIVE/OBJECTIVE:  HPI patient presents to the office today for evaluation of lower back pain with associated right lower extremity sciatic radiculopathy. 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  She had a anterior C5 and C6 arthroplasty on  of this year by Dr. Justin Araya with whom she has been following since. She states that she has been attending PT for her neck and now for her lower back and states that she is getting no improvement of her symptoms in her lower back. She states that her pain is aggravated with movement rising from sitting to standing and bending at the waist.  She states that usually she has pain in her glutes and the upper portion of her hamstring however she is now having shooting pain radiating down past her knee.   With this she has noticed objective size discrepancies in her right lower extremity versus her left as this issues been chronic for the past 17 years and she is noticing progressing weakness in that right lower extremity. However she denies any saddle anesthesia she denies any bowel or bladder incontinence, she denies any recent falls or trauma, she denies any fevers or chills. She has not had imaging of her lower back in quite some time. Her next follow-up with her neurosurgeon is in 2 weeks. Review of Systems   Constitutional: Negative for chills, fatigue and fever. HENT: Negative for congestion, ear pain and rhinorrhea. Eyes: Negative for pain and redness. Respiratory: Negative for cough and shortness of breath. Cardiovascular: Negative for chest pain. Gastrointestinal: Negative for abdominal pain, nausea and vomiting. Musculoskeletal: Positive for arthralgias, back pain, myalgias, neck pain and neck stiffness. Skin: Negative for color change and rash. Neurological: Positive for weakness. Negative for dizziness, light-headedness, numbness and headaches. Objective   Physical Exam  Vitals and nursing note reviewed. Constitutional:       Appearance: Normal appearance. HENT:      Head: Normocephalic and atraumatic. Nose: Nose normal. No congestion. Mouth/Throat:      Mouth: Mucous membranes are moist.   Eyes:      Extraocular Movements: Extraocular movements intact. Pupils: Pupils are equal, round, and reactive to light. Neck:      Comments: Well-healed midline horizontal incision distal to the thyroid cartilage consistent with previous procedure. Cardiovascular:      Rate and Rhythm: Normal rate and regular rhythm. Pulses: Normal pulses. Heart sounds: Normal heart sounds. Pulmonary:      Effort: Pulmonary effort is normal.      Breath sounds: Normal breath sounds. Abdominal:      General: Abdomen is flat. Palpations: Abdomen is soft.    Musculoskeletal:         General: Tenderness (Patient has tenderness down the midline lumbar spine L1-L5 with associated paraspinal tenderness on the right and left side. She is tender over the right sciatic notch.) present. Normal range of motion. Cervical back: Normal range of motion and neck supple. No tenderness. Comments: Patient has objective size discrepancy in the right lower extremity compared to the left. Right lower extremity is mildly weaker compared to the left. Lymphadenopathy:      Cervical: No cervical adenopathy. Skin:     General: Skin is warm and dry. Capillary Refill: Capillary refill takes less than 2 seconds. Neurological:      General: No focal deficit present. Mental Status: She is alert and oriented to person, place, and time. Psychiatric:         Mood and Affect: Mood normal.            On this date 7/29/2021 I have spent 15 minutes reviewing previous notes, test results and face to face with the patient discussing the diagnosis and importance of compliance with the treatment plan as well as documenting on the day of the visit. An electronic signature was used to authenticate this note.     --Miki Singh PA-C

## 2021-07-30 ENCOUNTER — HOSPITAL ENCOUNTER (OUTPATIENT)
Dept: GENERAL RADIOLOGY | Age: 36
Discharge: HOME | End: 2021-08-01
Payer: COMMERCIAL

## 2021-07-30 ENCOUNTER — HOSPITAL ENCOUNTER (OUTPATIENT)
Dept: PHYSICAL THERAPY | Age: 36
Setting detail: THERAPIES SERIES
Discharge: HOME | End: 2021-07-30
Payer: COMMERCIAL

## 2021-07-30 ENCOUNTER — HOSPITAL ENCOUNTER (OUTPATIENT)
Dept: OCCUPATIONAL THERAPY | Age: 36
Setting detail: THERAPIES SERIES
Discharge: HOME | End: 2021-07-30
Payer: COMMERCIAL

## 2021-07-30 ENCOUNTER — HOSPITAL ENCOUNTER (OUTPATIENT)
Age: 36
Discharge: HOME | End: 2021-08-01
Payer: COMMERCIAL

## 2021-07-30 DIAGNOSIS — G89.29 CHRONIC LOW BACK PAIN WITH RIGHT-SIDED SCIATICA, UNSPECIFIED BACK PAIN LATERALITY: ICD-10-CM

## 2021-07-30 DIAGNOSIS — M54.41 CHRONIC LOW BACK PAIN WITH RIGHT-SIDED SCIATICA, UNSPECIFIED BACK PAIN LATERALITY: ICD-10-CM

## 2021-07-30 PROCEDURE — 97110 THERAPEUTIC EXERCISES: CPT

## 2021-07-30 PROCEDURE — 72100 X-RAY EXAM L-S SPINE 2/3 VWS: CPT

## 2021-07-30 NOTE — FLOWSHEET NOTE
Total Treatment time 55 min      Assessment: [x] Progressing toward goals. [] No change. [] Other     [x] Patient would continue to benefit from skilled occupational therapy services in order to: Improve  Activity tolerance and Complaint of pain in order to decrease pain in UE for safe completion of ADLs     Short Term Goals: (  6    Treatments)  1. Decrease Pain: 6/10 to promote ADL completion. 2. Increase strength (pounds);  a. R  strength to 30 pounds to promote . b. R lateral pinch to 7 pounds to promote grooming tasks. c. R 2 point pinch to 4 pounds to promote meal preparation. d. R 3 jaw pinch to 7 pounds to promote writing. 3. Increase function:UE Functional Index Score 40/80 or more points to promote increased functional abilities  4. Patient to be independent with home exercise program as demonstrated by performance with correct form without cues.     Long Term Goals: (  12  Treatments)  1. Decrease pain: 4/10 to promote home maintenance. 2. Increase R  to 35 pounds to promote driving. 3. Increase R lateral pinch to 9 pounds to promote stirring pots for cooking. 4. Increase R 3 jaw pinch to 9 pounds to promote work related tasks. 5. Patient will tolerate ionto pending MD approval.    Patient Goals: Pt wants to relieve pain. Pt. Education:  [x] Yes  [] No  [x] Reviewed Prior HEP/Ed  Method of Education: [x] Verbal  [x] Demo  [x] Written  Re:  Comprehension of Education:  [x] Verbalizes understanding. [] Demonstrates understanding. [] Needs review. [x] Demonstrates/verbalizes HEP/Ed previously given. Plan: [x] Continue current frequency toward short and long term goals.   [x] Specific Instructions for subsequent treatments: light strengthening, nerve glides, ionto (pending MD approval)   [] Other:       Time In: 0700  Time Out: 0800        Electronically signed by:  Salinas AVINA

## 2021-07-30 NOTE — FLOWSHEET NOTE
[x] [facility]  [address]  [phone]  F: [phone]         Physical Therapy Daily Treatment Note    Date:  2021  Patient Name:  Giuseppe Camilo    :  1985  MRN: [id number]  Physician: Trey Leonard DO                                    Insurance: [employer] ( remaining)  Medical Diagnosis: S/P cervical discectomy (Z98.890 [ICD-10-CM]); Chronic low back pain with sciatica, sciatica laterality unspecified, unspecified back pain laterality (M54.40,G89.29 [ICD-10-CM])                          Rehab Codes: M54.2, M54.9, M25.60, M62.81, M79.601, M79.604, M79.605  Onset Date: Neck 21; Back 2004               Next 's appt.: 21     Visit# / total visits: ; Progress note for Medicare patient due at visit 8     Cancels/No Shows: 0/0    Subjective:    Pain:  [x] Yes  [] No Location: Neck, Back  N/A Pain Rating: (0-10 scale) 8/10 neck; back 6-7/10  Pain altered Tx:  [x] No  [] Yes  Action:  Comments: Pt reports having real sharp pains in her incision area, at times has difficulty swallowing. Pt reports pain starts in neck and radiates all the way down arm to fingertips. Pt reports having muscle spasms in thighs, arms and face last few days, these have limited the amount of time she is doing her HEP.       Objective:  Modalities: HP cerv, LB (large, cerv) hooklying w/towel roll under R UE, at end of Rx  Precautions:  Exercises:  Exercise Reps/ Time Weight/ Level Comments   SciFit  5 min ML1.0 Added , 1 rest break mid-way               Retro shoulder rolls 20x  Progressed reps    Scapular retraction 15x 3sec Progressed reps 30   Cervical AROM  10x  All planes, Pain-free range   Seated abd draw-ins 15x 3sec Progressed reps    Bicep curls  15x  Added , add weight L soon, add R if able          Hooklying       abdom draw-ins 10x 3sec Added    Glut sets 10x 3sec Added , painful R upper thigh, lower buttock   Add sets  10x 3sec Added 7/30   Cerv retractions luis alberto 10x 3sec Pushing into pillow, Added 7/30   Wand chest press 10x 1 lb Added 7/30   Wand flex  10x 1 lb Added 7/30                     Other:      Treatment Charges: Mins Units   [x]  Modalities HP 15 --   [x]  Ther Exercise 54 4   []  Manual Therapy     []  Ther Activities     []  Aquatics     []  Vasocompression     []  Other     Total Treatment time 54 4       Assessment: [x] Progressing toward goals. Pt reports shooting pains in R arm frequently throughout Rx. Pt expresses interest in aquatics, states it helped her when she was pregnant. Educated pt decreased weight bearing in [address] [place] [address] would be beneficial for her back. Pt declined transferring to a location w/aquatics at this time. Educated Pt in correct sit-sup and sup-sit, Pt reports feels better then laying straight down. Pt reports she can't do correctly to R side due to UE pain, but she gets on and off bed on L side, so will do that. Initiated HP to neck and LB at end of Rx to decrease pain and tightness, Pt reports feels good. [] No change. [] Other:  [x] Patient would continue to benefit from skilled physical therapy services in order to: address limited cervical and R shoulder mobility; weakness of the R UE; limited lumbar mobility; core weakness; and functional impairments including difficulty standing, walking, or sitting for a prolonged period of time and difficulty with job tasks due to R UE symptoms. STG: (to be met in 8 treatments)  1. ? Pain: Decrease neck, R UE, low back, and B LE pain to 4/10   2. ? ROM: Increase cervical rotation to the left to 30°, sidebend to the left to 20°; R shd flexion to 130°, abd to 120°; Lumbar flexion to 25% limited, extension 50% limited, L rotation 25% limited, L SB 50% limited  3. ? Strength: Improve neck/scapular strength and core strength as demonstrated by progression of PRE's and DLS exercises;  Increase R shd flex and abd to 4/5, R elbow to 4+/5  4. ? Function: Improve Oswestry to 60% impairment; NDI to 60% impairment  5. Patient to be independent with home exercise program as demonstrated by performance with correct form without cues. LTG: (to be met in 12 treatments)  1. Pain-free cervical ROM  2. Improvement in NDI and Oswestry to 50% impairment  3. Pt will report an improvement in R UE pain with job tasks       Pt. Education:  [x] Yes  [] No  [x] Reviewed Prior HEP/Ed  Method of Education: [x] Verbal  [x] Demo  [x] Written  Comprehension of Education:  [x] Verbalizes understanding-purpose, correct technique new ex, correct sit-sup, sup-sit  [x] Demonstrates understanding. [] Needs review. [] Demonstrates/verbalizes HEP/Ed previously given. Plan: [x] Continue current frequency toward long and short term goals.     [x] Specific Instructions for subsequent treatments: gentle stretches, STM/Manual therapy as needed to cervical and scapular muscles; Scapular and UE strengthening; R shd ROM; Low back stretches; Core strengthening; add hooklying marches, monitor response to HP         Time In:0800            Time Out: 0915    Electronically signed by:  Antionette Kohler, RAN

## 2021-08-02 ENCOUNTER — APPOINTMENT (OUTPATIENT)
Dept: CT IMAGING | Age: 36
End: 2021-08-02
Payer: COMMERCIAL

## 2021-08-02 ENCOUNTER — HOSPITAL ENCOUNTER (EMERGENCY)
Age: 36
Discharge: HOME | End: 2021-08-02
Attending: EMERGENCY MEDICINE
Payer: COMMERCIAL

## 2021-08-02 VITALS
WEIGHT: 220 LBS | BODY MASS INDEX: 34.53 KG/M2 | HEART RATE: 86 BPM | RESPIRATION RATE: 16 BRPM | TEMPERATURE: 97.5 F | OXYGEN SATURATION: 99 % | HEIGHT: 67 IN | SYSTOLIC BLOOD PRESSURE: 129 MMHG | DIASTOLIC BLOOD PRESSURE: 96 MMHG

## 2021-08-02 DIAGNOSIS — M54.12 CERVICAL RADICULOPATHY: ICD-10-CM

## 2021-08-02 DIAGNOSIS — M50.90 CERVICAL DISC DISEASE: ICD-10-CM

## 2021-08-02 DIAGNOSIS — G99.2 STENOSIS OF CERVICAL SPINE WITH MYELOPATHY (HCC): ICD-10-CM

## 2021-08-02 DIAGNOSIS — M48.02 STENOSIS OF CERVICAL SPINE WITH MYELOPATHY (HCC): ICD-10-CM

## 2021-08-02 DIAGNOSIS — M54.9 ACUTE BACK PAIN, UNSPECIFIED BACK LOCATION, UNSPECIFIED BACK PAIN LATERALITY: Primary | ICD-10-CM

## 2021-08-02 LAB — HCG QUALITATIVE: NEGATIVE

## 2021-08-02 PROCEDURE — 72125 CT NECK SPINE W/O DYE: CPT

## 2021-08-02 PROCEDURE — 72128 CT CHEST SPINE W/O DYE: CPT

## 2021-08-02 PROCEDURE — 72131 CT LUMBAR SPINE W/O DYE: CPT

## 2021-08-02 PROCEDURE — 6370000000 HC RX 637 (ALT 250 FOR IP): Performed by: STUDENT IN AN ORGANIZED HEALTH CARE EDUCATION/TRAINING PROGRAM

## 2021-08-02 PROCEDURE — 84703 CHORIONIC GONADOTROPIN ASSAY: CPT

## 2021-08-02 PROCEDURE — 99284 EMERGENCY DEPT VISIT MOD MDM: CPT

## 2021-08-02 RX ORDER — OXYCODONE HYDROCHLORIDE 5 MG/1
5 TABLET ORAL ONCE
Status: COMPLETED | OUTPATIENT
Start: 2021-08-02 | End: 2021-08-02

## 2021-08-02 RX ORDER — HYDROCODONE BITARTRATE AND ACETAMINOPHEN 5; 325 MG/1; MG/1
1 TABLET ORAL EVERY 12 HOURS PRN
Qty: 10 TABLET | Refills: 0 | Status: SHIPPED | OUTPATIENT
Start: 2021-08-02 | End: 2021-08-09 | Stop reason: SDUPTHER

## 2021-08-02 RX ORDER — CYCLOBENZAPRINE HCL 5 MG
5 TABLET ORAL 2 TIMES DAILY PRN
Qty: 15 TABLET | Refills: 0 | Status: SHIPPED | OUTPATIENT
Start: 2021-08-02 | End: 2022-06-06 | Stop reason: ALTCHOICE

## 2021-08-02 RX ADMIN — OXYCODONE HYDROCHLORIDE 5 MG: 5 TABLET ORAL at 11:28

## 2021-08-02 RX ADMIN — OXYCODONE HYDROCHLORIDE 5 MG: 5 TABLET ORAL at 10:47

## 2021-08-02 ASSESSMENT — ENCOUNTER SYMPTOMS
ABDOMINAL PAIN: 0
DIARRHEA: 0
VOMITING: 0
RHINORRHEA: 0
BACK PAIN: 1
COUGH: 0
NAUSEA: 0
SHORTNESS OF BREATH: 0
SORE THROAT: 0

## 2021-08-02 ASSESSMENT — PAIN DESCRIPTION - ORIENTATION: ORIENTATION: LEFT;UPPER

## 2021-08-02 ASSESSMENT — PAIN DESCRIPTION - PROGRESSION: CLINICAL_PROGRESSION: GRADUALLY WORSENING

## 2021-08-02 ASSESSMENT — PAIN DESCRIPTION - DESCRIPTORS: DESCRIPTORS: SHARP

## 2021-08-02 ASSESSMENT — PAIN DESCRIPTION - PAIN TYPE: TYPE: ACUTE PAIN

## 2021-08-02 ASSESSMENT — PAIN DESCRIPTION - LOCATION: LOCATION: BACK;LEG;ARM

## 2021-08-02 ASSESSMENT — PAIN SCALES - GENERAL
PAINLEVEL_OUTOF10: 10
PAINLEVEL_OUTOF10: 8
PAINLEVEL_OUTOF10: 10

## 2021-08-02 ASSESSMENT — PAIN DESCRIPTION - ONSET: ONSET: ON-GOING

## 2021-08-02 ASSESSMENT — PAIN DESCRIPTION - FREQUENCY: FREQUENCY: CONTINUOUS

## 2021-08-02 NOTE — TELEPHONE ENCOUNTER
Patient calling for refill of pain medication     Also states she fell down stairs yesterday and is in a lot of pain - I highly suggested to her that she go to ED to get checked out to make sure everything was ok

## 2021-08-02 NOTE — ED PROVIDER NOTES
systems for level 4, 10 or more for level 5)      Review of Systems   Constitutional: Negative for appetite change, chills, fatigue and fever. HENT: Negative for congestion, rhinorrhea, sneezing and sore throat. Eyes: Negative for visual disturbance. Respiratory: Negative for cough and shortness of breath. Cardiovascular: Negative for chest pain and leg swelling. Gastrointestinal: Negative for abdominal pain, diarrhea, nausea and vomiting. Genitourinary: Negative for dysuria. Musculoskeletal: Positive for back pain and neck pain. Negative for myalgias and neck stiffness. Skin: Negative for rash and wound. Neurological: Negative for dizziness, syncope, light-headedness and headaches. Psychiatric/Behavioral: Negative for dysphoric mood and suicidal ideas. PHYSICAL EXAM   (up to 7 for level 4, 8 or more forlevel 5)      INITIAL VITALS:   Vitals:    08/02/21 0955   BP: (!) 129/96   Pulse: 86   Resp: 16   Temp: 97.5 °F (36.4 °C)   SpO2: 99%        Physical Exam  Vitals and nursing note reviewed. Constitutional:       General: She is not in acute distress. Appearance: Normal appearance. She is not ill-appearing or diaphoretic. HENT:      Head: Normocephalic. Nose: Nose normal.      Mouth/Throat:      Mouth: Mucous membranes are moist.      Pharynx: Oropharynx is clear. Eyes:      Extraocular Movements: Extraocular movements intact. Conjunctiva/sclera: Conjunctivae normal.      Pupils: Pupils are equal, round, and reactive to light. Cardiovascular:      Rate and Rhythm: Normal rate and regular rhythm. Pulses: Normal pulses. Heart sounds: Normal heart sounds. Pulmonary:      Effort: Pulmonary effort is normal. No respiratory distress. Breath sounds: Normal breath sounds. No wheezing or rales. Chest:      Chest wall: No tenderness. Abdominal:      General: There is no distension. Palpations: Abdomen is soft. Tenderness:  There is no abdominal tenderness. There is no guarding or rebound. Musculoskeletal:      Cervical back: Neck supple. Bony tenderness present. Decreased range of motion. Thoracic back: Bony tenderness present. Lumbar back: Bony tenderness present. Skin:     General: Skin is warm. Capillary Refill: Capillary refill takes less than 2 seconds. Neurological:      General: No focal deficit present. Mental Status: She is alert and oriented to person, place, and time. Cranial Nerves: Cranial nerves are intact. Sensory: Sensation is intact. Motor: Motor function is intact. Deep Tendon Reflexes: Reflexes are normal and symmetric. Psychiatric:         Mood and Affect: Mood normal.         Behavior: Behavior normal.         DIFFERENTIAL  DIAGNOSIS     Initial MDM/Plan: 38 y.o. female who presents with neck pain and back pain after mechanical fall yesterday. History of C5/C6 arthroplasty in April. Has baseline numbness/tingling of the extremities and limited neck mobility but these symptoms have not worsened since her fall. Pain is currently rated 9.5 out of 10. She is ambulatory. No red flag symptoms. On examination, she is hemodynamically stable. She has midline tenderness palpation in the C-spine, T-spine and L-spine. No focal neurological deficit, no extremity weakness. Plan for hCG qualitative, CT C-spine, T-spine, L-spine and pain control. DIAGNOSTIC RESULTS / EMERGENCYDEPARTMENT COURSE / MDM     LABS:  Labs Reviewed   HCG, SERUM, QUALITATIVE         RADIOLOGY:  CT Cervical Spine WO Contrast    Result Date: 8/2/2021  EXAMINATION: CT OF THE CERVICAL SPINE WITHOUT CONTRAST 8/2/2021 11:54 am TECHNIQUE: CT of the cervical spine was performed without the administration of intravenous contrast. Multiplanar reformatted images are provided for review.  Dose modulation, iterative reconstruction, and/or weight based adjustment of the mA/kV was utilized to reduce the radiation dose to as low as reasonably achievable. COMPARISON: Cervical spine radiograph 06/29/2021. MRI cervical spine 02/09/2021. HISTORY: ORDERING SYSTEM PROVIDED HISTORY: hx c spine surgery, mechanical fall yesterday TECHNOLOGIST PROVIDED HISTORY: hx c spine surgery, mechanical fall yesterday Decision Support Exception - unselect if not a suspected or confirmed emergency medical condition->Emergency Medical Condition (MA) Is the patient pregnant?->No Reason for Exam: fall down stairs yesterday FINDINGS: BONES/ALIGNMENT: There is no acute fracture or traumatic malalignment. Status post discectomy and arthrodesis at C5-6. Reversal the cervical lordosis at C5-6. DEGENERATIVE CHANGES: Degenerative disc disease and marginal hypertrophic changes at C4-5 again demonstrated. SOFT TISSUES: There is no prevertebral soft tissue swelling. No acute abnormality of the cervical spine. CT THORACIC SPINE WO CONTRAST    Result Date: 8/2/2021  EXAMINATION: CT OF THE THORACIC SPINE WITHOUT CONTRAST; CT OF THE LUMBAR SPINE WITHOUT CONTRAST  8/2/2021 11:54 am: TECHNIQUE: CT of the thoracic spine was performed without the administration of intravenous contrast. Multiplanar reformatted images are provided for review. Dose modulation, iterative reconstruction, and/or weight based adjustment of the mA/kV was utilized to reduce the radiation dose to as low as reasonably achievable.; CT of the lumbar spine was performed without the administration of intravenous contrast. Multiplanar reformatted images are provided for review. Dose modulation, iterative reconstruction, and/or weight based adjustment of the mA/kV was utilized to reduce the radiation dose to as low as reasonably achievable. COMPARISON: Lumbar radiographs 07/30/2021. Thoracic spine radiographs 01/22/2021.  HISTORY: ORDERING SYSTEM PROVIDED HISTORY: hx c spine surgery, mechanical fall yesterday, midline pain TECHNOLOGIST PROVIDED HISTORY: hx c spine surgery, mechanical fall yesterday, midline pain Is the patient pregnant?->No Reason for Exam: fall down stairs yesterday; ORDERING SYSTEM PROVIDED HISTORY: TRAUMA TECHNOLOGIST PROVIDED HISTORY: midline pain s/p mechanical fall Decision Support Exception - unselect if not a suspected or confirmed emergency medical condition->Emergency Medical Condition (MA) Is the patient pregnant?->No Reason for Exam: fall down stairs yesterday FINDINGS: BONES/ALIGNMENT: There is normal alignment of the spine. The vertebral body heights are maintained. No osseous destructive lesion is seen. DEGENERATIVE CHANGES: No gross spinal canal stenosis or bony neural foraminal narrowing of the thoracic spine. SOFT TISSUES: No paraspinal mass is seen. No acute osseous abnormality identified in the thoracic or lumbar spine. CT LUMBAR SPINE WO CONTRAST    Result Date: 8/2/2021  EXAMINATION: CT OF THE THORACIC SPINE WITHOUT CONTRAST; CT OF THE LUMBAR SPINE WITHOUT CONTRAST  8/2/2021 11:54 am: TECHNIQUE: CT of the thoracic spine was performed without the administration of intravenous contrast. Multiplanar reformatted images are provided for review. Dose modulation, iterative reconstruction, and/or weight based adjustment of the mA/kV was utilized to reduce the radiation dose to as low as reasonably achievable.; CT of the lumbar spine was performed without the administration of intravenous contrast. Multiplanar reformatted images are provided for review. Dose modulation, iterative reconstruction, and/or weight based adjustment of the mA/kV was utilized to reduce the radiation dose to as low as reasonably achievable. COMPARISON: Lumbar radiographs 07/30/2021. Thoracic spine radiographs 01/22/2021.  HISTORY: ORDERING SYSTEM PROVIDED HISTORY: hx c spine surgery, mechanical fall yesterday, midline pain TECHNOLOGIST PROVIDED HISTORY: hx c spine surgery, mechanical fall yesterday, midline pain Is the patient pregnant?->No Reason for Exam: fall down stairs yesterday; ORDERING SYSTEM PROVIDED HISTORY: TRAUMA TECHNOLOGIST PROVIDED HISTORY: midline pain s/p mechanical fall Decision Support Exception - unselect if not a suspected or confirmed emergency medical condition->Emergency Medical Condition (MA) Is the patient pregnant?->No Reason for Exam: fall down stairs yesterday FINDINGS: BONES/ALIGNMENT: There is normal alignment of the spine. The vertebral body heights are maintained. No osseous destructive lesion is seen. DEGENERATIVE CHANGES: No gross spinal canal stenosis or bony neural foraminal narrowing of the thoracic spine. SOFT TISSUES: No paraspinal mass is seen. No acute osseous abnormality identified in the thoracic or lumbar spine. EMERGENCY DEPARTMENT COURSE:  ED Course as of Aug 02 1243   Mon Aug 02, 2021   1216 CT C spine showing no acute fracture or abnormality. Awaiting T and L-spine reads    [JT]   1225 T and L-spine scans also negative. Patient was informed of the results and recommended she follow-up with her neurosurgeon for further pain management. Medically stable for discharge. [JT]      ED Course User Index  [JT] Quita Montalvo MD          PROCEDURES:  None    CONSULTS:  None      FINAL IMPRESSION      1.  Acute back pain, unspecified back location, unspecified back pain laterality          DISPOSITION / PLAN     DISPOSITION Decision To Discharge 08/02/2021 12:25:40 PM      PATIENT REFERRED TO:  Claudette Velasquez PA-C  [address]  [address]  [phone]    In 2 days  As needed    [facility]  [address] [facility]  [phone]  Go to   If symptoms worsen      DISCHARGE MEDICATIONS:  New Prescriptions    CYCLOBENZAPRINE (FLEXERIL) 5 MG TABLET    Take 1 tablet by mouth 2 times daily as needed for Muscle spasms       Quita Montalvo MD  Emergency Medicine Resident    (Please note that portions of this note were completed with a voice recognition program.Efforts were made to edit the dictations but occasionally words are mis-transcribed.)        Demetria Crow MD  Resident  08/02/21 [id number]

## 2021-08-02 NOTE — ED PROVIDER NOTES
[facility] ED     Emergency Department     Faculty Attestation    I performed a history and physical examination of the patient and discussed management with the resident. I reviewed the residents note and agree with the documented findings and plan of care. Any areas of disagreement are noted on the chart. I was personally present for the key portions of any procedures. I have documented in the chart those procedures where I was not present during the key portions. I have reviewed the emergency nurses triage note. I agree with the chief complaint, past medical history, past surgical history, allergies, medications, social and family history as documented unless otherwise noted below. For Physician Assistant/ Nurse Practitioner cases/documentation I have personally evaluated this patient and have completed at least one if not all key elements of the E/M (history, physical exam, and MDM). Additional findings are as noted. Patient presents with neck and back pain after she fell down a few steps yesterday. Patient says she slipped when going downstairs and fell backwards. She denies hitting her head or having a loss of consciousness. Patient says she has had pain in the neck and back since the fall. Patient did have surgery on her cervical spine back in April of this year. Patient says she does have some tingling and numbness to her hands which she says she has had since surgery but thinks it may be a little worse since the fall. She denies any bowel or bladder changes. Patient is not on any anticoagulants. On exam, patient is resting comfortably in the bed. She is alert and oriented and answers questions appropriately. Lungs are clear to auscultation bilaterally and heart sounds are normal.  Abdomen is soft and nontender. There is tenderness to the cervical, thoracic, and lumbar midline. Strength and sensation is intact to the bilateral upper and lower extremities.   Will get imaging of the neck and back and treat patient's pain.       Gibson Wilde MD  Attending Emergency  Physician              Tyson Lala MD  08/02/21 [phone]

## 2021-08-02 NOTE — ED NOTES
Pt presents to the ED with c/o of upper back pain. Pt states she fell yesterday on stairs on her back. Pt states she had previous surgery April 30th on c-5 and c-6. Pt states she did not take anything for pain. Pt states she has radiating pain to bilateral arms with tingling which is new since fall. Pt rates pain 9.5/10 and constant. Pt reports headache. Pt states she always has tingling in bilateral legs. Pt denies other c/o  Call light given, white board updated.        Hemant Ram, AUBREE  08/02/21 1013

## 2021-08-02 NOTE — ED NOTES
The following labs labeled with pt sticker and tubed:     [] Antonio Marrero   [] on ice   [] Blue   [] Green/yellow  [] Green/black [] on ice  [] Pink  [] Red  [x] Yellow       [] COVID-19 swab    [] Rapid     [] Urine Sample  [] Pelvic Cultures    [] Blood Cultures            Bailey Henriquez RN  08/02/21 3313

## 2021-08-03 ENCOUNTER — HOSPITAL ENCOUNTER (OUTPATIENT)
Dept: PHYSICAL THERAPY | Age: 36
Setting detail: THERAPIES SERIES
Discharge: HOME | End: 2021-08-03
Payer: COMMERCIAL

## 2021-08-03 ENCOUNTER — HOSPITAL ENCOUNTER (OUTPATIENT)
Dept: OCCUPATIONAL THERAPY | Age: 36
Setting detail: THERAPIES SERIES
Discharge: HOME | End: 2021-08-03
Payer: COMMERCIAL

## 2021-08-03 NOTE — FLOWSHEET NOTE
[x] [facility]) [facility] &  Therapy  [address]    [phone]  F: [phone]   [] [address]  [facility] [address]   [address]  P: [phone]  F: [phone]  [] [address]  [address]  P: [phone]  F: [phone] [] [address]  P: [phone]  F: [phone]  [] [address] [facility]   [address]   [place]: [phone]  F: [phone]   [] [facility]  [address]  [place]: [phone]   F: [phone]     Physical Therapy Cancel/No Show note    Date: 8/3/2021  Patient: Vivian Henderson  : 1985  MRN: [id number]    Cancels/No Shows to date:     For today's appointment patient:    [x]  Cancelled    [] Rescheduled appointment    [] No-show     Reason given by patient:    []  Patient ill    []  Conflicting appointment    [] No transportation      [] Conflict with work    [] No reason given    [] Weather related    [] COVID-19    [x] Other:      Comments:  Pt fell down the stairs and is in too much pain for therapy this date.      [x] Next appointment was confirmed    Electronically signed by: Jim Waldron PTA

## 2021-08-05 ENCOUNTER — HOSPITAL ENCOUNTER (OUTPATIENT)
Dept: OCCUPATIONAL THERAPY | Age: 36
Setting detail: THERAPIES SERIES
Discharge: HOME | End: 2021-08-05
Payer: COMMERCIAL

## 2021-08-05 NOTE — SIGNIFICANT EVENT
[x] [address]  [address]    [phone]  F: [phone]     Occupational Therapy Cancel/No Show note    Date: 2021  Patient: Ladonna Garcia  : 1985  MRN: [id number]    Cancels/No Shows to date:20  For today's appointment patient:    [x]  Cancelled    [] Rescheduled appointment    [] No-show     Reason given by patient:    []  Patient ill    []  Conflicting appointment    [] No transportation      [] Conflict with work    [] No reason given    [] Weather related    [] [id number]    [x] Other:   Pt fell down stairs, still does not feel well   Comments:        [] Next appointment was confirmed    Electronically signed by: Hillis Severin OTD-S

## 2021-08-09 DIAGNOSIS — M54.12 CERVICAL RADICULOPATHY: ICD-10-CM

## 2021-08-09 DIAGNOSIS — M50.90 CERVICAL DISC DISEASE: ICD-10-CM

## 2021-08-09 DIAGNOSIS — M48.02 STENOSIS OF CERVICAL SPINE WITH MYELOPATHY (HCC): ICD-10-CM

## 2021-08-09 DIAGNOSIS — G99.2 STENOSIS OF CERVICAL SPINE WITH MYELOPATHY (HCC): ICD-10-CM

## 2021-08-09 RX ORDER — HYDROCODONE BITARTRATE AND ACETAMINOPHEN 5; 325 MG/1; MG/1
1 TABLET ORAL DAILY PRN
Qty: 7 TABLET | Refills: 0 | Status: SHIPPED | OUTPATIENT
Start: 2021-08-09 | End: 2021-08-16

## 2021-08-09 NOTE — TELEPHONE ENCOUNTER
Patient states she feels as though she has set back her healing since the fall but is in more excessive pain.     Date of last fill: 8.2.2021  Surgery: 4.30.2021  Time elapsed since last surgery: 13 weeks  Date of next follow up appointment: 8.12.2021

## 2021-08-10 ENCOUNTER — APPOINTMENT (OUTPATIENT)
Dept: PHYSICAL THERAPY | Age: 36
End: 2021-08-10
Payer: COMMERCIAL

## 2021-08-10 ENCOUNTER — HOSPITAL ENCOUNTER (OUTPATIENT)
Dept: OCCUPATIONAL THERAPY | Age: 36
Setting detail: THERAPIES SERIES
Discharge: HOME | End: 2021-08-10
Payer: COMMERCIAL

## 2021-08-10 NOTE — SIGNIFICANT EVENT
[x] [address]  [address]    [phone]  F: [phone]     Occupational Therapy Cancel/No Show note    Date: 8/10/2021  Patient: Veena Landeros  : 1985  MRN: [id number]    Cancels/No Shows to date:30  For today's appointment patient:    [x]  Cancelled    [] Rescheduled appointment    [] No-show     Reason given by patient:    []  Patient ill    []  Conflicting appointment    [] No transportation      [] Conflict with work    [] No reason given    [] Weather related    [] [id number]    [x] Other:   No    Comments:        [] Next appointment was confirmed    Electronically signed by: Russell BURRS

## 2021-08-12 ENCOUNTER — HOSPITAL ENCOUNTER (OUTPATIENT)
Dept: GENERAL RADIOLOGY | Age: 36
Discharge: HOME | End: 2021-08-14
Payer: COMMERCIAL

## 2021-08-12 ENCOUNTER — HOSPITAL ENCOUNTER (OUTPATIENT)
Age: 36
Discharge: HOME | End: 2021-08-14
Payer: COMMERCIAL

## 2021-08-12 ENCOUNTER — OFFICE VISIT (OUTPATIENT)
Dept: NEUROSURGERY | Age: 36
End: 2021-08-12
Payer: COMMERCIAL

## 2021-08-12 VITALS
BODY MASS INDEX: 34.53 KG/M2 | SYSTOLIC BLOOD PRESSURE: 128 MMHG | HEART RATE: 90 BPM | OXYGEN SATURATION: 100 % | DIASTOLIC BLOOD PRESSURE: 88 MMHG | WEIGHT: 220 LBS | HEIGHT: 67 IN

## 2021-08-12 DIAGNOSIS — G99.2 STENOSIS OF CERVICAL SPINE WITH MYELOPATHY (HCC): Primary | ICD-10-CM

## 2021-08-12 DIAGNOSIS — M54.12 CERVICAL RADICULOPATHY: ICD-10-CM

## 2021-08-12 DIAGNOSIS — G56.21 CUBITAL TUNNEL SYNDROME, RIGHT: ICD-10-CM

## 2021-08-12 DIAGNOSIS — M48.02 STENOSIS OF CERVICAL SPINE WITH MYELOPATHY (HCC): Primary | ICD-10-CM

## 2021-08-12 DIAGNOSIS — G99.2 STENOSIS OF CERVICAL SPINE WITH MYELOPATHY (HCC): ICD-10-CM

## 2021-08-12 DIAGNOSIS — M48.02 STENOSIS OF CERVICAL SPINE WITH MYELOPATHY (HCC): ICD-10-CM

## 2021-08-12 PROCEDURE — G8427 DOCREV CUR MEDS BY ELIG CLIN: HCPCS | Performed by: NURSE PRACTITIONER

## 2021-08-12 PROCEDURE — 1036F TOBACCO NON-USER: CPT | Performed by: NURSE PRACTITIONER

## 2021-08-12 PROCEDURE — G8417 CALC BMI ABV UP PARAM F/U: HCPCS | Performed by: NURSE PRACTITIONER

## 2021-08-12 PROCEDURE — 99214 OFFICE O/P EST MOD 30 MIN: CPT | Performed by: NURSE PRACTITIONER

## 2021-08-12 PROCEDURE — 72040 X-RAY EXAM NECK SPINE 2-3 VW: CPT

## 2021-08-12 NOTE — PROGRESS NOTES
[place]  [address] [facility] [address] [address]  Dept: [phone]    Patient:  Salvatore Mcmahan  YOB: 1985  Date: 21    The patient is a 38 y.o. female who presents today for consult of the following problems:     Chief Complaint   Patient presents with    Follow-up     Stenosis of cervical spine with myelopathy          HPI:     Salvatore Mcmahan is a 38 y.o. female presents for follow-up who is now approximately 12 weeks post anterior C5-6 disc arthroplasty for cervical myeloradiculopathy. Has been struggling with some issues to right upper extremity, traveling down tricep into elbow and down into fingers. This is been ongoing postoperatively, was referred for occupational therapy, but has only been able to attend 2 sessions so far. Does report that she had a recent fall down several stairs, was evaluated in an emergency department for this did have CT cervical thoracic and lumbar complete that did not show any acute abnormalities. Does report that since that fall she has started to redevelop some discomfort to her left upper extremity as well as some tingling to her hands. Feels that her fingers are a bit clumsier closer to where they were prior to surgery. Right hand symptoms particularly are affecting thumb index finger as well as pinky finger, mostly sparing middle and ring finger.      History:     Past Medical History:   Diagnosis Date    Anxiety     Blood loss     after     Cervical disc disease     Cervical myopathy     Chronic back pain     GERD (gastroesophageal reflux disease)     Hx of migraine headaches     Neuropathy     both legs    Thyroid nodule     Weakness generalized     due to neck issues     Past Surgical History:   Procedure Laterality Date    CERVICAL DISC ARTHROPLASTY  2021    ANTERIOR C5-6 DISC ARTHROPLASTY     CERVICAL FUSION N/A 2021    ANTERIOR C5-6 DISC ARTHROPLASTY performed by Rui Baugh DO at [facility] 3      x3    DILATION AND CURETTAGE OF UTERUS      TUBAL LIGATION       Family History   Problem Relation Age of Onset    Diabetes Mother     High Blood Pressure Mother     Other Mother         Tremor    Seizures Mother     Heart Disease Father     Hypertension Father     Cancer Sister         Lymphoblastic lymphoma    Diabetes Maternal Uncle     Heart Attack Maternal Grandmother     Diabetes Maternal Grandmother     Kidney Disease Maternal Grandmother     Breast Cancer Paternal Aunt      Current Outpatient Medications on File Prior to Visit   Medication Sig Dispense Refill    HYDROcodone-acetaminophen (NORCO) 5-325 MG per tablet Take 1 tablet by mouth daily as needed for Pain for up to 7 days. 7 tablet 0    cyclobenzaprine (FLEXERIL) 5 MG tablet Take 1 tablet by mouth 2 times daily as needed for Muscle spasms 15 tablet 0    topiramate (TOPAMAX) 50 MG tablet Take 1 tablet by mouth 2 times daily 60 tablet 3    pregabalin (LYRICA) 150 MG capsule Take 1 capsule by mouth 3 times daily for 90 days. 90 capsule 3    DULoxetine (CYMBALTA) 60 MG extended release capsule Take 1 capsule by mouth daily 30 capsule 2    senna-docusate (PERICOLACE) 8.6-50 MG per tablet Take 2 tablets by mouth daily as needed for Constipation 60 tablet 0    ibuprofen (ADVIL;MOTRIN) 800 MG tablet Take 1 tablet by mouth 2 times daily as needed for Pain 60 tablet 3    Handicap Placard MISC by Does not apply route 1 years 1 each 0    SUMAtriptan (IMITREX) 25 MG tablet Take 1 tablet by mouth once as needed for Migraine 30 tablet 1    Elastic Bandages & Supports (FUTURO SOFT CERVICAL COLLAR) MISC 1 Units by Does not apply route as needed (neck pain) 1 each 0     No current facility-administered medications on file prior to visit.      Social History     Tobacco Use    Smoking status: Former Smoker    Smokeless tobacco: Never Used    Tobacco comment: [id number], smoker then   Vaping Use    Vaping Use: Never used   Substance Use Topics    Alcohol use: Yes     Comment: occasional    Drug use: Never       Allergies   Allergen Reactions    Bee Venom Anaphylaxis    Doxycycline Nausea Only       Review of Systems  Constitutional: Negative for activity change and appetite change. HENT: Negative for ear pain and facial swelling. Eyes: Negative for discharge and itching. Respiratory: Negative for choking and chest tightness. Cardiovascular: Negative for chest pain and leg swelling. Gastrointestinal: Negative for nausea and abdominal pain. Endocrine: Negative for cold intolerance and heat intolerance. Genitourinary: Negative for frequency and flank pain. Musculoskeletal: Negative for myalgias and joint swelling. Skin: Negative for rash and wound. Allergic/Immunologic: Negative for environmental allergies and food allergies. Hematological: Negative for adenopathy. Does not bruise/bleed easily. Psychiatric/Behavioral: Negative for self-injury. The patient is not nervous/anxious. Physical Exam:      /88   Pulse 90   Ht 5' 7\" (1.702 m)   Wt 220 lb (99.8 kg)   LMP 07/14/2021 (Approximate)   SpO2 100%   BMI 34.46 kg/m²   Estimated body mass index is 34.46 kg/m² as calculated from the following:    Height as of this encounter: 5' 7\" (1.702 m). Weight as of this encounter: 220 lb (99.8 kg). General:  Lily Mayfield is a 38y.o. year old female who appears her stated age. HEENT: Normocephalic atraumatic. Neck supple. Chest: regular rate; pulses equal  Abdomen: Soft nontender nondistended.   Ext: DP and PT pulses 2+, good cap refill  Neuro    Mentation  Appropriate affect  Registration intact  Orientation intact  3 item recall intact  Judgement intact to situation    Cranial Nerves:   Pupils equal and reactive to light  Extraocular motion intact  Face and shrug symmetric  Tongue midline  No dysarthria  v1-3 sensation symmetric, masseter tone symmetric  Hearing symmetric    Sensation: Decreased to right hand, sparing middle and ring finger    Motor  L deltoid 5/5; R deltoid 5/5  L biceps 5/5; R biceps 5/5  L triceps 5/5; R triceps 5/5  L wrist extension 5/5; R wrist extension 5/5  L intrinsics 5/5; R intrinsics 5/5     L iliopsoas 5/5 , R iliopsoas 5/5  L quadriceps 5/5; R quadriceps 5/5  L Dorsiflexion 5/5; R dorsiflexion 5/5  L Plantarflexion 5/5; R plantarflexion 5/5  L EHL 5/5; R EHL 5/5    Reflexes  L Brachioradialis 2+/4; R brachioradialis 2+/4  L Biceps 2+/4; R Biceps 2+/4  L Triceps 2+/4; R Triceps 2+/4  L Patellar 2+/4: R Patellar 2+/4  L Achilles 2+/4; R Achilles 2+/4    hoffmans L: neg  hoffmans R: neg  Clonus L: neg  Clonus R: neg  Babinski L: neg  Babinski R: neg    Studies Review:     CT lumbar spine 8/2/2021:  FINDINGS:   BONES/ALIGNMENT: There is normal alignment of the spine. The vertebral body   heights are maintained. No osseous destructive lesion is seen.       DEGENERATIVE CHANGES: No gross spinal canal stenosis or bony neural foraminal   narrowing of the thoracic spine.       SOFT TISSUES: No paraspinal mass is seen. CT thoracic spine 8/2/2021:  FINDINGS:   BONES/ALIGNMENT: There is normal alignment of the spine. The vertebral body   heights are maintained. No osseous destructive lesion is seen.       DEGENERATIVE CHANGES: No gross spinal canal stenosis or bony neural foraminal   narrowing of the thoracic spine.       SOFT TISSUES: No paraspinal mass is seen. CT cervical spine 8/2/2021:  FINDINGS:   BONES/ALIGNMENT: There is no acute fracture or traumatic malalignment. Status post discectomy and arthrodesis at C5-6.  Reversal the cervical   lordosis at C5-6.       DEGENERATIVE CHANGES: Degenerative disc disease and marginal hypertrophic   changes at C4-5 again demonstrated.       SOFT TISSUES: There is no prevertebral soft tissue swelling. ED records reviewed    Assessment and Plan:      1. Stenosis of cervical spine with myelopathy ([facility] [address]    2. Cubital tunnel syndrome, right    3. Cervical radiculopathy          Plan: Patient with some persistent aching sensation to axial cervical spine, as well as some worsening of right upper extremity symptoms in addition to recurrence of left upper extremity symptoms since fall downstairs 1.5 weeks ago. Did have CT spine complete at that time that did not show any acute findings. Will obtain upright flexion extension imaging today to ensure no instability present at level of arthroplasty. We will also obtain repeat EMG bilateral upper extremities as patient is having progressive symptoms. Discussed the importance of compliance with attending physical and occupational therapy, patient has not been able to complete 2 sessions thus far. She does have additional sessions scheduled for next week. Strongly encouraged to attend these. We will have the patient return in approximately 8 weeks for follow-up appointment with Dr. Lesli Du. Contact office with any new or worsening symptoms in the interim. Followup: Return in about 8 weeks (around 10/7/2021), or if symptoms worsen or fail to improve. Prescriptions Ordered:  No orders of the defined types were placed in this encounter. Orders Placed:  Orders Placed This Encounter   Procedures    XR CERVICAL SPINE FLEXION AND EXTENSION     Standing Status:   Future     Standing Expiration Date:   11/10/2021     Scheduling Instructions:      Upright flexion/extension/and lateral neutral position     Order Specific Question:   Reason for exam:     Answer:   evaluate stability; post cervical arthroplasty; recent fall    EMG     Standing Status:   Future     Standing Expiration Date:   8/12/2022     Order Specific Question:   Which body part?      Answer:   bilateral upper extremities        Electronically signed by MORE Baker CNP on 8/12/2021 at 9:25 AM    Please note that this chart was generated using voice recognition Dragon dictation software. Although every effort was made to ensure the accuracy of this automated transcription, some errors in transcription may have occurred.

## 2021-08-13 ENCOUNTER — HOSPITAL ENCOUNTER (OUTPATIENT)
Dept: OCCUPATIONAL THERAPY | Age: 36
Setting detail: THERAPIES SERIES
Discharge: HOME | End: 2021-08-13
Payer: COMMERCIAL

## 2021-08-13 ENCOUNTER — HOSPITAL ENCOUNTER (OUTPATIENT)
Dept: PHYSICAL THERAPY | Age: 36
Setting detail: THERAPIES SERIES
Discharge: HOME | End: 2021-08-13
Payer: COMMERCIAL

## 2021-08-13 DIAGNOSIS — G99.2 STENOSIS OF CERVICAL SPINE WITH MYELOPATHY (HCC): ICD-10-CM

## 2021-08-13 DIAGNOSIS — M54.12 CERVICAL RADICULOPATHY: ICD-10-CM

## 2021-08-13 DIAGNOSIS — Z76.0 MEDICATION REFILL: ICD-10-CM

## 2021-08-13 DIAGNOSIS — M48.02 STENOSIS OF CERVICAL SPINE WITH MYELOPATHY (HCC): ICD-10-CM

## 2021-08-13 DIAGNOSIS — M54.14 THORACIC RADICULOPATHY: ICD-10-CM

## 2021-08-13 PROCEDURE — 97110 THERAPEUTIC EXERCISES: CPT

## 2021-08-13 PROCEDURE — 97140 MANUAL THERAPY 1/> REGIONS: CPT

## 2021-08-13 NOTE — FLOWSHEET NOTE
[x] [facility]  [address]  [phone]  F: [phone]         Physical Therapy Daily Treatment Note    Date:  2021  Patient Name:  Sorin Maria    :  1985  MRN: [id number]  Physician: Malcolm Nieto DO                                    Insurance: [employer] ( remaining)  Medical Diagnosis: S/P cervical discectomy (Z98.890 [ICD-10-CM]); Chronic low back pain with sciatica, sciatica laterality unspecified, unspecified back pain laterality (M54.40,G89.29 [ICD-10-CM])                          Rehab Codes: M54.2, M54.9, M25.60, M62.81, M79.601, M79.604, M79.605  Onset Date: Neck 21; Back 2004               Next 's appt. :       Visit# / total visits: 3/12; Progress note for Medicare patient due at visit 8     Cancels/No Shows: 0/0    Subjective:    Pain:  [x] Yes  [] No Location: Neck, Back  N/A Pain Rating: (0-10 scale) 7-8/10 neck; back 6/10  Pain altered Tx:  [x] No  [] Yes  Action:  Comments:  Patient reports she fell 2 weeks ago. Pain is slowly getting better to the point she can move and drive. Mid to low back down the R leg.       Objective:  Modalities: HP cerv, LB (large, cerv) hooklying w/towel roll under R UE, at end of Rx  Precautions:  Exercises:  Exercise Reps/ Time Weight/ Level Comments    SciFit  5 min ML1.0 Added 730, 1 rest break mid-way x                 Retro shoulder rolls 20x  Progressed reps 7/30 x   Scapular retraction 15x 3sec Progressed reps 7/30 x   Cervical AROM  10x  All planes, Pain-free range x   Seated abd draw-ins 15x 3sec Progressed reps 7/30    Bicep curls  15x  Added 730, add weight L soon, add R if able            Hooklying        abdom draw-ins 10x 3sec Added 730    Glut sets 10x 3sec Added 730, painful R upper thigh, lower buttock    Add sets  10x 3sec Added 7    Cerv retractions luis alberto 10x 3sec Pushing into pillow, Added 730    Wand chest press 10x 1 lb Added 730    Wand flex 10x 1 lb Added 7/30                         Other:   Manual: Gentle cervical PROM in supine, legs elevated. Layer 1 STM to R UT, SCM and paraspinals; changed positions to seated chair with gentle STM to R UT and mid scapula x18 min      Treatment Charges: Mins Units   [x]  Modalities HP        IFC trial 15    5 --   [x]  Ther Exercise 13 1   [x]  Manual Therapy 25 2   []  Ther Activities     []  Aquatics     []  Vasocompression     []  Other     Total Treatment time 38 3       Assessment: [] Progressing toward goals. [x] No change. Patient required breaks during SciFit due to pain. Additional time required throughout treatment for slow cautious movements. Focused on manual cervical ROM with gentle soft tissue manipulation to decrease pain. Patient extremely sensitive over R UT with burning, N/T. Trailed estim with HP with poor patient tolerance. Adjusted electrodes 3x before stopping the estim with HP alone for the remaining 10 min. [] Other:  [x] Patient would continue to benefit from skilled physical therapy services in order to: address limited cervical and R shoulder mobility; weakness of the R UE; limited lumbar mobility; core weakness; and functional impairments including difficulty standing, walking, or sitting for a prolonged period of time and difficulty with job tasks due to R UE symptoms. STG: (to be met in 8 treatments)  1. ? Pain: Decrease neck, R UE, low back, and B LE pain to 4/10   2. ? ROM: Increase cervical rotation to the left to 30°, sidebend to the left to 20°; R shd flexion to 130°, abd to 120°; Lumbar flexion to 25% limited, extension 50% limited, L rotation 25% limited, L SB 50% limited  3. ? Strength: Improve neck/scapular strength and core strength as demonstrated by progression of PRE's and DLS exercises; Increase R shd flex and abd to 4/5, R elbow to 4+/5  4. ? Function: Improve Oswestry to 60% impairment; NDI to 60% impairment  5.  Patient to be independent with home exercise program as demonstrated by performance with correct form without cues. LTG: (to be met in 12 treatments)  1. Pain-free cervical ROM  2. Improvement in NDI and Oswestry to 50% impairment  3. Pt will report an improvement in R UE pain with job tasks       Pt. Education:  [x] Yes  [] No  [x] Reviewed Prior HEP/Ed  Method of Education: [x] Verbal  [x] Demo  [] Written  Comprehension of Education:  [x] Verbalizes understanding-  [] Demonstrates understanding. [x] Needs review. [] Demonstrates/verbalizes HEP/Ed previously given. Plan: [x] Continue current frequency toward long and short term goals.      [x] Specific Instructions for subsequent treatments: gentle stretches, STM/Manual therapy as needed to cervical and scapular muscles; Scapular and UE strengthening; R shd ROM; Low back stretches; Core strengthening; add hooklying marches, monitor response to HP         Time In:0800            Time Out: 9304    Electronically signed by:  Pawel Anaya PTA

## 2021-08-13 NOTE — FLOWSHEET NOTE
[x] [facility]       Occupational Therapy            1st floor       [address], [place]         Phone: [phone]       Fax: [phone] [] [facility]  Occupational Therapy  [address]  [place], [place]  Phone: [phone]  Fax: [phone]      Occupational Therapy Daily Treatment Note    Date:  2021  Patient Name:  Ladonna Garcia    :  1985  MRN: [id number]  Referring Provider:  Other Dr. Bob Anchors: Other [facility]   Medical Diagnosis: Cubital Tunnel Syndrome, right (G56.21); S/P cervical discectomy (R29.008)    Rehab Codes: pain in shoulder M25.51,, pain in elbow M25.52,, pain in hand M79.646, or pain in wrist M25.53,  Sx Date: 21                      Next Dr. Rangel Mormon: 21  Visit# / total visits: 3/12; Progress note for Medicare patient due at visit 6    Cancels/No Shows: 0/0    Subjective:    Pain:  [x] Yes  [] No Location: RUE and R scapular Pain Rating: (0-10 scale) 8/10         LUE    Pain Ratin-7/10    Pain altered Tx:  [x] No  [] Yes  Action:  Pt Comments: \"I feel like I set myself back. \"     Objective:  Modalities:      Precautions: NA  Exercises:  EXERCISE    REPS/     TIME  WEIGHT/    LEVEL COMMENTS   Ulnar nerve glide 10x  Completed   Digit AROM 10x  Not Completed   Pinch pin with small foam 10x  Not Completed   Theracane   Added this date & Completed   Trigger point release with ball   Added this date & Completed   Self pec stretch    Added this date & Completed         Other: Pt seen this date s/p fall. Pt presents with noted B redness and min edema in digit web spaces. Pt reports needing to prop arm up to open axilla for pain relief. Additionally, pt reports increased headaches (R occiput and over R eye) and tender skin at times. Initiated stretching program to relieve pain and promote mobility of R UE.  Pt unable to tolerate pec major/minor stretch with foam roll due to pain over tailbone (reported from fall) and between scapulas. Attempted soft tissue massage to B rhomboids to relieve pain, however did not tolerate due to reported intense pain. Provided education on positioning to promote improved posture and decreased pain and paraesthesia. Demo theracane use for home relief (reports has a thera cane at home). Initiated pec major/minor stretch and trigger point release with good return. Assessed for Thoracic Outlet Syndrome due to pt reported symptoms. Pt negative Mchenry Dunker Stress Test this date. Treatment Charges: Mins Units   []  Modalities:      []  Ultrasound     [x]  Ther Exercise 48 4   [x]  Manual Therapy 7    []  Ther Activities     []  Orthotic fit/train     []  Orthotic recheck     []  Other     Total Treatment time 55 min      Assessment: [] Progressing toward goals. [x] No change. [] Other     [x] Patient would continue to benefit from skilled occupational therapy services in order to: Improve  Activity tolerance and Complaint of pain in order to decrease pain in UE for safe completion of ADLs     Short Term Goals: (  6    Treatments)  1. Decrease Pain: 6/10 to promote ADL completion. 2. Increase strength (pounds);  a. R  strength to 30 pounds to promote . b. R lateral pinch to 7 pounds to promote grooming tasks. c. R 2 point pinch to 4 pounds to promote meal preparation. d. R 3 jaw pinch to 7 pounds to promote writing. 3. Increase function:UE Functional Index Score 40/80 or more points to promote increased functional abilities  4. Patient to be independent with home exercise program as demonstrated by performance with correct form without cues.     Long Term Goals: (  12  Treatments)  1. Decrease pain: 4/10 to promote home maintenance. 2. Increase R  to 35 pounds to promote driving. 3. Increase R lateral pinch to 9 pounds to promote stirring pots for cooking. 4. Increase R 3 jaw pinch to 9 pounds to promote work related tasks.   5. Patient will tolerate ionto pending MD approval.    Patient Goals: Pt wants to relieve pain. Pt. Education:  [x] Yes  [] No  [x] Reviewed Prior HEP/Ed  Method of Education: [x] Verbal  [x] Demo  [x] Written  Re:  Comprehension of Education:  [x] Verbalizes understanding. [] Demonstrates understanding. [] Needs review. [x] Demonstrates/verbalizes HEP/Ed previously given. Plan: [x] Continue current frequency toward short and long term goals.   [x] Specific Instructions for subsequent treatments: light strengthening, nerve glides, ionto    [] Other:       Time In: 0900  Time Out: 1000        Electronically signed by:  Trino AVINA

## 2021-08-16 RX ORDER — PREGABALIN 100 MG/1
CAPSULE ORAL
Qty: 90 CAPSULE | Refills: 2 | OUTPATIENT
Start: 2021-08-16

## 2021-08-16 RX ORDER — DULOXETIN HYDROCHLORIDE 60 MG/1
60 CAPSULE, DELAYED RELEASE ORAL DAILY
Qty: 30 CAPSULE | Refills: 2 | Status: SHIPPED | OUTPATIENT
Start: 2021-08-16 | End: 2022-03-11

## 2021-08-16 RX ORDER — DULOXETIN HYDROCHLORIDE 60 MG/1
CAPSULE, DELAYED RELEASE ORAL
Qty: 30 CAPSULE | Refills: 2 | OUTPATIENT
Start: 2021-08-16

## 2021-08-16 RX ORDER — PREGABALIN 150 MG/1
150 CAPSULE ORAL 3 TIMES DAILY
Qty: 90 CAPSULE | Refills: 3 | Status: SHIPPED | OUTPATIENT
Start: 2021-08-16 | End: 2021-12-10 | Stop reason: SDUPTHER

## 2021-08-16 NOTE — TELEPHONE ENCOUNTER
Patient last seen by PCP in January. I do not see where she is scheduled for an upcoming appointments with a new PCP.   Needs to establish

## 2021-08-16 NOTE — TELEPHONE ENCOUNTER
Hemoglobin A1C (%)   Date Value   06/03/2021 5.1   12/10/2020 5.1             ( goal A1C is < 7)   No results found for: LABMICR  LDL Cholesterol (mg/dL)   Date Value   12/10/2020 83       (goal LDL is <100)   AST (U/L)   Date Value   12/10/2020 12     ALT (U/L)   Date Value   12/10/2020 12     BUN (mg/dL)   Date Value   12/10/2020 14     BP Readings from Last 3 Encounters:   08/12/21 128/88   08/02/21 (!) 129/96   07/29/21 127/87          (goal 120/80)        Patient Active Problem List:     Chronic low back pain with sciatica     Thyroid nodule     Stenosis of cervical spine with myelopathy (HCC)     Cervical radiculopathy     Thoracic radiculopathy     Class 1 obesity due to excess calories with serious comorbidity in adult     Cervical disc disease     Establishing care with new doctor, encounter for     Bunion of great toe of right foot     S/P cervical discectomy     Cubital tunnel syndrome, right      ----Deloris Later

## 2021-08-16 NOTE — TELEPHONE ENCOUNTER
Patient has scheduled future appt for 11/221 with provider and would like to know if med refills can be sent to pharmacy or does she need to come in to Walk In for refill?

## 2021-08-17 ENCOUNTER — HOSPITAL ENCOUNTER (OUTPATIENT)
Dept: OCCUPATIONAL THERAPY | Age: 36
Setting detail: THERAPIES SERIES
Discharge: HOME | End: 2021-08-17
Payer: COMMERCIAL

## 2021-08-17 NOTE — SIGNIFICANT EVENT
[x] [facility]  [address]    [phone]  F: [phone]     Occupational Therapy Cancel/No Show note    Date: 2021  Patient: Myrna Contreras  : 1985  MRN: [id number]    Cancels/No Shows to date:  For today's appointment patient:    [x]  Cancelled    [] Rescheduled appointment    [] No-show     Reason given by patient:    []  Patient ill    []  Conflicting appointment    [] No transportation      [] Conflict with work    [] No reason given    [] Weather related    [] OUELO-20    [x] Other:   \"patient up all night with sick child\"    Comments:        [x] Next appointment was confirmed    Electronically signed by: Tree AVINA

## 2021-09-08 ENCOUNTER — HOSPITAL ENCOUNTER (OUTPATIENT)
Dept: PHYSICAL THERAPY | Age: 36
Setting detail: THERAPIES SERIES
Discharge: HOME | End: 2021-09-08
Payer: COMMERCIAL

## 2021-09-08 NOTE — DISCHARGE SUMMARY
[x] [facility]  [address]  [phone]  F: [phone] [] [address]  [facility]   [address]  P: [phone]  F: [phone] [] [address]  [address] [facility]  P: [phone]  F: [phone] [] [address]  [facility]   [address]   [place]: [phone]  F: [phone]         Physical Therapy Discharge Note    Date: 2021      Patient: Giuseppe Camilo  : 1985  MRN: [id number]    Physician: Kenia Hassan DO                                    Insurance: [facility] ( remaining)  Medical Diagnosis: S/P cervical discectomy (Z98.890 [ICD-10-CM]); Chronic low back pain with sciatica, sciatica laterality unspecified, unspecified back pain laterality (M54.40,G89.29 [ICD-10-CM])                          Rehab Codes: M54.2, M54.9, M25.60, M62.81, M79.601, M79.604, M79.605  Onset Date: Neck 20; Back 2004               Next 's appt. :       Visit# / total visits: 3/12; Progress note for Medicare patient due at visit 8                                        Cancels/No Shows: 1/3    Date of initial visit: 21                Date of final visit: 21      Subjective:  Refer to initial evaluation. Objective:  Refer to initial evaluation. Assessment:  Refer to initial evaluation.       Treatment to Date:  [x] Therapeutic Exercise    [] Modalities:  [] Therapeutic Activity    [] Ultrasound  [x] Electrical Stimulation  [] Gait Training     [] Massage       [] Lumbar/Cervical Traction  [] Neuromuscular Re-education [x] Cold/hotpack [] Iontophoresis: 4 mg/mL  [x] Instruction in Home Exercise Program                     Dexamethasone Sodium  [] Manual Therapy             Phosphate 40-80 mAmin  [] Aquatic Therapy                   [] Vasocompression/    [] Other:             Game Ready    Discharge Status:     [] Pt to continue exercise/home instructions independently. [] Therapy interrupted due to:    [x] Pt has 2 or more no shows/cancels, is discontinued per our policy. [] Other:         Electronically signed by Patrick Corona PT on 9/8/2021 at 1:41 PM      If you have any questions or concerns, please don't hesitate to call.   Thank you for your referral.

## 2021-09-19 ENCOUNTER — APPOINTMENT (OUTPATIENT)
Dept: CT IMAGING | Facility: CLINIC | Age: 36
End: 2021-09-19
Payer: COMMERCIAL

## 2021-09-19 ENCOUNTER — HOSPITAL ENCOUNTER (EMERGENCY)
Facility: CLINIC | Age: 36
Discharge: HOME | End: 2021-09-19
Attending: EMERGENCY MEDICINE
Payer: COMMERCIAL

## 2021-09-19 VITALS
SYSTOLIC BLOOD PRESSURE: 145 MMHG | OXYGEN SATURATION: 99 % | HEART RATE: 99 BPM | TEMPERATURE: 98.2 F | DIASTOLIC BLOOD PRESSURE: 109 MMHG | RESPIRATION RATE: 20 BRPM | BODY MASS INDEX: 33.83 KG/M2 | WEIGHT: 216 LBS

## 2021-09-19 DIAGNOSIS — G50.0 TRIGEMINAL NEURALGIA OF RIGHT SIDE OF FACE: Primary | ICD-10-CM

## 2021-09-19 PROCEDURE — 99282 EMERGENCY DEPT VISIT SF MDM: CPT

## 2021-09-19 PROCEDURE — 96372 THER/PROPH/DIAG INJ SC/IM: CPT

## 2021-09-19 PROCEDURE — 70450 CT HEAD/BRAIN W/O DYE: CPT

## 2021-09-19 PROCEDURE — 6360000002 HC RX W HCPCS: Performed by: EMERGENCY MEDICINE

## 2021-09-19 RX ORDER — KETOROLAC TROMETHAMINE 30 MG/ML
30 INJECTION, SOLUTION INTRAMUSCULAR; INTRAVENOUS ONCE
Status: COMPLETED | OUTPATIENT
Start: 2021-09-19 | End: 2021-09-19

## 2021-09-19 RX ORDER — ORPHENADRINE CITRATE 30 MG/ML
60 INJECTION INTRAMUSCULAR; INTRAVENOUS ONCE
Status: COMPLETED | OUTPATIENT
Start: 2021-09-19 | End: 2021-09-19

## 2021-09-19 RX ORDER — CARBAMAZEPINE 100 MG/1
100 TABLET, EXTENDED RELEASE ORAL 2 TIMES DAILY
Qty: 60 TABLET | Refills: 3 | Status: SHIPPED | OUTPATIENT
Start: 2021-09-19 | End: 2021-09-21

## 2021-09-19 RX ADMIN — KETOROLAC TROMETHAMINE 30 MG: 30 INJECTION, SOLUTION INTRAMUSCULAR; INTRAVENOUS at 12:07

## 2021-09-19 RX ADMIN — ORPHENADRINE CITRATE 60 MG: 60 INJECTION INTRAMUSCULAR; INTRAVENOUS at 12:07

## 2021-09-19 ASSESSMENT — PAIN DESCRIPTION - LOCATION: LOCATION: HEAD

## 2021-09-19 ASSESSMENT — PAIN SCALES - GENERAL: PAINLEVEL_OUTOF10: 9

## 2021-09-19 ASSESSMENT — PAIN DESCRIPTION - DESCRIPTORS: DESCRIPTORS: THROBBING;SHOOTING;SHARP

## 2021-09-19 ASSESSMENT — PAIN DESCRIPTION - FREQUENCY: FREQUENCY: CONTINUOUS

## 2021-09-19 ASSESSMENT — PAIN DESCRIPTION - PAIN TYPE: TYPE: ACUTE PAIN

## 2021-09-19 ASSESSMENT — PAIN DESCRIPTION - ORIENTATION: ORIENTATION: RIGHT

## 2021-09-19 NOTE — ED PROVIDER NOTES
[facility]  [address] [facility]  Phone: [phone]        Pt Name: Dane Martin  MRN: [id number]  [place] 1985  Date of evaluation: 21      CHIEF COMPLAINT     Chief Complaint   Patient presents with    Headache     reports pain to right side of head radiating down into ear and into neck with intermitent sharp shooting pains onset 2 days ago, taking Motrin 800mg without relief, denies nausea or vomiting          HISTORY OF PRESENT ILLNESS  (Location/Symptom, Timing/Onset, Context/Setting, Quality, Duration, Modifying Factors, Severity.)    Dane Martin is a 38 y.o. female who presents with right-sided facial pain right-sided headache. The patient states that starting 2 days ago she noted that she started developing right facial pain worse with any pressure worse even when she takes a shower she states that it is the right side of her head it is a sharp stabbing pain it is also worse when she goes to chew she denies any trauma no vision no hearing changes no numbness no weakness no fever no chills states she did have surgery on her neck earlier this year for a cervical fusion denies any neck pain states it is the right side of her face and head denies any fever or chills no visual or hearing changes no numbness or weakness no trauma      REVIEW OF SYSTEMS    (2-9 systems for level 4, 10 or more for level 5)     Review of Systems   HENT:        Right facial pain   Neurological: Positive for headaches. All other systems reviewed and are negative. PAST MEDICAL HISTORY    has a past medical history of Anxiety, Blood loss, Cervical disc disease, Cervical myopathy, Chronic back pain, GERD (gastroesophageal reflux disease), Hx of migraine headaches, Neuropathy, Thyroid nodule, and Weakness generalized.     SURGICAL HISTORY      has a past surgical history that includes  section; Tubal ligation; Dilation and curettage of uterus; Cervical disc arthroplasty (2021); and cervical fusion (N/A, 2021). CURRENTMEDICATIONS       Previous Medications    CYCLOBENZAPRINE (FLEXERIL) 5 MG TABLET    Take 1 tablet by mouth 2 times daily as needed for Muscle spasms    DULOXETINE (CYMBALTA) 60 MG EXTENDED RELEASE CAPSULE    Take 1 capsule by mouth daily    ELASTIC BANDAGES & SUPPORTS (FUTURO SOFT CERVICAL COLLAR) MISC    1 Units by Does not apply route as needed (neck pain)    HANDICAP PLACARD MISC    by Does not apply route 1 years    IBUPROFEN (ADVIL;MOTRIN) 800 MG TABLET    Take 1 tablet by mouth 2 times daily as needed for Pain    PREGABALIN (LYRICA) 150 MG CAPSULE    Take 1 capsule by mouth 3 times daily for 90 days. SENNA-DOCUSATE (PERICOLACE) 8.6-50 MG PER TABLET    Take 2 tablets by mouth daily as needed for Constipation    SUMATRIPTAN (IMITREX) 25 MG TABLET    Take 1 tablet by mouth once as needed for Migraine       ALLERGIES     is allergic to bee venom and doxycycline. FAMILY HISTORY     She indicated that her mother is alive. She indicated that her father is alive. She indicated that the status of her sister is unknown. She indicated that her maternal grandmother is . She indicated that the status of her maternal uncle is unknown. She indicated that the status of her paternal aunt is unknown.     family history includes Breast Cancer in her paternal aunt; Cancer in her sister; Diabetes in her maternal grandmother, maternal uncle, and mother; Heart Attack in her maternal grandmother; Heart Disease in her father; High Blood Pressure in her mother; Hypertension in her father; Kidney Disease in her maternal grandmother; Other in her mother; Seizures in her mother. SOCIAL HISTORY      reports that she has quit smoking. She has never used smokeless tobacco. She reports current alcohol use. She reports that she does not use drugs. PHYSICAL EXAM    (up to 7 for level 4, 8 or more for level 5)   INITIAL VITALS:  weight is 98 kg (216 lb).  Her oral temperature is 98.2 °F (36.8 °C). Her blood pressure is 145/109 (abnormal) and her pulse is 99. Her respiration is 20 and oxygen saturation is 99%. Physical Exam  Vitals and nursing note reviewed. Constitutional:       Comments: Moderate distress secondary to right facial pain   HENT:      Head: Normocephalic and atraumatic. Comments: Tenderness to palpation over the right facial region including the right side of her scalp right maxilla and right mandibular region suggestive of a trigeminal neuralgia the tympanic membrane's appear without any signs of infection I see no bad dentation no trismus no abscess     Right Ear: Tympanic membrane normal.      Left Ear: Tympanic membrane normal.      Mouth/Throat:      Mouth: Mucous membranes are moist.      Pharynx: Oropharynx is clear. Eyes:      Extraocular Movements: Extraocular movements intact. Conjunctiva/sclera: Conjunctivae normal.      Pupils: Pupils are equal, round, and reactive to light. Neck:      Comments: No meningeal signs  Cardiovascular:      Rate and Rhythm: Normal rate and regular rhythm. Pulmonary:      Effort: Pulmonary effort is normal.      Breath sounds: Normal breath sounds. Abdominal:      General: There is no distension. Palpations: Abdomen is soft. Tenderness: There is no abdominal tenderness. There is no guarding. Musculoskeletal:         General: Normal range of motion. Cervical back: Normal range of motion and neck supple. No rigidity or tenderness. Lymphadenopathy:      Cervical: No cervical adenopathy. Skin:     General: Skin is warm and dry. Findings: No rash. Neurological:      General: No focal deficit present. Mental Status: She is alert. Cranial Nerves: No cranial nerve deficit. Motor: No weakness.       Gait: Gait normal.      Comments: Hypersensitive to touch right side of the face and head otherwise GCS of 15 with no focal deficits appreciated no cerebellar deficits no meningeal signs         DIFFERENTIAL DIAGNOSIS/ MDM:     I will give the patient some injections and get a CT scan of the head    DIAGNOSTIC RESULTS         RADIOLOGY:        Interpretation per the Radiologist below, if available at the time of this note:       CT HEAD [address] (Final result)  Result time 09/19/21 12:30:20  Final result by Amara Walker MD (09/19/21 12:30:20)                Impression:    No acute intracranial abnormality. Narrative:    EXAMINATION:   CT OF THE HEAD WITHOUT CONTRAST  9/19/2021 12:10 pm     TECHNIQUE:   CT of the head was performed without the administration of intravenous   contrast. Dose modulation, iterative reconstruction, and/or weight based   adjustment of the mA/kV was utilized to reduce the radiation dose to as low   as reasonably achievable. COMPARISON:   None. HISTORY:   ORDERING SYSTEM PROVIDED HISTORY: right sided headache   TECHNOLOGIST PROVIDED HISTORY:     right sided headache   Decision Support Exception - unselect if not a suspected or confirmed   emergency medical condition->Emergency Medical Condition (MA)   Is the patient pregnant?->No   Reason for Exam: Pt c/o right sided headache without head injury   Acuity: Acute   Type of Exam: Initial     FINDINGS:   BRAIN/VENTRICLES: There is no acute intracranial hemorrhage, mass effect or   midline shift.  No abnormal extra-axial fluid collection.  The gray-white   differentiation is maintained without evidence of an acute infarct.  There is   no evidence of hydrocephalus. ORBITS: The visualized portion of the orbits demonstrate no acute abnormality. SINUSES: The visualized paranasal sinuses and mastoid air cells demonstrate   no acute abnormality. SOFT TISSUES/SKULL:  No acute abnormality of the visualized skull or soft   tissues. LABS:  No results found for this visit on 09/19/21.         EMERGENCY DEPARTMENT COURSE:   Vitals:    Vitals:    09/19/21 1153   BP: (!) 145/109   Pulse: 99   Resp: 20   Temp: 98.2 °F (36.8 °C)   TempSrc: Oral   SpO2: 99%   Weight: 98 kg (216 lb)     -------------------------  BP: (!) 145/109, Temp: 98.2 °F (36.8 °C), Pulse: 99, Resp: 20      RE-EVALUATION:  The patient presents clinically with trigeminal neuralgia CT of the head shows no acute process the patient has no meningeal signs I do feel she can attempt to work this as an outpatient I will write a prescription for Tegretol I am recommending that she call her family doctor tomorrow  The patient presents with right facial pain without signs of CNS bleed, stroke, infection, temporal arteritis, idiopathic intracranial hypertension, or other serious etiology. The patient is neurologically intact. Given the extremely low risk of these diagnoses further testing and evaluation for these possibilities does not appear to be indicated at this time. The patient appears stable for discharge and has been instructed to return immediately if the symptoms worsen in any way, or in 8-12 hr if not improved for re-evaluation. We also discussed returning to the Emergency Department immediately if new or worsening symptoms occur. We have discussed the symptoms which are most concerning (e.g., changing or worsening pain, visual or hearing changes, numbness or weakness, fever, stiff neck, or rash) that necessitate immediate return. The patient understands that at this time there is no evidence for a more malignant underlying process, but the patient also understands that early in the process of an illness or injury, an emergency department workup can be falsely reassuring. Routine discharge counseling was given, and the patient understands that worsening, changing or persistent symptoms should prompt an immediate call or follow up with their primary physician or return to the emergency department. The importance of appropriate follow up was also discussed.   I have reviewed the disposition diagnosis with the patient and or their family/guardian. I have answered their questions and given discharge instructions. They voiced understanding of these instructions and did not have any further questions or complaints. PROCEDURES:  None    FINAL IMPRESSION      1. Trigeminal neuralgia of right side of face          DISPOSITION/PLAN   DISPOSITION Decision To Discharge 09/19/2021 12:37:32 PM      CONDITION ON DISPOSITION:   Stable    PATIENT REFERRED TO:  Nico Yangs, [address] [facility]  [address]  [phone]    Call in 1 day        DISCHARGE MEDICATIONS:  New Prescriptions    CARBAMAZEPINE (TEGRETOL-XR) 100 MG EXTENDED RELEASE TABLET    Take 1 tablet by mouth 2 times daily       (Please note that portions of this note were completed with a voicerecognition program.  Efforts were made to edit the dictations but occasionally words are mis-transcribed.)    Gwendolyn Gandhi MD,, MD, F.A.C.E.P.   Attending Emergency Medicine Physician       Gwendolyn Gandhi MD  09/19/21 0282

## 2021-09-21 ENCOUNTER — OFFICE VISIT (OUTPATIENT)
Dept: NEUROLOGY | Age: 36
End: 2021-09-21
Payer: COMMERCIAL

## 2021-09-21 VITALS
BODY MASS INDEX: 33.59 KG/M2 | SYSTOLIC BLOOD PRESSURE: 106 MMHG | DIASTOLIC BLOOD PRESSURE: 103 MMHG | WEIGHT: 214 LBS | HEIGHT: 67 IN | HEART RATE: 92 BPM

## 2021-09-21 DIAGNOSIS — M54.14 THORACIC RADICULOPATHY: ICD-10-CM

## 2021-09-21 DIAGNOSIS — G43.709 CHRONIC MIGRAINE W/O AURA W/O STATUS MIGRAINOSUS, NOT INTRACTABLE: ICD-10-CM

## 2021-09-21 DIAGNOSIS — G89.29 CHRONIC NECK PAIN: ICD-10-CM

## 2021-09-21 DIAGNOSIS — M79.602 PARESTHESIA AND PAIN OF BOTH UPPER EXTREMITIES: ICD-10-CM

## 2021-09-21 DIAGNOSIS — G50.0 TRIGEMINAL NEURALGIA OF RIGHT SIDE OF FACE: Primary | ICD-10-CM

## 2021-09-21 DIAGNOSIS — M54.40 CHRONIC LOW BACK PAIN WITH SCIATICA, SCIATICA LATERALITY UNSPECIFIED, UNSPECIFIED BACK PAIN LATERALITY: ICD-10-CM

## 2021-09-21 DIAGNOSIS — M62.838 MUSCLE SPASMS OF BOTH LOWER EXTREMITIES: ICD-10-CM

## 2021-09-21 DIAGNOSIS — M54.2 CHRONIC NECK PAIN: ICD-10-CM

## 2021-09-21 DIAGNOSIS — R20.2 PARESTHESIA AND PAIN OF BOTH UPPER EXTREMITIES: ICD-10-CM

## 2021-09-21 DIAGNOSIS — M79.601 PARESTHESIA AND PAIN OF BOTH UPPER EXTREMITIES: ICD-10-CM

## 2021-09-21 DIAGNOSIS — M79.609 PARESTHESIA AND PAIN OF EXTREMITY: ICD-10-CM

## 2021-09-21 DIAGNOSIS — R20.2 PARESTHESIA AND PAIN OF EXTREMITY: ICD-10-CM

## 2021-09-21 DIAGNOSIS — M54.12 CERVICAL RADICULOPATHY: ICD-10-CM

## 2021-09-21 DIAGNOSIS — G89.29 CHRONIC LOW BACK PAIN WITH SCIATICA, SCIATICA LATERALITY UNSPECIFIED, UNSPECIFIED BACK PAIN LATERALITY: ICD-10-CM

## 2021-09-21 PROCEDURE — 1036F TOBACCO NON-USER: CPT | Performed by: STUDENT IN AN ORGANIZED HEALTH CARE EDUCATION/TRAINING PROGRAM

## 2021-09-21 PROCEDURE — G8417 CALC BMI ABV UP PARAM F/U: HCPCS | Performed by: STUDENT IN AN ORGANIZED HEALTH CARE EDUCATION/TRAINING PROGRAM

## 2021-09-21 PROCEDURE — G8427 DOCREV CUR MEDS BY ELIG CLIN: HCPCS | Performed by: STUDENT IN AN ORGANIZED HEALTH CARE EDUCATION/TRAINING PROGRAM

## 2021-09-21 PROCEDURE — 99214 OFFICE O/P EST MOD 30 MIN: CPT | Performed by: STUDENT IN AN ORGANIZED HEALTH CARE EDUCATION/TRAINING PROGRAM

## 2021-09-21 RX ORDER — MAGNESIUM OXIDE 400 MG/1
400 TABLET ORAL DAILY
Qty: 30 TABLET | Refills: 3 | Status: SHIPPED | OUTPATIENT
Start: 2021-09-21 | End: 2021-10-21

## 2021-09-21 RX ORDER — CARBAMAZEPINE 100 MG/1
200 TABLET, EXTENDED RELEASE ORAL 2 TIMES DAILY
Qty: 60 TABLET | Refills: 1 | Status: SHIPPED | OUTPATIENT
Start: 2021-09-21 | End: 2021-11-23

## 2021-09-21 ASSESSMENT — ENCOUNTER SYMPTOMS
EYE REDNESS: 0
ABDOMINAL PAIN: 0
SHORTNESS OF BREATH: 0
BACK PAIN: 1
EYE PAIN: 0
DIARRHEA: 0
SINUS PAIN: 0
PHOTOPHOBIA: 1
COUGH: 0
SORE THROAT: 0
EYE DISCHARGE: 0
NAUSEA: 0
CONSTIPATION: 0
VOMITING: 0

## 2021-09-21 NOTE — PROGRESS NOTES
[address] [place] [address], [facility], [facility], [address] [facility], [address] [place] [address]  P: [phone]  F: [phone]    NEUROLOGY CLINIC NOTE     PATIENT NAME: Jesu Sims  PATIENT MRN: [id number]  PRIMARY CARE PHYSICIAN: Ryland Dawson PA-C      Interval History: 9/21/2021  Patient was last seen in the clinic in May 2021. Since last clinic visit patient endorses headache located on the right side of the head along with right side of the face. As per patient her pain starts on the right ear radiating up to the top of the head and to the right side of the face, sharp stabbing pain, getting worse with chewing. Endorses mild photophobia and phonophobia. She also endorses tenderness on the skin of her face and head. She has a history of migraine headaches, as per patient this headache is different than her regular migraine headaches. Denies any nausea or vomiting. Denies any redness in the eyes or watering from the eyes or watering from the nose. Endorses mild gait unsteadiness, denies any dizziness or drowsiness, denies any speech difficulty. She was evaluated in the ER on 9/19/2021 for this headache, CT head was done which did not show any acute intracranial pathology. She was diagnosed to have right-sided trigeminal neuralgia and started on Tegretol 100 mg twice daily. Endorses improvement in the stabbing pain with it. Endorses improvement in the headache with the heating pad    She continues to have chronic neck pain radiating to the right upper extremity down to the elbow down into the fingers. She had a fall on 8/5/2021, she fell down several stairs, was evaluated in the ER at that time. CT cervical thoracic and lumbar spine was done which did not show any acute abnormalities. Patient was evaluated by neurosurgery after the fall on 8/12/2021 in the clinic.     During that visit neurosurgery ordered upright flexion-extension images along with repeat EMG of bilateral upper extremities. Interval History: 5/24/2021   Patient was last seen in the clinic in February 2021. Patient had anterior cervical C5-C6 disc arthroplasty done on 4/30/2021. Postprocedure cervical flexion extension x-rays were stable. Since then patient continues to complain of neck pain radiating down to the right upper extremity associated with tingling and numbness in the right upper extremity, endorses improvement in the tingling and numbness along with the weakness in the left upper extremity, continues to have some weakness in the right upper extremity. She will be following up with Dr. Jose Cervantes on 6/29/2021. X-ray cervical spine flexion extension 5/1/2021  Impression   Postsurgical changes related to placement of an inter vertebral disc   prosthetic at C5-C6.  No spondylolisthesis. She continues to have back pain with radicular symptoms in bilateral lower extremities, endorses worsening of paresthesias in her left foot. Currently she is taking Cymbalta 60 mg at nighttime. Taking Flexeril 5 mg at nighttime. Also taking Lyrica 100 mg 3 times a day. Endorses some improvement in the symptoms with that. Denies any side effects from Lyrica or Cymbalta or Flexeril. Patient saw the pain physician, was not started on any intervention at present as she needs to complete 6 weeks of physical therapy as per the pain management prior to any intervention. 2/9/2021  Vitamin E, alpha-tocopherol 8.1, gamma tocopherol  1.2  Vitamin B12 577  Folate 12.4  Vitamin B6 34.1  ESR 4  CRP 3.4  Ceruloplasmin normal 28  Copper 140.9 normal  LOUISE negative    Notes from 2/19/2021  HPI:      Lily Mayfield is a 38 y.o. right handed  female with PMH significant for chronic back pain, thyroid nodule, anxiety was seen in the clinic for neuropathy     History obtained from Patient and medical chart review.     Patient has a history of chronic neck pain, getting worse for last 1-1/2-year, endorses sharp stabbing pain in the neck radiating down to bilateral upper extremities, worse on the left side as compared to the right side. She endorses constant tingling and numbness in her hands bilaterally, endorses intermittent tingling and numbness in bilateral upper extremities involving the forearm and. She also complains of weakness in her bilateral upper extremities, endorses dropping things from her hands, difficulty with her hand , difficulty holding the objects. Difficulty holding baby, endorses her hands locking up while eating. Difficulty with zipping and buttoning and dressing. She had MRI of the cervical spine done on 2/9/2021 which showed disc protrusion at C5-C6 causing moderate stenosis of the thecal sac and narrowing of the neural foramina. Multiple small lymph nodes scattered throughout the neck, mostly nonspecific and reactive. Patient was evaluated by neurosurgery today, recommend cervical flexion-extension x-rays and was referred to physical therapy. She also had EMG nerve conduction study done of bilateral upper extremities on 7/8/2020, this was done at [facility].  No images available to review, as per the report EMG study was normal.  There was no electrodiagnostic evidence of generalized large fiber peripheral polyneuropathy or cervical radiculopathy. Patient also gives a history of chronic back pain, started in 2004. She had received epidural injection for her labor, post epidural injection she started having back pain. She endorses sharp stabbing pain in her lower back radiating to bilateral lower extremities, pain worse on the right side specially her right buttock radiating to bilateral lower extremities, she feels more weaker in the left lower extremity. Endorses constant tingling and numbness in her bilateral lower extremities, specially at nighttime she feels as if her repeat feet on fire. Endorses difficulty with walking specially for a long period of time.   Denies any problem with bowel or bladder function, has a history of urinary urgency but denies any bladder accidents. Feels unsteady while ambulation, denies any falls. Does not use any aids for walking. Denies any perineal numbness or saddle anesthesia. She has never seen a pain management before. MRI of the lumbar spine done on 2020 showed mild disc bulge at L4-L5 and L5-S1 not causing significant spinal stenosis or neuroforaminal narrowing. Mild facet joint arthritis in the lower lumbar spine. Normal alignment of the lumbar spine without fractures or subluxations. EMG nerve conduction study of bilateral lower extremities was done on 2019 which showed chronic mild left L5 radiculopathy without active denervation. No electrodiagnostic evidence of generalized large fiber peripheral polyneuropathy. Patient used to follow-up with a neurologist at [facility], [facility], was last seen in 2020. Patient had tried gabapentin in the past, denies any improvement in the symptoms with gabapentin. Does not remember the dose of gabapentin tried. Currently she is on Lyrica 100 mg twice a day, this was started by her PCP. Denies any significant improvement in her symptoms with Lyrica. Has not tried any other pain medications. Except for Tylenol and motrin     Currently taking Flexeril 5 mg as needed for muscle spasm, endorses improvement in her symptoms with that. Denies any side effects from Flexeril.   Taking Mobic 7.5 mg 2 times a day every day      PATIENT HISTORY:     Past Medical History:   Diagnosis Date    Anxiety     Blood loss     after     Cervical disc disease     Cervical myopathy     Chronic back pain     GERD (gastroesophageal reflux disease)     Hx of migraine headaches     Neuropathy     both legs    Thyroid nodule     Weakness generalized     due to neck issues        Past Surgical History:   Procedure Laterality Date    CERVICAL DISC ARTHROPLASTY  2021 oxide (MAG-OX) 400 MG tablet Take 1 tablet by mouth daily 30 tablet 3    pregabalin (LYRICA) 150 MG capsule Take 1 capsule by mouth 3 times daily for 90 days. 90 capsule 3    DULoxetine (CYMBALTA) 60 MG extended release capsule Take 1 capsule by mouth daily 30 capsule 2    cyclobenzaprine (FLEXERIL) 5 MG tablet Take 1 tablet by mouth 2 times daily as needed for Muscle spasms 15 tablet 0    ibuprofen (ADVIL;MOTRIN) 800 MG tablet Take 1 tablet by mouth 2 times daily as needed for Pain 60 tablet 3    Handicap Placard MISC by Does not apply route 1 years 1 each 0    SUMAtriptan (IMITREX) 25 MG tablet Take 1 tablet by mouth once as needed for Migraine 30 tablet 1    Elastic Bandages & Supports (FUTURO SOFT CERVICAL COLLAR) MISC 1 Units by Does not apply route as needed (neck pain) 1 each 0    senna-docusate (PERICOLACE) 8.6-50 MG per tablet Take 2 tablets by mouth daily as needed for Constipation (Patient not taking: Reported on 9/21/2021) 60 tablet 0     No current facility-administered medications for this visit. Allergies   Allergen Reactions    Bee Venom Anaphylaxis    Doxycycline Nausea Only        REVIEW OF SYSTEMS:     Review of Systems   Constitutional: Negative for chills, diaphoresis, fever and unexpected weight change. HENT: Negative for congestion, ear discharge, ear pain, hearing loss, sinus pain and sore throat. Eyes: Positive for photophobia and visual disturbance. Negative for pain, discharge and redness. Respiratory: Negative for cough and shortness of breath. Cardiovascular: Negative for chest pain, palpitations and leg swelling. Gastrointestinal: Negative for abdominal pain, constipation, diarrhea, nausea and vomiting. Endocrine: Negative for polydipsia and polyuria. Genitourinary: Negative for difficulty urinating and hematuria. Musculoskeletal: Positive for arthralgias, back pain, gait problem, neck pain and neck stiffness. Skin: Negative for pallor and rash. Neurological: Positive for numbness and headaches. Negative for dizziness, tremors, seizures, syncope, facial asymmetry, speech difficulty, weakness and light-headedness. Hematological: Does not bruise/bleed easily. Psychiatric/Behavioral: Negative for agitation, behavioral problems and hallucinations. VITALS  BP (!) 106/103   Pulse 92   Ht 5' 7\" (1.702 m)   Wt 214 lb (97.1 kg)   BMI 33.52 kg/m²      PHYSICAL EXAMINATION:     Constitutional: Well developed, well nourished and in no acute distress. Head:  normocephalic, atraumatic. Neck: supple, no carotid bruits  Respiratory: Clear to auscultation bilaterally. Cardiovascular: normal rate, regular rhythm  Abdomen: Soft, nontender, nondistended  Extremities:  peripheral pulses palpable, no pedal edema   Psych: normal affect      NEUROLOGICAL EXAMINATION:     Mental status   Alert and oriented; intact memory with no confusion, speech or language problems; no hallucinations or delusions     Cranial nerves   II - visual fields intact to confrontation                                                III, IV, VI  extra-ocular muscles full: no pupillary defect  V - normal facial sensation                                                               VII - normal facial symmetry                                                             VIII - intact hearing                                                                             IX, X - symmetrical palate                                                                  XI - symmetrical shoulder shrug                                                       XII - midline tongue without atrophy or fasciculation     Motor function  Normal muscle bulk and tone  Muscle strength:   Bilateral upper extremities 4+/5, bilateral hand  4+/5. Effort related weakness  Bilateral lower extremities 4+/5.   Effort related weakness     Sensory function  decreased light touch and pinprick in the left lower extremity and right upper extremity as compared to the other side. Cerebellar No involuntary movements or tremors     Reflex function  reflexes 1+ in bilateral upper and lower extremities  No clonus. Plantars equivocal.     Gait                  Normal station and gait  Unable to perform heel walking, was able to perform toe and tandem walking. PRIOR TESTS AND IMAGING: Following images and Labs were reviewed by the examiner     MRI lumbar spine without contrast 1/24/2020  FINDINGS: No comparisons. Normal alignment of the lumbar spine. No acute fractures or subluxations. The conus terminates at L1 which is within normal limits. Marrow signal is within normal limits. No significant spinal stenosis. At T12-L1, L1-L2, L2-L3, and L3-L4 there is no significant disc   herniation, spinal stenosis, or neuroforaminal narrowing. At L4-L5 there is mild broad base disc bulge and mild degenerative facet joint disease causing minimal narrowing of the inferior neural foramina bilaterally without effacement of the exiting nerve roots. At L5-S1, there is minimal posterior disc bulge not causing significant spinal stenosis or neuroforaminal narrowing. IMPRESSION:  1. Mild disc bulge at L4-5 and L5-S1 not causing significant spinal stenosis or neuroforaminal narrowing. 2.  Mild facet joint arthritis in the lower lumbar spine. 3.  Normal alignment of the lumbar spine without fractures or subluxations seen. EMG nerve conduction study 12/23/2019 bilateral lower extremities  1-Abnormal study of the left lower extremity and normal study of the right   lower extremity.  There is electrodiagnostic evidence suggestive of a   chronic mild left L5 radiculopathy without active denervation.  There is   no electrodiagnostic evidence of a generalized large fiber peripheral   polyneuropathy.  Clinical correlation is required.  Please see full EMG   report.       EMG nerve conduction studies bilateral upper extremity discussed above.       Multiple small lymph nodes scattered throughout the neck that are   nonspecific.  Nurse may be reactive.  The number of lymph nodes is concerning   in a patient of this age.  Clinically correlate to exclude underlying   pathology. X-ray cervical spine flexion extension 5/1/2021  Impression   Postsurgical changes related to placement of an inter vertebral disc   prosthetic at C5-C6.  No spondylolisthesis. ASSESSMENT / PLAN:     Jenniffer Fernandez is a 38 y.o. right handed  female with PMH significant for chronic back pain, thyroid nodule, anxiety was seen in the clinic for neuropathy     Possible trigeminal neuralgia on the right side of the face  History of chronic migraine headaches    Chronic neck pain with bilateral radicular symptoms, worse in the left upper extremity as compared to right  Chronic back pain with bilateral radicular symptoms, more pain in the right lower extremity, weakness in the left lower extremity  Pain and paresthesias in her bilateral upper extremities and lower extremities  Possible peripheral polyneuropathy      12/10/2020  TSH 0.24, T4 1.13  HbA1c 5.1    2/9/2021  Vitamin ED, alpha-tocopherol 8.1, gamma tocopherol  1.2  Vitamin B12 577  Folate 12.4  Vitamin B6 34.1  ESR 4  CRP 3.4  Ceruloplasmin normal 28  Copper 140.9 normal  LOUISE negative      PLAN:   -We will increase the dose of Tegretol from 100 mg twice daily to 200 mg BID    -We will add magnesium oxide 400 mg daily for headache prophylaxis  -Continue Lyrica 150 mg 3 times a day. -We will order MRI of the brain with and without contrast     Patient has already tried gabapentin in the past, denies any improvement in the symptoms with gabapentin, does not remember the dose of gabapentin tried.    -Continue Cymbalta 60 mg at nighttime. Discussed possible side effects with the patient. Instructed patient to call the clinic if develop any side effects.   Patient not taking Lexapro anymore. -Flexeril 5 mg nightly as needed for muscle spasms      Patient had anterior cervical C5-C6 disc arthroplasty done on 4/30/2021. Postprocedure cervical flexion extension x-rays were stable. X-ray cervical spine flexion extension 5/1/2021  Impression   Postsurgical changes related to placement of an inter vertebral disc   prosthetic at C5-C6.  No spondylolisthesis. -physical therapy as per neurosurg    -F/u pain management for chronic back pain and neck pain    - Follow up in the clinic in 4 to 6 weeks. - Instructed patient to call the clinic if symptoms worsen or develop any new symptoms.      Electronically signed by Ivan Jones MD on 9/21/2021 at 10:40 AM

## 2021-09-22 ENCOUNTER — OFFICE VISIT (OUTPATIENT)
Dept: PRIMARY CARE CLINIC | Age: 36
End: 2021-09-22
Payer: COMMERCIAL

## 2021-09-22 VITALS
OXYGEN SATURATION: 98 % | SYSTOLIC BLOOD PRESSURE: 127 MMHG | WEIGHT: 220 LBS | HEART RATE: 91 BPM | DIASTOLIC BLOOD PRESSURE: 91 MMHG | TEMPERATURE: 97.5 F | BODY MASS INDEX: 34.46 KG/M2

## 2021-09-22 DIAGNOSIS — I10 ESSENTIAL HYPERTENSION: Primary | ICD-10-CM

## 2021-09-22 DIAGNOSIS — R51.9 HEAD AND FACE PAIN: ICD-10-CM

## 2021-09-22 PROCEDURE — G8417 CALC BMI ABV UP PARAM F/U: HCPCS | Performed by: SURGERY

## 2021-09-22 PROCEDURE — G8427 DOCREV CUR MEDS BY ELIG CLIN: HCPCS | Performed by: SURGERY

## 2021-09-22 PROCEDURE — 99214 OFFICE O/P EST MOD 30 MIN: CPT | Performed by: SURGERY

## 2021-09-22 PROCEDURE — 1036F TOBACCO NON-USER: CPT | Performed by: SURGERY

## 2021-09-22 RX ORDER — PROPRANOLOL HCL 60 MG
60 CAPSULE, EXTENDED RELEASE 24HR ORAL DAILY
Qty: 30 CAPSULE | Refills: 3 | Status: SHIPPED | OUTPATIENT
Start: 2021-09-22 | End: 2022-06-08 | Stop reason: SDUPTHER

## 2021-09-22 RX ORDER — BLOOD PRESSURE TEST KIT
KIT MISCELLANEOUS
Qty: 1 KIT | Refills: 0 | Status: SHIPPED | OUTPATIENT
Start: 2021-09-22

## 2021-09-22 SDOH — ECONOMIC STABILITY: FOOD INSECURITY: WITHIN THE PAST 12 MONTHS, YOU WORRIED THAT YOUR FOOD WOULD RUN OUT BEFORE YOU GOT MONEY TO BUY MORE.: NEVER TRUE

## 2021-09-22 SDOH — ECONOMIC STABILITY: FOOD INSECURITY: WITHIN THE PAST 12 MONTHS, THE FOOD YOU BOUGHT JUST DIDN'T LAST AND YOU DIDN'T HAVE MONEY TO GET MORE.: NEVER TRUE

## 2021-09-22 ASSESSMENT — ENCOUNTER SYMPTOMS
ABDOMINAL PAIN: 0
BACK PAIN: 1
CHEST TIGHTNESS: 0
NAUSEA: 0
COUGH: 0
DIARRHEA: 0
COLOR CHANGE: 0
SHORTNESS OF BREATH: 0
EYE DISCHARGE: 0

## 2021-09-22 ASSESSMENT — SOCIAL DETERMINANTS OF HEALTH (SDOH): HOW HARD IS IT FOR YOU TO PAY FOR THE VERY BASICS LIKE FOOD, HOUSING, MEDICAL CARE, AND HEATING?: NOT HARD AT ALL

## 2021-09-22 NOTE — PROGRESS NOTES
Visit Information    Have you changed or started any medications since your last visit including any over-the-counter medicines, vitamins, or herbal medicines? no   Are you having any side effects from any of your medications? -  no  Have you stopped taking any of your medications? Is so, why? -  no    Have you seen any other physician or provider since your last visit? Yes - Records Obtained  Have you had any other diagnostic tests since your last visit? Yes - Records Obtained  Have you been seen in the emergency room and/or had an admission to a hospital since we last saw you? Yes - Records Obtained  Have you had your routine dental cleaning in the past 6 months? no    Have you activated your [facility] account? If not, what are your barriers?  Yes     Patient Care Team:  Ajay Sinclair PA-C as PCP - General (Physician Assistant)  Ajay Sinclair PA-C as PCP - [facility] Provider    Medical History Review  Past Medical, Family, and Social History reviewed and does not contribute to the patient presenting condition    Health Maintenance   Topic Date Due    Hepatitis C screen  Never done    Varicella vaccine (1 of 2 - 2-dose childhood series) Never done    Cervical cancer screen  04/30/2019    Flu vaccine (1) 09/01/2021    DTaP/Tdap/Td vaccine (2 - Td or Tdap) 09/10/2029    COVID-19 Vaccine  Completed    HIV screen  Completed    Hepatitis A vaccine  Aged Out    Hepatitis B vaccine  Aged Out    Hib vaccine  Aged Out    Meningococcal (ACWY) vaccine  Aged Out    Pneumococcal 0-64 years Vaccine  Aged Out

## 2021-09-22 NOTE — PROGRESS NOTES
[facility]  [id number] [address]  Dept: [phone]  Dept Fax: [phone]    Patient Care Team:  MORE Acevedo NP as PCP - General (Family Medicine)    2021     Liane Galarza (:  1985)is a 38 y.o. female, with a history of cervical disc disease and migraines presents to the office for follow up after her ER visit on the  of this month and  for evaluation of the following medical concerns:   Chief Complaint   Patient presents with   [facility] Established New Doctor    Migraine       HPI  History obtained from patient and chart. Patient was evaluated by neurology yesterday for her headache after ER visit and had changed her migraine medication and  was scheduled for MRI of brain next Tuesday morning . States that he her right sided headache had subsided to constant throbbing pain since her ER visit  still complains of right facial pain/Sensitivity. She had blood work ordered in  but was not done. She does follow-up with the neurosurgery for her cervical disc disease. Review of Systems   Constitutional: Negative for activity change, chills, fatigue and fever. HENT: Negative for congestion. Eyes: Negative for discharge. Respiratory: Negative for cough, chest tightness and shortness of breath. Cardiovascular: Negative for chest pain, palpitations and leg swelling. Gastrointestinal: Negative for abdominal pain, diarrhea and nausea. Genitourinary: Negative for difficulty urinating. Musculoskeletal: Positive for arthralgias, back pain, neck pain and neck stiffness. Negative for gait problem. Skin: Negative for color change, pallor, rash and wound. Neurological: Positive for headaches. Negative for dizziness, facial asymmetry, weakness and numbness. Psychiatric/Behavioral: Negative for agitation. Prior to Visit Medications    Medication Sig Taking?  Authorizing Provider   propranolol (INDERAL LA) 60 MG extended release capsule Take 1 capsule by mouth daily Yes MORE Uribe NP   Blood Pressure KIT Blood pressure management Yes MORE Uribe NP   carBAMazepine (TEGRETOL-XR) 100 MG extended release tablet Take 2 tablets by mouth 2 times daily Yes Kayla Cole MD   magnesium oxide (MAG-OX) 400 MG tablet Take 1 tablet by mouth daily Yes Kayla Cole MD   pregabalin (LYRICA) 150 MG capsule Take 1 capsule by mouth 3 times daily for 90 days.  Yes MORE Aviles CNP   DULoxetine (CYMBALTA) 60 MG extended release capsule Take 1 capsule by mouth daily Yes MORE Aviles CNP   cyclobenzaprine (FLEXERIL) 5 MG tablet Take 1 tablet by mouth 2 times daily as needed for Muscle spasms Yes Katy Martinez MD   Elastic Bandages & Supports (FUTURO SOFT CERVICAL COLLAR) [address] 1 Units by Does not apply route as needed (neck pain) Yes MORE Stauffer CNP   ibuprofen (ADVIL;MOTRIN) 800 MG tablet Take 1 tablet by mouth 2 times daily as needed for Pain Yes Mj Benavides PA-C   Handicap Placard [address] by Does not apply route 1 years Yes Mj Benavides PA-C   SUMAtriptan (IMITREX) 25 MG tablet Take 1 tablet by mouth once as needed for Migraine  Patient not taking: Reported on 9/22/2021  Sia Crockett PA-C   topiramate (TOPAMAX) 50 MG tablet Take 1 tablet by mouth 2 times daily  Kayla Cole MD        Social History     Tobacco Use    Smoking status: Former Smoker    Smokeless tobacco: Never Used    Tobacco comment: [id number], smoker then   Substance Use Topics    Alcohol use: Yes     Comment: occasional        Vitals:    09/22/21 0920 09/22/21 1004   BP: (!) 138/95 (!) 127/91   Site: Left Upper Arm    Position: Sitting    Pulse: 90 91   Temp: 97.5 °F (36.4 °C)    TempSrc: Temporal    SpO2: 98%    Weight: 220 lb (99.8 kg)      Estimated body mass index is 34.46 kg/m² as calculated from the following:    Height as of 9/21/21: 5' 7\" (1.702 m).    Weight as of this encounter: 220 lb (99.8 kg). DIAGNOSTIC FINDINGS:  CBC:  Lab Results   Component Value Date    WBC 15.9 05/01/2021    HGB 12.4 05/01/2021     05/01/2021       BMP:    Lab Results   Component Value Date     12/10/2020    K 4.8 12/10/2020     12/10/2020    CO2 25 12/10/2020    BUN 14 12/10/2020    CREATININE 0.73 12/10/2020    GLUCOSE 91 12/10/2020       HEMOGLOBIN A1C:   Lab Results   Component Value Date    LABA1C 5.1 06/03/2021       FASTING LIPID PANEL:  Lab Results   Component Value Date    CHOL 157 12/10/2020    HDL 60 12/10/2020    TRIG 71 12/10/2020       Physical Exam  Constitutional:       General: She is not in acute distress. HENT:      Head: Normocephalic. Nose: Nose normal. No congestion. Eyes:      General:         Right eye: No discharge. Left eye: No discharge. Extraocular Movements: Extraocular movements intact. Cardiovascular:      Rate and Rhythm: Normal rate. Pulmonary:      Effort: Pulmonary effort is normal. No respiratory distress. Abdominal:      General: Abdomen is flat. Musculoskeletal:         General: No swelling. Cervical back: Rigidity and tenderness present. Neurological:      Mental Status: She is alert and oriented to person, place, and time. GCS: GCS eye subscore is 4. GCS verbal subscore is 5. GCS motor subscore is 5. Psychiatric:         Mood and Affect: Mood and affect normal.         Speech: Speech normal.         Behavior: Behavior normal.         ASSESSMENT     Diagnosis Orders   1. Head and face pain  C-Reactive Protein    Sedimentation Rate   2. Essential hypertension            PLAN:  Orders Placed This Encounter   Medications    propranolol (INDERAL LA) 60 MG extended release capsule     Sig: Take 1 capsule by mouth daily     Dispense:  30 capsule     Refill:  3    Blood Pressure KIT     Sig: Blood pressure management     Dispense:  1 kit     Refill:  0         1.  Patient blood pressure slightly  elevated in office and in ER visit , could be related to pain. Will monitor her blood pressure and order her blood pressure kit. Patient was instructed to check blood pressure in morning and and keep a log and to bring it with her next time visit . Will start her for now  on inderal as it helps with the headache and blood pressure. 2. Labs reviewed, informed patient labs will be ordered and to have completed before follow-up appointment, patient voiced understanding. 3.  We will order sed rate and CRP to rule out any underlying inflammatory, continue to follow-up with neurology and neurosurgery    FOLLOW UP AND INSTRUCTIONS:  Return in about 6 weeks (around 11/3/2021). · Dorothea received counseling on the following healthy behaviors:nutrition, exercise and medication adherence    · Discussed use, benefit, and side effects of prescribed medications. Barriers to medication compliance addressed. All patient questions answered. Pt verbalized understanding of all instructions given. · Patient advised to contact scheduling offices for any referrals or imaging orders placed today if they have not been contacted in 48 hours. Return in about 6 weeks (around 11/3/2021). An electronic signature was used to authenticate this note.     --Sherlyn Blizzard, APRN - NP on 9/22/2021 at 12:08 PM

## 2021-09-23 DIAGNOSIS — M54.12 CERVICAL RADICULOPATHY: ICD-10-CM

## 2021-09-23 DIAGNOSIS — M48.02 STENOSIS OF CERVICAL SPINE WITH MYELOPATHY (HCC): ICD-10-CM

## 2021-09-23 DIAGNOSIS — M50.90 CERVICAL DISC DISEASE: ICD-10-CM

## 2021-09-23 DIAGNOSIS — G99.2 STENOSIS OF CERVICAL SPINE WITH MYELOPATHY (HCC): ICD-10-CM

## 2021-09-23 RX ORDER — HYDROCODONE BITARTRATE AND ACETAMINOPHEN 5; 325 MG/1; MG/1
1 TABLET ORAL DAILY PRN
Qty: 7 TABLET | Refills: 0 | OUTPATIENT
Start: 2021-09-23 | End: 2021-09-30

## 2021-09-23 NOTE — TELEPHONE ENCOUNTER
Patient calling for refill of norco.    Date of last fill: 8.9.2021  Surgery: 4.30.2021  Time elapsed since last surgery: 19 weeks  Date of next follow up appointment: 10.5.2021    Pt notified response time for refills is 24-48 hours

## 2021-09-28 ENCOUNTER — HOSPITAL ENCOUNTER (OUTPATIENT)
Dept: MRI IMAGING | Age: 36
Discharge: HOME | End: 2021-09-30
Payer: COMMERCIAL

## 2021-09-28 DIAGNOSIS — G50.0 TRIGEMINAL NEURALGIA OF RIGHT SIDE OF FACE: ICD-10-CM

## 2021-09-28 LAB
GFR NON-AFRICAN AMERICAN: >60 ML/MIN
GFR SERPL CREATININE-BSD FRML MDRD: >60 ML/MIN
GFR SERPL CREATININE-BSD FRML MDRD: NORMAL ML/MIN/{1.73_M2}
POC CREATININE: 0.74 MG/DL (ref 0.51–1.19)

## 2021-09-28 PROCEDURE — 82565 ASSAY OF CREATININE: CPT

## 2021-09-28 PROCEDURE — 70553 MRI BRAIN STEM W/O & W/DYE: CPT

## 2021-09-28 PROCEDURE — 6360000004 HC RX CONTRAST MEDICATION: Performed by: STUDENT IN AN ORGANIZED HEALTH CARE EDUCATION/TRAINING PROGRAM

## 2021-09-28 PROCEDURE — A9576 INJ PROHANCE MULTIPACK: HCPCS | Performed by: STUDENT IN AN ORGANIZED HEALTH CARE EDUCATION/TRAINING PROGRAM

## 2021-09-28 RX ORDER — SODIUM CHLORIDE 0.9 % (FLUSH) 0.9 %
10 SYRINGE (ML) INJECTION PRN
Status: DISCONTINUED | OUTPATIENT
Start: 2021-09-28 | End: 2021-10-01 | Stop reason: HOSPADM

## 2021-09-28 RX ADMIN — GADOTERIDOL 20 ML: 279.3 INJECTION, SOLUTION INTRAVENOUS at 09:27

## 2021-10-14 NOTE — PROGRESS NOTES
Left patient voicemail to call clinic to schedule lab work and must be within next 2 weeks.      For Pharmacy Admin Tracking Only     Intervention Detail:    Total # of Interventions Recommended:    Total # of Interventions Accepted:    Time Spent (min): 5 PACU care complete, awaiting bed assignment.

## 2021-11-16 ENCOUNTER — TELEPHONE (OUTPATIENT)
Dept: PRIMARY CARE CLINIC | Age: 36
End: 2021-11-16

## 2021-11-16 NOTE — TELEPHONE ENCOUNTER
----- Message from Jozef Janett sent at 11/16/2021 11:18 AM EST -----  Subject: Message to Provider    QUESTIONS  Information for Provider? Patient is on her way to the hospital due to   falling down the stairs. she is in a lot of pain in regards to both her   legs. ---------------------------------------------------------------------------  --------------  Jessica LOMELI  What is the best way for the office to contact you? OK to leave message on   voicemail  Preferred Call Back Phone Number? [phone]  ---------------------------------------------------------------------------  --------------  SCRIPT ANSWERS  Relationship to Patient? Self  (Is the patient requesting to be seen urgently for their symptoms?)? No  Is this follow up request related to routine Diabetes Management? No  Are you having any new concerns about your existing condition?  Yes

## 2021-11-23 ENCOUNTER — HOSPITAL ENCOUNTER (OUTPATIENT)
Dept: GENERAL RADIOLOGY | Age: 36
Discharge: HOME | End: 2021-11-25
Payer: COMMERCIAL

## 2021-11-23 ENCOUNTER — OFFICE VISIT (OUTPATIENT)
Dept: NEUROLOGY | Age: 36
End: 2021-11-23
Payer: COMMERCIAL

## 2021-11-23 ENCOUNTER — HOSPITAL ENCOUNTER (OUTPATIENT)
Age: 36
Discharge: HOME | End: 2021-11-25
Payer: COMMERCIAL

## 2021-11-23 VITALS
OXYGEN SATURATION: 98 % | DIASTOLIC BLOOD PRESSURE: 76 MMHG | WEIGHT: 220 LBS | HEART RATE: 70 BPM | BODY MASS INDEX: 34.53 KG/M2 | SYSTOLIC BLOOD PRESSURE: 107 MMHG | HEIGHT: 67 IN
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
## 2021-11-23 PROCEDURE — G8417 CALC BMI ABV UP PARAM F/U: HCPCS | Performed by: STUDENT IN AN ORGANIZED HEALTH CARE EDUCATION/TRAINING PROGRAM

## 2021-11-23 PROCEDURE — 1036F TOBACCO NON-USER: CPT | Performed by: STUDENT IN AN ORGANIZED HEALTH CARE EDUCATION/TRAINING PROGRAM

## 2021-11-23 PROCEDURE — 99214 OFFICE O/P EST MOD 30 MIN: CPT | Performed by: STUDENT IN AN ORGANIZED HEALTH CARE EDUCATION/TRAINING PROGRAM

## 2021-11-23 PROCEDURE — G8427 DOCREV CUR MEDS BY ELIG CLIN: HCPCS | Performed by: STUDENT IN AN ORGANIZED HEALTH CARE EDUCATION/TRAINING PROGRAM

## 2021-11-23 PROCEDURE — 72100 X-RAY EXAM L-S SPINE 2/3 VWS: CPT

## 2021-11-23 PROCEDURE — G8484 FLU IMMUNIZE NO ADMIN: HCPCS | Performed by: STUDENT IN AN ORGANIZED HEALTH CARE EDUCATION/TRAINING PROGRAM

## 2021-11-23 RX ORDER — CARBAMAZEPINE 200 MG/1
200 TABLET, EXTENDED RELEASE ORAL 2 TIMES DAILY
Qty: 60 TABLET | Refills: 2 | Status: SHIPPED | OUTPATIENT
Start: 2021-11-23 | End: 2022-01-04

## 2021-11-23 ASSESSMENT — ENCOUNTER SYMPTOMS
NAUSEA: 0
SORE THROAT: 0
DIARRHEA: 0
SHORTNESS OF BREATH: 0
EYE DISCHARGE: 0
CHEST TIGHTNESS: 0
ABDOMINAL PAIN: 0
COUGH: 0
BACK PAIN: 0
VOMITING: 0
CONSTIPATION: 1
WHEEZING: 0

## 2021-11-23 NOTE — PROGRESS NOTES
[address] [place] [address], [facility], [facility], [address] [facility], [address] [place] [address]  P: [phone]  F: [phone]    NEUROLOGY CLINIC NOTE     PATIENT NAME: Richard Rose  PATIENT MRN: [id number]  PRIMARY CARE PHYSICIAN: MORE Stone NP    HPI:        Interval History: 11/23/2021   Patient had a fall on 11/15/2021. Patient stated that her left leg gave away and she fell down from the stair. Patient went to the ER, because every time she did not wait and she went back home. Patient presenting to the clinic today with complaint of back pain, lower back, radiating bilaterally to the buttocks, heaviness of buttocks, constipation, increased tingling and numbness on the left leg, as compared to right. Will get x-ray lumbar spine to rule out any acute trauma  CT  lumbar and thoracic spine. Increase the dose of Tegretol to 300 mg and 200 for 1 week followed by 300 twice daily. PT OT         Interval History: 9/21/2021  Patient was last seen in the clinic in May 2021. Since last clinic visit patient endorses headache located on the right side of the head along with right side of the face. As per patient her pain starts on the right ear radiating up to the top of the head and to the right side of the face, sharp stabbing pain, getting worse with chewing. Endorses mild photophobia and phonophobia. She also endorses tenderness on the skin of her face and head. She has a history of migraine headaches, as per patient this headache is different than her regular migraine headaches. Denies any nausea or vomiting. Denies any redness in the eyes or watering from the eyes or watering from the nose. Endorses mild gait unsteadiness, denies any dizziness or drowsiness, denies any speech difficulty. She was evaluated in the ER on 9/19/2021 for this headache, CT head was done which did not show any acute intracranial pathology.   She was diagnosed to have right-sided trigeminal neuralgia and started on Tegretol 100 mg twice daily. Endorses improvement in the stabbing pain with it. Endorses improvement in the headache with the heating pad     She continues to have chronic neck pain radiating to the right upper extremity down to the elbow down into the fingers. She had a fall on 8/5/2021, she fell down several stairs, was evaluated in the ER at that time. CT cervical thoracic and lumbar spine was done which did not show any acute abnormalities. Patient was evaluated by neurosurgery after the fall on 8/12/2021 in the clinic. During that visit neurosurgery ordered upright flexion-extension images along with repeat EMG of bilateral upper extremities. Interval History: 5/24/2021   Patient was last seen in the clinic in February 2021. Patient had anterior cervical C5-C6 disc arthroplasty done on 4/30/2021. Postprocedure cervical flexion extension x-rays were stable. Since then patient continues to complain of neck pain radiating down to the right upper extremity associated with tingling and numbness in the right upper extremity, endorses improvement in the tingling and numbness along with the weakness in the left upper extremity, continues to have some weakness in the right upper extremity. She will be following up with Dr. Kelli Haynes on 6/29/2021. X-ray cervical spine flexion extension 5/1/2021  Impression   Postsurgical changes related to placement of an inter vertebral disc   prosthetic at C5-C6. No spondylolisthesis. She continues to have back pain with radicular symptoms in bilateral lower extremities, endorses worsening of paresthesias in her left foot. Currently she is taking Cymbalta 60 mg at nighttime. Taking Flexeril 5 mg at nighttime. Also taking Lyrica 100 mg 3 times a day. Endorses some improvement in the symptoms with that. Denies any side effects from Lyrica or Cymbalta or Flexeril.      Patient saw the pain physician, was not started on any intervention at present as she needs to complete 6 weeks of physical therapy as per the pain management prior to any intervention. 2/9/2021  Vitamin E, alpha-tocopherol 8.1, gamma tocopherol  1.2  Vitamin B12 577  Folate 12.4  Vitamin B6 34.1  ESR 4  CRP 3.4  Ceruloplasmin normal 28  Copper 140.9 normal  LOUISE negative      Meghan Miller is a 38 y.o. right handed  female with PMH significant for chronic back pain, thyroid nodule, anxiety was seen in the clinic for neuropathy      History obtained from Patient and medical chart review. Patient has a history of chronic neck pain, getting worse for last 1-1/2-year, endorses sharp stabbing pain in the neck radiating down to bilateral upper extremities, worse on the left side as compared to the right side. She endorses constant tingling and numbness in her hands bilaterally, endorses intermittent tingling and numbness in bilateral upper extremities involving the forearm and. She also complains of weakness in her bilateral upper extremities, endorses dropping things from her hands, difficulty with her hand , difficulty holding the objects. Difficulty holding baby, endorses her hands locking up while eating. Difficulty with zipping and buttoning and dressing. She had MRI of the cervical spine done on 2/9/2021 which showed disc protrusion at C5-C6 causing moderate stenosis of the thecal sac and narrowing of the neural foramina. Multiple small lymph nodes scattered throughout the neck, mostly nonspecific and reactive. Patient was evaluated by neurosurgery today, recommend cervical flexion-extension x-rays and was referred to physical therapy. She also had EMG nerve conduction study done of bilateral upper extremities on 7/8/2020, this was done at [facility].  No images available to review, as per the report EMG study was normal.  There was no electrodiagnostic evidence of generalized large fiber peripheral polyneuropathy or cervical radiculopathy. motrin      Currently taking Flexeril 5 mg as needed for muscle spasm, endorses improvement in her symptoms with that. Denies any side effects from Flexeril. Taking Mobic 7.5 mg 2 times a day every day    PATIENT HISTORY:     Past Medical History:   Diagnosis Date    Anxiety     Blood loss     after     Cervical disc disease     Cervical myopathy     Chronic back pain     GERD (gastroesophageal reflux disease)     Hx of migraine headaches     Neuropathy     both legs    Thyroid nodule     Weakness generalized     due to neck issues        Past Surgical History:   Procedure Laterality Date    CERVICAL DISC ARTHROPLASTY  2021    ANTERIOR C5-6 DISC ARTHROPLASTY     CERVICAL FUSION N/A 2021    ANTERIOR C5-6 DISC ARTHROPLASTY performed by Noble Bueno,  at [address]      x3    DILATION AND CURETTAGE OF UTERUS      TUBAL LIGATION          Social History     Socioeconomic History    Marital status:      Spouse name: Not on file    Number of children: Not on file    Years of education: Not on file    Highest education level: Not on file   Occupational History    Not on file   Tobacco Use    Smoking status: Former Smoker    Smokeless tobacco: Never Used    Tobacco comment: [id number], smoker then   Vaping Use    Vaping Use: Never used   Substance and Sexual Activity    Alcohol use: Yes     Comment: occasional    Drug use: Never    Sexual activity: Not on file   Other Topics Concern    Not on file   Social History Narrative    Not on file     Social Determinants of Health     Financial Resource Strain: Low Risk     Difficulty of Paying Living Expenses: Not hard at all   Food Insecurity: No Food Insecurity    Worried About Running Out of Food in the Last Year: Never true    Srinivasa of Food in the Last Year: Never true   Transportation Needs:     Lack of Transportation (Medical): Not on file    Lack of Transportation (Non-Medical):  Not on file Physical Activity:     Days of Exercise per Week: Not on file    Minutes of Exercise per Session: Not on file   Stress:     Feeling of Stress : Not on file   Social Connections:     Frequency of Communication with Friends and Family: Not on file    Frequency of Social Gatherings with Friends and Family: Not on file    Attends Caoda[employer] Services: Not on file    Active Member of [address] [place] [address] or Organizations: Not on file    Attends Club or Organization Meetings: Not on file    Marital Status: Not on file   Intimate Partner Violence:     Fear of Current or Ex-Partner: Not on file    Emotionally Abused: Not on file    Physically Abused: Not on file    Sexually Abused: Not on file   Housing Stability:     Unable to Pay for Housing in the Last Year: Not on file    Number of [place] in the Last Year: Not on file    Unstable Housing in the Last Year: Not on file        Current Outpatient Medications   Medication Sig Dispense Refill    carBAMazepine (TEGRETOL-XR) 200 MG extended release tablet Take 1 tablet by mouth 2 times daily Take 200 am and 300 PM for 1 week 300 am and 300 pm after 1 week 60 tablet 2    propranolol (INDERAL LA) 60 MG extended release capsule Take 1 capsule by mouth daily 30 capsule 3    Blood Pressure KIT Blood pressure management 1 kit 0    DULoxetine (CYMBALTA) 60 MG extended release capsule Take 1 capsule by mouth daily 30 capsule 2    cyclobenzaprine (FLEXERIL) 5 MG tablet Take 1 tablet by mouth 2 times daily as needed for Muscle spasms 15 tablet 0    ibuprofen (ADVIL;MOTRIN) 800 MG tablet Take 1 tablet by mouth 2 times daily as needed for Pain 60 tablet 3    Handicap Placard MISC by Does not apply route 1 years 1 each 0    pregabalin (LYRICA) 150 MG capsule Take 1 capsule by mouth 3 times daily for 90 days.  90 capsule 3    SUMAtriptan (IMITREX) 25 MG tablet Take 1 tablet by mouth once as needed for Migraine (Patient not taking: Reported on 9/22/2021) 30 tablet 1    Elastic Bandages & Supports (FUTURO SOFT CERVICAL COLLAR) MISC 1 Units by Does not apply route as needed (neck pain) 1 each 0     No current facility-administered medications for this visit. Allergies   Allergen Reactions    Bee Venom Anaphylaxis    Doxycycline Nausea Only        REVIEW OF SYSTEMS:     Review of Systems   Constitutional: Negative for activity change, chills, fever and unexpected weight change. HENT: Negative for congestion, hearing loss and sore throat. Eyes: Negative for discharge and visual disturbance. Respiratory: Negative for cough, chest tightness, shortness of breath and wheezing. Cardiovascular: Negative for chest pain, palpitations and leg swelling. Gastrointestinal: Positive for constipation. Negative for abdominal pain, diarrhea, nausea and vomiting. Endocrine: Negative for polydipsia and polyphagia. Genitourinary: Negative for decreased urine volume, difficulty urinating, dysuria, frequency and vaginal pain. Musculoskeletal: Negative for arthralgias, back pain, joint swelling and neck stiffness. Skin: Negative for rash. Allergic/Immunologic: Negative for environmental allergies. Neurological: Positive for weakness and numbness. Negative for dizziness, tremors, seizures, syncope, facial asymmetry, speech difficulty and headaches. Hematological: Negative for adenopathy. Psychiatric/Behavioral: Negative for agitation, confusion, hallucinations and suicidal ideas. VITALS  /76   Pulse 70   Ht 5' 7\" (1.702 m)   Wt 220 lb (99.8 kg)   SpO2 98%   BMI 34.46 kg/m²      PHYSICAL EXAMINATION:     Physical Exam     Constitutional: Well developed, well nourished and in no acute distress. Head:  normocephalic, atraumatic. Neck: supple, no carotid bruits, thyroid not palpable  Respiratory: Clear to auscultation bilaterally with no use of accessory muscles during respiration. Cardiovascular: normal rate, regular rhythm, no murmur, gallop, rub.   Abdomen: Soft, nontender, nondistended, normal bowel sounds, no hepatomegaly or splenomegaly  Extremities:  peripheral pulses palpable, no pedal edema or calf pain with palpation  Psych: normal affect      NEUROLOGICAL EXAMINATION:     Mental status   Alert and oriented; intact memory with no confusion, speech or language problems; no hallucinations or delusions     Cranial nerves   II - visual fields intact to confrontation                                                III, IV, VI  extra-ocular muscles full: no pupillary defect; no AMANDA, no nystagmus, no ptosis   V - normal facial sensation                                                               VII - normal facial symmetry                                                             VIII - intact hearing                                                                             IX, X - symmetrical palate                                                                  XI - symmetrical shoulder shrug                                                       XII - midline tongue without atrophy or fasciculation     Motor function  Normal muscle bulk and tone  Muscle strength:   Left 4+/5   fine motor movements     Sensory function  decreased sensation on the left lower extremity . Cerebellar Intact fine motor movement. No involuntary movements or tremors     Reflex function Intact 2+ DTR and symmetric. Negative Babinski     Gait                  Normal station and gait           PRIOR TESTS AND IMAGING: Following images and Labs were reviewed by the examiner     MRI lumbar spine without contrast 1/24/2020  FINDINGS: No comparisons. Normal alignment of the lumbar spine. No acute fractures or subluxations. The conus terminates at L1 which is within normal limits. Marrow signal is within normal limits. No significant spinal stenosis. At T12-L1, L1-L2, L2-L3, and L3-L4 there is no significant disc   herniation, spinal stenosis, or neuroforaminal narrowing.     At L4-L5 there is mild broad base disc bulge and mild degenerative facet joint disease causing minimal narrowing of the inferior neural foramina bilaterally without effacement of the exiting nerve roots. At L5-S1, there is minimal posterior disc bulge not causing significant spinal stenosis or neuroforaminal narrowing. IMPRESSION:  1. Mild disc bulge at L4-5 and L5-S1 not causing significant spinal stenosis or neuroforaminal narrowing. 2.  Mild facet joint arthritis in the lower lumbar spine. 3.  Normal alignment of the lumbar spine without fractures or subluxations seen. EMG nerve conduction study 12/23/2019 bilateral lower extremities  1-Abnormal study of the left lower extremity and normal study of the right   lower extremity. There is electrodiagnostic evidence suggestive of a   chronic mild left L5 radiculopathy without active denervation. There is   no electrodiagnostic evidence of a generalized large fiber peripheral   polyneuropathy. Clinical correlation is required. Please see full EMG   report. EMG nerve conduction studies bilateral upper extremity 7/8/2020  1- Normal study of the bilateral upper extremities. There is no   electrodiagnostic evidence of a generalized large fiber peripheral   polyneuropathy or a cervical radiculopathy. Clinical correlation is   required. Please see full EMG report. MRI brain without contrast 7/15/2020  IMPRESSION:  Unremarkable unenhanced MRI of the brain. MRI cervical spine without contrast 2/9/2021  FINDINGS:   BONES/ALIGNMENT: There is normal alignment of the spine. The vertebral body   heights are maintained. The bone marrow signal appears unremarkable. There   is degenerative disc disease with disc space narrowing and osteophytes at   C4-5, C5-6 and C6-7. SPINAL CORD:  No abnormal signal is identified within the spinal cord. SOFT TISSUES: No abnormal enhancement of the cervical spine.  No paraspinal   mass identified. There are small lymph nodes scattered throughout the neck that are   nonspecific. They could be reactive. There are slightly prominent for a   patient of this age. Clinically correlate to exclude underlying pathology. C2-C3: The thecal sac and neural foramina are intact. C3-C4: There is a disc protrusion measuring 2 mm. The thecal sac and neural   foramina are intact. C4-C5: There is a disc protrusion measuring 2 mm. The thecal sac and neural   foramina are intact. C5-C6: There is a disc protrusion measuring 3-4 mm toward the left causing an   anterior indentation on the thecal sac. The thecal sac is moderately   stenotic measuring 8.3 mm. Disc and/or osteophytes result in mild narrowing   of the neural foramina bilaterally. C6-C7: Disc and/or osteophytes cause minimal narrowing of the neural foramina   bilaterally. The thecal sac is not stenotic. C7-T1:  The thecal sac and neural foramina are intact. Impression   Disc protrusion at C5-6 causing moderate stenosis of the thecal sac and   narrowing of the neural foramina as discussed above. Multiple small lymph nodes scattered throughout the neck that are   nonspecific. They may be reactive. The number of lymph nodes is concerning   in a patient of this age. Clinically correlate to exclude underlying   pathology. X-ray cervical spine flexion extension 5/1/2021  Impression   Postsurgical changes related to placement of an inter vertebral disc   prosthetic at C5-C6.   No spondylolisthesis               ASSESSMENT / PLAN:       Elmira Hawthorne is a 38 y.o. right handed  female with PMH significant for chronic back pain, thyroid nodule, anxiety was seen in the clinic for neuropathy      Hx of fall on 11/15/2021    Possible trigeminal neuralgia on the right side of the face  History of chronic migraine headaches     Chronic neck pain with bilateral radicular symptoms, worse in the left upper extremity as compared to right  Chronic back pain with bilateral radicular symptoms, more pain in the right lower extremity, weakness in the left lower extremity  Pain and paresthesias in her bilateral upper extremities and lower extremities  Possible peripheral polyneuropathy        12/10/2020  TSH 0.24, T4 1.13  HbA1c 5.1     2/9/2021  Vitamin ED, alpha-tocopherol 8.1, gamma tocopherol  1.2  Vitamin B12 577  Folate 12.4  Vitamin B6 34.1  ESR 4  CRP 3.4  Ceruloplasmin normal 28  Copper 140.9 normal  LOUISE negative        PLAN:   -We will increase the dose of Tegretol from 200 am and 300 pm for 1 week then 300 m BID  -We will add magnesium oxide 400 mg daily for headache prophylaxis  -Continue Lyrica 150 mg 3 times a day. - CT the second lumbar spine to rule out any trauma  -X-ray lumbar spine  -PT/OT    Patient has already tried gabapentin in the past, denies any improvement in the symptoms with gabapentin, does not remember the dose of gabapentin tried.     -Continue Cymbalta 60 mg at nighttime. Discussed possible side effects with the patient. Instructed patient to call the clinic if develop any side effects. Patient not taking Lexapro anymore. -Flexeril 5 mg nightly as needed for muscle spasms        Patient had anterior cervical C5-C6 disc arthroplasty done on 4/30/2021. Postprocedure cervical flexion extension x-rays were stable. X-ray cervical spine flexion extension 5/1/2021  Impression   Postsurgical changes related to placement of an inter vertebral disc   prosthetic at C5-C6. No spondylolisthesis. -physical therapy as per neurosurg     -F/u pain management for chronic back pain and neck pain     - Follow up in the clinic in  2 weeks   - Instructed patient to call the clinic if symptoms worsen or develop any new symptoms.         Ms. Joshua Macias received counseling on the following healthy behaviors: medical compliance, smoking cessation, blood pressure control, regular follow up with primary doctor.          Electronically signed by Shannon Reyes MD on 11/23/2021 at 5:21 PM

## 2021-11-24 ENCOUNTER — HOSPITAL ENCOUNTER (OUTPATIENT)
Dept: CT IMAGING | Age: 36
Discharge: HOME | End: 2021-11-26
Payer: COMMERCIAL

## 2021-11-24 ENCOUNTER — OFFICE VISIT (OUTPATIENT)
Dept: PRIMARY CARE CLINIC | Age: 36
End: 2021-11-24
Payer: COMMERCIAL

## 2021-11-24 VITALS — DIASTOLIC BLOOD PRESSURE: 85 MMHG | OXYGEN SATURATION: 97 % | HEART RATE: 73 BPM | SYSTOLIC BLOOD PRESSURE: 125 MMHG

## 2021-11-24 DIAGNOSIS — R93.89 ABNORMAL X-RAY: Primary | ICD-10-CM

## 2021-11-24 DIAGNOSIS — K38.9 APPENDICOLITH: Primary | ICD-10-CM
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
## 2021-11-24 DIAGNOSIS — R93.89 ABNORMAL X-RAY: ICD-10-CM

## 2021-11-24 PROCEDURE — 6360000004 HC RX CONTRAST MEDICATION: Performed by: SURGERY

## 2021-11-24 PROCEDURE — 72128 CT CHEST SPINE W/O DYE: CPT

## 2021-11-24 PROCEDURE — 1036F TOBACCO NON-USER: CPT | Performed by: SURGERY

## 2021-11-24 PROCEDURE — 74177 CT ABD & PELVIS W/CONTRAST: CPT

## 2021-11-24 PROCEDURE — G8427 DOCREV CUR MEDS BY ELIG CLIN: HCPCS | Performed by: SURGERY

## 2021-11-24 PROCEDURE — G8417 CALC BMI ABV UP PARAM F/U: HCPCS | Performed by: SURGERY

## 2021-11-24 PROCEDURE — G8484 FLU IMMUNIZE NO ADMIN: HCPCS | Performed by: SURGERY

## 2021-11-24 PROCEDURE — 99213 OFFICE O/P EST LOW 20 MIN: CPT | Performed by: SURGERY

## 2021-11-24 PROCEDURE — 72131 CT LUMBAR SPINE W/O DYE: CPT

## 2021-11-24 RX ADMIN — IOPAMIDOL 75 ML: 755 INJECTION, SOLUTION INTRAVENOUS at 14:39

## 2021-11-24 RX ADMIN — IOHEXOL 50 ML: 240 INJECTION, SOLUTION INTRATHECAL; INTRAVASCULAR; INTRAVENOUS; ORAL at 14:39

## 2021-11-24 ASSESSMENT — ENCOUNTER SYMPTOMS
SHORTNESS OF BREATH: 0
COLOR CHANGE: 0
COUGH: 0
NAUSEA: 0
ABDOMINAL PAIN: 1
BACK PAIN: 1
DIARRHEA: 0
CHEST TIGHTNESS: 0
CONSTIPATION: 1
EYE DISCHARGE: 0

## 2021-11-24 NOTE — PROGRESS NOTES
Visit Information    Have you changed or started any medications since your last visit including any over-the-counter medicines, vitamins, or herbal medicines? no   Are you having any side effects from any of your medications? -  no  Have you stopped taking any of your medications? Is so, why? -  no    Have you seen any other physician or provider since your last visit? Yes - Records Obtained  Have you had any other diagnostic tests since your last visit? Yes - Records Obtained  Have you been seen in the emergency room and/or had an admission to a hospital since we last saw you? No  Have you had your routine dental cleaning in the past 6 months? no    Have you activated your [facility] account? If not, what are your barriers?  Yes     Patient Care Team:  Christine Lesches, APRN - NP as PCP - General (Family Medicine)    Medical History Review  Past Medical, Family, and Social History reviewed and does contribute to the patient presenting condition    Health Maintenance   Topic Date Due    Hepatitis C screen  Never done    Varicella vaccine (1 of 2 - 2-dose childhood series) Never done    COVID-19 Vaccine (1) Never done    Flu vaccine (1) 06/30/2022 (Originally 9/1/2021)    Cervical cancer screen  04/29/2022    DTaP/Tdap/Td vaccine (2 - Td or Tdap) 09/10/2029    HIV screen  Completed    Hepatitis A vaccine  Aged Out    Hepatitis B vaccine  Aged Out    Hib vaccine  Aged Out    Meningococcal (ACWY) vaccine  Aged Out    Pneumococcal 0-64 years Vaccine  Aged Out

## 2021-11-24 NOTE — PROGRESS NOTES
Significantly event      Patient was seen in the clinic for neck pain after fall 11/23/2021. X-ray lumbar spine was ordered. Radiology called for nonspecific 6 mm calcification projecting over the right lower quadrant possibly appendicolith or within the iliac bone. I called patient and updated her regarding the imaging. Patient denies any acute abdominal pain on the right lower quadrant. But states that she is having some mild abdominal discomfort which is more on the left side. Denies any fever, chills, nausea, vomiting. Patient was advised to revisit likely increasing pain intensity, nausea, vomiting. Also make an appointment with primary care physician to follow-up with her imaging finding. Patient understand image finding, and voices understanding to the recommendations.           Electronically signed by Herman Grajeda MD on 11/23/2021 at 7:41 PM

## 2021-11-24 NOTE — PROGRESS NOTES
[facility]  [id number] [facility] [address]  Dept: [phone]  Dept Fax: [phone]    Patient Care Team:  MORE Sierra NP as PCP - General (Family Medicine)    2021     Marilyn Bah (:  1985)is a 39 y.o. female, here for evaluation of the following medical concerns:   Chief Complaint   Patient presents with   Devota Harsh        HPI  History obtained from patient and chart patient presents today to discuss x-ray results. Patient had a x-ray lumbar spine that was ordered by the neurologist and revealed nonspecific 6 mm calcification projecting over the right lower quadrant possibly appendicolith. Neurologist had reviewed the results with the patient and had advised the patient to follow-up with PCP and she is here to discuss further plan. On the side note patient was here to do other CT scans that was ordered by the neurologist and I had reviewed the x-ray and ordered her an additional CT scan of the abdomen and pelvis. Patient had labs pending since  and they are still active in the computer. Patient denies nausea,  complains of mild cramping of lower abdomen due to constipation that is relieved with laxative. Denies chest pain pain or shortness of breath denies chills or fever,  denies intense acute abdomen pain. Previous visit I had put patient on Inderal for migraine prevention and patient stating that she has been tolerating the medicine without any side effect    Review of Systems   Constitutional: Negative for activity change, chills, fatigue and fever. HENT: Negative for congestion. Eyes: Negative for discharge. Respiratory: Negative for cough, chest tightness and shortness of breath. Cardiovascular: Negative for chest pain, palpitations and leg swelling. Gastrointestinal: Positive for abdominal pain and constipation. Negative for diarrhea and nausea. Genitourinary: Negative for difficulty urinating. Musculoskeletal: Positive for back pain, neck pain and neck stiffness. Negative for arthralgias and gait problem. Skin: Negative for color change, pallor, rash and wound. Neurological: Positive for headaches. Negative for dizziness, facial asymmetry, weakness and numbness. Psychiatric/Behavioral: Negative for agitation. Prior to Visit Medications    Medication Sig Taking? Authorizing Provider   carBAMazepine (TEGRETOL-XR) 200 MG extended release tablet Take 1 tablet by mouth 2 times daily Take 200 am and 300 PM for 1 week 300 am and 300 pm after 1 week Yes Ramona Floyd MD   propranolol (INDERAL LA) 60 MG extended release capsule Take 1 capsule by mouth daily Yes MORE Uribe NP   Blood Pressure KIT Blood pressure management Yes MORE Uribe NP   DULoxetine (CYMBALTA) 60 MG extended release capsule Take 1 capsule by mouth daily Yes LaurieMORE Rodriguez CNP   cyclobenzaprine (FLEXERIL) 5 MG tablet Take 1 tablet by mouth 2 times daily as needed for Muscle spasms Yes Riana Cook MD   ibuprofen (ADVIL;MOTRIN) 800 MG tablet Take 1 tablet by mouth 2 times daily as needed for Pain Yes Promise Raymond PA-C   Handicap Placard MISC by Does not apply route 1 years Yes Promise Raymond PA-C   pregabalin (LYRICA) 150 MG capsule Take 1 capsule by mouth 3 times daily for 90 days.   MORE Rosas CNP   SUMAtriptan (IMITREX) 25 MG tablet Take 1 tablet by mouth once as needed for Migraine  Patient not taking: Reported on 9/22/2021  Tracy Kay PA-C   topiramate (TOPAMAX) 50 MG tablet Take 1 tablet by mouth 2 times daily  Cheko Dubon MD   Elastic Bandages & Supports ([address]) [address] 1 Units by Does not apply route as needed (neck pain)  MORE Obregon CNP        Social History     Tobacco Use    Smoking status: Former Smoker    Smokeless tobacco: Never Used    Tobacco comment: [id number], smoker then   Substance Use Topics    Alcohol use: Yes     Comment: occasional        Vitals:    11/24/21 1456   BP: 125/85   Pulse: 73   SpO2: 97%     Estimated body mass index is 34.46 kg/m² as calculated from the following:    Height as of 11/23/21: 5' 7\" (1.702 m). Weight as of 11/23/21: 220 lb (99.8 kg). DIAGNOSTIC FINDINGS:  CBC:  Lab Results   Component Value Date    WBC 15.9 05/01/2021    HGB 12.4 05/01/2021     05/01/2021       BMP:    Lab Results   Component Value Date     12/10/2020    K 4.8 12/10/2020     12/10/2020    CO2 25 12/10/2020    BUN 14 12/10/2020    CREATININE 0.74 09/28/2021    CREATININE 0.73 12/10/2020    GLUCOSE 91 12/10/2020       HEMOGLOBIN A1C:   Lab Results   Component Value Date    LABA1C 5.1 06/03/2021       FASTING LIPID PANEL:  Lab Results   Component Value Date    CHOL 157 12/10/2020    HDL 60 12/10/2020    TRIG 71 12/10/2020       Physical Exam  Constitutional:       General: She is not in acute distress. HENT:      Head: Normocephalic. Nose: Nose normal. No congestion. Eyes:      General:         Right eye: No discharge. Left eye: No discharge. Extraocular Movements: Extraocular movements intact. Cardiovascular:      Rate and Rhythm: Normal rate. Pulmonary:      Effort: Pulmonary effort is normal. No respiratory distress. Abdominal:      General: Abdomen is flat. Tenderness: There is abdominal tenderness. Musculoskeletal:         General: No swelling. Cervical back: Rigidity and tenderness present. Neurological:      Mental Status: She is alert and oriented to person, place, and time. GCS: GCS eye subscore is 4. GCS verbal subscore is 5. GCS motor subscore is 5. Psychiatric:         Mood and Affect: Mood and affect normal.         Speech: Speech normal.         Behavior: Behavior normal.         ASSESSMENT     Diagnosis Orders   1.  2518 Dilan Baxter DO, General Surgery, [facility] PLAN:  No orders of the defined types were placed in this encounter. 1. Mild abdominal cramping /pain -could be related to constipation pain comes and goes and not not acute at this time. Stated that she has laxative at home, encouraged high-fiber diet with increased fluid intake. Discussed with patient plan of care. CT of the pelvis and abdomen still pending, patient was educated on signs of symptoms of appendicitis and was instructed to go to ER with untroubled pain and nausea. Patient verbalized understanding , General surgery consult was put in   2. Continue to follow-up with neurology and neurosurgeon  3. Reminded patient again that blood work needs to be done , patient stated that she will do her blood work on Monday    FOLLOW UP AND INSTRUCTIONS:  Return in about 3 months (around 2/24/2022). · Dorothea received counseling on the following healthy behaviors:nutrition, exercise and medication adherence    · Discussed use, benefit, and side effects of prescribed medications. Barriers to medication compliance addressed. All patient questions answered. Pt verbalized understanding of all instructions given. · Patient given educational materials - see patient instructions      · Patient advised to contact scheduling offices for any referrals or imaging orders placed today if they have not been contacted in 48 hours. Return in about 3 months (around 2/24/2022). An electronic signature was used to authenticate this note.     --MORE Ayala NP on 11/24/2021 at 3:35 PM

## 2021-11-26 ENCOUNTER — HOSPITAL ENCOUNTER (OUTPATIENT)
Age: 36
Setting detail: SPECIMEN
Discharge: HOME | End: 2021-11-26

## 2021-11-26 ENCOUNTER — HOSPITAL ENCOUNTER (OUTPATIENT)
Age: 36
Discharge: HOME | End: 2021-11-26
Payer: COMMERCIAL

## 2021-11-26 DIAGNOSIS — R51.9 HEAD AND FACE PAIN: ICD-10-CM

## 2021-11-26 DIAGNOSIS — E04.1 THYROID NODULE: Primary | ICD-10-CM

## 2021-11-26 DIAGNOSIS — Z76.89 ESTABLISHING CARE WITH NEW DOCTOR, ENCOUNTER FOR: ICD-10-CM

## 2021-11-26 DIAGNOSIS — E04.1 THYROID NODULE: ICD-10-CM

## 2021-11-26 LAB
ABSOLUTE EOS #: 0.17 K/UL (ref 0–0.44)
ABSOLUTE IMMATURE GRANULOCYTE: 0.03 K/UL (ref 0–0.3)
ABSOLUTE LYMPH #: 2.52 K/UL (ref 1.1–3.7)
ABSOLUTE MONO #: 0.58 K/UL (ref 0.1–1.2)
ALBUMIN SERPL-MCNC: 4.1 G/DL (ref 3.5–5.2)
ALBUMIN/GLOBULIN RATIO: 1.4 (ref 1–2.5)
ALP BLD-CCNC: 95 U/L (ref 35–104)
ALT SERPL-CCNC: 10 U/L (ref 5–33)
ANION GAP SERPL CALCULATED.3IONS-SCNC: 9 MMOL/L (ref 9–17)
AST SERPL-CCNC: 13 U/L
BASOPHILS # BLD: 1 % (ref 0–2)
BASOPHILS ABSOLUTE: 0.04 K/UL (ref 0–0.2)
BILIRUB SERPL-MCNC: 0.32 MG/DL (ref 0.3–1.2)
BUN BLDV-MCNC: 12 MG/DL (ref 6–20)
BUN/CREAT BLD: NORMAL (ref 9–20)
C-REACTIVE PROTEIN: 4.3 MG/L (ref 0–5)
CALCIUM SERPL-MCNC: 9.2 MG/DL (ref 8.6–10.4)
CHLORIDE BLD-SCNC: 103 MMOL/L (ref 98–107)
CHOLESTEROL, FASTING: 179 MG/DL
CHOLESTEROL/HDL RATIO: 2.9
CO2: 24 MMOL/L (ref 20–31)
CREAT SERPL-MCNC: 0.75 MG/DL (ref 0.5–0.9)
DIFFERENTIAL TYPE: NORMAL
EOSINOPHILS RELATIVE PERCENT: 2 % (ref 1–4)
GFR AFRICAN AMERICAN: >60 ML/MIN
GFR NON-AFRICAN AMERICAN: >60 ML/MIN
GFR SERPL CREATININE-BSD FRML MDRD: NORMAL ML/MIN/{1.73_M2}
GFR SERPL CREATININE-BSD FRML MDRD: NORMAL ML/MIN/{1.73_M2}
GLUCOSE BLD-MCNC: 96 MG/DL (ref 70–99)
HCT VFR BLD CALC: 44.3 % (ref 36.3–47.1)
HDLC SERPL-MCNC: 61 MG/DL
HEMOGLOBIN: 14.3 G/DL (ref 11.9–15.1)
IMMATURE GRANULOCYTES: 0 %
LDL CHOLESTEROL: 104 MG/DL (ref 0–130)
LYMPHOCYTES # BLD: 35 % (ref 24–43)
MCH RBC QN AUTO: 29.9 PG (ref 25.2–33.5)
MCHC RBC AUTO-ENTMCNC: 32.3 G/DL (ref 28.4–34.8)
MCV RBC AUTO: 92.5 FL (ref 82.6–102.9)
MONOCYTES # BLD: 8 % (ref 3–12)
NRBC AUTOMATED: 0 PER 100 WBC
PDW BLD-RTO: 13.6 % (ref 11.8–14.4)
PLATELET # BLD: 342 K/UL (ref 138–453)
PLATELET ESTIMATE: NORMAL
PMV BLD AUTO: 11.5 FL (ref 8.1–13.5)
POTASSIUM SERPL-SCNC: 4.4 MMOL/L (ref 3.7–5.3)
RBC # BLD: 4.79 M/UL (ref 3.95–5.11)
RBC # BLD: NORMAL 10*6/UL
SEDIMENTATION RATE, ERYTHROCYTE: 10 MM (ref 0–20)
SEG NEUTROPHILS: 54 % (ref 36–65)
SEGMENTED NEUTROPHILS ABSOLUTE COUNT: 3.9 K/UL (ref 1.5–8.1)
SODIUM BLD-SCNC: 136 MMOL/L (ref 135–144)
TOTAL PROTEIN: 7.1 G/DL (ref 6.4–8.3)
TRIGLYCERIDE, FASTING: 72 MG/DL
TSH SERPL DL<=0.05 MIU/L-ACNC: 0.72 MIU/L (ref 0.3–5)
VLDLC SERPL CALC-MCNC: NORMAL MG/DL (ref 1–30)
WBC # BLD: 7.2 K/UL (ref 3.5–11.3)
WBC # BLD: NORMAL 10*3/UL

## 2021-11-26 PROCEDURE — 84443 ASSAY THYROID STIM HORMONE: CPT

## 2021-11-26 PROCEDURE — 86140 C-REACTIVE PROTEIN: CPT

## 2021-11-26 PROCEDURE — 85652 RBC SED RATE AUTOMATED: CPT

## 2021-11-26 PROCEDURE — 36415 COLL VENOUS BLD VENIPUNCTURE: CPT

## 2021-11-30 ENCOUNTER — OFFICE VISIT (OUTPATIENT)
Dept: SURGERY | Age: 36
End: 2021-11-30
Payer: COMMERCIAL

## 2021-11-30 VITALS — RESPIRATION RATE: 20 BRPM | WEIGHT: 220 LBS | HEIGHT: 67 IN | BODY MASS INDEX: 34.53 KG/M2

## 2021-11-30 DIAGNOSIS — R10.31 RIGHT LOWER QUADRANT ABDOMINAL PAIN: Primary | ICD-10-CM

## 2021-11-30 DIAGNOSIS — K58.1 IRRITABLE BOWEL SYNDROME WITH CONSTIPATION: ICD-10-CM

## 2021-11-30 PROCEDURE — G8427 DOCREV CUR MEDS BY ELIG CLIN: HCPCS | Performed by: SURGERY

## 2021-11-30 PROCEDURE — 99203 OFFICE O/P NEW LOW 30 MIN: CPT | Performed by: SURGERY

## 2021-11-30 PROCEDURE — G8417 CALC BMI ABV UP PARAM F/U: HCPCS | Performed by: SURGERY

## 2021-11-30 PROCEDURE — G8484 FLU IMMUNIZE NO ADMIN: HCPCS | Performed by: SURGERY

## 2021-11-30 PROCEDURE — 1036F TOBACCO NON-USER: CPT | Performed by: SURGERY

## 2021-11-30 NOTE — PROGRESS NOTES
[facility] General Surgery   History & Physical  Oleta Pretty, DO  Pt Name: Aislinn Mao  MRN: [id number]  YOB: 1985  Date of evaluation: 2021  Primary Care Physician: MORE Garcia NP    Chief Complaint: RLQ pain      SUBJECTIVE:    History of Present Illness: This is a 39 y.o.  female who presents for evaluation for the above. Chart review shows mention of appendicolith but CT abd/pelv obtained 21 shows normal appendix without appendicolith. Pt reports intermittent RLQ pain that feels deeper in the pelvis rather than intraabdominal but denies this is similar to menstrual or ovarian pain. PSH significant for C section x3. Reports intermittent normal/constipated BMs, pt reports high fiber diet at baseline. No [address] GI malignancy. No other complaints at this time. Chart review performed to add information to the HPI: Yes    Past Medical History   has a past medical history of Anxiety, Blood loss, Cervical disc disease, Cervical myopathy, Chronic back pain, GERD (gastroesophageal reflux disease), Hx of migraine headaches, Neuropathy, Thyroid nodule, and Weakness generalized. Past Surgical History   has a past surgical history that includes  section; Tubal ligation; Dilation and curettage of uterus; Cervical disc arthroplasty (2021); and cervical fusion (N/A, 2021). Family History  family history includes Breast Cancer in her paternal aunt; Cancer in her sister; Diabetes in her maternal grandmother, maternal uncle, and mother; Heart Attack in her maternal grandmother; Heart Disease in her father; High Blood Pressure in her mother; Hypertension in her father; Kidney Disease in her maternal grandmother; Other in her mother; Seizures in her mother. Social History  Tobacco use:  reports that she has quit smoking. She has never used smokeless tobacco.  Alcohol use:  reports current alcohol use.   Drug use:  reports no history of drug use.      Medications  Current Medications:   Current Outpatient Medications   Medication Sig Dispense Refill    carBAMazepine (TEGRETOL-XR) 200 MG extended release tablet Take 1 tablet by mouth 2 times daily Take 200 am and 300 PM for 1 week 300 am and 300 pm after 1 week 60 tablet 2    propranolol (INDERAL LA) 60 MG extended release capsule Take 1 capsule by mouth daily 30 capsule 3    Blood Pressure KIT Blood pressure management 1 kit 0    DULoxetine (CYMBALTA) 60 MG extended release capsule Take 1 capsule by mouth daily 30 capsule 2    cyclobenzaprine (FLEXERIL) 5 MG tablet Take 1 tablet by mouth 2 times daily as needed for Muscle spasms 15 tablet 0    ibuprofen (ADVIL;MOTRIN) 800 MG tablet Take 1 tablet by mouth 2 times daily as needed for Pain 60 tablet 3    Handicap Placard MISC by Does not apply route 1 years 1 each 0    pregabalin (LYRICA) 150 MG capsule Take 1 capsule by mouth 3 times daily for 90 days. 90 capsule 3    SUMAtriptan (IMITREX) 25 MG tablet Take 1 tablet by mouth once as needed for Migraine (Patient not taking: Reported on 9/22/2021) 30 tablet 1    Elastic Bandages & Supports (FUTURO SOFT CERVICAL COLLAR) MISC 1 Units by Does not apply route as needed (neck pain) 1 each 0     No current facility-administered medications for this visit. Home Medications:   Prior to Admission medications    Medication Sig Start Date End Date Taking?  Authorizing Provider   carBAMazepine (TEGRETOL-XR) 200 MG extended release tablet Take 1 tablet by mouth 2 times daily Take 200 am and 300 PM for 1 week 300 am and 300 pm after 1 week 11/23/21  Yes Shannon Reyes MD   propranolol (INDERAL LA) 60 MG extended release capsule Take 1 capsule by mouth daily 9/22/21  Yes MORE Adams - NP   Blood Pressure KIT Blood pressure management 9/22/21  Yes Michelle Nugent APRN - NP   DULoxetine (CYMBALTA) 60 MG extended release capsule Take 1 capsule by mouth daily 8/16/21  Yes MORE Ochoa - LEAH cyclobenzaprine (FLEXERIL) 5 MG tablet Take 1 tablet by mouth 2 times daily as needed for Muscle spasms 8/2/21  Yes Yun Guerrero MD   ibuprofen (ADVIL;MOTRIN) 800 MG tablet Take 1 tablet by mouth 2 times daily as needed for Pain 4/20/21  Yes Tommie Mejia PA-C   Handicap Placard [address] [place] [address] by Does not apply route 1 years 3/12/21  Yes Tommie Mejia PA-C   pregabalin (LYRICA) 150 MG capsule Take 1 capsule by mouth 3 times daily for 90 days. 8/16/21 11/14/21  MORE Ty CNP   SUMAtriptan (IMITREX) 25 MG tablet Take 1 tablet by mouth once as needed for Migraine  Patient not taking: Reported on 9/22/2021 6/23/21 9/22/21  Tram López PA-C   topiramate (TOPAMAX) 50 MG tablet Take 1 tablet by mouth 2 times daily 5/25/21 9/19/21  Leann Velasquez MD   Elastic Bandages & Supports ([address] [facility]) [address] [place] [address] 1 Units by Does not apply route as needed (neck pain) 5/17/21 9/22/21  MORE Yanes CNP       Allergies  Bee venom and Doxycycline      Review of Systems:  General: Denies any fever, chills. Eyes: Denies any changes in vision, diplopia or eye pain  Ears, Nose, Mouth: Denies changes in hearing/tinnitus or drainage from ears, no rhinorrhea or bloody nose, no difficulty chewing  Throat: no difficulty swallowing, no throat pain  Respiratory: Denies any shortness of breath or cough. Cardiac: Denies any chest pain, palpitations, claudication or edema. Gastrointestinal: constipation/normal BM    Genitourinary: Denies any frequency, urgency, hesitancy or incontinence. Musculoskeletal: Denies worsening muscle weakness or recent trauma  Skin: Denies rashes or lesions  Psychiatric: Denies any recent changes in mood or affect  Hematologic: Denies bruising or bleeding easily.     PHYSICAL EXAMINATION  Vitals:   Vitals:    11/30/21 1052   Resp: 20       General Appearance:  awake, alert, no acute distress, well developed, well nourished   Skin:  Skin color, texture, turgor normal. No rashes or lesions. Head/face:  NCAT, face symmetrical  Eyes:  PERRL, no evidence of conjunctivitis or ptosis bilaterally  Ears:  External ears and canals grossly normal, no evidence of otorrhea. Nose/Sinuses:  Nares normal. Septum midline. Mucosa normal. No external drainage noted. Mouth/Neck:  Mucosa moist.  No external oral lesions. Trachea midline. No visible masses. Lungs:  Normal chest expansion, unlabored breathing without accessory muscle use. No audible rales, rhonchi, or wheezing. Cardiovascular: S1S2. No evidence of JVD. No evidence of pulsatile masses in abdomen  Abdomen:  Soft, non-tender, no organomegaly, no masses. Musculoskeletal: No evidence of bony/muscular deformities, trauma, atrophy of either left/right upper/lower extremity. No evidence of digital clubbing or cyanosis. Neurologic:  CN 2-12 grossly intact without obvious deficits. Grossly normal sensation in all extremities. Psychiatric: appropriate judgement and insight, appropriate recall of recent and remote memory, no evidence of depression/anxiety/agitation      RADIOLOGY:  The following images and/or reports were personally reviewed with the following significant findings pertinent to the Chief Complaint and/or HPI:         CT ABDOMEN PELVIS W IV CONTRAST Additional Contrast? Oral    Result Date: 11/24/2021  EXAMINATION: CT OF THE ABDOMEN AND PELVIS WITH CONTRAST 11/24/2021 1:11 pm TECHNIQUE: CT of the abdomen and pelvis was performed with the administration of intravenous contrast. Multiplanar reformatted images are provided for review. Dose modulation, iterative reconstruction, and/or weight based adjustment of the mA/kV was utilized to reduce the radiation dose to as low as reasonably achievable. COMPARISON: None.  HISTORY: ORDERING SYSTEM PROVIDED HISTORY: Abnormal x-ray TECHNOLOGIST PROVIDED HISTORY: Nonspecific 6 mm calcification projecting over the right lower quadrant revealed on x-ray, rule out appendicitis Reason for Exam: abd pain fall Acuity: Unknown Type of Exam: Unknown FINDINGS: Lower Chest: A 0.2 cm subpleural nodule in the left lower lobe. Minimal right basilar dependent atelectasis Organs: Unremarkable liver, spleen, pancreas, adrenals, bilateral kidneys. No definite cholelithiasis. GI/Bowel: Normal appendix. Bowel loops nonobstructed. Pelvis: Ruptured left ovarian follicle. No adnexal mass. Peritoneum/Retroperitoneum: No free air or free fluid. No adenopathy. Multiple phleboliths in the pelvis. Intact abdominal aorta and its major branches. Bones/Soft Tissues: A small fat containing umbilical hernia. Intact osseous structures. A 0.6 cm bone island in the right iliac wing. No acute finding in the abdomen and pelvis. Normal appendix. DIAGNOSES:   Diagnosis Orders   1. Right lower quadrant abdominal pain     2. Irritable bowel syndrome with constipation         PLAN:  · Unclear etiology for RLQ pain, normal appearing appendix on CT. No acute surgical indications at this time. We discussed the possibility of intraabdominal adhesions given history of repeat c-sections, pt may benefit from other workup/investigation prior to diagnostic laparoscopy. Will refer to GI for further evaluation, pt is agreeable to this. We also discussed evaluation by Gynecology service should the etiology involve adhesed ovary/uterus, pt is considering transferring her gyn care to the [facility] system at this time. · All questions were answered, pt is agreeable to this plan.   ·       Medical Decision Making: low complexity     Electronically signed by Julian Lemus DO on 11/30/2021 at 12:33 PM

## 2021-12-01 ENCOUNTER — HOSPITAL ENCOUNTER (OUTPATIENT)
Dept: ULTRASOUND IMAGING | Age: 36
Discharge: HOME | End: 2021-12-03
Payer: COMMERCIAL

## 2021-12-01 DIAGNOSIS — E04.1 THYROID NODULE: ICD-10-CM

## 2021-12-01 PROCEDURE — 76536 US EXAM OF HEAD AND NECK: CPT

## 2021-12-09 ENCOUNTER — TELEPHONE (OUTPATIENT)
Dept: GASTROENTEROLOGY | Age: 36
End: 2021-12-09

## 2021-12-09 NOTE — TELEPHONE ENCOUNTER
New patient no show letter sent to pt for [facility] 12/8/21. Writer tried to call pt to reschedule, no response message was left for pt to call office to rs.
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
## 2021-12-10 DIAGNOSIS — M54.14 THORACIC RADICULOPATHY: ICD-10-CM

## 2021-12-10 DIAGNOSIS — M54.12 CERVICAL RADICULOPATHY: ICD-10-CM

## 2021-12-10 DIAGNOSIS — Z76.0 MEDICATION REFILL: ICD-10-CM

## 2021-12-10 DIAGNOSIS — G50.0 TRIGEMINAL NEURALGIA: Primary | ICD-10-CM

## 2021-12-10 RX ORDER — PREGABALIN 150 MG/1
150 CAPSULE ORAL 3 TIMES DAILY
Qty: 90 CAPSULE | Refills: 3 | Status: SHIPPED | OUTPATIENT
Start: 2021-12-10 | End: 2022-04-22

## 2022-01-04 RX ORDER — CARBAMAZEPINE 100 MG/1
100 TABLET, EXTENDED RELEASE ORAL 2 TIMES DAILY
Qty: 60 TABLET | Refills: 3 | Status: SHIPPED | OUTPATIENT
Start: 2022-01-04 | End: 2022-05-16

## 2022-01-04 RX ORDER — CARBAMAZEPINE 200 MG/1
200 TABLET, EXTENDED RELEASE ORAL 2 TIMES DAILY
Qty: 60 TABLET | Refills: 2 | Status: SHIPPED | OUTPATIENT
Start: 2022-01-04 | End: 2022-07-07

## 2022-02-07 ENCOUNTER — PROCEDURE VISIT (OUTPATIENT)
Dept: PHYSICAL MEDICINE AND REHAB | Age: 37
End: 2022-02-07
Payer: COMMERCIAL

## 2022-02-07 DIAGNOSIS — G56.21 ULNAR NEUROPATHY AT ELBOW, RIGHT: Primary | ICD-10-CM

## 2022-02-07 PROCEDURE — 99999 PR OFFICE/OUTPT VISIT,PROCEDURE ONLY: CPT | Performed by: STUDENT IN AN ORGANIZED HEALTH CARE EDUCATION/TRAINING PROGRAM

## 2022-02-07 PROCEDURE — 95913 NRV CNDJ TEST 13/> STUDIES: CPT | Performed by: STUDENT IN AN ORGANIZED HEALTH CARE EDUCATION/TRAINING PROGRAM

## 2022-02-07 PROCEDURE — 95886 MUSC TEST DONE W/N TEST COMP: CPT | Performed by: STUDENT IN AN ORGANIZED HEALTH CARE EDUCATION/TRAINING PROGRAM

## 2022-02-07 NOTE — PROGRESS NOTES
[facility] PHYSICAL MEDICINE AND REHABILITATION  [address] [place] [id number]  Dept: [phone]  Dept Fax: [phone]    EMG/NCS Bilateral Upper Limbs    Subjective:     Maryana Paredes is a 39y.o.-year-old right-handed female presenting with pain and paresthesias in the bilateral upper limbs. She notes pain in the medial right upper limb from the axilla to the 4th-5th digits. She denies any pain in the left upper limb. She also reports numbness/tingling of the bilateral hands, affecting digits 4-5 on both sides. She states that she has weakness of the bilateral hands, causing her to drop things. She has a history of C5-C6 anterior fusion, which helped with neck pain and bilateral upper limb pain that she was having prior to surgery. PMH:  no diabetes, +thyroid nodules (does not take any medication for the thyroid)    PSH:  +C5-C6 anterior fusion, no hand surgery    Objective:     Physical Exam    Constitutional: She appears well-developed and well-nourished. In no distress. Pulmonary/Chest: Respirations WNL and unlabored. MSK:  Normal cervical spine ROM. Neurological: She is alert. Sensation to light touch decreased at the lateral and medial right elbow compared to the left. Sensation to light touch decreased at the dorsal aspect of the 1st digit of the left hand. Increased sensitivity at the palmar aspect of the 1st and 3rd digits of the right hand as well at at the dorsal aspect of the 5th digit of the right hand. Strength 4/5 in the right upper limb and 4+/5 in the left upper limb. Negative Raza's reflex bilaterally. Positive Tinel sign at the right wrist and right medial elbow. Negative Tinel sign at the left wrist and left medial elbow. Skin: Skin is warm and dry with good turgor.        Imaging  MRI cervical spine, 2/9/21:  Impression   Disc protrusion at C5-6 causing moderate stenosis of the thecal sac and narrowing of the neural foramina as discussed above.       Multiple small lymph nodes scattered throughout the neck that are   nonspecific. Jackson [place] may be reactive.  The number of lymph nodes is concerning   in a patient of this age.  Clinically correlate to exclude underlying   pathology. Findings:     The procedure was explained to the patient prior to beginning the test.  Risks and benefits of the procedure were discussed. Patient gave verbal consent to proceed. The left median sensory studies to digits 1 and 3 are normal.  The left radial sensory study to digit 1 is normal.  The left ulnar sensory study to digit 5 is normal.  The left median motor study to APB is normal.  The left ulnar motor study to ADM is normal.    The right median sensory studies to digits 1 and 3 are normal.  The right radial sensory study to digit 1 is normal.  The right ulnar sensory study to digit 5 is normal.  The right median motor study to APB is normal.  The right ulnar motor study to ADM shows normal latency and amplitude with decreased conduction velocity across the elbow. The right ulnar F wave is normal.    EMG of selected muscles of the right upper limb is normal.    Needle EMG of the left upper limb was deferred, as patient's symptoms are worse on the right. Impression:     1. Abnormal electrodiagnostic study. 2. There is evidence of a right ulnar neuropathy at the elbow. 3. There is no evidence of bilateral median neuropathy. 4. There is no evidence of right cervical radiculopathy, plexopathy, or myopathy. Please see separate document with nerve conduction and EMG tables.       Electronically signed by Jennifer Hall MD on 2/7/2022 at 3:30 PM

## 2022-02-08 DIAGNOSIS — G56.21 CUBITAL TUNNEL SYNDROME, RIGHT: ICD-10-CM

## 2022-02-08 DIAGNOSIS — G99.2 STENOSIS OF CERVICAL SPINE WITH MYELOPATHY (HCC): ICD-10-CM

## 2022-02-08 DIAGNOSIS — M54.12 CERVICAL RADICULOPATHY: ICD-10-CM

## 2022-02-08 DIAGNOSIS — M48.02 STENOSIS OF CERVICAL SPINE WITH MYELOPATHY (HCC): ICD-10-CM

## 2022-02-17 ENCOUNTER — OFFICE VISIT (OUTPATIENT)
Dept: PRIMARY CARE CLINIC | Age: 37
End: 2022-02-17
Payer: COMMERCIAL

## 2022-02-17 ENCOUNTER — NURSE TRIAGE (OUTPATIENT)
Dept: OTHER | Facility: CLINIC | Age: 37
End: 2022-02-17

## 2022-02-17 VITALS
DIASTOLIC BLOOD PRESSURE: 91 MMHG | HEART RATE: 78 BPM | SYSTOLIC BLOOD PRESSURE: 128 MMHG | OXYGEN SATURATION: 100 % | TEMPERATURE: 97 F

## 2022-02-17 DIAGNOSIS — M79.2 RADICULAR PAIN IN RIGHT ARM: ICD-10-CM

## 2022-02-17 DIAGNOSIS — G89.29 CHRONIC PAIN OF RIGHT UPPER EXTREMITY: Primary | ICD-10-CM

## 2022-02-17 DIAGNOSIS — M79.601 CHRONIC PAIN OF RIGHT UPPER EXTREMITY: Primary | ICD-10-CM

## 2022-02-17 PROCEDURE — 1036F TOBACCO NON-USER: CPT

## 2022-02-17 PROCEDURE — 99213 OFFICE O/P EST LOW 20 MIN: CPT

## 2022-02-17 PROCEDURE — G8417 CALC BMI ABV UP PARAM F/U: HCPCS

## 2022-02-17 PROCEDURE — G8484 FLU IMMUNIZE NO ADMIN: HCPCS

## 2022-02-17 PROCEDURE — G8427 DOCREV CUR MEDS BY ELIG CLIN: HCPCS

## 2022-02-17 RX ORDER — METHYLPREDNISOLONE 4 MG/1
TABLET ORAL
Qty: 1 KIT | Refills: 0 | Status: SHIPPED | OUTPATIENT
Start: 2022-02-17 | End: 2022-02-23

## 2022-02-17 ASSESSMENT — PATIENT HEALTH QUESTIONNAIRE - PHQ9
SUM OF ALL RESPONSES TO PHQ QUESTIONS 1-9: 0
SUM OF ALL RESPONSES TO PHQ9 QUESTIONS 1 & 2: 0
1. LITTLE INTEREST OR PLEASURE IN DOING THINGS: 0
SUM OF ALL RESPONSES TO PHQ QUESTIONS 1-9: 0
2. FEELING DOWN, DEPRESSED OR HOPELESS: 0
SUM OF ALL RESPONSES TO PHQ QUESTIONS 1-9: 0
SUM OF ALL RESPONSES TO PHQ QUESTIONS 1-9: 0

## 2022-02-17 ASSESSMENT — ENCOUNTER SYMPTOMS
SHORTNESS OF BREATH: 0
COLOR CHANGE: 0
CHEST TIGHTNESS: 0

## 2022-02-17 NOTE — PATIENT INSTRUCTIONS
Complete medrol dosepack. Follow-up with neurosurgery. Continue with rehab. Follow-up with PCP. Patient Education        Pinched Nerve in the Neck: Care Instructions  Your Care Instructions  A pinched nerve in the neck happens when a vertebra or disc in the upper part of your spine is damaged. This damage can happen because of an injury. Or it can just happen with age. The changes caused by the damage may put pressure on a nearby nerve root, pinching it. This causes symptoms such as sharp pain in your neck, shoulder, arm, hand, or back. You may also have tingling or numbness. Sometimes it makes your arm weaker. The symptoms are usually worse when you turn your head or strain your neck. For many people, the symptoms get better over time and finally go away. Early treatment usually includes medicines for pain and swelling. Sometimes physical therapy and special exercises may help. Follow-up care is a key part of your treatment and safety. Be sure to make and go to all appointments, and call your doctor if you are having problems. It's also a good idea to know your test results and keep a list of the medicines you take. How can you care for yourself at home? · Be safe with medicines. Read and follow all instructions on the label. ? If the doctor gave you a prescription medicine for pain, take it as prescribed. ? If you are not taking a prescription pain medicine, ask your doctor if you can take an over-the-counter medicine. · Try using a heating pad on a low or medium setting for 15 to 20 minutes every 2 or 3 hours. Try a warm shower in place of one session with the heating pad. You can also buy single-use heat wraps that last up to 8 hours. · You can also try an ice pack for 10 to 15 minutes every 2 to 3 hours. There isn't strong evidence that either heat or ice will help. But you can try them to see if they help you. · Don't spend too long in one position.  Take short breaks to move around and change positions. · Wear a seat belt and shoulder harness when you are in a car. · Sleep with a pillow under your head and neck that keeps your neck straight. · If you were given a neck brace (cervical collar) to limit neck motion, wear it as instructed for as many days as your doctor tells you to. Do not wear it longer than you were told to. Wearing a brace for too long can lead to neck stiffness and can weaken the neck muscles. · Follow your doctor's instructions for gentle neck-stretching exercises. · Do not smoke. Smoking can slow healing of your discs. If you need help quitting, talk to your doctor about stop-smoking programs and medicines. These can increase your chances of quitting for good. · Avoid strenuous work or exercise until your doctor says it is okay. When should you call for help? Call 911 anytime you think you may need emergency care. For example, call if:    · You are unable to move an arm or a leg at all. Call your doctor now or seek immediate medical care if:    · You have new or worse symptoms in your arms, legs, chest, belly, or buttocks. Symptoms may include:  ? Numbness or tingling. ? Weakness. ? Pain.     · You lose bladder or bowel control. Watch closely for changes in your health, and be sure to contact your doctor if:    · You are not getting better as expected. Where can you learn more? Go to https://Fluoresentric.DuXplore. org and sign in to your [facility] account. Enter N156 in the [facility] box to learn more about \"Pinched Nerve in the Neck: Care Instructions. \"     If you do not have an account, please click on the \"Sign Up Now\" link. Current as of: July 1, 2021               Content Version: 13.1  © 0819-5530 [employer], [employer]. Care instructions adapted under license by [facility] ([facility]).  If you have questions about a medical condition or this instruction, always ask your healthcare professional. Mervat Garcia disclaims any warranty or liability for your use of this information.

## 2022-02-17 NOTE — PROGRESS NOTES
[address] WALK-IN PRIMARY CARE  [facility] WALK IN C[facility]  [address] [address]  Dept: [address] Kim Negro is a 39 y.o. female Established patient, who presents to the walk-in clinic today with conditions/complaints as noted below:    Chief Complaint   Patient presents with    Arm Pain     right arm  since she had surgery in april          HPI:     Patient is a 48-year-old female that presents today for pain and paresthesias of her right upper extremity. This is a chronic issue that has been ongoing since her anterior cervical C5-C6 disc arthroplasty in April. She notes pain in the medial right upper limb from the axilla to the 4th-5th digits. Describes the pain as throbbing, shooting and rates it 8 out of 10. Denies any recent injury. States that the pain is worsening. She had an EMG done last week which showed evidence of right ulnar neuropathy. She's right-handed and experiences intermittent  weakness. She has ongoing treatment with physical therapy and her last appointment was . She is currently taking Cymbalta, Lyrica, and Flexeril for the pain with mild relief. Past Medical History:   Diagnosis Date    Anxiety     Blood loss     after     Cervical disc disease     Cervical myopathy     Chronic back pain     GERD (gastroesophageal reflux disease)     Hx of migraine headaches     Neuropathy     both legs    Thyroid nodule     Weakness generalized     due to neck issues       Current Outpatient Medications   Medication Sig Dispense Refill    methylPREDNISolone (MEDROL DOSEPACK) 4 MG tablet Take by mouth. 1 kit 0    carBAMazepine (TEGRETOL-XR) 200 MG extended release tablet Take 1 tablet by mouth 2 times daily Two prescription. 200 and morning along with 100 mg in the morning.   200 mg at bedtime along with 100 mg at bedtime. 60 tablet 2    carBAMazepine (TEGRETOL-XR) 100 MG extended release tablet Take 1 tablet by mouth 2 times daily Two prescription. 200 and morning along with 100 mg in the morning. 200 mg at bedtime along with 100 mg at bedtime. 60 tablet 3    pregabalin (LYRICA) 150 MG capsule Take 1 capsule by mouth 3 times daily for 90 days. 90 capsule 3    propranolol (INDERAL LA) 60 MG extended release capsule Take 1 capsule by mouth daily 30 capsule 3    Blood Pressure KIT Blood pressure management 1 kit 0    DULoxetine (CYMBALTA) 60 MG extended release capsule Take 1 capsule by mouth daily 30 capsule 2    cyclobenzaprine (FLEXERIL) 5 MG tablet Take 1 tablet by mouth 2 times daily as needed for Muscle spasms 15 tablet 0    SUMAtriptan (IMITREX) 25 MG tablet Take 1 tablet by mouth once as needed for Migraine (Patient not taking: Reported on 9/22/2021) 30 tablet 1    Elastic Bandages & Supports (FUTURO SOFT CERVICAL COLLAR) MISC 1 Units by Does not apply route as needed (neck pain) 1 each 0    ibuprofen (ADVIL;MOTRIN) 800 MG tablet Take 1 tablet by mouth 2 times daily as needed for Pain 60 tablet 3    Handicap Placard MISC by Does not apply route 1 years 1 each 0     No current facility-administered medications for this visit. Allergies   Allergen Reactions    Bee Venom Anaphylaxis    Doxycycline Nausea Only       :     Review of Systems   Constitutional: Negative for chills and fever. Respiratory: Negative for chest tightness and shortness of breath. Cardiovascular: Negative for chest pain. Musculoskeletal: Positive for arthralgias. Negative for joint swelling. Skin: Negative for color change and rash. Neurological: Positive for weakness and numbness.       :     BP (!) 128/91   Pulse 78   Temp 97 °F (36.1 °C) (Temporal)   SpO2 100%     Physical Exam  Vitals reviewed. Constitutional:       General: She is not in acute distress. Appearance: Normal appearance.    Cardiovascular: Rate and Rhythm: Normal rate and regular rhythm. Heart sounds: Normal heart sounds. Pulmonary:      Effort: Pulmonary effort is normal.      Breath sounds: Normal breath sounds. Musculoskeletal:         General: No swelling, deformity or signs of injury. Normal range of motion. Right upper arm: Normal. No swelling or deformity. Right elbow: No swelling, deformity or effusion. Normal range of motion. Tenderness present in medial epicondyle. Right wrist: Normal. No swelling or deformity. Normal range of motion. Normal pulse. Right hand: No swelling or deformity. Normal range of motion. Decreased strength (). Decreased sensation of the median distribution. Cervical back: Normal range of motion. Skin:     General: Skin is warm and dry. Findings: No rash. Neurological:      General: No focal deficit present. Mental Status: She is alert and oriented to person, place, and time. Psychiatric:         Mood and Affect: Mood normal.         Behavior: Behavior normal.         :          1. Chronic pain of right upper extremity  2. Radicular pain in right arm  -     methylPREDNISolone (MEDROL DOSEPACK) 4 MG tablet; Take by mouth., Disp-1 kit, R-0Normal       :      Return if symptoms worsen or fail to improve. Orders Placed This Encounter   Medications    methylPREDNISolone (MEDROL DOSEPACK) 4 MG tablet     Sig: Take by mouth. Dispense:  1 kit     Refill:  0      Advised the patient that this is a chronic issue and the walk-in is for acute issues. Complete medrol dosepack. Follow-up with PCP and physical therapy. Patient and/or parent given educational materials - see patient instructions. Discussed use, benefit, and side effects of prescribed medications. All patient questions answered. Patient and/or parent voiced understanding.       Electronically signed by MORE Aguilera 2/17/2022 at 2:14 PM

## 2022-02-25 ENCOUNTER — OFFICE VISIT (OUTPATIENT)
Dept: PRIMARY CARE CLINIC | Age: 37
End: 2022-02-25
Payer: COMMERCIAL

## 2022-02-25 VITALS
SYSTOLIC BLOOD PRESSURE: 129 MMHG | HEART RATE: 85 BPM | OXYGEN SATURATION: 97 % | DIASTOLIC BLOOD PRESSURE: 91 MMHG | TEMPERATURE: 97.9 F | BODY MASS INDEX: 33.83 KG/M2 | WEIGHT: 216 LBS

## 2022-02-25 DIAGNOSIS — Z91.030 BEE ALLERGY STATUS: ICD-10-CM

## 2022-02-25 DIAGNOSIS — M79.2 RADICULAR PAIN IN RIGHT ARM: ICD-10-CM

## 2022-02-25 DIAGNOSIS — M54.14 THORACIC RADICULOPATHY: Primary | ICD-10-CM

## 2022-02-25 PROCEDURE — G8417 CALC BMI ABV UP PARAM F/U: HCPCS | Performed by: NURSE PRACTITIONER

## 2022-02-25 PROCEDURE — G8484 FLU IMMUNIZE NO ADMIN: HCPCS | Performed by: NURSE PRACTITIONER

## 2022-02-25 PROCEDURE — G8427 DOCREV CUR MEDS BY ELIG CLIN: HCPCS | Performed by: NURSE PRACTITIONER

## 2022-02-25 PROCEDURE — 99214 OFFICE O/P EST MOD 30 MIN: CPT | Performed by: NURSE PRACTITIONER

## 2022-02-25 PROCEDURE — 1036F TOBACCO NON-USER: CPT | Performed by: NURSE PRACTITIONER

## 2022-02-25 RX ORDER — NORTRIPTYLINE HYDROCHLORIDE 10 MG/1
10 CAPSULE ORAL NIGHTLY
Qty: 30 CAPSULE | Refills: 3 | Status: SHIPPED | OUTPATIENT
Start: 2022-02-25 | End: 2022-03-01 | Stop reason: SINTOL

## 2022-02-25 RX ORDER — EPINEPHRINE 0.3 MG/.3ML
0.3 INJECTION SUBCUTANEOUS ONCE
Qty: 0.3 ML | Refills: 2 | Status: SHIPPED | OUTPATIENT
Start: 2022-02-25 | End: 2022-10-31

## 2022-02-25 ASSESSMENT — ENCOUNTER SYMPTOMS
SHORTNESS OF BREATH: 0
COUGH: 0

## 2022-02-25 NOTE — PROGRESS NOTES
Visit Information    Have you changed or started any medications since your last visit including any over-the-counter medicines, vitamins, or herbal medicines? no   Are you having any side effects from any of your medications? -  no  Have you stopped taking any of your medications? Is so, why? -  no    Have you seen any other physician or provider since your last visit? Yes - Records Obtained  Have you had any other diagnostic tests since your last visit? Yes - Records Obtained  Have you been seen in the emergency room and/or had an admission to a hospital since we last saw you? No  Have you had your routine dental cleaning in the past 6 months? no    Have you activated your [employer] account? If not, what are your barriers?  Yes     Patient Care Team:  MORE Brock - CNP as PCP - General (Family Nurse Practitioner)  Marek Parikh MD as Consulting Physician (Gastroenterology)    Medical History Review  Past Medical, Family, and Social History reviewed and does contribute to the patient presenting condition    Health Maintenance   Topic Date Due    Hepatitis C screen  Never done    Varicella vaccine (1 of 2 - 2-dose childhood series) Never done    COVID-19 Vaccine (1) Never done    Flu vaccine (1) 06/30/2022 (Originally 9/1/2021)    Cervical cancer screen  04/29/2022    Depression Screen  02/17/2023    DTaP/Tdap/Td vaccine (2 - Td or Tdap) 09/10/2029    HIV screen  Completed    Hepatitis A vaccine  Aged Out    Hepatitis B vaccine  Aged Out    Hib vaccine  Aged Out    Meningococcal (ACWY) vaccine  Aged Out    Pneumococcal 0-64 years Vaccine  Aged Out

## 2022-02-25 NOTE — PROGRESS NOTES
Sj Knight (:  1985) is a 39 y.o. female,Established patient, here for evaluation of the following chief complaint(s):  Establish Care and 3 Month Follow-Up (review EMG)         ASSESSMENT/PLAN:  1. Thoracic radiculopathy  -     nortriptyline (PAMELOR) 10 MG capsule; Take 1 capsule by mouth nightly, Disp-30 capsule, R-3Normal  -     Handicap Placard MISC; Starting 2022, Disp-1 each, R-0, Print  2. Bee allergy status  -     EPINEPHrine (EPIPEN 2-ELOY) 0.3 MG/0.3ML SOAJ injection; Inject 0.3 mLs into the muscle once for 1 dose Use as directed for allergic reaction, Disp-0.3 mL, R-2Normal  3. Radicular pain in right arm  -     nortriptyline (PAMELOR) 10 MG capsule; Take 1 capsule by mouth nightly, Disp-30 capsule, R-3Normal      No follow-ups on file. Subjective   SUBJECTIVE/OBJECTIVE:  HPI   Right arm- chronic pain. Debilitating when it is bad. Pain shoots from arm pit to elbow to fingers. States they told her she has cubital tunnel syndrome. She had an emg and follows up with NS on . The pain prevents her from working. She also has a chronic neuropathy in bilateral legs from a herniated disc in her lower back she reports. She has done pt. She is on lyrica, cymbalta, muscle relaxer (makes her sleepy). Just had a medrol dose pack, this helped a little. Review of Systems   Constitutional: Negative for chills and fever. Respiratory: Negative for cough and shortness of breath. Cardiovascular: Negative for chest pain, palpitations and leg swelling. Objective      Vitals:    22 0959   BP: (!) 129/91   Pulse:    Temp:    SpO2:        Physical Exam  Constitutional:       Appearance: She is well-developed. HENT:      Right Ear: External ear normal.      Left Ear: External ear normal.      Nose: Nose normal.   Cardiovascular:      Rate and Rhythm: Normal rate and regular rhythm.       Heart sounds: Normal heart sounds, S1 normal and S2 normal.

## 2022-03-01 ENCOUNTER — OFFICE VISIT (OUTPATIENT)
Dept: NEUROLOGY | Age: 37
End: 2022-03-01
Payer: COMMERCIAL

## 2022-03-01 VITALS
DIASTOLIC BLOOD PRESSURE: 87 MMHG | TEMPERATURE: 98.4 F | WEIGHT: 210 LBS | HEIGHT: 67 IN | HEART RATE: 83 BPM | BODY MASS INDEX: 32.96 KG/M2 | RESPIRATION RATE: 18 BRPM | SYSTOLIC BLOOD PRESSURE: 123 MMHG | OXYGEN SATURATION: 97 %

## 2022-03-01 DIAGNOSIS — G50.0 TRIGEMINAL NEURALGIA: ICD-10-CM

## 2022-03-01 DIAGNOSIS — E04.1 THYROID NODULE: ICD-10-CM

## 2022-03-01 DIAGNOSIS — G62.9 SMALL FIBER NEUROPATHY: Primary | ICD-10-CM

## 2022-03-01 DIAGNOSIS — E53.8 B12 DEFICIENCY: ICD-10-CM

## 2022-03-01 PROCEDURE — G8484 FLU IMMUNIZE NO ADMIN: HCPCS | Performed by: STUDENT IN AN ORGANIZED HEALTH CARE EDUCATION/TRAINING PROGRAM

## 2022-03-01 PROCEDURE — G8427 DOCREV CUR MEDS BY ELIG CLIN: HCPCS | Performed by: STUDENT IN AN ORGANIZED HEALTH CARE EDUCATION/TRAINING PROGRAM

## 2022-03-01 PROCEDURE — 1036F TOBACCO NON-USER: CPT | Performed by: STUDENT IN AN ORGANIZED HEALTH CARE EDUCATION/TRAINING PROGRAM

## 2022-03-01 PROCEDURE — G8417 CALC BMI ABV UP PARAM F/U: HCPCS | Performed by: STUDENT IN AN ORGANIZED HEALTH CARE EDUCATION/TRAINING PROGRAM

## 2022-03-01 PROCEDURE — 99212 OFFICE O/P EST SF 10 MIN: CPT | Performed by: STUDENT IN AN ORGANIZED HEALTH CARE EDUCATION/TRAINING PROGRAM

## 2022-03-01 ASSESSMENT — ENCOUNTER SYMPTOMS
ABDOMINAL PAIN: 0
NAUSEA: 0
COUGH: 0
DIARRHEA: 0
CONSTIPATION: 1
CHEST TIGHTNESS: 0
EYE DISCHARGE: 0
VOMITING: 0
SORE THROAT: 0
SHORTNESS OF BREATH: 0
BACK PAIN: 0
WHEEZING: 0

## 2022-03-01 NOTE — PROGRESS NOTES
[address] [place] [address], [facility], [facility], [address] [facility], [address] [place] [address]  P: [phone]  F: [phone]    NEUROLOGY CLINIC NOTE     PATIENT NAME: Arnold Romero  PATIENT MRN: [id number]  PRIMARY CARE PHYSICIAN: MORE Lamb CNP    HPI:      Interval history: 3/1/2022:   Patient was seen in the clinic last on 11/23/2021. Patient fell last Sunday, while walking down the stairs, did not hit her head denies any LOC. Patient recently had EMG which showed right ulnar neuropathy at elbow. Patient complaining of drowsiness since the increase dose of the tegretol. Patient complaining of right forearm tingling, numbness on the ulnar aspect, difficulty holding, secondary to pain which is consistent with a mild neuropathy. Interval History: 11/23/2021   Patient had a fall on 11/15/2021. Patient stated that her left leg gave away and she fell down from the stair. Patient went to the ER, because every time she did not wait and she went back home. Patient presenting to the clinic today with complaint of back pain, lower back, radiating bilaterally to the buttocks, heaviness of buttocks, constipation, increased tingling and numbness on the left leg, as compared to right. Will get x-ray lumbar spine to rule out any acute trauma  CT  lumbar and thoracic spine. Increase the dose of Tegretol to 300 mg and 200 for 1 week followed by 300 twice daily. PT OT         Interval History: 9/21/2021  Patient was last seen in the clinic in May 2021. Since last clinic visit patient endorses headache located on the right side of the head along with right side of the face. As per patient her pain starts on the right ear radiating up to the top of the head and to the right side of the face, sharp stabbing pain, getting worse with chewing. Endorses mild photophobia and phonophobia. She also endorses tenderness on the skin of her face and head.   She has a history of migraine headaches, as per patient this headache is different than her regular migraine headaches. Denies any nausea or vomiting. Denies any redness in the eyes or watering from the eyes or watering from the nose. Endorses mild gait unsteadiness, denies any dizziness or drowsiness, denies any speech difficulty. She was evaluated in the ER on 9/19/2021 for this headache, CT head was done which did not show any acute intracranial pathology. She was diagnosed to have right-sided trigeminal neuralgia and started on Tegretol 100 mg twice daily. Endorses improvement in the stabbing pain with it. Endorses improvement in the headache with the heating pad     She continues to have chronic neck pain radiating to the right upper extremity down to the elbow down into the fingers. She had a fall on 8/5/2021, she fell down several stairs, was evaluated in the ER at that time. CT cervical thoracic and lumbar spine was done which did not show any acute abnormalities. Patient was evaluated by neurosurgery after the fall on 8/12/2021 in the clinic. During that visit neurosurgery ordered upright flexion-extension images along with repeat EMG of bilateral upper extremities. Interval History: 5/24/2021   Patient was last seen in the clinic in February 2021. Patient had anterior cervical C5-C6 disc arthroplasty done on 4/30/2021. Postprocedure cervical flexion extension x-rays were stable. Since then patient continues to complain of neck pain radiating down to the right upper extremity associated with tingling and numbness in the right upper extremity, endorses improvement in the tingling and numbness along with the weakness in the left upper extremity, continues to have some weakness in the right upper extremity. She will be following up with Dr. Silvana Ac on 6/29/2021.      X-ray cervical spine flexion extension 5/1/2021  Impression   Postsurgical changes related to placement of an inter vertebral disc   prosthetic at C5-C6. No spondylolisthesis. She continues to have back pain with radicular symptoms in bilateral lower extremities, endorses worsening of paresthesias in her left foot. Currently she is taking Cymbalta 60 mg at nighttime. Taking Flexeril 5 mg at nighttime. Also taking Lyrica 100 mg 3 times a day. Endorses some improvement in the symptoms with that. Denies any side effects from Lyrica or Cymbalta or Flexeril. Patient saw the pain physician, was not started on any intervention at present as she needs to complete 6 weeks of physical therapy as per the pain management prior to any intervention. 2/9/2021  Vitamin E, alpha-tocopherol 8.1, gamma tocopherol  1.2  Vitamin B12 577  Folate 12.4  Vitamin B6 34.1  ESR 4  CRP 3.4  Ceruloplasmin normal 28  Copper 140.9 normal  LOUISE negative      Shavonne Catalan is a 38 y.o. right handed  female with PMH significant for chronic back pain, thyroid nodule, anxiety was seen in the clinic for neuropathy      History obtained from Patient and medical chart review. Patient has a history of chronic neck pain, getting worse for last 1-1/2-year, endorses sharp stabbing pain in the neck radiating down to bilateral upper extremities, worse on the left side as compared to the right side. She endorses constant tingling and numbness in her hands bilaterally, endorses intermittent tingling and numbness in bilateral upper extremities involving the forearm and. She also complains of weakness in her bilateral upper extremities, endorses dropping things from her hands, difficulty with her hand , difficulty holding the objects. Difficulty holding baby, endorses her hands locking up while eating. Difficulty with zipping and buttoning and dressing. She had MRI of the cervical spine done on 2/9/2021 which showed disc protrusion at C5-C6 causing moderate stenosis of the thecal sac and narrowing of the neural foramina.   Multiple small lymph nodes scattered throughout the neck, mostly nonspecific and reactive. Patient was evaluated by neurosurgery today, recommend cervical flexion-extension x-rays and was referred to physical therapy. She also had EMG nerve conduction study done of bilateral upper extremities on 7/8/2020, this was done at [facility].  No images available to review, as per the report EMG study was normal.  There was no electrodiagnostic evidence of generalized large fiber peripheral polyneuropathy or cervical radiculopathy. Patient also gives a history of chronic back pain, started in 2004. She had received epidural injection for her labor, post epidural injection she started having back pain. She endorses sharp stabbing pain in her lower back radiating to bilateral lower extremities, pain worse on the right side specially her right buttock radiating to bilateral lower extremities, she feels more weaker in the left lower extremity. Endorses constant tingling and numbness in her bilateral lower extremities, specially at nighttime she feels as if her repeat feet on fire. Endorses difficulty with walking specially for a long period of time. Denies any problem with bowel or bladder function, has a history of urinary urgency but denies any bladder accidents. Feels unsteady while ambulation, denies any falls. Does not use any aids for walking. Denies any perineal numbness or saddle anesthesia. She has never seen a pain management before. MRI of the lumbar spine done on 1/24/2020 showed mild disc bulge at L4-L5 and L5-S1 not causing significant spinal stenosis or neuroforaminal narrowing. Mild facet joint arthritis in the lower lumbar spine. Normal alignment of the lumbar spine without fractures or subluxations. EMG nerve conduction study of bilateral lower extremities was done on 12/23/2019 which showed chronic mild left L5 radiculopathy without active denervation.   No electrodiagnostic evidence of generalized large fiber peripheral polyneuropathy. Patient used to follow-up with a neurologist at [facility], [facility], was last seen in 2020. Patient had tried gabapentin in the past, denies any improvement in the symptoms with gabapentin. Does not remember the dose of gabapentin tried. Currently she is on Lyrica 100 mg twice a day, this was started by her PCP. Denies any significant improvement in her symptoms with Lyrica. Has not tried any other pain medications. Except for Tylenol and motrin      Currently taking Flexeril 5 mg as needed for muscle spasm, endorses improvement in her symptoms with that. Denies any side effects from Flexeril. Taking Mobic 7.5 mg 2 times a day every day    PATIENT HISTORY:     Past Medical History:   Diagnosis Date    Anxiety     Blood loss     after     Cervical disc disease     Cervical myopathy     Chronic back pain     GERD (gastroesophageal reflux disease)     Hx of migraine headaches     Neuropathy     both legs    Thyroid nodule     Weakness generalized     due to neck issues        Past Surgical History:   Procedure Laterality Date    CERVICAL DISC ARTHROPLASTY  2021    ANTERIOR C5-6 DISC ARTHROPLASTY     CERVICAL FUSION N/A 2021    ANTERIOR C5-6 DISC ARTHROPLASTY performed by Benjy Deshpande DO at [address] 3      x3    DILATION AND CURETTAGE OF UTERUS      TUBAL LIGATION          Social History     Socioeconomic History    Marital status:      Spouse name: Not on file    Number of children: Not on file    Years of education: Not on file    Highest education level: Not on file   Occupational History    Not on file   Tobacco Use    Smoking status: Former Smoker     Years: 2.00     Quit date:      Years since quittin.1    Smokeless tobacco: Never Used    Tobacco comment: [id number], smoker then   Vaping Use    Vaping Use: Never used   Substance and Sexual Activity    Alcohol use:  Yes Comment: occasional    Drug use: Never    Sexual activity: Not on file   Other Topics Concern    Not on file   Social History Narrative    Not on file     Social Determinants of Health     Financial Resource Strain: Low Risk     Difficulty of Paying Living Expenses: Not hard at all   Food Insecurity: No Food Insecurity    Worried About Running Out of Food in the Last Year: Never true    [address] in the Last Year: Never true   Transportation Needs:     Lack of Transportation (Medical): Not on file    Lack of Transportation (Non-Medical): Not on file   Physical Activity:     Days of Exercise per Week: Not on file    Minutes of Exercise per Session: Not on file   Stress:     Feeling of Stress : Not on file   Social Connections:     Frequency of Communication with Friends and Family: Not on file    Frequency of Social Gatherings with Friends and Family: Not on file    Attends Christianity Services: Not on file    Active Member of [address] [place] [address] or Organizations: Not on file    Attends Club or Organization Meetings: Not on file    Marital Status: Not on file   Intimate Partner Violence:     Fear of Current or Ex-Partner: Not on file    Emotionally Abused: Not on file    Physically Abused: Not on file    Sexually Abused: Not on file   Housing Stability:     Unable to Pay for Housing in the Last Year: Not on file    Number of [place] in the Last Year: Not on file    Unstable Housing in the Last Year: Not on file        Current Outpatient Medications   Medication Sig Dispense Refill    Handicap Placard MISC by Does not apply route 1 each 0    carBAMazepine (TEGRETOL-XR) 200 MG extended release tablet Take 1 tablet by mouth 2 times daily Two prescription. 200 and morning along with 100 mg in the morning. 200 mg at bedtime along with 100 mg at bedtime. 60 tablet 2    carBAMazepine (TEGRETOL-XR) 100 MG extended release tablet Take 1 tablet by mouth 2 times daily Two prescription.   200 and morning along with 100 mg in the morning. 200 mg at bedtime along with 100 mg at bedtime. 60 tablet 3    pregabalin (LYRICA) 150 MG capsule Take 1 capsule by mouth 3 times daily for 90 days. 90 capsule 3    propranolol (INDERAL LA) 60 MG extended release capsule Take 1 capsule by mouth daily 30 capsule 3    Blood Pressure KIT Blood pressure management 1 kit 0    DULoxetine (CYMBALTA) 60 MG extended release capsule Take 1 capsule by mouth daily 30 capsule 2    cyclobenzaprine (FLEXERIL) 5 MG tablet Take 1 tablet by mouth 2 times daily as needed for Muscle spasms 15 tablet 0    ibuprofen (ADVIL;MOTRIN) 800 MG tablet Take 1 tablet by mouth 2 times daily as needed for Pain 60 tablet 3    Handicap Placard MISC by Does not apply route 1 years 1 each 0    EPINEPHrine (EPIPEN 2-ELOY) 0.3 MG/0.3ML SOAJ injection Inject 0.3 mLs into the muscle once for 1 dose Use as directed for allergic reaction 0.3 mL 2    Elastic Bandages & Supports (FUTURO SOFT CERVICAL COLLAR) MISC 1 Units by Does not apply route as needed (neck pain) 1 each 0     No current facility-administered medications for this visit. Allergies   Allergen Reactions    Bee Venom Anaphylaxis    Doxycycline Nausea Only        REVIEW OF SYSTEMS:     Review of Systems   Constitutional: Negative for activity change, chills, fever and unexpected weight change. HENT: Negative for congestion, hearing loss and sore throat. Eyes: Negative for discharge and visual disturbance. Respiratory: Negative for cough, chest tightness, shortness of breath and wheezing. Cardiovascular: Negative for chest pain, palpitations and leg swelling. Gastrointestinal: Positive for constipation. Negative for abdominal pain, diarrhea, nausea and vomiting. Endocrine: Negative for polydipsia and polyphagia. Genitourinary: Negative for decreased urine volume, difficulty urinating, dysuria, frequency and vaginal pain.    Musculoskeletal: Negative for arthralgias, back pain, joint swelling and neck stiffness. Skin: Negative for rash. Allergic/Immunologic: Negative for environmental allergies. Neurological: Positive for weakness and numbness. Negative for dizziness, tremors, seizures, syncope, facial asymmetry, speech difficulty and headaches. Hematological: Negative for adenopathy. Psychiatric/Behavioral: Negative for agitation, confusion, hallucinations and suicidal ideas. VITALS  /87 (Site: Left Upper Arm, Position: Sitting, Cuff Size: Large Adult)   Pulse 83   Temp 98.4 °F (36.9 °C) (Temporal)   Resp 18   Ht 5' 7\" (1.702 m)   Wt 210 lb (95.3 kg)   SpO2 97%   BMI 32.89 kg/m²      PHYSICAL EXAMINATION:     Physical Exam     Constitutional: Well developed, well nourished and in no acute distress. Head:  normocephalic, atraumatic. Neck: supple, no carotid bruits, thyroid not palpable  Respiratory: Clear to auscultation bilaterally with no use of accessory muscles during respiration. Cardiovascular: normal rate, regular rhythm, no murmur, gallop, rub.   Abdomen: Soft, nontender, nondistended, normal bowel sounds, no hepatomegaly or splenomegaly  Extremities:  peripheral pulses palpable, no pedal edema or calf pain with palpation  Psych: normal affect      NEUROLOGICAL EXAMINATION:     Mental status   Alert and oriented; intact memory with no confusion, speech or language problems; no hallucinations or delusions     Cranial nerves   II - visual fields intact to confrontation                                                III, IV, VI  extra-ocular muscles full: no pupillary defect; no AMANDA, no nystagmus, no ptosis   V - normal facial sensation                                                               VII - normal facial symmetry                                                             VIII - intact hearing                                                                             IX, X - symmetrical palate XI - symmetrical shoulder shrug                                                       XII - midline tongue without atrophy or fasciculation     Motor function  Normal muscle bulk and tone  Muscle strength:   Left 4+/5   fine motor movements     Sensory function  decreased sensation on the left lower extremity . Decreased sensation on right medial forearm along with fourth and fifth digit. Right hand  4+/5  Right hand finger abduction adduction 4+/5        Cerebellar Intact fine motor movement. No involuntary movements or tremors     Reflex function Intact 2+ DTR and symmetric. Negative Babinski     Gait                  Normal station and gait           PRIOR TESTS AND IMAGING: Following images and Labs were reviewed by the examiner     MRI lumbar spine without contrast 1/24/2020     IMPRESSION:  1. Mild disc bulge at L4-5 and L5-S1 not causing significant spinal stenosis or neuroforaminal narrowing. 2.  Mild facet joint arthritis in the lower lumbar spine. 3.  Normal alignment of the lumbar spine without fractures or subluxations seen. EMG nerve conduction study 12/23/2019 bilateral lower extremities  1-Abnormal study of the left lower extremity and normal study of the right   lower extremity. There is electrodiagnostic evidence suggestive of a   chronic mild left L5 radiculopathy without active denervation. There is   no electrodiagnostic evidence of a generalized large fiber peripheral   polyneuropathy. Clinical correlation is required. EMG nerve conduction studies bilateral upper extremity 7/8/2020  1- Normal study of the bilateral upper extremities. There is no   electrodiagnostic evidence of a generalized large fiber peripheral   polyneuropathy or a cervical radiculopathy. Clinical correlation is   required. MRI brain without contrast 7/15/2020  IMPRESSION:  Unremarkable unenhanced MRI of the brain.         MRI cervical spine without contrast 2/9/2021  Impression   Disc protrusion at C5-6 causing moderate stenosis of the thecal sac and   narrowing of the neural foramina as discussed above. Multiple small lymph nodes scattered throughout the neck that are   nonspecific. They may be reactive. The number of lymph nodes is concerning   in a patient of this age. Clinically correlate to exclude underlying   pathology. X-ray cervical spine flexion extension 5/1/2021  Impression   Postsurgical changes related to placement of an inter vertebral disc   prosthetic at C5-C6. No spondylolisthesis           CT lumbar Thoracic:  11/24/2021    Impression   No acute fracture or subluxation in the thoracolumbar spine.             Impression   No acute fracture or subluxation in the thoracolumbar spine.         EMG: Right upper extremity 2/7/2022    Impression:      1. Abnormal electrodiagnostic study. 2. There is evidence of a right ulnar neuropathy at the elbow. 3. There is no evidence of bilateral median neuropathy. 4. There is no evidence of right cervical radiculopathy, plexopathy, or myopathy        12/10/2020  TSH 0.24, T4 1.13  HbA1c 5.1     2/9/2021  Vitamin ED, alpha-tocopherol 8.1, gamma tocopherol  1.2  Vitamin B12 577  Folate 12.4  Vitamin B6 34.1  ESR 4  CRP 3.4  Ceruloplasmin normal 28  Copper 140.9 normal  LOUISE negative          ASSESSMENT / PLAN:       Salomón Todd is a 38 y.o. right handed  female with PMH significant for chronic back pain, thyroid nodule, anxiety was seen in the clinic for neuropathy      Hx of fall on 11/15/2021  HX of fall on 2/27/2022- Patient was offered scan, but patient refused it. 1. Possible trigeminal neuralgia on the right side of the face  2. History of chronic migraine headaches  3. Right ulnar neuropathy at elbow, confirmed with EMG   4. Small fiber peripheral polyneuropathy, chronic right sciatica   5.  Chronic neck pain with bilateral radicular symptoms, worse in the left upper extremity as compared to right  6. Chronic back pain with bilateral radicular symptoms, more pain in the right lower extremity, weakness in the left lower extremity  7. Pain and paresthesias in her bilateral upper extremities and lower extremities  8. Possible peripheral polyneuropathy       PLAN:   1. We will increase the dose of Tegretol  300 m BID  2. We will add magnesium oxide 400 mg daily for headache prophylaxis  3. Continue Lyrica 150 mg 3 times a day. 4. Discontinue nortriptyline, patient responding well to increased dose of Tegretol, and patient is still sleepy will discontinue nortriptyline. 5. B12, methylmalonic acid level, ACE level, serum electrophoresis with immunofixation  6. Chest x-ray AP lateral view  7. Neurosurgery follow-up for EMG finding of right alert neuropathy at elbow. 8. Continue Cymbalta 60 mg at nighttime. Discussed possible side effects with the patient. Instructed patient to call the clinic if develop any side effects. Patient not taking Lexapro anymore. 9. Flexeril 5 mg nightly as needed for muscle spasms, patient is aware of side effect of increased drowsiness. 10. Follow-up in clinic in 6 weeks. Ms. Jeferson Palomino received counseling on the following healthy behaviors: medical compliance, smoking cessation, blood pressure control, regular follow up with primary doctor.          Electronically signed by Jacob Bassett MD on 3/1/2022 at 5:46 PM

## 2022-03-02 ENCOUNTER — HOSPITAL ENCOUNTER (OUTPATIENT)
Age: 37
Setting detail: SPECIMEN
Discharge: HOME | End: 2022-03-02
Payer: COMMERCIAL

## 2022-03-02 ENCOUNTER — HOSPITAL ENCOUNTER (OUTPATIENT)
Age: 37
Discharge: HOME | End: 2022-03-04
Payer: COMMERCIAL

## 2022-03-02 ENCOUNTER — HOSPITAL ENCOUNTER (OUTPATIENT)
Dept: GENERAL RADIOLOGY | Age: 37
Discharge: HOME | End: 2022-03-04
Payer: COMMERCIAL

## 2022-03-02 DIAGNOSIS — E04.1 THYROID NODULE: ICD-10-CM

## 2022-03-02 DIAGNOSIS — G62.9 SMALL FIBER NEUROPATHY: ICD-10-CM

## 2022-03-02 DIAGNOSIS — E53.8 B12 DEFICIENCY: ICD-10-CM

## 2022-03-02 LAB
ANGIOTENSIN-CONVERTING ENZYME: 28 U/L (ref 8–52)
FOLATE: 11.3 NG/ML
VITAMIN B-12: 667 PG/ML (ref 232–1245)

## 2022-03-02 PROCEDURE — 83921 ORGANIC ACID SINGLE QUANT: CPT

## 2022-03-02 PROCEDURE — 84165 PROTEIN E-PHORESIS SERUM: CPT

## 2022-03-02 PROCEDURE — 84155 ASSAY OF PROTEIN SERUM: CPT

## 2022-03-02 PROCEDURE — 82607 VITAMIN B-12: CPT

## 2022-03-02 PROCEDURE — 86376 MICROSOMAL ANTIBODY EACH: CPT

## 2022-03-02 PROCEDURE — 36415 COLL VENOUS BLD VENIPUNCTURE: CPT

## 2022-03-02 PROCEDURE — 71046 X-RAY EXAM CHEST 2 VIEWS: CPT

## 2022-03-02 PROCEDURE — 82164 ANGIOTENSIN I ENZYME TEST: CPT

## 2022-03-02 PROCEDURE — 82746 ASSAY OF FOLIC ACID SERUM: CPT

## 2022-03-04 LAB
ALBUMIN (CALCULATED): 4.1 G/DL (ref 3.2–5.2)
ALBUMIN PERCENT: 61 % (ref 45–65)
ALPHA 1 PERCENT: 2 % (ref 3–6)
ALPHA 2 PERCENT: 10 % (ref 6–13)
ALPHA-1-GLOBULIN: 0.1 G/DL (ref 0.1–0.4)
ALPHA-2-GLOBULIN: 0.7 G/DL (ref 0.5–0.9)
BETA GLOBULIN: 0.8 G/DL (ref 0.5–1.1)
BETA PERCENT: 11 % (ref 11–19)
GAMMA GLOBULIN %: 16 % (ref 9–20)
GAMMA GLOBULIN: 1.1 G/DL (ref 0.5–1.5)
PATHOLOGIST: ABNORMAL
PROTEIN ELECTROPHORESIS, SERUM: ABNORMAL
THYROID PEROXIDASE (TPO) AB: <4 IU/ML (ref 0–25)
TOTAL PROT. SUM,%: 100 % (ref 98–102)
TOTAL PROT. SUM: 6.8 G/DL (ref 6.3–8.2)
TOTAL PROTEIN: 6.8 G/DL (ref 6.4–8.3)

## 2022-03-06 LAB — METHYLMALONIC ACID: 0.12 UMOL/L (ref 0–0.4)

## 2022-03-10 ENCOUNTER — OFFICE VISIT (OUTPATIENT)
Dept: NEUROSURGERY | Age: 37
End: 2022-03-10
Payer: COMMERCIAL

## 2022-03-10 VITALS
DIASTOLIC BLOOD PRESSURE: 80 MMHG | BODY MASS INDEX: 33.02 KG/M2 | TEMPERATURE: 97.9 F | SYSTOLIC BLOOD PRESSURE: 110 MMHG | HEART RATE: 83 BPM | WEIGHT: 210.4 LBS | RESPIRATION RATE: 22 BRPM | OXYGEN SATURATION: 98 % | HEIGHT: 67 IN

## 2022-03-10 DIAGNOSIS — G56.21 CUBITAL TUNNEL SYNDROME, RIGHT: Primary | ICD-10-CM

## 2022-03-10 PROCEDURE — 99213 OFFICE O/P EST LOW 20 MIN: CPT | Performed by: NURSE PRACTITIONER

## 2022-03-10 PROCEDURE — G8417 CALC BMI ABV UP PARAM F/U: HCPCS | Performed by: NURSE PRACTITIONER

## 2022-03-10 PROCEDURE — G8484 FLU IMMUNIZE NO ADMIN: HCPCS | Performed by: NURSE PRACTITIONER

## 2022-03-10 PROCEDURE — 1036F TOBACCO NON-USER: CPT | Performed by: NURSE PRACTITIONER

## 2022-03-10 PROCEDURE — G8427 DOCREV CUR MEDS BY ELIG CLIN: HCPCS | Performed by: NURSE PRACTITIONER

## 2022-03-10 NOTE — PROGRESS NOTES
[address]  [facility] [address] [facility] [address]  Dept: [phone]    Patient:  Chandra Chase  YOB: 1985  Date: 3/10/22    The patient is a 39 y.o. female who presents today for consult of the following problems:     Chief Complaint   Patient presents with    Follow-up         HPI:     Chandra Chase is a 39 y.o. female who presents with concerns regarding continued right upper extremity discomfort. Patient underwent C5-6 arthroplasty in April of last year, was at that time having significant left upper extremity radiculopathy as well as myelopathy. Since that surgery her left upper extremity symptoms have resolved, has also had improvement to her myelopathic symptoms. She did after surgery developed significant right upper extremity pain. Pain typically extends from right elbow down ulnar aspect of arm into lateral hand. Will also sometimes radiate up her arm and even into her right breast at its worst.  Pain has been unrelenting for several months. Reports it is significantly bothersome while trying to sleep, frequently wakes her up prevents her from being able to get comfortable. Has really not been able to find any significant comfortable positions. Has also started to have some intermittent cramping to medial aspect of her right hand. Did recently undergo EMG for further evaluation, and presents today for review.     History:     Past Medical History:   Diagnosis Date    Anxiety     Blood loss     after     Cervical disc disease     Cervical myopathy     Chronic back pain     GERD (gastroesophageal reflux disease)     Hx of migraine headaches     Neuropathy     both legs    Thyroid nodule     Weakness generalized     due to neck issues     Past Surgical History:   Procedure Laterality Date    CERVICAL DISC ARTHROPLASTY  2021 ANTERIOR C5-6 DISC ARTHROPLASTY     CERVICAL FUSION N/A 4/30/2021    ANTERIOR C5-6 DISC ARTHROPLASTY performed by Cyndi Merritt DO at [address]      x3    DILATION AND CURETTAGE OF UTERUS      TUBAL LIGATION       Family History   Problem Relation Age of Onset    Diabetes Mother     High Blood Pressure Mother     Other Mother         Tremor    Seizures Mother     Heart Disease Father     Hypertension Father     Cancer Sister         Lymphoblastic lymphoma    Diabetes Maternal Uncle     Heart Attack Maternal Grandmother     Diabetes Maternal Grandmother     Kidney Disease Maternal Grandmother     Breast Cancer Paternal Aunt      Current Outpatient Medications on File Prior to Visit   Medication Sig Dispense Refill    Handicap Placard MISC by Does not apply route 1 each 0    carBAMazepine (TEGRETOL-XR) 200 MG extended release tablet Take 1 tablet by mouth 2 times daily Two prescription. 200 and morning along with 100 mg in the morning. 200 mg at bedtime along with 100 mg at bedtime. 60 tablet 2    carBAMazepine (TEGRETOL-XR) 100 MG extended release tablet Take 1 tablet by mouth 2 times daily Two prescription. 200 and morning along with 100 mg in the morning. 200 mg at bedtime along with 100 mg at bedtime. 60 tablet 3    pregabalin (LYRICA) 150 MG capsule Take 1 capsule by mouth 3 times daily for 90 days.  90 capsule 3    propranolol (INDERAL LA) 60 MG extended release capsule Take 1 capsule by mouth daily 30 capsule 3    Blood Pressure KIT Blood pressure management 1 kit 0    DULoxetine (CYMBALTA) 60 MG extended release capsule Take 1 capsule by mouth daily 30 capsule 2    cyclobenzaprine (FLEXERIL) 5 MG tablet Take 1 tablet by mouth 2 times daily as needed for Muscle spasms 15 tablet 0    ibuprofen (ADVIL;MOTRIN) 800 MG tablet Take 1 tablet by mouth 2 times daily as needed for Pain 60 tablet 3    Handicap Placard MISC by Does not apply route 1 years 1 each 0    EPINEPHrine (EPIPEN 2-ELOY) 0.3 MG/0.3ML SOAJ injection Inject 0.3 mLs into the muscle once for 1 dose Use as directed for allergic reaction 0.3 mL 2    [DISCONTINUED] topiramate (TOPAMAX) 50 MG tablet Take 1 tablet by mouth 2 times daily 60 tablet 3    Elastic Bandages & Supports (FUTURO SOFT CERVICAL COLLAR) MISC 1 Units by Does not apply route as needed (neck pain) 1 each 0     No current facility-administered medications on file prior to visit. Social History     Tobacco Use    Smoking status: Former Smoker     Years: 2.00     Quit date:      Years since quittin.2    Smokeless tobacco: Never Used    Tobacco comment: [id number], smoker then   Vaping Use    Vaping Use: Never used   Substance Use Topics    Alcohol use: Yes     Comment: occasional    Drug use: Never       Allergies   Allergen Reactions    Bee Venom Anaphylaxis    Doxycycline Nausea Only       Review of Systems  Constitutional: Negative for activity change and appetite change. HENT: Negative for ear pain and facial swelling. Eyes: Negative for discharge and itching. Respiratory: Negative for choking and chest tightness. Cardiovascular: Negative for chest pain and leg swelling. Gastrointestinal: Negative for nausea and abdominal pain. Endocrine: Negative for cold intolerance and heat intolerance. Genitourinary: Negative for frequency and flank pain. Musculoskeletal: Negative for myalgias and joint swelling. Skin: Negative for rash and wound. Allergic/Immunologic: Negative for environmental allergies and food allergies. Hematological: Negative for adenopathy. Does not bruise/bleed easily. Psychiatric/Behavioral: Negative for self-injury. The patient is not nervous/anxious.       Physical Exam:      /80   Pulse 83   Temp 97.9 °F (36.6 °C) (Temporal)   Resp 22   Ht 5' 7\" (1.702 m)   Wt 210 lb 6.4 oz (95.4 kg)   SpO2 98%   BMI 32.95 kg/m²   Estimated body mass index is 32.95 kg/m² as calculated from the following:    Height as of this encounter: 5' 7\" (1.702 m). Weight as of this encounter: 210 lb 6.4 oz (95.4 kg). General:  Neeraj Fajardo is a 39y.o. year old female who appears her stated age. HEENT: Normocephalic atraumatic. Neck supple. Chest: regular rate; pulses equal  Abdomen: Soft nontender nondistended. Ext: DP and PT pulses 2+, good cap refill  Neuro    Mentation  Appropriate affect  Registration intact  Orientation intact  Judgement intact to situation    Cranial Nerves:   Pupils equal and reactive to light  Extraocular motion intact  Face and shrug symmetric  Tongue midline  No dysarthria  v1-3 sensation symmetric, masseter tone symmetric  Hearing symmetric    Sensation: Decreased to ulnar distribution of right upper    Motor  L deltoid 5/5; R deltoid 5/5  L biceps 5/5; R biceps 5/5  L triceps 5/5; R triceps 5/5  L wrist extension 5/5; R wrist extension 5/5  L intrinsics 5/5; R intrinsics 4/5     L iliopsoas 5/5 , R iliopsoas 5/5  L quadriceps 5/5; R quadriceps 5/5  L Dorsiflexion 5/5; R dorsiflexion 5/5  L Plantarflexion 5/5; R plantarflexion 5/5  L EHL 5/5; R EHL 5/5    Reflexes  L Brachioradialis 2+/4; R brachioradialis 2+/4  L Biceps 2+/4; R Biceps 2+/4  L Triceps 2+/4; R Triceps 2+/4  L Patellar 2+/4: R Patellar 2+/4  L Achilles 2+/4; R Achilles 2+/4    hoffmans L: neg  hoffmans R: neg  Clonus L: neg  Clonus R: neg  Babinski L: neg  Babinski R: neg    + Tinel's at right elbow and wrist    Studies Review:     EMG 2/7/2022:  1. Abnormal electrodiagnostic study. 2. There is evidence of a right ulnar neuropathy at the elbow. 3. There is no evidence of bilateral median neuropathy. 4. There is no evidence of right cervical radiculopathy, plexopathy, or myopathy. Assessment and Plan:      1. Cubital tunnel syndrome, right          Plan: EMG findings as well as physical exam consistent with right cubital tunnel syndrome.   Patient fitted with cubital tunnel brace to wear nightly. Given progression to progressive pain and right hand weakness with intermittent clawing, recommend follow-up in the next 4 weeks with Dr. Shelbi Lilly for further evaluation/recommendations    Followup: Return in about 4 weeks (around 4/7/2022), or if symptoms worsen or fail to improve. Prescriptions Ordered:  No orders of the defined types were placed in this encounter. Orders Placed:  No orders of the defined types were placed in this encounter. Electronically signed by Lawernce Hodgkin, APRN - CNP on 3/11/2022 at 10:10 AM    Please note that this chart was generated using voice recognition Dragon dictation software. Although every effort was made to ensure the accuracy of this automated transcription, some errors in transcription may have occurred.

## 2022-03-11 DIAGNOSIS — M48.02 STENOSIS OF CERVICAL SPINE WITH MYELOPATHY (HCC): ICD-10-CM

## 2022-03-11 DIAGNOSIS — M54.12 CERVICAL RADICULOPATHY: ICD-10-CM

## 2022-03-11 DIAGNOSIS — G99.2 STENOSIS OF CERVICAL SPINE WITH MYELOPATHY (HCC): ICD-10-CM

## 2022-03-11 RX ORDER — DULOXETIN HYDROCHLORIDE 60 MG/1
CAPSULE, DELAYED RELEASE ORAL
Qty: 30 CAPSULE | Refills: 2 | Status: SHIPPED | OUTPATIENT
Start: 2022-03-11 | End: 2022-05-24

## 2022-03-11 NOTE — TELEPHONE ENCOUNTER
Health Maintenance   Topic Date Due    Hepatitis C screen  Never done    Varicella vaccine (1 of 2 - 2-dose childhood series) Never done    COVID-19 Vaccine (1) Never done    Flu vaccine (1) 06/30/2022 (Originally 9/1/2021)    Cervical cancer screen  04/29/2022    Depression Screen  02/17/2023    DTaP/Tdap/Td vaccine (2 - Td or Tdap) 09/10/2029    HIV screen  Completed    Hepatitis A vaccine  Aged Out    Hepatitis B vaccine  Aged Out    Hib vaccine  Aged Out    Meningococcal (ACWY) vaccine  Aged Out    Pneumococcal 0-64 years Vaccine  Aged Out             (applicable per patient's age: Cancer Screenings, Depression Screening, Fall Risk Screening, Immunizations)    Hemoglobin A1C (%)   Date Value   06/03/2021 5.1   12/10/2020 5.1     LDL Cholesterol (mg/dL)   Date Value   11/26/2021 104     AST (U/L)   Date Value   11/26/2021 13     ALT (U/L)   Date Value   11/26/2021 10     BUN (mg/dL)   Date Value   11/26/2021 12      (goal A1C is < 7)   (goal LDL is <100) need 30-50% reduction from baseline     BP Readings from Last 3 Encounters:   03/10/22 110/80   03/01/22 123/87   02/25/22 (!) 129/91    (goal /80)      All Future Testing planned in [facility]:  Lab Frequency Next Occurrence   Saline lock IV Once 09/04/2021       Next Visit Date:  Future Appointments   Date Time Provider Thuy Zhui   4/29/2022 11:00 AM DO Roland Thomas Neuro [facility]   5/10/2022  3:00 PM Juan Castelan MD Neuro [facility] 20            Patient Active Problem List:     Chronic low back pain with sciatica     Thyroid nodule     Stenosis of cervical spine with myelopathy (HCC)     Cervical radiculopathy     Thoracic radiculopathy     Class 1 obesity due to excess calories with serious comorbidity in adult     Cervical disc disease     Establishing care with new doctor, encounter for     Bunion of great toe of right foot     S/P cervical discectomy     Cubital tunnel syndrome, right

## 2022-03-14 ENCOUNTER — TELEPHONE (OUTPATIENT)
Dept: NEUROSURGERY | Age: 37
End: 2022-03-14

## 2022-03-14 NOTE — TELEPHONE ENCOUNTER
Patient is requesting letter to be taken off work. Patient states her right arm feels as though it's burning. 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 Patient requesting to be off until next surgery.  Please approve

## 2022-04-18 ENCOUNTER — TELEPHONE (OUTPATIENT)
Dept: NEUROSURGERY | Age: 37
End: 2022-04-18

## 2022-04-18 DIAGNOSIS — Z76.0 MEDICATION REFILL: ICD-10-CM

## 2022-04-18 DIAGNOSIS — M48.02 STENOSIS OF CERVICAL SPINE WITH MYELOPATHY (HCC): ICD-10-CM

## 2022-04-18 DIAGNOSIS — M54.14 THORACIC RADICULOPATHY: ICD-10-CM

## 2022-04-18 DIAGNOSIS — G99.2 STENOSIS OF CERVICAL SPINE WITH MYELOPATHY (HCC): ICD-10-CM
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
## 2022-04-18 DIAGNOSIS — M54.12 CERVICAL RADICULOPATHY: ICD-10-CM

## 2022-04-18 NOTE — TELEPHONE ENCOUNTER
Pt states that she constant cramping pain in her thumb. She states that her hand is closing up. She states that the brace increases her pain patient and, that she gets a shutting pain that running down the inner side of her arm.

## 2022-04-19 RX ORDER — DULOXETIN HYDROCHLORIDE 60 MG/1
CAPSULE, DELAYED RELEASE ORAL
Qty: 30 CAPSULE | Refills: 10 | OUTPATIENT
Start: 2022-04-19

## 2022-04-19 NOTE — TELEPHONE ENCOUNTER
Health Maintenance   Topic Date Due    Hepatitis C screen  Never done    Varicella vaccine (1 of 2 - 2-dose childhood series) Never done    COVID-19 Vaccine (1) Never done    Cervical cancer screen  04/29/2022    Flu vaccine (Season Ended) 09/01/2022    Depression Screen  02/17/2023    DTaP/Tdap/Td vaccine (2 - Td or Tdap) 09/10/2029    HIV screen  Completed    Hepatitis A vaccine  Aged Out    Hepatitis B vaccine  Aged Out    Hib vaccine  Aged Out    Meningococcal (ACWY) vaccine  Aged Out    Pneumococcal 0-64 years Vaccine  Aged Out             (applicable per patient's age: Cancer Screenings, Depression Screening, Fall Risk Screening, Immunizations)    Hemoglobin A1C (%)   Date Value   06/03/2021 5.1   12/10/2020 5.1     LDL Cholesterol (mg/dL)   Date Value   11/26/2021 104     AST (U/L)   Date Value   11/26/2021 13     ALT (U/L)   Date Value   11/26/2021 10     BUN (mg/dL)   Date Value   11/26/2021 12      (goal A1C is < 7)   (goal LDL is <100) need 30-50% reduction from baseline     BP Readings from Last 3 Encounters:   03/10/22 110/80   03/01/22 123/87   02/25/22 (!) 129/91    (goal /80)      All Future Testing planned in [facility]:  Lab Frequency Next Occurrence   Saline lock IV Once 09/04/2021       Next Visit Date:  Future Appointments   Date Time Provider Thuy Tao   4/29/2022 11:00 AM DO Roland Banerjee [facility]   5/10/2022  3:00 PM Liane Goode MD Neuro [facility] 20            Patient Active Problem List:     Chronic low back pain with sciatica     Thyroid nodule     Stenosis of cervical spine with myelopathy (HCC)     Cervical radiculopathy     Thoracic radiculopathy     Class 1 obesity due to excess calories with serious comorbidity in adult     Cervical disc disease     Establishing care with new doctor, encounter for     Bunion of great toe of right foot     S/P cervical discectomy     Cubital tunnel syndrome, right

## 2022-04-19 NOTE — TELEPHONE ENCOUNTER
Health Maintenance   Topic Date Due    Hepatitis C screen  Never done    Varicella vaccine (1 of 2 - 2-dose childhood series) Never done    COVID-19 Vaccine (1) Never done    Cervical cancer screen  04/29/2022    Flu vaccine (Season Ended) 09/01/2022    Depression Screen  02/17/2023    DTaP/Tdap/Td vaccine (2 - Td or Tdap) 09/10/2029    HIV screen  Completed    Hepatitis A vaccine  Aged Out    Hepatitis B vaccine  Aged Out    Hib vaccine  Aged Out    Meningococcal (ACWY) vaccine  Aged Out    Pneumococcal 0-64 years Vaccine  Aged Out             (applicable per patient's age: Cancer Screenings, Depression Screening, Fall Risk Screening, Immunizations)    Hemoglobin A1C (%)   Date Value   06/03/2021 5.1   12/10/2020 5.1     LDL Cholesterol (mg/dL)   Date Value   11/26/2021 104     AST (U/L)   Date Value   11/26/2021 13     ALT (U/L)   Date Value   11/26/2021 10     BUN (mg/dL)   Date Value   11/26/2021 12      (goal A1C is < 7)   (goal LDL is <100) need 30-50% reduction from baseline     BP Readings from Last 3 Encounters:   03/10/22 110/80   03/01/22 123/87   02/25/22 (!) 129/91    (goal /80)      All Future Testing planned in [facility]:  Lab Frequency Next Occurrence   Saline lock IV Once 09/04/2021       Next Visit Date:  Future Appointments   Date Time Provider Thuy Aislinn   4/29/2022 11:00 AM DO Roland Casas Neuro [facility]   5/10/2022  3:00 PM Vonzella Brunner, MD Neuro [facility]            Patient Active Problem List:     Chronic low back pain with sciatica     Thyroid nodule     Stenosis of cervical spine with myelopathy (HCC)     Cervical radiculopathy     Thoracic radiculopathy     Class 1 obesity due to excess calories with serious comorbidity in adult     Cervical disc disease     Establishing care with new doctor, encounter for     Bunion of great toe of right foot     S/P cervical discectomy     Cubital tunnel syndrome, right

## 2022-04-21 ENCOUNTER — TELEPHONE (OUTPATIENT)
Dept: PRIMARY CARE CLINIC | Age: 37
End: 2022-04-21

## 2022-04-21 NOTE — TELEPHONE ENCOUNTER
Pharmacy called and is requesting refills of patient's  pregabalin (LYRICA) 150 MG capsule  (prescribed 12/10/21 by Dr. Brice Butcher)    and carBAMazepine (TEGRETOL-XR) 200 MG extended release tablet (prescribed 1/4/22 by Dr. Enrrique Martinez)    Pharmacy is aware that PCP did not originally prescribe these medications but wanted to see if PCP would be able to prescribe the medications now.

## 2022-04-22 RX ORDER — PREGABALIN 150 MG/1
CAPSULE ORAL
Qty: 90 CAPSULE | Refills: 0 | Status: SHIPPED | OUTPATIENT
Start: 2022-04-22 | End: 2022-05-19 | Stop reason: SDUPTHER

## 2022-04-22 RX ORDER — CARBAMAZEPINE 200 MG/1
TABLET, EXTENDED RELEASE ORAL
Qty: 60 TABLET | Refills: 10 | OUTPATIENT
Start: 2022-04-22

## 2022-04-22 NOTE — TELEPHONE ENCOUNTER
Spoke to patient and she understands that Lyrica was prescribed by PCP and PCP's office will call pharmacy to request Tegretol refill go through neurologist.      Marce People to Devan Mathur to inform that Dr. Cady Segal would need to prescribe the Tegretol refill.
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
## 2022-04-25 RX ORDER — CARBAMAZEPINE 200 MG/1
200 TABLET, EXTENDED RELEASE ORAL 2 TIMES DAILY
Qty: 60 TABLET | Refills: 2 | Status: CANCELLED | OUTPATIENT
Start: 2022-04-25

## 2022-04-29 ENCOUNTER — OFFICE VISIT (OUTPATIENT)
Dept: NEUROSURGERY | Age: 37
End: 2022-04-29
Payer: COMMERCIAL

## 2022-04-29 VITALS
DIASTOLIC BLOOD PRESSURE: 72 MMHG | SYSTOLIC BLOOD PRESSURE: 120 MMHG | HEIGHT: 67 IN | OXYGEN SATURATION: 98 % | BODY MASS INDEX: 32.96 KG/M2 | HEART RATE: 73 BPM | WEIGHT: 210 LBS

## 2022-04-29 DIAGNOSIS — Z98.890 S/P CERVICAL DISC REPLACEMENT: Primary | ICD-10-CM
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
## 2022-04-29 DIAGNOSIS — G56.21 CUBITAL TUNNEL SYNDROME, RIGHT: ICD-10-CM

## 2022-04-29 PROCEDURE — G8427 DOCREV CUR MEDS BY ELIG CLIN: HCPCS | Performed by: NEUROLOGICAL SURGERY

## 2022-04-29 PROCEDURE — G8417 CALC BMI ABV UP PARAM F/U: HCPCS | Performed by: NEUROLOGICAL SURGERY

## 2022-04-29 PROCEDURE — 1036F TOBACCO NON-USER: CPT | Performed by: NEUROLOGICAL SURGERY

## 2022-04-29 PROCEDURE — 99214 OFFICE O/P EST MOD 30 MIN: CPT | Performed by: NEUROLOGICAL SURGERY

## 2022-04-29 NOTE — PROGRESS NOTES
Department of Neurosurgery                                                      Follow up visit      History Obtained From: patient,     CHIEF COMPLAINT:         Chief Complaint   Patient presents with    Follow-up     Cubital tunnel syndrome, right       HISTORY OF PRESENT ILLNESS:       The patient is a 39 y.o. female who presents for follow up for right cubital tunnel syndrome. Patient reports that she developed right upper extremity pain after he surgery. She reports that the pain radiates in her arm, from her elbow down to her 4th and 5th digits and occasionally travels to her right breast. Reports that the pain is causing significant discomfort especially when trying to sleep. Denies neck pain. She reports that she has been undergoing a lot of pain in the right side of her face and the back of her head. Reports that chewing, brushing her teeth, or even touching the right side, provokes a lot of her pain.      PAST MEDICAL HISTORY :       Past Medical History:        Diagnosis Date    Anxiety     Blood loss     after     Cervical disc disease     Cervical myopathy     Chronic back pain     GERD (gastroesophageal reflux disease)     Hx of migraine headaches     Neuropathy     both legs    Thyroid nodule     Weakness generalized     due to neck issues       Past Surgical History:        Procedure Laterality Date    CERVICAL DISC ARTHROPLASTY  2021    ANTERIOR C5-6 DISC ARTHROPLASTY     CERVICAL FUSION N/A 2021    ANTERIOR C5-6 DISC ARTHROPLASTY performed by Janelle Watson DO at [address]      x3    DILATION AND CURETTAGE OF UTERUS      TUBAL LIGATION         Social History:   Social History     Socioeconomic History    Marital status:      Spouse name: Not on file    Number of children: Not on file    Years of education: Not on file    Highest education level: Not on file   Occupational History    Not on file   Tobacco Use    Smoking status: Former Smoker     Years: 2.00     Quit date:      Years since quittin.3    Smokeless tobacco: Never Used    Tobacco comment: [id number], smoker then   Vaping Use    Vaping Use: Never used   Substance and Sexual Activity    Alcohol use: Yes     Comment: occasional    Drug use: Never    Sexual activity: Not on file   Other Topics Concern    Not on file   Social History Narrative    Not on file     Social Determinants of Health     Financial Resource Strain: Low Risk     Difficulty of Paying Living Expenses: Not hard at all   Food Insecurity: No Food Insecurity    Worried About [facility] in the Last Year: Never true    [facility] in the Last Year: Never true   Transportation Needs:     Lack of Transportation (Medical): Not on file    Lack of Transportation (Non-Medical):  Not on file   Physical Activity:     Days of Exercise per Week: Not on file    Minutes of Exercise per Session: Not on file   Stress:     Feeling of Stress : Not on file   Social Connections:     Frequency of Communication with Friends and Family: Not on file    Frequency of Social Gatherings with Friends and Family: Not on file    Attends [employer] Services: Not on file    Active Member of [address] [place] [address] or Organizations: Not on file    Attends Club or Organization Meetings: Not on file    Marital Status: Not on file   Intimate Partner Violence:     Fear of Current or Ex-Partner: Not on file    Emotionally Abused: Not on file    Physically Abused: Not on file    Sexually Abused: Not on file   Housing Stability:     Unable to Pay for Housing in the Last Year: Not on file    Number of [place] in the Last Year: Not on file    Unstable Housing in the Last Year: Not on file       Family History:       Problem Relation Age of Onset    Diabetes Mother     High Blood Pressure Mother     Other Mother         Tremor    Seizures Mother     Heart Disease Father     Hypertension Father    Srinivas Barahona Cancer Sister         Lymphoblastic lymphoma    Diabetes Maternal Uncle     Heart Attack Maternal Grandmother     Diabetes Maternal Grandmother     Kidney Disease Maternal Grandmother     Breast Cancer Paternal Aunt        Allergies:  Bee venom and Doxycycline    Home Medications:  Prior to Admission medications    Medication Sig Start Date End Date Taking? Authorizing Provider   pregabalin (LYRICA) 150 MG capsule TAKE 1 CAPSULE BY MOUTH THREE TIMES DAILY 4/22/22 5/22/22 Yes MORE Yu CNP   DULoxetine (CYMBALTA) 60 MG extended release capsule TAKE ONE (1) CAPSULE BY MOUTH ONCE DAILY 3/11/22  Yes MORE Yu CNP   Handicap Placard MISC by Does not apply route 2/25/22  Yes MORE Yu CNP   carBAMazepine (TEGRETOL-XR) 200 MG extended release tablet Take 1 tablet by mouth 2 times daily Two prescription. 200 and morning along with 100 mg in the morning. 200 mg at bedtime along with 100 mg at bedtime. 1/4/22  Yes Hiral Patino MD   carBAMazepine (TEGRETOL-XR) 100 MG extended release tablet Take 1 tablet by mouth 2 times daily Two prescription. 200 and morning along with 100 mg in the morning. 200 mg at bedtime along with 100 mg at bedtime.  1/4/22  Yes Hiral Patino MD   cyclobenzaprine (FLEXERIL) 5 MG tablet Take 1 tablet by mouth 2 times daily as needed for Muscle spasms 8/2/21  Yes Dina Cintron MD   ibuprofen (ADVIL;MOTRIN) 800 MG tablet Take 1 tablet by mouth 2 times daily as needed for Pain 4/20/21  Yes Roberto Degree, PA-C   Handicap Placelina [address] [facility] by Does not apply route 1 years 3/12/21  Yes Roberto Degree, PA-C   EPINEPHrine (EPIPEN 2-ELOY) 0.3 MG/0.3ML SOAJ injection Inject 0.3 mLs into the muscle once for 1 dose Use as directed for allergic reaction 2/25/22 2/25/22  MORE Yu CNP   propranolol (INDERAL LA) 60 MG extended release capsule Take 1 capsule by mouth daily  Patient not taking: Reported on 4/29/2022 9/22/21   [facility] MORE Hare NP   Blood Pressure KIT Blood pressure management 9/22/21   MORE Ragland NP   topiramate (TOPAMAX) 50 MG tablet Take 1 tablet by mouth 2 times daily 5/25/21 9/19/21  Gildardo Mccullough MD   Elastic Bandages & Supports ([address]) [address] [facility] 1 Units by Does not apply route as needed (neck pain) 5/17/21 9/22/21  MORE Pate CNP       Current Medications:   No current facility-administered medications for this visit. PHYSICAL EXAM:       /72   Pulse 73   Ht 5' 7\" (1.702 m)   Wt 210 lb (95.3 kg)   SpO2 98%   BMI 32.89 kg/m²   Physical Exam     Gen: NAD  HEENT: moist mucus membranes  Cardio: RRR  Pulm: chest rise symmetrically  GI: abd soft  Ext: no edema  Skin: warm    Neuro:    AOX3  CN 2-12 grossly intact  Speech articulate  Motor 5/5  No pronator drift  Sensation symmetrical   Right 4th Interosseus: 4/5  Right 3rd Interosseus:  4/5  Decrease sensation in the lateral forearm    Positive Tinel's Sign   Cubital Tunnel Right Side  Negative Romberg Test  Negative Raza's Sign  Deltoids 5/5   String 4+ Bilaterally   DTR Diminished    Radiology Review:      EMG   Ordered By: MORE Pate CNP  2/7/2022  Official Read:  1. Abnormal electrodiagnostic study  2. There is evidence of a right ulnar neuropathy at the elbow. 3. There is no evidence of bilateral median neuropathy   4.  There is no evidence of right cervical radiculopathy, plexopathy, or myopathy         ASSESSMENT AND PLAN:       Patient Active Problem List   Diagnosis    Chronic low back pain with sciatica    Thyroid nodule    Stenosis of cervical spine with myelopathy (HCC)    Cervical radiculopathy    Thoracic radiculopathy    Class 1 obesity due to excess calories with serious comorbidity in adult    Cervical disc disease    Establishing care with new doctor, encounter for    Bunion of great toe of right foot    S/P cervical discectomy    Cubital tunnel syndrome, right A/P:  This is a 39 y.o. female with S/P cervical disc replacement  -     MRI CERVICAL SPINE WO CONTRAST; Future  -     XR CERVICAL SPINE (4-5 VIEWS); Future  Fall, subsequent encounter  -     MRI CERVICAL SPINE WO CONTRAST; Future   cubital tunnel syndrome    I will order an XR and MRI r/o residual stenosis    Patient has decided to proceed with surgery regarding cubital tunnel. Patient and/or family was counseled on the diagnosis and surgical treatment plan. By signing my name below, Issac Erickson, attest that this documentation has been prepared under the direction and in the presence of Geraldo Ku DO. Electronically signed: Zari Valerio, 4/29/22     This note was created using voice recognition software. There may be inaccuracies of transcription  that are inadvertently overlooked prior to the signature. There is any questions about the transcription please contact me.

## 2022-05-10 ENCOUNTER — HOSPITAL ENCOUNTER (OUTPATIENT)
Dept: GENERAL RADIOLOGY | Age: 37
Discharge: HOME | End: 2022-05-12
Payer: COMMERCIAL

## 2022-05-10 ENCOUNTER — OFFICE VISIT (OUTPATIENT)
Dept: NEUROLOGY | Age: 37
End: 2022-05-10
Payer: COMMERCIAL

## 2022-05-10 ENCOUNTER — HOSPITAL ENCOUNTER (OUTPATIENT)
Dept: MRI IMAGING | Age: 37
Discharge: HOME | End: 2022-05-12
Payer: COMMERCIAL

## 2022-05-10 ENCOUNTER — HOSPITAL ENCOUNTER (OUTPATIENT)
Age: 37
Discharge: HOME | End: 2022-05-12
Payer: COMMERCIAL

## 2022-05-10 VITALS
WEIGHT: 210 LBS | HEART RATE: 77 BPM | SYSTOLIC BLOOD PRESSURE: 96 MMHG | TEMPERATURE: 98.4 F | HEIGHT: 67 IN | OXYGEN SATURATION: 99 % | DIASTOLIC BLOOD PRESSURE: 67 MMHG | BODY MASS INDEX: 32.96 KG/M2
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
## 2022-05-10 DIAGNOSIS — G50.0 TRIGEMINAL NEURALGIA OF RIGHT SIDE OF FACE: Primary | ICD-10-CM

## 2022-05-10 DIAGNOSIS — Z98.890 S/P CERVICAL DISC REPLACEMENT: ICD-10-CM

## 2022-05-10 DIAGNOSIS — F33.9 EPISODE OF RECURRENT MAJOR DEPRESSIVE DISORDER, UNSPECIFIED DEPRESSION EPISODE SEVERITY (HCC): ICD-10-CM

## 2022-05-10 DIAGNOSIS — G43.809 OTHER MIGRAINE WITHOUT STATUS MIGRAINOSUS, NOT INTRACTABLE: ICD-10-CM
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
## 2022-05-10 PROCEDURE — 72050 X-RAY EXAM NECK SPINE 4/5VWS: CPT

## 2022-05-10 PROCEDURE — 99214 OFFICE O/P EST MOD 30 MIN: CPT | Performed by: STUDENT IN AN ORGANIZED HEALTH CARE EDUCATION/TRAINING PROGRAM

## 2022-05-10 PROCEDURE — G8417 CALC BMI ABV UP PARAM F/U: HCPCS | Performed by: STUDENT IN AN ORGANIZED HEALTH CARE EDUCATION/TRAINING PROGRAM

## 2022-05-10 PROCEDURE — 72141 MRI NECK SPINE W/O DYE: CPT

## 2022-05-10 PROCEDURE — G8427 DOCREV CUR MEDS BY ELIG CLIN: HCPCS | Performed by: STUDENT IN AN ORGANIZED HEALTH CARE EDUCATION/TRAINING PROGRAM

## 2022-05-10 PROCEDURE — 1036F TOBACCO NON-USER: CPT | Performed by: STUDENT IN AN ORGANIZED HEALTH CARE EDUCATION/TRAINING PROGRAM

## 2022-05-10 RX ORDER — VERAPAMIL HYDROCHLORIDE 80 MG/1
80 TABLET ORAL NIGHTLY
Qty: 90 TABLET | Refills: 3 | Status: SHIPPED | OUTPATIENT
Start: 2022-05-10 | End: 2022-07-14 | Stop reason: ALTCHOICE

## 2022-05-10 ASSESSMENT — ENCOUNTER SYMPTOMS
BACK PAIN: 0
NAUSEA: 0
ABDOMINAL PAIN: 0
EYE DISCHARGE: 0
SORE THROAT: 0
WHEEZING: 0
VOMITING: 0
CONSTIPATION: 1
COUGH: 0
DIARRHEA: 0
SHORTNESS OF BREATH: 0
CHEST TIGHTNESS: 0

## 2022-05-10 NOTE — PROGRESS NOTES
[address] [place] [address],  [address], [facility], [facility], [address] [place] [address]  P: [phone]  F: [phone]    NEUROLOGY CLINIC NOTE     PATIENT NAME: Jammie Seay  PATIENT MRN: [id number]  PRIMARY CARE PHYSICIAN: MORE Block - CNP    HPI:          Interval History: 5/10/2022    Patient was last seen in the clinic on 3/1/2021  Patient continues to have tingling and numbness in the right hand, and headache right side. Patient saw NS for  Right extremity pain and tingling:   MRI C spine:-  Interbody fusion c5-c6. Resulting in metallic artifact. Patient decided for cubital tunnel surgery. Lab work to identify underlying neuropathy     Sr Electrophoresis  WNL  ACE- 28  Thyroid peroxidase ab- <4  Methylmalonic Acid 0.12  , folate 11.3  CXR: No acute process     We will contact Dr. Srinivas Marin to verify the EMG results. Interval history: 3/1/2022:   Patient was seen in the clinic last on 11/23/2021. Patient fell last Sunday, while walking down the stairs, did not hit her head denies any LOC. Patient recently had EMG which showed right ulnar neuropathy at elbow. Patient complaining of drowsiness since the increase dose of the tegretol. Patient complaining of right forearm tingling, numbness on the ulnar aspect, difficulty holding, secondary to pain which is consistent with a mild neuropathy. Interval History: 11/23/2021   Patient had a fall on 11/15/2021. Patient stated that her left leg gave away and she fell down from the stair. Patient went to the ER, because every time she did not wait and she went back home. Patient presenting to the clinic today with complaint of back pain, lower back, radiating bilaterally to the buttocks, heaviness of buttocks, constipation, increased tingling and numbness on the left leg, as compared to right. Will get x-ray lumbar spine to rule out any acute trauma  CT  lumbar and thoracic spine.   Increase the dose of Tegretol to 300 mg and 200 for 1 week followed by 300 twice daily. PT OT         Interval History: 9/21/2021  Patient was last seen in the clinic in May 2021. Since last clinic visit patient endorses headache located on the right side of the head along with right side of the face. As per patient her pain starts on the right ear radiating up to the top of the head and to the right side of the face, sharp stabbing pain, getting worse with chewing. Endorses mild photophobia and phonophobia. She also endorses tenderness on the skin of her face and head. She has a history of migraine headaches, as per patient this headache is different than her regular migraine headaches. Denies any nausea or vomiting. Denies any redness in the eyes or watering from the eyes or watering from the nose. Endorses mild gait unsteadiness, denies any dizziness or drowsiness, denies any speech difficulty. She was evaluated in the ER on 9/19/2021 for this headache, CT head was done which did not show any acute intracranial pathology. She was diagnosed to have right-sided trigeminal neuralgia and started on Tegretol 100 mg twice daily. Endorses improvement in the stabbing pain with it. Endorses improvement in the headache with the heating pad     She continues to have chronic neck pain radiating to the right upper extremity down to the elbow down into the fingers. She had a fall on 8/5/2021, she fell down several stairs, was evaluated in the ER at that time. CT cervical thoracic and lumbar spine was done which did not show any acute abnormalities. Patient was evaluated by neurosurgery after the fall on 8/12/2021 in the clinic. During that visit neurosurgery ordered upright flexion-extension images along with repeat EMG of bilateral upper extremities. Interval History: 5/24/2021   Patient was last seen in the clinic in February 2021. Patient had anterior cervical C5-C6 disc arthroplasty done on 4/30/2021. intermittent tingling and numbness in bilateral upper extremities involving the forearm and. She also complains of weakness in her bilateral upper extremities, endorses dropping things from her hands, difficulty with her hand , difficulty holding the objects. Difficulty holding baby, endorses her hands locking up while eating. Difficulty with zipping and buttoning and dressing. She had MRI of the cervical spine done on 2/9/2021 which showed disc protrusion at C5-C6 causing moderate stenosis of the thecal sac and narrowing of the neural foramina. Multiple small lymph nodes scattered throughout the neck, mostly nonspecific and reactive. Patient was evaluated by neurosurgery today, recommend cervical flexion-extension x-rays and was referred to physical therapy. She also had EMG nerve conduction study done of bilateral upper extremities on 7/8/2020, this was done at [facility].  No images available to review, as per the report EMG study was normal.  There was no electrodiagnostic evidence of generalized large fiber peripheral polyneuropathy or cervical radiculopathy. Patient also gives a history of chronic back pain, started in 2004. She had received epidural injection for her labor, post epidural injection she started having back pain. She endorses sharp stabbing pain in her lower back radiating to bilateral lower extremities, pain worse on the right side specially her right buttock radiating to bilateral lower extremities, she feels more weaker in the left lower extremity. Endorses constant tingling and numbness in her bilateral lower extremities, specially at nighttime she feels as if her repeat feet on fire. Endorses difficulty with walking specially for a long period of time. Denies any problem with bowel or bladder function, has a history of urinary urgency but denies any bladder accidents. Feels unsteady while ambulation, denies any falls. Does not use any aids for walking. DILATION AND CURETTAGE OF UTERUS      TUBAL LIGATION          Social History     Socioeconomic History    Marital status:      Spouse name: Not on file    Number of children: Not on file    Years of education: Not on file    Highest education level: Not on file   Occupational History    Not on file   Tobacco Use    Smoking status: Former Smoker     Years: 2.00     Quit date: 2005     Years since quittin.3    Smokeless tobacco: Never Used    Tobacco comment: [id number], smoker then   Vaping Use    Vaping Use: Never used   Substance and Sexual Activity    Alcohol use: Yes     Comment: occasional    Drug use: Never    Sexual activity: Not on file   Other Topics Concern    Not on file   Social History Narrative    Not on file     Social Determinants of Health     Financial Resource Strain: Low Risk     Difficulty of Paying Living Expenses: Not hard at all   Food Insecurity: No Food Insecurity    Worried About Running Out of Food in the Last Year: Never true    [address] in the Last Year: Never true   Transportation Needs:     Lack of Transportation (Medical): Not on file    Lack of Transportation (Non-Medical):  Not on file   Physical Activity:     Days of Exercise per Week: Not on file    Minutes of Exercise per Session: Not on file   Stress:     Feeling of Stress : Not on file   Social Connections:     Frequency of Communication with Friends and Family: Not on file    Frequency of Social Gatherings with Friends and Family: Not on file    Attends [employer] Services: Not on file    Active Member of Clubs or Organizations: Not on file    Attends Club or Organization Meetings: Not on file    Marital Status: Not on file   Intimate Partner Violence:     Fear of Current or Ex-Partner: Not on file    Emotionally Abused: Not on file    Physically Abused: Not on file    Sexually Abused: Not on file   Housing Stability:     Unable to Pay for Housing in the Last Year: Not on file  Number of Places Lived in the Last Year: Not on file    Unstable Housing in the Last Year: Not on file        Current Outpatient Medications   Medication Sig Dispense Refill    verapamil (CALAN) 80 MG tablet Take 1 tablet by mouth at bedtime 90 tablet 3    pregabalin (LYRICA) 150 MG capsule TAKE 1 CAPSULE BY MOUTH THREE TIMES DAILY 90 capsule 0    DULoxetine (CYMBALTA) 60 MG extended release capsule TAKE ONE (1) CAPSULE BY MOUTH ONCE DAILY 30 capsule 2    Handicap Placard MISC by Does not apply route 1 each 0    carBAMazepine (TEGRETOL-XR) 200 MG extended release tablet Take 1 tablet by mouth 2 times daily Two prescription. 200 and morning along with 100 mg in the morning. 200 mg at bedtime along with 100 mg at bedtime. 60 tablet 2    carBAMazepine (TEGRETOL-XR) 100 MG extended release tablet Take 1 tablet by mouth 2 times daily Two prescription. 200 and morning along with 100 mg in the morning. 200 mg at bedtime along with 100 mg at bedtime. 60 tablet 3    Blood Pressure KIT Blood pressure management 1 kit 0    cyclobenzaprine (FLEXERIL) 5 MG tablet Take 1 tablet by mouth 2 times daily as needed for Muscle spasms 15 tablet 0    ibuprofen (ADVIL;MOTRIN) 800 MG tablet Take 1 tablet by mouth 2 times daily as needed for Pain 60 tablet 3    Handicap Placard MISC by Does not apply route 1 years 1 each 0    EPINEPHrine (EPIPEN 2-ELOY) 0.3 MG/0.3ML SOAJ injection Inject 0.3 mLs into the muscle once for 1 dose Use as directed for allergic reaction 0.3 mL 2    propranolol (INDERAL LA) 60 MG extended release capsule Take 1 capsule by mouth daily (Patient not taking: Reported on 4/29/2022) 30 capsule 3    Elastic Bandages & Supports (FUTURO SOFT CERVICAL COLLAR) MISC 1 Units by Does not apply route as needed (neck pain) 1 each 0     No current facility-administered medications for this visit.         Allergies   Allergen Reactions    Bee Venom Anaphylaxis    Doxycycline Nausea Only        REVIEW OF SYSTEMS:     Review of Systems   Constitutional: Negative for activity change, chills, fever and unexpected weight change. HENT: Negative for congestion, hearing loss and sore throat. Eyes: Negative for discharge and visual disturbance. Respiratory: Negative for cough, chest tightness, shortness of breath and wheezing. Cardiovascular: Negative for chest pain, palpitations and leg swelling. Gastrointestinal: Positive for constipation. Negative for abdominal pain, diarrhea, nausea and vomiting. Endocrine: Negative for polydipsia and polyphagia. Genitourinary: Negative for decreased urine volume, difficulty urinating, dysuria, frequency and vaginal pain. Musculoskeletal: Negative for arthralgias, back pain, joint swelling and neck stiffness. Skin: Negative for rash. Allergic/Immunologic: Negative for environmental allergies. Neurological: Positive for weakness and numbness. Negative for dizziness, tremors, seizures, syncope, facial asymmetry, speech difficulty and headaches. Hematological: Negative for adenopathy. Psychiatric/Behavioral: Negative for agitation, confusion, hallucinations and suicidal ideas. VITALS  BP 96/67   Pulse 77   Temp 98.4 °F (36.9 °C) (Temporal)   Ht 5' 7\" (1.702 m)   Wt 210 lb (95.3 kg)   SpO2 99%   BMI 32.89 kg/m²      PHYSICAL EXAMINATION:     Physical Exam     Constitutional: Well developed, well nourished and in no acute distress. Head:  normocephalic, atraumatic. Neck: supple, no carotid bruits, thyroid not palpable  Respiratory: Clear to auscultation bilaterally with no use of accessory muscles during respiration. Cardiovascular: normal rate, regular rhythm, no murmur, gallop, rub.   Abdomen: Soft, nontender, nondistended, normal bowel sounds, no hepatomegaly or splenomegaly  Extremities:  peripheral pulses palpable, no pedal edema or calf pain with palpation  Psych: normal affect      NEUROLOGICAL EXAMINATION:     Mental status   Alert and oriented; intact memory with no confusion, speech or language problems; no hallucinations or delusions     Cranial nerves   II - visual fields intact to confrontation                                                III, IV, VI - extra-ocular muscles full: no pupillary defect; no AMANDA, no nystagmus, no ptosis   V - normal facial sensation                                                               VII - normal facial symmetry                                                             VIII - intact hearing                                                                             IX, X - symmetrical palate                                                                  XI - symmetrical shoulder shrug                                                       XII - midline tongue without atrophy or fasciculation     Motor function  Normal muscle bulk and tone  Muscle strength:   Left 4+/5   fine motor movements     Sensory function  decreased sensation on the left lower extremity . Decreased sensation on right medial forearm along with fourth and fifth digit. Right hand  4+/5  Right hand finger abduction adduction 4+/5        Cerebellar Intact fine motor movement. No involuntary movements or tremors     Reflex function Intact 2+ DTR and symmetric. Negative Babinski     Gait                  Normal station and gait           PRIOR TESTS AND IMAGING: Following images and Labs were reviewed by the examiner     MRI lumbar spine without contrast 1/24/2020     IMPRESSION:  1. Mild disc bulge at L4-5 and L5-S1 not causing significant spinal stenosis or neuroforaminal narrowing. 2.  Mild facet joint arthritis in the lower lumbar spine. 3.  Normal alignment of the lumbar spine without fractures or subluxations seen. EMG nerve conduction study 12/23/2019 bilateral lower extremities  1-Abnormal study of the left lower extremity and normal study of the right   lower extremity.   There is electrodiagnostic evidence suggestive of a   chronic mild left L5 radiculopathy without active denervation. There is   no electrodiagnostic evidence of a generalized large fiber peripheral   polyneuropathy. Clinical correlation is required. EMG nerve conduction studies bilateral upper extremity 7/8/2020  1- Normal study of the bilateral upper extremities. There is no   electrodiagnostic evidence of a generalized large fiber peripheral   polyneuropathy or a cervical radiculopathy. Clinical correlation is   required. MRI brain without contrast 7/15/2020  IMPRESSION:  Unremarkable unenhanced MRI of the brain. MRI cervical spine without contrast 2/9/2021  Impression   Disc protrusion at C5-6 causing moderate stenosis of the thecal sac and   narrowing of the neural foramina as discussed above. Multiple small lymph nodes scattered throughout the neck that are   nonspecific. They may be reactive. The number of lymph nodes is concerning   in a patient of this age. Clinically correlate to exclude underlying   pathology. X-ray cervical spine flexion extension 5/1/2021  Impression   Postsurgical changes related to placement of an inter vertebral disc   prosthetic at C5-C6. No spondylolisthesis           CT lumbar Thoracic:  11/24/2021    Impression   No acute fracture or subluxation in the thoracolumbar spine.             Impression   No acute fracture or subluxation in the thoracolumbar spine.         EMG: Right upper extremity 2/7/2022    Impression:      1. Abnormal electrodiagnostic study. 2. There is evidence of a right ulnar neuropathy at the elbow. 3. There is no evidence of bilateral median neuropathy.   4. There is no evidence of right cervical radiculopathy, plexopathy, or myopathy        12/10/2020  TSH 0.24, T4 1.13  HbA1c 5.1     2/9/2021  Vitamin ED, alpha-tocopherol 8.1, gamma tocopherol  1.2  Vitamin B12 577  Folate 12.4  Vitamin B6 34.1  ESR 4  CRP 3.4  Ceruloplasmin normal 28  Copper 140.9 normal  LOUISE negative          ASSESSMENT / PLAN:       Sorin Maria is a 38 y.o. right handed  female with PMH significant for chronic back pain, thyroid nodule, anxiety was seen in the clinic for neuropathy      Hx of fall on 11/15/2021  HX of fall on 2/27/2022- Patient was offered scan, but patient refused it. 1. Possible trigeminal neuralgia on the right side of the face  2. History of chronic migraine headaches  3. Right ulnar neuropathy at elbow, confirmed with EMG   4. Small fiber peripheral polyneuropathy, chronic right sciatica   5. Chronic neck pain with bilateral radicular symptoms, worse in the left upper extremity as compared to right  6. Chronic back pain with bilateral radicular symptoms, more pain in the right lower extremity, weakness in the left lower extremity  7. Pain and paresthesias in her bilateral upper extremities and lower extremities  8. Possible peripheral polyneuropathy       PLAN:   1. We will increase the dose of Tegretol  300 m BID  2. We will start Verapamil 80 HS for migraine prophylaxis   3. We will add magnesium oxide 400 mg daily for headache prophylaxis  4. Continue Lyrica 150 mg 3 times a day. 5. Discontinue nortriptyline, patient responding well to increased dose of Tegretol, and patient is still sleepy will discontinue nortriptyline. 6. B12, methylmalonic acid level, ACE level, serum electrophoresis with immunofixation  7. Chest x-ray AP lateral view  8. Neurosurgery follow-up for EMG finding of right alert neuropathy at elbow. 9. Continue Cymbalta 60 mg at nighttime. Discussed possible side effects with the patient. Instructed patient to call the clinic if develop any side effects. Patient not taking Lexapro anymore. 10. Flexeril 5 mg nightly as needed for muscle spasms, patient is aware of side effect of increased drowsiness. 11. Follow-up in clinic in 6 weeks.               Ms. Marce Galarza received counseling on the following healthy behaviors: medical compliance, smoking cessation, blood pressure control, regular follow up with primary doctor.          Electronically signed by Imer Lucas MD on 5/10/2022 at 3:49 PM

## 2022-05-12 DIAGNOSIS — G50.0 TRIGEMINAL NEURALGIA: ICD-10-CM

## 2022-05-12 DIAGNOSIS — M54.12 CERVICAL RADICULOPATHY: ICD-10-CM

## 2022-05-12 NOTE — TELEPHONE ENCOUNTER
Health Maintenance   Topic Date Due    Varicella vaccine (1 of 2 - 2-dose childhood series) Never done    COVID-19 Vaccine (1) Never done    Hepatitis C screen  Never done    Cervical cancer screen  04/29/2022    Flu vaccine (Season Ended) 09/01/2022    Depression Monitoring  02/17/2023    DTaP/Tdap/Td vaccine (2 - Td or Tdap) 09/10/2029    HIV screen  Completed    Hepatitis A vaccine  Aged Out    Hepatitis B vaccine  Aged Out    Hib vaccine  Aged Out    Meningococcal (ACWY) vaccine  Aged Out    Pneumococcal 0-64 years Vaccine  Aged Out             (applicable per patient's age: Cancer Screenings, Depression Screening, Fall Risk Screening, Immunizations)    Hemoglobin A1C (%)   Date Value   06/03/2021 5.1   12/10/2020 5.1     LDL Cholesterol (mg/dL)   Date Value   11/26/2021 104     AST (U/L)   Date Value   11/26/2021 13     ALT (U/L)   Date Value   11/26/2021 10     BUN (mg/dL)   Date Value   11/26/2021 12      (goal A1C is < 7)   (goal LDL is <100) need 30-50% reduction from baseline     BP Readings from Last 3 Encounters:   05/10/22 96/67   04/29/22 120/72   03/10/22 110/80    (goal /80)      All Future Testing planned in [facility]:  Lab Frequency Next Occurrence   Saline lock IV Once 62/29/3836   Basic Metabolic Panel Once 02/93/6918   Carbamazepine Level, Total Once 05/10/2022   Carbamazepine Level, Free Once 05/10/2022   CTA HEAD NECK W WO CONTRAST Once 05/10/2022   CBC with Auto Differential Once 05/10/2022       Next Visit Date:  Future Appointments   Date Time Provider Thuy Tao   5/31/2022  4:00 PM STA CT SCAN RM 1 [facility] CT SCAN [facility] Radiolog   6/14/2022 10:00 AM [facility] PAT RM 1 [facility] PAT [facility]   7/13/2022 11:30 AM MORE Whiteside - CNP [facility]   7/14/2022 12:30 PM Carolina Monreal MD Neuro [facility]   8/26/2022 11:30 AM Chika Gomes DO [facility]            Patient Active Problem List:     Chronic low back pain with sciatica     Thyroid nodule Stenosis of cervical spine with myelopathy (HCC)     Cervical radiculopathy     Thoracic radiculopathy     Class 1 obesity due to excess calories with serious comorbidity in adult     Cervical disc disease     Establishing care with new doctor, encounter for     Bunion of great toe of right foot     S/P cervical disc replacement     Cubital tunnel syndrome, right     Fall

## 2022-05-13 DIAGNOSIS — Z76.0 MEDICATION REFILL: ICD-10-CM

## 2022-05-13 DIAGNOSIS — M54.12 CERVICAL RADICULOPATHY: ICD-10-CM

## 2022-05-13 DIAGNOSIS — M54.14 THORACIC RADICULOPATHY: ICD-10-CM
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
## 2022-05-13 NOTE — TELEPHONE ENCOUNTER
Health Maintenance   Topic Date Due    Varicella vaccine (1 of 2 - 2-dose childhood series) Never done    COVID-19 Vaccine (1) Never done    Hepatitis C screen  Never done    Cervical cancer screen  04/29/2022    Flu vaccine (Season Ended) 09/01/2022    Depression Monitoring  02/17/2023    DTaP/Tdap/Td vaccine (2 - Td or Tdap) 09/10/2029    HIV screen  Completed    Hepatitis A vaccine  Aged Out    Hepatitis B vaccine  Aged Out    Hib vaccine  Aged Out    Meningococcal (ACWY) vaccine  Aged Out    Pneumococcal 0-64 years Vaccine  Aged Out             (applicable per patient's age: Cancer Screenings, Depression Screening, Fall Risk Screening, Immunizations)    Hemoglobin A1C (%)   Date Value   06/03/2021 5.1   12/10/2020 5.1     LDL Cholesterol (mg/dL)   Date Value   11/26/2021 104     AST (U/L)   Date Value   11/26/2021 13     ALT (U/L)   Date Value   11/26/2021 10     BUN (mg/dL)   Date Value   11/26/2021 12      (goal A1C is < 7)   (goal LDL is <100) need 30-50% reduction from baseline     BP Readings from Last 3 Encounters:   05/10/22 96/67   04/29/22 120/72   03/10/22 110/80    (goal /80)      All Future Testing planned in [facility]:  Lab Frequency Next Occurrence   Saline lock IV Once 74/09/4397   Basic Metabolic Panel Once 34/76/7939   Carbamazepine Level, Total Once 05/10/2022   Carbamazepine Level, Free Once 05/10/2022   CTA HEAD NECK W WO CONTRAST Once 05/10/2022   CBC with Auto Differential Once 05/10/2022       Next Visit Date:  Future Appointments   Date Time Provider Thuy Toa   5/31/2022  4:00 PM STA CT SCAN RM 1 [facility] CT SCAN [facility] Radiolog   6/14/2022 10:00 AM [facility] PAT RM 1 [facility] PAT [facility]   7/13/2022 11:30 AM Azzie Bloch, APRN - CNP [facility]   7/14/2022 12:30 PM Odette Watson MD Neuro [facility]   8/26/2022 11:30 AM DO [facility]            Patient Active Problem List:     Chronic low back pain with sciatica     Thyroid nodule Stenosis of cervical spine with myelopathy (HCC)     Cervical radiculopathy     Thoracic radiculopathy     Class 1 obesity due to excess calories with serious comorbidity in adult     Cervical disc disease     Establishing care with new doctor, encounter for     Bunion of great toe of right foot     S/P cervical disc replacement     Cubital tunnel syndrome, right     Fall

## 2022-05-16 RX ORDER — CARBAMAZEPINE 100 MG/1
TABLET, EXTENDED RELEASE ORAL
Qty: 60 TABLET | Refills: 5 | Status: SHIPPED | OUTPATIENT
Start: 2022-05-16

## 2022-05-19 DIAGNOSIS — Z76.0 MEDICATION REFILL: ICD-10-CM

## 2022-05-19 DIAGNOSIS — M54.14 THORACIC RADICULOPATHY: ICD-10-CM

## 2022-05-19 DIAGNOSIS — M54.12 CERVICAL RADICULOPATHY: ICD-10-CM

## 2022-05-19 RX ORDER — PREGABALIN 150 MG/1
CAPSULE ORAL
Qty: 90 CAPSULE | Refills: 0 | OUTPATIENT
Start: 2022-05-19 | End: 2022-06-18

## 2022-05-19 NOTE — TELEPHONE ENCOUNTER
Health Maintenance   Topic Date Due    Varicella vaccine (1 of 2 - 2-dose childhood series) Never done    COVID-19 Vaccine (1) Never done    Hepatitis C screen  Never done    Cervical cancer screen  04/29/2022    Flu vaccine (Season Ended) 09/01/2022    Depression Monitoring  02/17/2023    DTaP/Tdap/Td vaccine (2 - Td or Tdap) 09/10/2029    HIV screen  Completed    Hepatitis A vaccine  Aged Out    Hepatitis B vaccine  Aged Out    Hib vaccine  Aged Out    Meningococcal (ACWY) vaccine  Aged Out    Pneumococcal 0-64 years Vaccine  Aged Out             (applicable per patient's age: Cancer Screenings, Depression Screening, Fall Risk Screening, Immunizations)    Hemoglobin A1C (%)   Date Value   06/03/2021 5.1   12/10/2020 5.1     LDL Cholesterol (mg/dL)   Date Value   11/26/2021 104     AST (U/L)   Date Value   11/26/2021 13     ALT (U/L)   Date Value   11/26/2021 10     BUN (mg/dL)   Date Value   11/26/2021 12      (goal A1C is < 7)   (goal LDL is <100) need 30-50% reduction from baseline     BP Readings from Last 3 Encounters:   05/10/22 96/67   04/29/22 120/72   03/10/22 110/80    (goal /80)      All Future Testing planned in [facility]:  Lab Frequency Next Occurrence   Saline lock IV Once 88/89/7036   Basic Metabolic Panel Once 77/19/0354   Carbamazepine Level, Total Once 05/10/2022   Carbamazepine Level, Free Once 05/10/2022   CTA HEAD NECK W WO CONTRAST Once 05/10/2022   CBC with Auto Differential Once 05/10/2022       Next Visit Date:  Future Appointments   Date Time Provider Thuy Tao   5/31/2022  4:00 PM STA CT SCAN RM 1 [facility] CT SCAN [facility]   6/14/2022 10:00 AM [facility] PAT RM 1 [facility] PAT [facility]   7/13/2022 11:30 AM [address], APRN - CNP [facility]   7/14/2022 12:30 PM Vonzella Brunner, MD Neuro [facility] [address]   8/26/2022 11:30 AM DO [facility]            Patient Active Problem List:     Chronic low back pain with sciatica     Thyroid nodule Stenosis of cervical spine with myelopathy (HCC)     Cervical radiculopathy     Thoracic radiculopathy     Class 1 obesity due to excess calories with serious comorbidity in adult     Cervical disc disease     Establishing care with new doctor, encounter for     Bunion of great toe of right foot     S/P cervical disc replacement     Cubital tunnel syndrome, right     Fall    [facility] called and requested medication refill on pended medication. Pt last seen 2/25/22.  No follow up at this time

## 2022-05-23 DIAGNOSIS — M54.12 CERVICAL RADICULOPATHY: ICD-10-CM

## 2022-05-23 DIAGNOSIS — G99.2 STENOSIS OF CERVICAL SPINE WITH MYELOPATHY (HCC): ICD-10-CM

## 2022-05-23 DIAGNOSIS — M48.02 STENOSIS OF CERVICAL SPINE WITH MYELOPATHY (HCC): ICD-10-CM

## 2022-05-23 DIAGNOSIS — Z76.0 MEDICATION REFILL: ICD-10-CM
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
## 2022-05-23 DIAGNOSIS — M54.14 THORACIC RADICULOPATHY: ICD-10-CM

## 2022-05-23 RX ORDER — CARBAMAZEPINE 200 MG/1
TABLET, EXTENDED RELEASE ORAL
Qty: 60 TABLET | Refills: 10 | OUTPATIENT
Start: 2022-05-23

## 2022-05-24 RX ORDER — PREGABALIN 150 MG/1
CAPSULE ORAL
Qty: 90 CAPSULE | Refills: 0 | OUTPATIENT
Start: 2022-05-24 | End: 2022-06-23

## 2022-05-24 RX ORDER — DULOXETIN HYDROCHLORIDE 60 MG/1
CAPSULE, DELAYED RELEASE ORAL
Qty: 30 CAPSULE | Refills: 10 | Status: SHIPPED | OUTPATIENT
Start: 2022-05-24

## 2022-05-24 RX ORDER — PREGABALIN 150 MG/1
CAPSULE ORAL
Qty: 90 CAPSULE | Refills: 0 | Status: SHIPPED | OUTPATIENT
Start: 2022-05-24 | End: 2022-06-24

## 2022-05-26 RX ORDER — CARBAMAZEPINE 200 MG/1
TABLET, EXTENDED RELEASE ORAL
Qty: 60 TABLET | Refills: 10 | OUTPATIENT
Start: 2022-05-26

## 2022-05-31 ENCOUNTER — HOSPITAL ENCOUNTER (OUTPATIENT)
Dept: CT IMAGING | Age: 37
Discharge: HOME | End: 2022-06-02
Payer: COMMERCIAL

## 2022-05-31 ENCOUNTER — TELEPHONE (OUTPATIENT)
Dept: NEUROSURGERY | Age: 37
End: 2022-05-31

## 2022-05-31 DIAGNOSIS — G50.0 TRIGEMINAL NEURALGIA OF RIGHT SIDE OF FACE: ICD-10-CM

## 2022-05-31 DIAGNOSIS — F33.9 EPISODE OF RECURRENT MAJOR DEPRESSIVE DISORDER, UNSPECIFIED DEPRESSION EPISODE SEVERITY (HCC): ICD-10-CM

## 2022-05-31 PROCEDURE — 70496 CT ANGIOGRAPHY HEAD: CPT

## 2022-05-31 PROCEDURE — 2580000003 HC RX 258: Performed by: STUDENT IN AN ORGANIZED HEALTH CARE EDUCATION/TRAINING PROGRAM

## 2022-05-31 PROCEDURE — 6360000004 HC RX CONTRAST MEDICATION: Performed by: STUDENT IN AN ORGANIZED HEALTH CARE EDUCATION/TRAINING PROGRAM

## 2022-05-31 RX ORDER — SODIUM CHLORIDE 0.9 % (FLUSH) 0.9 %
10 SYRINGE (ML) INJECTION PRN
Status: DISCONTINUED | OUTPATIENT
Start: 2022-05-31 | End: 2022-06-03 | Stop reason: HOSPADM

## 2022-05-31 RX ORDER — 0.9 % SODIUM CHLORIDE 0.9 %
80 INTRAVENOUS SOLUTION INTRAVENOUS ONCE
Status: COMPLETED | OUTPATIENT
Start: 2022-05-31 | End: 2022-05-31

## 2022-05-31 RX ADMIN — IOPAMIDOL 75 ML: 755 INJECTION, SOLUTION INTRAVENOUS at 15:57

## 2022-05-31 RX ADMIN — SODIUM CHLORIDE 80 ML: 9 INJECTION, SOLUTION INTRAVENOUS at 15:58

## 2022-05-31 RX ADMIN — SODIUM CHLORIDE, PRESERVATIVE FREE 10 ML: 5 INJECTION INTRAVENOUS at 15:58

## 2022-05-31 NOTE — TELEPHONE ENCOUNTER
Patient called stating she is in extreme pain with right hand locking and is having trouble swallowing and urine urgency.

## 2022-06-01 NOTE — TELEPHONE ENCOUNTER
The issues with her right hand could certainly be related to her ulnar neuropathy, for which she is scheduled for surgery in the near future.  I am not sure why she is experiencing difficulty swallowing or urinary urgency, should see PCP to be evaluated, checked for UTI, etc.

## 2022-06-06 ENCOUNTER — APPOINTMENT (OUTPATIENT)
Dept: MRI IMAGING | Age: 37
End: 2022-06-06
Payer: COMMERCIAL

## 2022-06-06 ENCOUNTER — APPOINTMENT (OUTPATIENT)
Dept: CT IMAGING | Age: 37
End: 2022-06-06
Payer: COMMERCIAL

## 2022-06-06 ENCOUNTER — HOSPITAL ENCOUNTER (EMERGENCY)
Age: 37
Discharge: HOME | End: 2022-06-06
Attending: EMERGENCY MEDICINE
Payer: COMMERCIAL

## 2022-06-06 ENCOUNTER — APPOINTMENT (OUTPATIENT)
Dept: GENERAL RADIOLOGY | Age: 37
End: 2022-06-06
Payer: COMMERCIAL

## 2022-06-06 VITALS
RESPIRATION RATE: 14 BRPM | BODY MASS INDEX: 33.59 KG/M2 | DIASTOLIC BLOOD PRESSURE: 102 MMHG | SYSTOLIC BLOOD PRESSURE: 148 MMHG | HEART RATE: 77 BPM | WEIGHT: 214 LBS | OXYGEN SATURATION: 99 % | TEMPERATURE: 98.3 F | HEIGHT: 67 IN

## 2022-06-06 DIAGNOSIS — M54.12 CERVICAL RADICULOPATHY: ICD-10-CM

## 2022-06-06 DIAGNOSIS — R20.2 PARESTHESIA: Primary | ICD-10-CM

## 2022-06-06 LAB
ABSOLUTE EOS #: 0.23 K/UL (ref 0–0.44)
ABSOLUTE IMMATURE GRANULOCYTE: 0.03 K/UL (ref 0–0.3)
ABSOLUTE LYMPH #: 2.46 K/UL (ref 1.1–3.7)
ABSOLUTE MONO #: 0.58 K/UL (ref 0.1–1.2)
ANION GAP SERPL CALCULATED.3IONS-SCNC: 6 MMOL/L (ref 9–17)
BASOPHILS # BLD: 1 % (ref 0–2)
BASOPHILS ABSOLUTE: 0.06 K/UL (ref 0–0.2)
BUN BLDV-MCNC: 10 MG/DL (ref 6–20)
BUN/CREAT BLD: 10 (ref 9–20)
CALCIUM SERPL-MCNC: 8.7 MG/DL (ref 8.6–10.4)
CHLORIDE BLD-SCNC: 106 MMOL/L (ref 98–107)
CO2: 26 MMOL/L (ref 20–31)
CREAT SERPL-MCNC: 0.98 MG/DL (ref 0.5–0.9)
EOSINOPHILS RELATIVE PERCENT: 3 % (ref 1–4)
GFR AFRICAN AMERICAN: >60 ML/MIN
GFR NON-AFRICAN AMERICAN: >60 ML/MIN
GFR SERPL CREATININE-BSD FRML MDRD: ABNORMAL ML/MIN/{1.73_M2}
GLUCOSE BLD-MCNC: 85 MG/DL (ref 70–99)
HCG QUALITATIVE: NEGATIVE
HCT VFR BLD CALC: 43.8 % (ref 36.3–47.1)
HEMOGLOBIN: 14.1 G/DL (ref 11.9–15.1)
IMMATURE GRANULOCYTES: 0 %
LYMPHOCYTES # BLD: 28 % (ref 24–43)
MAGNESIUM: 2.1 MG/DL (ref 1.6–2.6)
MCH RBC QN AUTO: 29.8 PG (ref 25.2–33.5)
MCHC RBC AUTO-ENTMCNC: 32.2 G/DL (ref 28.4–34.8)
MCV RBC AUTO: 92.6 FL (ref 82.6–102.9)
MONOCYTES # BLD: 7 % (ref 3–12)
NRBC AUTOMATED: 0 PER 100 WBC
PDW BLD-RTO: 13.4 % (ref 11.8–14.4)
PLATELET # BLD: 336 K/UL (ref 138–453)
PMV BLD AUTO: 11.1 FL (ref 8.1–13.5)
POTASSIUM SERPL-SCNC: 4.2 MMOL/L (ref 3.7–5.3)
RBC # BLD: 4.73 M/UL (ref 3.95–5.11)
SEG NEUTROPHILS: 61 % (ref 36–65)
SEGMENTED NEUTROPHILS ABSOLUTE COUNT: 5.54 K/UL (ref 1.5–8.1)
SODIUM BLD-SCNC: 138 MMOL/L (ref 135–144)
TROPONIN, HIGH SENSITIVITY: <6 NG/L (ref 0–14)
TROPONIN, HIGH SENSITIVITY: <6 NG/L (ref 0–14)
WBC # BLD: 8.9 K/UL (ref 3.5–11.3)

## 2022-06-06 PROCEDURE — 83735 ASSAY OF MAGNESIUM: CPT

## 2022-06-06 PROCEDURE — 71045 X-RAY EXAM CHEST 1 VIEW: CPT

## 2022-06-06 PROCEDURE — 93005 ELECTROCARDIOGRAM TRACING: CPT | Performed by: EMERGENCY MEDICINE

## 2022-06-06 PROCEDURE — 85025 COMPLETE CBC W/AUTO DIFF WBC: CPT

## 2022-06-06 PROCEDURE — 70551 MRI BRAIN STEM W/O DYE: CPT

## 2022-06-06 PROCEDURE — 6370000000 HC RX 637 (ALT 250 FOR IP): Performed by: EMERGENCY MEDICINE

## 2022-06-06 PROCEDURE — 80048 BASIC METABOLIC PNL TOTAL CA: CPT

## 2022-06-06 PROCEDURE — 99285 EMERGENCY DEPT VISIT HI MDM: CPT

## 2022-06-06 PROCEDURE — 70450 CT HEAD/BRAIN W/O DYE: CPT

## 2022-06-06 PROCEDURE — 84703 CHORIONIC GONADOTROPIN ASSAY: CPT

## 2022-06-06 PROCEDURE — 84484 ASSAY OF TROPONIN QUANT: CPT

## 2022-06-06 RX ORDER — PREDNISONE 50 MG/1
50 TABLET ORAL DAILY
Qty: 5 TABLET | Refills: 0 | Status: SHIPPED | OUTPATIENT
Start: 2022-06-06 | End: 2022-06-11

## 2022-06-06 RX ORDER — HYDROCODONE BITARTRATE AND ACETAMINOPHEN 5; 325 MG/1; MG/1
2 TABLET ORAL ONCE
Status: COMPLETED | OUTPATIENT
Start: 2022-06-06 | End: 2022-06-06

## 2022-06-06 RX ORDER — HYDROCODONE BITARTRATE AND ACETAMINOPHEN 5; 325 MG/1; MG/1
1 TABLET ORAL EVERY 6 HOURS PRN
Qty: 10 TABLET | Refills: 0 | Status: SHIPPED | OUTPATIENT
Start: 2022-06-06 | End: 2022-06-09

## 2022-06-06 RX ORDER — CYCLOBENZAPRINE HCL 10 MG
10 TABLET ORAL 3 TIMES DAILY PRN
Qty: 21 TABLET | Refills: 0 | Status: SHIPPED | OUTPATIENT
Start: 2022-06-06 | End: 2022-06-16

## 2022-06-06 RX ADMIN — HYDROCODONE BITARTRATE AND ACETAMINOPHEN 2 TABLET: 5; 325 TABLET ORAL at 15:22

## 2022-06-06 ASSESSMENT — PAIN SCALES - GENERAL
PAINLEVEL_OUTOF10: 8
PAINLEVEL_OUTOF10: 8

## 2022-06-06 ASSESSMENT — ENCOUNTER SYMPTOMS
EYE REDNESS: 0
EYE DISCHARGE: 0
COUGH: 0
DIARRHEA: 0
COLOR CHANGE: 0
SHORTNESS OF BREATH: 0
SORE THROAT: 0
VOMITING: 0
NAUSEA: 0
RHINORRHEA: 0

## 2022-06-06 ASSESSMENT — PAIN DESCRIPTION - LOCATION: LOCATION: NECK

## 2022-06-06 ASSESSMENT — PAIN - FUNCTIONAL ASSESSMENT: PAIN_FUNCTIONAL_ASSESSMENT: NONE - DENIES PAIN

## 2022-06-06 NOTE — ED PROVIDER NOTES
EMERGENCY DEPARTMENT ENCOUNTER    Pt Name: Larissa Garcia  MRN: [id number]  [place] 1985  Date of evaluation: 22  CHIEF COMPLAINT       Chief Complaint   Patient presents with    Numbness     Left sided facial numbness starting last night; pt has previous hx of trigeminal neuralgia on right side; cervical disc issues; pt reports tingling in right arm as well     HISTORY OF PRESENT ILLNESS   65-year-old female presents with complaints of numbness and tingling in her left lower face, left arm and chest.  Patient states that her symptoms began a few days ago. Patient states that she has previously had similar symptoms, she denies any nausea or vomiting, no fevers or chills, she denies cough or sputum production. She said that her left arm feels weaker than her right arm. She denies any lower extremity pain swelling or numbness. Denies any lower extremity weakness. REVIEW OF SYSTEMS     Review of Systems   Constitutional: Negative for chills and fever. HENT: Negative for rhinorrhea and sore throat. Eyes: Negative for discharge, redness and visual disturbance. Respiratory: Negative for cough and shortness of breath. Cardiovascular: Positive for chest pain. Negative for palpitations and leg swelling. Gastrointestinal: Negative for diarrhea, nausea and vomiting. Genitourinary: Negative for dysuria and hematuria. Musculoskeletal: Negative for arthralgias, myalgias and neck pain. Skin: Negative for color change and rash. Neurological: Negative for seizures, weakness and headaches. Tingling   Psychiatric/Behavioral: Negative for hallucinations, self-injury and suicidal ideas.      PASTMEDICAL HISTORY     Past Medical History:   Diagnosis Date    Anxiety     Blood loss     after     Cervical disc disease     Cervical myopathy     Chronic back pain     GERD (gastroesophageal reflux disease)     Hx of migraine headaches     Neuropathy     both legs    Thyroid nodule     Weakness generalized     due to neck issues     Past Problem List  Patient Active Problem List   Diagnosis Code    Chronic low back pain with sciatica M54.40, G89.29    Thyroid nodule E04.1    Stenosis of cervical spine with myelopathy (HCC) M48.02, G99.2    Cervical radiculopathy M54.12    Thoracic radiculopathy M54.14    Class 1 obesity due to excess calories with serious comorbidity in adult E66.09    Cervical disc disease M50.90    Establishing care with new doctor, encounter for Z76.89    Bunion of great toe of right foot M21.611    S/P cervical disc replacement Z98.890    Cubital tunnel syndrome, right G56.21    Fall W19. XXXA     SURGICAL HISTORY       Past Surgical History:   Procedure Laterality Date    CERVICAL DISC ARTHROPLASTY  04/30/2021    ANTERIOR C5-6 DISC ARTHROPLASTY     CERVICAL FUSION N/A 4/30/2021    ANTERIOR C5-6 DISC ARTHROPLASTY performed by Lorena Bass, DO at [facility] [address]      x3    DILATION AND CURETTAGE OF UTERUS      TUBAL LIGATION       CURRENT MEDICATIONS       Previous Medications    BLOOD PRESSURE KIT    Blood pressure management    CARBAMAZEPINE (TEGRETOL XR) 100 MG EXTENDED RELEASE TABLET    TAKE 1 TABLET DAILY ~108 TAKE 1 TABLET AT BEDTIME    CARBAMAZEPINE (TEGRETOL-XR) 200 MG EXTENDED RELEASE TABLET    Take 1 tablet by mouth 2 times daily Two prescription. 200 and morning along with 100 mg in the morning. 200 mg at bedtime along with 100 mg at bedtime.     DULOXETINE (CYMBALTA) 60 MG EXTENDED RELEASE CAPSULE    TAKE ONE (1) CAPSULE BY MOUTH ONCE DAILY    ELASTIC BANDAGES & SUPPORTS (FUTURO SOFT CERVICAL COLLAR) MISC    1 Units by Does not apply route as needed (neck pain)    EPINEPHRINE (EPIPEN 2-ELOY) 0.3 MG/0.3ML SOAJ INJECTION    Inject 0.3 mLs into the muscle once for 1 dose Use as directed for allergic reaction    HANDICAP PLACARD MISC    by Does not apply route 1 years    HANDICAP PLACARD MISC    by Does not apply route IBUPROFEN (ADVIL;MOTRIN) 800 MG TABLET    Take 1 tablet by mouth 2 times daily as needed for Pain    PREGABALIN (LYRICA) 150 MG CAPSULE    TAKE 1 CAPSULE BY MOUTH THREE TIMES DAILY    PROPRANOLOL (INDERAL LA) 60 MG EXTENDED RELEASE CAPSULE    Take 1 capsule by mouth daily    VERAPAMIL (CALAN) 80 MG TABLET    Take 1 tablet by mouth at bedtime     ALLERGIES     is allergic to bee venom and doxycycline. FAMILY HISTORY     She indicated that her mother is alive. She indicated that her father is alive. She indicated that the status of her sister is unknown. She indicated that her maternal grandmother is . She indicated that the status of her maternal uncle is unknown. She indicated that the status of her paternal aunt is unknown. SOCIAL HISTORY       Social History     Tobacco Use    Smoking status: Former Smoker     Years: 2.00     Quit date:      Years since quittin.4    Smokeless tobacco: Never Used    Tobacco comment: 5096-1917, smoker then   Vaping Use    Vaping Use: Never used   Substance Use Topics    Alcohol use: Yes     Comment: occasional    Drug use: Never     PHYSICAL EXAM     INITIAL VITALS: BP (!) 148/102   Pulse 77   Temp 98.3 °F (36.8 °C) (Oral)   Resp 14   Ht 5' 7\" (1.702 m)   Wt 214 lb (97.1 kg)   SpO2 99%   BMI 33.52 kg/m²    Physical Exam  Constitutional:       Appearance: Normal appearance. She is well-developed. She is not ill-appearing or toxic-appearing. HENT:      Head: Normocephalic and atraumatic. Eyes:      Conjunctiva/sclera: Conjunctivae normal.      Pupils: Pupils are equal, round, and reactive to light. Neck:      Trachea: Trachea normal.   Cardiovascular:      Rate and Rhythm: Normal rate and regular rhythm. Heart sounds: S1 normal and S2 normal. No murmur heard. Pulmonary:      Effort: Pulmonary effort is normal. No accessory muscle usage or respiratory distress. Breath sounds: Normal breath sounds.    Chest:      Chest wall: No deformity or tenderness. Abdominal:      General: Bowel sounds are normal. There is no distension or abdominal bruit. Palpations: Abdomen is not rigid. Tenderness: There is no abdominal tenderness. There is no guarding or rebound. Musculoskeletal:      Cervical back: Normal range of motion and neck supple. Skin:     General: Skin is warm. Findings: No rash. Neurological:      Mental Status: She is alert and oriented to person, place, and time. GCS: GCS eye subscore is 4. GCS verbal subscore is 5. GCS motor subscore is 6. Motor: Motor function is intact. Coordination: Coordination is intact. Comments: Patient has some mildly diminished sensation along the V3 branch of her left face, some minor paresthesias in the left upper extremity compared to the right. Psychiatric:         Speech: Speech normal.         MEDICAL DECISION MAKIN-year-old female presents with complaints of some numbness to her left face as well as some numbness in her left arm. The patient does not have any focal weakness, she is outside the tPA window as her symptoms began yesterday around 8 PM.  Plan at this time is to obtain a CT brain basic labs and reevaluate. 5:05 PM EDT  Patient's CT and MRI are unremarkable, troponins x2 are negative, I believe her symptoms are mostly related to cervical radiculopathy. Plan is short course of prednisone pain control outpatient follow-up. CRITICAL CARE:       PROCEDURES:    Procedures    DIAGNOSTIC RESULTS   EKG:All EKG's are interpreted by the Emergency Department Physician who either signs or Co-signs this chart in the absence of a cardiologist.  Patient EKG shows sinus rhythm rate of 70 ND QRS QTC normal is unremarkable, the patient has normal axis no ST elevations or depressions, no significant T wave changes. Nonspecific EKG.       RADIOLOGY:All plain film, CT, MRI, and formal ultrasound images (except ED bedside ultrasound) are read by the radiologist, see reports below, unless otherwisenoted in MDM or here. MRI BRAIN WO CONTRAST   Final Result   No evidence of acute infarct. RECOMMENDATIONS:   Unavailable         XR CHEST PORTABLE   Final Result   No acute process. CT HEAD WO CONTRAST   Final Result   No acute intracranial abnormality. Unremarkable CT of the head. It is noted that this patient has had a number of x-ray producing studies in   the last 2 years. The patient's lifetime dose, ALARA, and the reason for   presentation for x-ray studies may need to be kept in mind. LABS: All lab results were reviewed by myself, and all abnormals are listed below. Labs Reviewed   BASIC METABOLIC PANEL - Abnormal; Notable for the following components:       Result Value    CREATININE 0.98 (*)     Anion Gap 6 (*)     All other components within normal limits   CBC WITH AUTO DIFFERENTIAL   MAGNESIUM   TROPONIN   TROPONIN   HCG, SERUM, QUALITATIVE       EMERGENCY DEPARTMENTCOURSE:         Vitals:    Vitals:    06/06/22 1252   BP: (!) 148/102   Pulse: 77   Resp: 14   Temp: 98.3 °F (36.8 °C)   TempSrc: Oral   SpO2: 99%   Weight: 214 lb (97.1 kg)   Height: 5' 7\" (1.702 m)       The patient was given the following medications while in the emergency department:  Orders Placed This Encounter   Medications    HYDROcodone-acetaminophen (NORCO) 5-325 MG per tablet 2 tablet    HYDROcodone-acetaminophen (NORCO) 5-325 MG per tablet     Sig: Take 1 tablet by mouth every 6 hours as needed for Pain for up to 3 days. Dispense:  10 tablet     Refill:  0    predniSONE (DELTASONE) 50 MG tablet     Sig: Take 1 tablet by mouth daily for 5 days     Dispense:  5 tablet     Refill:  0    cyclobenzaprine (FLEXERIL) 10 mg tablet     Sig: Take 1 tablet by mouth 3 times daily as needed for Muscle spasms     Dispense:  21 tablet     Refill:  0     CONSULTS:  None    FINAL IMPRESSION      1. Paresthesia    2.  Cervical radiculopathy DISPOSITION/PLAN   DISPOSITION Decision To Discharge 06/06/2022 05:03:08 PM      PATIENT REFERRED TO:  Danny Monreal, APRN - CNP  [address]    Schedule an appointment as soon as possible for a visit in 2 days      DISCHARGE MEDICATIONS:  New Prescriptions    CYCLOBENZAPRINE (FLEXERIL) 10 MG TABLET    Take 1 tablet by mouth 3 times daily as needed for Muscle spasms    HYDROCODONE-ACETAMINOPHEN (NORCO) 5-325 MG PER TABLET    Take 1 tablet by mouth every 6 hours as needed for Pain for up to 3 days. PREDNISONE (DELTASONE) 50 MG TABLET    Take 1 tablet by mouth daily for 5 days     The care is provided during an unprecedented national emergency due to the novel coronavirus, COVID 19.   MD Ryan Levy MD  06/06/22 [id number]

## 2022-06-06 NOTE — ED NOTES
Pt ambulatory to the bathroom without assistance.      [facility]  06/06/22 [id number]

## 2022-06-07 LAB
EKG ATRIAL RATE: 70 BPM
EKG P AXIS: 38 DEGREES
EKG P-R INTERVAL: 210 MS
EKG Q-T INTERVAL: 362 MS
EKG QRS DURATION: 82 MS
EKG QTC CALCULATION (BAZETT): 390 MS
EKG R AXIS: 12 DEGREES
EKG T AXIS: 40 DEGREES
EKG VENTRICULAR RATE: 70 BPM

## 2022-06-07 PROCEDURE — 93010 ELECTROCARDIOGRAM REPORT: CPT | Performed by: INTERNAL MEDICINE

## 2022-06-08 ENCOUNTER — OFFICE VISIT (OUTPATIENT)
Dept: PRIMARY CARE CLINIC | Age: 37
End: 2022-06-08
Payer: COMMERCIAL

## 2022-06-08 VITALS
BODY MASS INDEX: 33.74 KG/M2 | DIASTOLIC BLOOD PRESSURE: 87 MMHG | SYSTOLIC BLOOD PRESSURE: 121 MMHG | HEART RATE: 80 BPM | OXYGEN SATURATION: 97 % | TEMPERATURE: 97.9 F | WEIGHT: 215.4 LBS

## 2022-06-08 DIAGNOSIS — M50.30 DDD (DEGENERATIVE DISC DISEASE), CERVICAL: ICD-10-CM

## 2022-06-08 DIAGNOSIS — R94.31 ABNORMAL EKG: Primary | ICD-10-CM

## 2022-06-08 PROBLEM — Z76.89 ESTABLISHING CARE WITH NEW DOCTOR, ENCOUNTER FOR: Status: RESOLVED | Noted: 2021-06-03 | Resolved: 2022-06-08

## 2022-06-08 PROCEDURE — G8428 CUR MEDS NOT DOCUMENT: HCPCS | Performed by: NURSE PRACTITIONER

## 2022-06-08 PROCEDURE — 99213 OFFICE O/P EST LOW 20 MIN: CPT | Performed by: NURSE PRACTITIONER

## 2022-06-08 PROCEDURE — 1036F TOBACCO NON-USER: CPT | Performed by: NURSE PRACTITIONER

## 2022-06-08 PROCEDURE — G8417 CALC BMI ABV UP PARAM F/U: HCPCS | Performed by: NURSE PRACTITIONER

## 2022-06-08 RX ORDER — IBUPROFEN 800 MG/1
800 TABLET ORAL 2 TIMES DAILY PRN
Qty: 60 TABLET | Refills: 5 | Status: SHIPPED | OUTPATIENT
Start: 2022-06-08

## 2022-06-08 RX ORDER — PROPRANOLOL HCL 60 MG
60 CAPSULE, EXTENDED RELEASE 24HR ORAL DAILY
Qty: 30 CAPSULE | Refills: 5 | Status: SHIPPED | OUTPATIENT
Start: 2022-06-08 | End: 2022-06-08

## 2022-06-08 ASSESSMENT — ENCOUNTER SYMPTOMS
COUGH: 0
SHORTNESS OF BREATH: 0

## 2022-06-08 NOTE — PROGRESS NOTES
Catherine Waggoner (:  1985) is a 39 y.o. female,Established patient, here for evaluation of the following chief complaint(s):  Medication Refill (er 22 )         ASSESSMENT/PLAN:  1. Abnormal EKG  -     [facility] - [address], [place], Cardiology, [facility]  2. DDD (degenerative disc disease), cervical  -     ibuprofen (ADVIL;MOTRIN) 800 MG tablet; Take 1 tablet by mouth 2 times daily as needed for Pain, Disp-60 tablet, R-5Normal      No follow-ups on file. Subjective   SUBJECTIVE/OBJECTIVE:  HPI   Recent er for some numbness to left side bottom lip. Ct head and mri brain without acute findings. She was given some prednisone, flexeril and norco.  She has a f/u apt with neuro. She is already on tegretol for pt reported trigeminal neuralgia on the right side. While in er she had an ekg that showed:  Sinus rhythm with 1st degree A-V block  Anterior infarct , age undetermined  Abnormal ECG  No previous ECGs available    Review of Systems   Constitutional: Negative for chills and fever. Respiratory: Negative for cough and shortness of breath. Cardiovascular: Negative for chest pain, palpitations and leg swelling. Objective      Vitals:    22 0733   BP: 121/87   Pulse: 80   Temp: 97.9 °F (36.6 °C)   SpO2: 97%       Physical Exam  Constitutional:       Appearance: She is well-developed. HENT:      Right Ear: External ear normal.      Left Ear: External ear normal.      Nose: Nose normal.   Cardiovascular:      Rate and Rhythm: Normal rate and regular rhythm. Heart sounds: Normal heart sounds, S1 normal and S2 normal.   Pulmonary:      Effort: Pulmonary effort is normal. No respiratory distress. Breath sounds: Normal breath sounds. Musculoskeletal:         General: No deformity. Normal range of motion. Cervical back: Full passive range of motion without pain and normal range of motion. Skin:     General: Skin is warm and dry.    Neurological:      Mental Status: She is alert and oriented to person, place, and time. An electronic signature was used to authenticate this note.     --Lupis Parsons, MORE - CNP

## 2022-06-08 NOTE — PROGRESS NOTES
Visit Information    Have you changed or started any medications since your last visit including any over-the-counter medicines, vitamins, or herbal medicines? no   Are you having any side effects from any of your medications? -  no  Have you stopped taking any of your medications? Is so, why? -  no    Have you seen any other physician or provider since your last visit? Yes - Records Obtained  Have you had any other diagnostic tests since your last visit? Yes - Records Obtained  Have you been seen in the emergency room and/or had an admission to a hospital since we last saw you? Yes - Records Obtained  Have you had your routine dental cleaning in the past 6 months? no    Have you activated your [employer] account? If not, what are your barriers?  Yes     Patient Care Team:  MORE Floyd CNP as PCP - General (Family Nurse Practitioner)  MORE Floyd CNP as PCP - [facility]  Matthias Juarez MD as Consulting Physician (Gastroenterology)    Medical History Review  Past Medical, Family, and Social History reviewed and does contribute to the patient presenting condition    Health Maintenance   Topic Date Due    Varicella vaccine (1 of 2 - 2-dose childhood series) Never done    COVID-19 Vaccine (1) Never done    Hepatitis C screen  Never done    Cervical cancer screen  04/29/2022    Flu vaccine (Season Ended) 09/01/2022    Depression Monitoring  02/17/2023    DTaP/Tdap/Td vaccine (2 - Td or Tdap) 09/10/2029    HIV screen  Completed    Hepatitis A vaccine  Aged Out    Hepatitis B vaccine  Aged Out    Hib vaccine  Aged Out    Meningococcal (ACWY) vaccine  Aged Out    Pneumococcal 0-64 years Vaccine  Aged Out

## 2022-06-09 DIAGNOSIS — M54.14 THORACIC RADICULOPATHY: ICD-10-CM

## 2022-06-09 DIAGNOSIS — M54.12 CERVICAL RADICULOPATHY: ICD-10-CM

## 2022-06-09 DIAGNOSIS — Z76.0 MEDICATION REFILL: ICD-10-CM
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
## 2022-06-09 RX ORDER — CARBAMAZEPINE 200 MG/1
TABLET, EXTENDED RELEASE ORAL
Qty: 60 TABLET | Refills: 10 | OUTPATIENT
Start: 2022-06-09

## 2022-06-09 RX ORDER — PREGABALIN 150 MG/1
CAPSULE ORAL
Qty: 90 CAPSULE | Refills: 0 | OUTPATIENT
Start: 2022-06-09

## 2022-06-10 RX ORDER — SODIUM CHLORIDE, SODIUM LACTATE, POTASSIUM CHLORIDE, CALCIUM CHLORIDE 600; 310; 30; 20 MG/100ML; MG/100ML; MG/100ML; MG/100ML
1000 INJECTION, SOLUTION INTRAVENOUS CONTINUOUS
Status: CANCELLED | OUTPATIENT
Start: 2022-06-10

## 2022-06-14 ENCOUNTER — HOSPITAL ENCOUNTER (OUTPATIENT)
Dept: PREADMISSION TESTING | Age: 37
Discharge: HOME | End: 2022-06-18

## 2022-06-14 VITALS
HEIGHT: 67 IN | HEART RATE: 87 BPM | TEMPERATURE: 97.6 F | SYSTOLIC BLOOD PRESSURE: 128 MMHG | BODY MASS INDEX: 32.49 KG/M2 | OXYGEN SATURATION: 100 % | DIASTOLIC BLOOD PRESSURE: 89 MMHG | WEIGHT: 207 LBS | RESPIRATION RATE: 18 BRPM

## 2022-06-14 ASSESSMENT — PAIN DESCRIPTION - FREQUENCY: FREQUENCY: CONTINUOUS

## 2022-06-14 ASSESSMENT — PAIN SCALES - GENERAL: PAINLEVEL_OUTOF10: 6

## 2022-06-14 ASSESSMENT — PAIN - FUNCTIONAL ASSESSMENT: PAIN_FUNCTIONAL_ASSESSMENT: PREVENTS OR INTERFERES WITH MANY ACTIVE NOT PASSIVE ACTIVITIES

## 2022-06-14 ASSESSMENT — PAIN DESCRIPTION - PAIN TYPE: TYPE: CHRONIC PAIN

## 2022-06-14 ASSESSMENT — PAIN DESCRIPTION - DESCRIPTORS: DESCRIPTORS: ACHING

## 2022-06-14 ASSESSMENT — PAIN DESCRIPTION - ORIENTATION: ORIENTATION: RIGHT

## 2022-06-14 ASSESSMENT — PAIN DESCRIPTION - LOCATION: LOCATION: ARM

## 2022-06-14 NOTE — PROGRESS NOTES
Informed patient and Oliva Hansen @ surgeon's office of cardiac clearance needed for upcoming surgery.

## 2022-06-14 NOTE — H&P (VIEW-ONLY)
History and Physical    Pt Name: Leonora Romo  MRN: [id number]  YOB: 1985  Date of evaluation: 6/14/2022  Primary Care Physician: MORE Sanchez CNP    SUBJECTIVE:   History of Chief Complaint:    Leonora Romo is a 39 y.o. female who presents for PAT appointment. Patient complains of neck pain, and right shoulder heaviness, and right arm heaviness, and shooting pain to right 4th and 5th fingers and finger locking thumb and index finger. She states symptoms are constant and present since neck surgery 2021. She does report improvement in numbness of arms since neck surgery. Patient has been scheduled for CUBITAL TUNNEL RELEASE - RIGHT. Allergies  is allergic to bee venom and doxycycline. Medications  Prior to Admission medications    Medication Sig Start Date End Date Taking?  Authorizing Provider   ibuprofen (ADVIL;MOTRIN) 800 MG tablet Take 1 tablet by mouth 2 times daily as needed for Pain 6/8/22   Unknown MORE Grayson CNP   cyclobenzaprine (FLEXERIL) 10 mg tablet Take 1 tablet by mouth 3 times daily as needed for Muscle spasms 6/6/22 6/16/22  Leora Huddleston MD   pregabalin (LYRICA) 150 MG capsule TAKE 1 CAPSULE BY MOUTH THREE TIMES DAILY 5/24/22 6/24/22  Unknown MORE Grayson CNP   DULoxetine (CYMBALTA) 60 MG extended release capsule TAKE ONE (1) CAPSULE BY MOUTH ONCE DAILY 5/24/22   Unknown MORE Grayson CNP   carBAMazepine (TEGRETOL XR) 100 MG extended release tablet TAKE 1 TABLET DAILY ~108 TAKE 1 TABLET AT BEDTIME  Patient taking differently: Take 300 mg by mouth 2 times daily  5/16/22   Unknown OMRE Grayson CNP   verapamil (CALAN) 80 MG tablet Take 1 tablet by mouth at bedtime 5/10/22   Amena Castanon MD   EPINEPHrine (EPIPEN 2-ELOY) 0.3 MG/0.3ML SOAJ injection Inject 0.3 mLs into the muscle once for 1 dose Use as directed for allergic reaction 2/25/22 2/25/22  Unknown MORE Grayson CNP   Handicap Placard MISC by Does not apply route 22   MORE Montoya - CNP   carBAMazepine (TEGRETOL-XR) 200 MG extended release tablet Take 1 tablet by mouth 2 times daily Two prescription. 200 and morning along with 100 mg in the morning. 200 mg at bedtime along with 100 mg at bedtime. 22   Nisha Carl MD   Blood Pressure KIT Blood pressure management 21   MORE Reese - NP   topiramate (TOPAMAX) 50 MG tablet Take 1 tablet by mouth 2 times daily 21  Jen Sellers MD   Elastic Bandages & Supports ([address]) [address] 1 Units by Does not apply route as needed (neck pain) 21  MORE Mac - LEAH   Handicap Placard MISC by Does not apply route 1 years 3/12/21   Lashawn Zarate PA-C     Past Medical History    has a past medical history of Anxiety, Blood loss, Cervical disc disease, Cervical myopathy, Chronic back pain, GERD (gastroesophageal reflux disease), Hx of migraine headaches, Neuropathy, Thyroid nodule, Trigeminal neuralgia of right side of face, Under care of team, Under care of team, Under care of team, Weakness generalized, and Wellness examination. Past Surgical History   has a past surgical history that includes  section; Tubal ligation; Dilation and curettage of uterus; Cervical disc arthroplasty (2021); and cervical fusion (N/A, 2021). Social History   reports that she quit smoking about 17 years ago. She quit after 2.00 years of use. She has never used smokeless tobacco.    reports current alcohol use. reports no history of drug use. Marital Status    Children 4  Occupation   Family History  Family Status   Relation Name Status    Mother  Alive    Father  [de-identified]    Sister  (Not Specified)    MUnc  (Not Specified)    MGM      PAunt  (Not Specified)     family history includes Breast Cancer in her paternal aunt;  Cancer in her sister; Diabetes in her maternal grandmother, maternal uncle, and mother; Heart Attack in her maternal grandmother; Heart Disease in her father; High Blood Pressure in her mother; Hypertension in her father; Kidney Disease in her maternal grandmother; Other in her mother; Seizures in her mother. Review of Systems:  CONSTITUTIONAL:   negative for fevers, chills, fatigue and malaise    EYES:   negative for double vision, blurred vision and photophobia   HEENT:   negative for tinnitus, epistaxis and sore throat     RESPIRATORY:   negative for cough, shortness of breath, wheezing     CARDIOVASCULAR:   negative for chest pain, palpitations, syncope, edema     GASTROINTESTINAL:   negative for nausea, vomiting  +constipation   GENITOURINARY:   negative for incontinence     MUSCULOSKELETAL:   +chronic neck pain   NEUROLOGICAL:   +arm weakness  negative for headaches and dizziness     PSYCHIATRIC:   negative for anxiety       OBJECTIVE:   VITALS:  height is 5' 7\" (1.702 m) and weight is 207 lb (93.9 kg). Her temporal temperature is 97.6 °F (36.4 °C). Her blood pressure is 128/89 and her pulse is 87. Her respiration is 18 and oxygen saturation is 100%. CONSTITUTIONAL:alert & oriented x 3, no acute distress. Friendly. Very pleasant. SKIN:  Warm and dry, no rashes on exposed areas of skin   HEAD:  Normocephalic, atraumatic   EYES: EOMs intact. PERRL. EARS:  Equal bilaterally, no edema or thickening, skin is intact without lumps or lesions. No discharge. Hearing grossly WNL. NOSE:  Nares patent. No rhinorrhea   MOUTH/THROAT:  Mucous membranes moist, tongue is pink, uvula midline, teeth appear to be intact  NECK:supple, full ROM, some limitation in ROM  LUNGS: Respirations even and non-labored. Clear to auscultation bilaterally, no wheezes, rales, or rhonchi. CARDIOVASCULAR: Regular rate and rhythm, no murmurs/rubs/gallops   ABDOMEN: soft, non-tender, non-distended, bowel sounds active x 4   EXTREMITIES: No edema bilateral lower extremities. No varicosities bilateral lower extremities.

## 2022-06-14 NOTE — H&P
History and Physical    Pt Name: Mark Neal  MRN: [id number]  YOB: 1985  Date of evaluation: 6/14/2022  Primary Care Physician: MORE Farmer CNP    SUBJECTIVE:   History of Chief Complaint:    Mark Neal is a 39 y.o. female who presents for PAT appointment. Patient complains of neck pain, and right shoulder heaviness, and right arm heaviness, and shooting pain to right 4th and 5th fingers and finger locking thumb and index finger. She states symptoms are constant and present since neck surgery 2021. She does report improvement in numbness of arms since neck surgery. Patient has been scheduled for CUBITAL TUNNEL RELEASE - RIGHT. Allergies  is allergic to bee venom and doxycycline. Medications  Prior to Admission medications    Medication Sig Start Date End Date Taking?  Authorizing Provider   ibuprofen (ADVIL;MOTRIN) 800 MG tablet Take 1 tablet by mouth 2 times daily as needed for Pain 6/8/22   Ermalinda Shock, MORE Dhillon CNP   cyclobenzaprine (FLEXERIL) 10 mg tablet Take 1 tablet by mouth 3 times daily as needed for Muscle spasms 6/6/22 6/16/22  Trino Do MD   pregabalin (LYRICA) 150 MG capsule TAKE 1 CAPSULE BY MOUTH THREE TIMES DAILY 5/24/22 6/24/22  Ermalinda Shock, MORE - CNP   DULoxetine (CYMBALTA) 60 MG extended release capsule TAKE ONE (1) CAPSULE BY MOUTH ONCE DAILY 5/24/22   Ermalinda Shock, MORE - CNP   carBAMazepine (TEGRETOL XR) 100 MG extended release tablet TAKE 1 TABLET DAILY ~108 TAKE 1 TABLET AT BEDTIME  Patient taking differently: Take 300 mg by mouth 2 times daily  5/16/22   Ermalinda Shock, MORE - LEAH   verapamil (CALAN) 80 MG tablet Take 1 tablet by mouth at bedtime 5/10/22   Rocky Melendez MD   EPINEPHrine (EPIPEN 2-ELOY) 0.3 MG/0.3ML SOAJ injection Inject 0.3 mLs into the muscle once for 1 dose Use as directed for allergic reaction 2/25/22 2/25/22  Ermalinda Shock, MORE Dhillon CNP   Handicap Placard MISC by Does not apply route 22   Johnny Lockwood, APRN - CNP   carBAMazepine (TEGRETOL-XR) 200 MG extended release tablet Take 1 tablet by mouth 2 times daily Two prescription. 200 and morning along with 100 mg in the morning. 200 mg at bedtime along with 100 mg at bedtime. 22   Nasra Cook MD   Blood Pressure KIT Blood pressure management 21   Ez Patel APRN - NP   topiramate (TOPAMAX) 50 MG tablet Take 1 tablet by mouth 2 times daily 21  Jose Grewal MD   Elastic Bandages & Supports ([address]) [address] [facility] 1 Units by Does not apply route as needed (neck pain) 21  Lonnell Kussmaul, APRN - CNP   Handicap Placard MISC by Does not apply route 1 years 3/12/21   Erendira Velazquez PA-C     Past Medical History    has a past medical history of Anxiety, Blood loss, Cervical disc disease, Cervical myopathy, Chronic back pain, GERD (gastroesophageal reflux disease), Hx of migraine headaches, Neuropathy, Thyroid nodule, Trigeminal neuralgia of right side of face, Under care of team, Under care of team, Under care of team, Weakness generalized, and Wellness examination. Past Surgical History   has a past surgical history that includes  section; Tubal ligation; Dilation and curettage of uterus; Cervical disc arthroplasty (2021); and cervical fusion (N/A, 2021). Social History   reports that she quit smoking about 17 years ago. She quit after 2.00 years of use. She has never used smokeless tobacco.    reports current alcohol use. reports no history of drug use. Marital Status    Children 4  Occupation   Family History  Family Status   Relation Name Status    Mother  Alive    Father  [de-identified]    Sister  (Not Specified)    MUnc  (Not Specified)    MGM      PAunt  (Not Specified)     family history includes Breast Cancer in her paternal aunt;  Cancer in her sister; Diabetes in her maternal grandmother, maternal uncle, and mother; Heart Attack in her maternal grandmother; Heart Disease in her father; High Blood Pressure in her mother; Hypertension in her father; Kidney Disease in her maternal grandmother; Other in her mother; Seizures in her mother. Review of Systems:  CONSTITUTIONAL:   negative for fevers, chills, fatigue and malaise    EYES:   negative for double vision, blurred vision and photophobia   HEENT:   negative for tinnitus, epistaxis and sore throat     RESPIRATORY:   negative for cough, shortness of breath, wheezing     CARDIOVASCULAR:   negative for chest pain, palpitations, syncope, edema     GASTROINTESTINAL:   negative for nausea, vomiting  +constipation   GENITOURINARY:   negative for incontinence     MUSCULOSKELETAL:   +chronic neck pain   NEUROLOGICAL:   +arm weakness  negative for headaches and dizziness     PSYCHIATRIC:   negative for anxiety       OBJECTIVE:   VITALS:  height is 5' 7\" (1.702 m) and weight is 207 lb (93.9 kg). Her temporal temperature is 97.6 °F (36.4 °C). Her blood pressure is 128/89 and her pulse is 87. Her respiration is 18 and oxygen saturation is 100%. CONSTITUTIONAL:alert & oriented x 3, no acute distress. Friendly. Very pleasant. SKIN:  Warm and dry, no rashes on exposed areas of skin   HEAD:  Normocephalic, atraumatic   EYES: EOMs intact. PERRL. EARS:  Equal bilaterally, no edema or thickening, skin is intact without lumps or lesions. No discharge. Hearing grossly WNL. NOSE:  Nares patent. No rhinorrhea   MOUTH/THROAT:  Mucous membranes moist, tongue is pink, uvula midline, teeth appear to be intact  NECK:supple, full ROM, some limitation in ROM  LUNGS: Respirations even and non-labored. Clear to auscultation bilaterally, no wheezes, rales, or rhonchi. CARDIOVASCULAR: Regular rate and rhythm, no murmurs/rubs/gallops   ABDOMEN: soft, non-tender, non-distended, bowel sounds active x 4   EXTREMITIES: No edema bilateral lower extremities. No varicosities bilateral lower extremities. NEUROLOGIC: CN II-XII are grossly intact. Gait is smooth, but slow, steady. Weak hand grasps bilaterally 3/5.      Testing:   EKG: on file from 6/6/2022  Labs pending: on file from 6/6/22 done in ED visit for facial parasthesia  IMPRESSIONS:   Cubital tunnel syndrome  PLANS:   CUBITAL TUNNEL RELEASE  -RIGHT    MORE Gamez CNP  Electronically signed 6/14/2022 at 12:36 PM

## 2022-06-20 DIAGNOSIS — M54.12 CERVICAL RADICULOPATHY: ICD-10-CM
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
## 2022-06-20 DIAGNOSIS — M54.14 THORACIC RADICULOPATHY: ICD-10-CM

## 2022-06-20 DIAGNOSIS — Z76.0 MEDICATION REFILL: ICD-10-CM

## 2022-06-21 RX ORDER — CARBAMAZEPINE 200 MG/1
TABLET, EXTENDED RELEASE ORAL
Qty: 60 TABLET | Refills: 10 | OUTPATIENT
Start: 2022-06-21

## 2022-06-23 ENCOUNTER — OFFICE VISIT (OUTPATIENT)
Dept: CARDIOLOGY | Age: 37
End: 2022-06-23
Payer: COMMERCIAL

## 2022-06-23 VITALS
SYSTOLIC BLOOD PRESSURE: 130 MMHG | HEART RATE: 64 BPM | WEIGHT: 213.6 LBS | BODY MASS INDEX: 33.53 KG/M2 | HEIGHT: 67 IN | DIASTOLIC BLOOD PRESSURE: 60 MMHG

## 2022-06-23 DIAGNOSIS — Z01.810 PREOP CARDIOVASCULAR EXAM: Primary | ICD-10-CM

## 2022-06-23 DIAGNOSIS — Z09 HOSPITAL DISCHARGE FOLLOW-UP: ICD-10-CM

## 2022-06-23 PROCEDURE — 99204 OFFICE O/P NEW MOD 45 MIN: CPT | Performed by: INTERNAL MEDICINE

## 2022-06-23 PROCEDURE — G8417 CALC BMI ABV UP PARAM F/U: HCPCS | Performed by: INTERNAL MEDICINE

## 2022-06-23 PROCEDURE — G8427 DOCREV CUR MEDS BY ELIG CLIN: HCPCS | Performed by: INTERNAL MEDICINE

## 2022-06-23 PROCEDURE — 1036F TOBACCO NON-USER: CPT | Performed by: INTERNAL MEDICINE

## 2022-06-23 PROCEDURE — 99213 OFFICE O/P EST LOW 20 MIN: CPT

## 2022-06-23 NOTE — PROGRESS NOTES
Cardiology Consultation/Follow Up. [facility]    CC: Patient is here to establish cardiac care. CHLOE Gross is a 79-year-old female with no previous cardiac history is here for preoperative stratification prior to cubital tunnel release surgery on right. Patient has a history of cervical surgery and neuropathies. Also has migraine headaches. No prior history of CAD, CHF, arrhythmias, TIA, CVA, insulin-dependent diabetes mellitus or CKD. Patient denies any chest pain or angina. She occasionally gets short of breath on moderate to heavy exertion but no dyspnea on routine activities. She has two-story home and able to climb 2 flight of stairs every day without any significant exertional symptoms. No lower extremity edema or orthopnea. No recent dizzy spells or syncope. Past Medical:  Past Medical History:   Diagnosis Date    Anxiety     Blood loss     after     Cervical disc disease     Cervical myopathy     Chronic back pain     GERD (gastroesophageal reflux disease)     Hx of migraine headaches     Neuropathy     both legs, feet    Thyroid nodule     Trigeminal neuralgia of right side of face     Under care of team 2022    NEUROLSURGERY - DR. Himanshu Bah Under care of team 2022    NEUROLOGY - DR. NOWAK - LAST VISIT 2022    Under care of team 2022    CARDIOLOGY - UPCOMING FIRST VISIT - DOES NOT REMEMBER NAME    Weakness generalized     due to neck issues    Wellness examination 2022    PCP - Marva Dougherty CNP - LAST VISIT  - 2022       Past Surgical:  Past Surgical History:   Procedure Laterality Date    CERVICAL DISC ARTHROPLASTY  2021    ANTERIOR C5-6 DISC ARTHROPLASTY     CERVICAL FUSION N/A 2021    ANTERIOR C5-6 DISC ARTHROPLASTY performed by Rui Baugh DO at [address] 3      x3    DILATION AND CURETTAGE OF UTERUS      TUBAL LIGATION         Family History:  Family History   Problem Relation Age of Onset    Diabetes Mother     High Blood Pressure Mother     Other Mother         Tremor    Seizures Mother     Heart Disease Father     Hypertension Father     Cancer Sister         Lymphoblastic lymphoma    Diabetes Maternal Uncle     Heart Attack Maternal Grandmother     Diabetes Maternal Grandmother     Kidney Disease Maternal Grandmother     Breast Cancer Paternal Aunt        Social History:  Social History     Tobacco Use    Smoking status: Former Smoker     Years: 2.00     Quit date:      Years since quittin.4    Smokeless tobacco: Never Used    Tobacco comment: [id number], smoker then   Vaping Use    Vaping Use: Never used   Substance Use Topics    Alcohol use: Yes     Comment: 2 glasses of wine a month    Drug use: Never        REVIEW OF SYSTEMS:    · Constitutional: there has been no unanticipated weight loss. There's been No change in energy level, No change in activity level. · Eyes: No visual changes or diplopia. No scleral icterus. · ENT: No Headaches, hearing loss or vertigo. No mouth sores or sore throat. · Cardiovascular: As described in HPI. · Respiratory: AS HPI  · Gastrointestinal: No abdominal pain, appetite loss, blood in stools. No change in bowel or bladder habits. · Genitourinary: No dysuria, trouble voiding, or hematuria. · Musculoskeletal: Positive for bilateral hip pain. · Integumentary: No rash or pruritis. · Neurological: No headache, diplopia, change in muscle strength, numbness or tingling  · Psychiatric: No new anxiety or depression. · Endocrine: No temperature intolerance. No excessive thirst, fluid intake, or urination. No tremor. · Hematologic/Lymphatic: No abnormal bruising or bleeding, blood clots or swollen lymph nodes. · Allergic/Immunologic: No nasal congestion or hives.     Medications:  Current Outpatient Medications   Medication Sig Dispense Refill    ibuprofen (ADVIL;MOTRIN) 800 MG tablet Take 1 tablet by mouth 2 times daily as needed for Pain 60 tablet 5    pregabalin (LYRICA) 150 MG capsule TAKE 1 CAPSULE BY MOUTH THREE TIMES DAILY 90 capsule 0    DULoxetine (CYMBALTA) 60 MG extended release capsule TAKE ONE (1) CAPSULE BY MOUTH ONCE DAILY 30 capsule 10    carBAMazepine (TEGRETOL XR) 100 MG extended release tablet TAKE 1 TABLET DAILY ~108 TAKE 1 TABLET AT BEDTIME (Patient taking differently: Take 300 mg by mouth 2 times daily ) 60 tablet 5    EPINEPHrine (EPIPEN 2-ELOY) 0.3 MG/0.3ML SOAJ injection Inject 0.3 mLs into the muscle once for 1 dose Use as directed for allergic reaction 0.3 mL 2    Handicap Placard MISC by Does not apply route 1 each 0    carBAMazepine (TEGRETOL-XR) 200 MG extended release tablet Take 1 tablet by mouth 2 times daily Two prescription. 200 and morning along with 100 mg in the morning. 200 mg at bedtime along with 100 mg at bedtime. 60 tablet 2    Blood Pressure KIT Blood pressure management 1 kit 0    verapamil (CALAN) 80 MG tablet Take 1 tablet by mouth at bedtime (Patient not taking: Reported on 6/23/2022) 90 tablet 3     No current facility-administered medications for this visit. Physical Exam:   Vitals: /60   Pulse 64   Ht 5' 7\" (1.702 m)   Wt 213 lb 9.6 oz (96.9 kg)   LMP 06/14/2022 (Exact Date)   BMI 33.45 kg/m²   General appearance: alert, oriented and cooperative with exam  HEENT: Head: Normocephalic, no lesions, without obvious abnormality. Neck: no carotid bruit, no JVD  Lungs: clear to auscultation bilaterally without any wheezing or rales   Heart: regular rate and rhythm, S1, S2 normal, no murmur, click, rub or gallop  Abdomen: soft, non-tender; bowel sounds normal; no masses,  no organomegaly  Extremities: extremities normal, atraumatic, no cyanosis or edema  Neurologic: Mental status: Alert, oriented, thought content appropriate    Labs:  CBC: No results for input(s): WBC, HGB, HCT, PLT in the last 72 hours.   BMP: No results for input(s): NA, K, CO2, BUN, CREATININE, LABGLOM, GLUCOSE in the last 72 hours. PT/INR: No results for input(s): PROTIME, INR in the last 72 hours. FASTING LIPID PANEL:    EKG 6/6/2022: Normal sinus rhythm, possible anterior infarct      Past Medical and Surgical History, Problem List, Allergies, Medications, Labs, Imaging, all reviewed extensively in EMR and with the patient. Assessment:   1. Preop cardiac evaluation prior to cubital tunnel release surgery  2. Cervical disc disease status post replacement  3. Cervical stenosis and radiculopathy  4. Chronic low back pain  5. Migraine headaches    Plan:     Patient's RCRI risk score index is 0. She has good functional capacity without any symptoms of angina or CHF. She will be low risk for cubital tunnel release surgery from cardiac standpoint and this was discussed with her. The patient was counseled regarding heart healthy diet, weight loss and exercise as tolerated. All questions and concerns were addressed to patient's satisfaction. The patient is to follow up in 12 months or sooner if necessary. Thank you for allowing me to participate in the care of this patient, please do not hesitate to call if you have any questions. Rylan Buckner MD, [address] [facility]  [facility]  [phone]

## 2022-06-23 NOTE — PROGRESS NOTES
Cardiology Consultation  [facility] [address], [address] [place]  [phone]      6/23/2022      Regarding:  Daniel Bones  1985      To Whom It May Concern,      Patient is Low Cardiac Risk for planned cubital tunnel release surgery. Special instructions:    Please continue other meds.         Thank you,      Ramin Lobo MD      [address] [facility]

## 2022-06-24 RX ORDER — PREGABALIN 150 MG/1
CAPSULE ORAL
Qty: 90 CAPSULE | Refills: 2 | Status: SHIPPED | OUTPATIENT
Start: 2022-06-24 | End: 2022-10-07

## 2022-06-29 ENCOUNTER — HOSPITAL ENCOUNTER (OUTPATIENT)
Age: 37
Setting detail: OUTPATIENT SURGERY
Discharge: HOME | End: 2022-06-29
Attending: NEUROLOGICAL SURGERY | Admitting: NEUROLOGICAL SURGERY
Payer: COMMERCIAL

## 2022-06-29 ENCOUNTER — ANESTHESIA EVENT (OUTPATIENT)
Dept: OPERATING ROOM | Age: 37
End: 2022-06-29
Payer: COMMERCIAL

## 2022-06-29 ENCOUNTER — ANESTHESIA (OUTPATIENT)
Dept: OPERATING ROOM | Age: 37
End: 2022-06-29
Payer: COMMERCIAL

## 2022-06-29 VITALS
RESPIRATION RATE: 17 BRPM | SYSTOLIC BLOOD PRESSURE: 122 MMHG | DIASTOLIC BLOOD PRESSURE: 82 MMHG | OXYGEN SATURATION: 97 % | TEMPERATURE: 97.7 F | HEART RATE: 70 BPM

## 2022-06-29 DIAGNOSIS — G56.21 CUBITAL TUNNEL SYNDROME, RIGHT: Primary | ICD-10-CM

## 2022-06-29 LAB — HCG, PREGNANCY URINE (POC): NEGATIVE

## 2022-06-29 PROCEDURE — 2709999900 HC NON-CHARGEABLE SUPPLY: Performed by: NEUROLOGICAL SURGERY

## 2022-06-29 PROCEDURE — 3600000013 HC SURGERY LEVEL 3 ADDTL 15MIN: Performed by: NEUROLOGICAL SURGERY

## 2022-06-29 PROCEDURE — 7100000040 HC SPAR PHASE II RECOVERY - FIRST 15 MIN: Performed by: NEUROLOGICAL SURGERY

## 2022-06-29 PROCEDURE — 3600000003 HC SURGERY LEVEL 3 BASE: Performed by: NEUROLOGICAL SURGERY

## 2022-06-29 PROCEDURE — 7100000041 HC SPAR PHASE II RECOVERY - ADDTL 15 MIN: Performed by: NEUROLOGICAL SURGERY

## 2022-06-29 PROCEDURE — 7100000001 HC PACU RECOVERY - ADDTL 15 MIN: Performed by: NEUROLOGICAL SURGERY

## 2022-06-29 PROCEDURE — 2580000003 HC RX 258: Performed by: NEUROLOGICAL SURGERY

## 2022-06-29 PROCEDURE — 6360000002 HC RX W HCPCS: Performed by: NEUROLOGICAL SURGERY

## 2022-06-29 PROCEDURE — 3700000001 HC ADD 15 MINUTES (ANESTHESIA): Performed by: NEUROLOGICAL SURGERY

## 2022-06-29 PROCEDURE — 2500000003 HC RX 250 WO HCPCS: Performed by: ANESTHESIOLOGY

## 2022-06-29 PROCEDURE — C9290 INJ, BUPIVACAINE LIPOSOME: HCPCS | Performed by: NEUROLOGICAL SURGERY

## 2022-06-29 PROCEDURE — 2580000003 HC RX 258: Performed by: ANESTHESIOLOGY

## 2022-06-29 PROCEDURE — 6360000002 HC RX W HCPCS: Performed by: ANESTHESIOLOGY

## 2022-06-29 PROCEDURE — 64718 REVISE ULNAR NERVE AT ELBOW: CPT | Performed by: NEUROLOGICAL SURGERY

## 2022-06-29 PROCEDURE — 81025 URINE PREGNANCY TEST: CPT

## 2022-06-29 PROCEDURE — 6370000000 HC RX 637 (ALT 250 FOR IP): Performed by: ANESTHESIOLOGY

## 2022-06-29 PROCEDURE — 2500000003 HC RX 250 WO HCPCS: Performed by: NEUROLOGICAL SURGERY

## 2022-06-29 PROCEDURE — 7100000000 HC PACU RECOVERY - FIRST 15 MIN: Performed by: NEUROLOGICAL SURGERY

## 2022-06-29 PROCEDURE — 6370000000 HC RX 637 (ALT 250 FOR IP): Performed by: NEUROLOGICAL SURGERY

## 2022-06-29 PROCEDURE — 3700000000 HC ANESTHESIA ATTENDED CARE: Performed by: NEUROLOGICAL SURGERY

## 2022-06-29 RX ORDER — LIDOCAINE HYDROCHLORIDE AND EPINEPHRINE 10; 10 MG/ML; UG/ML
INJECTION, SOLUTION INFILTRATION; PERINEURAL PRN
Status: DISCONTINUED | OUTPATIENT
Start: 2022-06-29 | End: 2022-06-29 | Stop reason: HOSPADM

## 2022-06-29 RX ORDER — MIDAZOLAM HYDROCHLORIDE 1 MG/ML
INJECTION INTRAMUSCULAR; INTRAVENOUS PRN
Status: DISCONTINUED | OUTPATIENT
Start: 2022-06-29 | End: 2022-06-29 | Stop reason: SDUPTHER

## 2022-06-29 RX ORDER — SODIUM CHLORIDE 0.9 % (FLUSH) 0.9 %
5-40 SYRINGE (ML) INJECTION EVERY 12 HOURS SCHEDULED
Status: DISCONTINUED | OUTPATIENT
Start: 2022-06-29 | End: 2022-06-29 | Stop reason: HOSPADM

## 2022-06-29 RX ORDER — ROCURONIUM BROMIDE 10 MG/ML
INJECTION, SOLUTION INTRAVENOUS PRN
Status: DISCONTINUED | OUTPATIENT
Start: 2022-06-29 | End: 2022-06-29 | Stop reason: SDUPTHER

## 2022-06-29 RX ORDER — FENTANYL CITRATE 50 UG/ML
50 INJECTION, SOLUTION INTRAMUSCULAR; INTRAVENOUS EVERY 5 MIN PRN
Status: DISCONTINUED | OUTPATIENT
Start: 2022-06-29 | End: 2022-06-29 | Stop reason: HOSPADM

## 2022-06-29 RX ORDER — MAGNESIUM HYDROXIDE 1200 MG/15ML
LIQUID ORAL CONTINUOUS PRN
Status: DISCONTINUED | OUTPATIENT
Start: 2022-06-29 | End: 2022-06-29 | Stop reason: HOSPADM

## 2022-06-29 RX ORDER — SODIUM CHLORIDE 0.9 % (FLUSH) 0.9 %
5-40 SYRINGE (ML) INJECTION PRN
Status: DISCONTINUED | OUTPATIENT
Start: 2022-06-29 | End: 2022-06-29 | Stop reason: HOSPADM

## 2022-06-29 RX ORDER — MIDAZOLAM HYDROCHLORIDE 2 MG/2ML
2 INJECTION, SOLUTION INTRAMUSCULAR; INTRAVENOUS ONCE
Status: COMPLETED | OUTPATIENT
Start: 2022-06-29 | End: 2022-06-29

## 2022-06-29 RX ORDER — FENTANYL CITRATE 50 UG/ML
INJECTION, SOLUTION INTRAMUSCULAR; INTRAVENOUS PRN
Status: DISCONTINUED | OUTPATIENT
Start: 2022-06-29 | End: 2022-06-29 | Stop reason: SDUPTHER

## 2022-06-29 RX ORDER — PROPOFOL 10 MG/ML
INJECTION, EMULSION INTRAVENOUS PRN
Status: DISCONTINUED | OUTPATIENT
Start: 2022-06-29 | End: 2022-06-29 | Stop reason: SDUPTHER

## 2022-06-29 RX ORDER — PHENYLEPHRINE HCL IN 0.9% NACL 1 MG/10 ML
SYRINGE (ML) INTRAVENOUS PRN
Status: DISCONTINUED | OUTPATIENT
Start: 2022-06-29 | End: 2022-06-29 | Stop reason: SDUPTHER

## 2022-06-29 RX ORDER — GLYCOPYRROLATE 0.2 MG/ML
INJECTION INTRAMUSCULAR; INTRAVENOUS PRN
Status: DISCONTINUED | OUTPATIENT
Start: 2022-06-29 | End: 2022-06-29 | Stop reason: SDUPTHER

## 2022-06-29 RX ORDER — OXYCODONE HYDROCHLORIDE AND ACETAMINOPHEN 5; 325 MG/1; MG/1
1 TABLET ORAL EVERY 4 HOURS PRN
Qty: 42 TABLET | Refills: 0 | Status: SHIPPED | OUTPATIENT
Start: 2022-06-29 | End: 2022-07-11 | Stop reason: SDUPTHER

## 2022-06-29 RX ORDER — SODIUM CHLORIDE, SODIUM LACTATE, POTASSIUM CHLORIDE, CALCIUM CHLORIDE 600; 310; 30; 20 MG/100ML; MG/100ML; MG/100ML; MG/100ML
1000 INJECTION, SOLUTION INTRAVENOUS CONTINUOUS
Status: DISCONTINUED | OUTPATIENT
Start: 2022-06-29 | End: 2022-06-29 | Stop reason: HOSPADM

## 2022-06-29 RX ORDER — DEXAMETHASONE SODIUM PHOSPHATE 10 MG/ML
INJECTION INTRAMUSCULAR; INTRAVENOUS PRN
Status: DISCONTINUED | OUTPATIENT
Start: 2022-06-29 | End: 2022-06-29 | Stop reason: SDUPTHER

## 2022-06-29 RX ORDER — FENTANYL CITRATE 50 UG/ML
25 INJECTION, SOLUTION INTRAMUSCULAR; INTRAVENOUS EVERY 5 MIN PRN
Status: DISCONTINUED | OUTPATIENT
Start: 2022-06-29 | End: 2022-06-29 | Stop reason: HOSPADM

## 2022-06-29 RX ORDER — LIDOCAINE HYDROCHLORIDE 10 MG/ML
INJECTION, SOLUTION EPIDURAL; INFILTRATION; INTRACAUDAL; PERINEURAL PRN
Status: DISCONTINUED | OUTPATIENT
Start: 2022-06-29 | End: 2022-06-29 | Stop reason: SDUPTHER

## 2022-06-29 RX ORDER — NEOSTIGMINE METHYLSULFATE 5 MG/5 ML
SYRINGE (ML) INTRAVENOUS PRN
Status: DISCONTINUED | OUTPATIENT
Start: 2022-06-29 | End: 2022-06-29 | Stop reason: SDUPTHER

## 2022-06-29 RX ORDER — ONDANSETRON 2 MG/ML
INJECTION INTRAMUSCULAR; INTRAVENOUS PRN
Status: DISCONTINUED | OUTPATIENT
Start: 2022-06-29 | End: 2022-06-29 | Stop reason: SDUPTHER

## 2022-06-29 RX ORDER — OXYCODONE HYDROCHLORIDE AND ACETAMINOPHEN 5; 325 MG/1; MG/1
1 TABLET ORAL EVERY 6 HOURS PRN
Qty: 28 TABLET | Refills: 0 | Status: SHIPPED | OUTPATIENT
Start: 2022-06-29 | End: 2022-06-29 | Stop reason: SDUPTHER

## 2022-06-29 RX ORDER — CEPHALEXIN 500 MG/1
500 CAPSULE ORAL 3 TIMES DAILY
Qty: 9 CAPSULE | Refills: 0 | Status: SHIPPED | OUTPATIENT
Start: 2022-06-29 | End: 2022-07-02

## 2022-06-29 RX ORDER — GINSENG 100 MG
CAPSULE ORAL PRN
Status: DISCONTINUED | OUTPATIENT
Start: 2022-06-29 | End: 2022-06-29 | Stop reason: HOSPADM

## 2022-06-29 RX ORDER — SODIUM CHLORIDE 9 MG/ML
INJECTION, SOLUTION INTRAVENOUS PRN
Status: DISCONTINUED | OUTPATIENT
Start: 2022-06-29 | End: 2022-06-29 | Stop reason: HOSPADM

## 2022-06-29 RX ORDER — OXYCODONE HYDROCHLORIDE AND ACETAMINOPHEN 5; 325 MG/1; MG/1
1 TABLET ORAL
Status: COMPLETED | OUTPATIENT
Start: 2022-06-29 | End: 2022-06-29

## 2022-06-29 RX ADMIN — Medication 100 MCG: at 09:26

## 2022-06-29 RX ADMIN — Medication 100 MCG: at 08:38

## 2022-06-29 RX ADMIN — DEXAMETHASONE SODIUM PHOSPHATE 10 MG: 10 INJECTION INTRAMUSCULAR; INTRAVENOUS at 08:50

## 2022-06-29 RX ADMIN — FENTANYL CITRATE 25 MCG: 50 INJECTION, SOLUTION INTRAMUSCULAR; INTRAVENOUS at 10:29

## 2022-06-29 RX ADMIN — FENTANYL CITRATE 50 MCG: 50 INJECTION, SOLUTION INTRAMUSCULAR; INTRAVENOUS at 11:02

## 2022-06-29 RX ADMIN — MIDAZOLAM HYDROCHLORIDE 2 MG: 1 INJECTION, SOLUTION INTRAMUSCULAR; INTRAVENOUS at 07:56

## 2022-06-29 RX ADMIN — SODIUM CHLORIDE, POTASSIUM CHLORIDE, SODIUM LACTATE AND CALCIUM CHLORIDE: 600; 310; 30; 20 INJECTION, SOLUTION INTRAVENOUS at 09:43

## 2022-06-29 RX ADMIN — Medication 100 MCG: at 08:56

## 2022-06-29 RX ADMIN — LIDOCAINE HYDROCHLORIDE 50 MG: 10 INJECTION, SOLUTION EPIDURAL; INFILTRATION; INTRACAUDAL; PERINEURAL at 08:10

## 2022-06-29 RX ADMIN — Medication 100 MCG: at 08:20

## 2022-06-29 RX ADMIN — ONDANSETRON 4 MG: 2 INJECTION INTRAMUSCULAR; INTRAVENOUS at 09:47

## 2022-06-29 RX ADMIN — ROCURONIUM BROMIDE 40 MG: 10 INJECTION INTRAVENOUS at 08:10

## 2022-06-29 RX ADMIN — Medication 4 MG: at 10:04

## 2022-06-29 RX ADMIN — PROPOFOL 200 MG: 10 INJECTION, EMULSION INTRAVENOUS at 08:10

## 2022-06-29 RX ADMIN — Medication 100 MCG: at 09:05

## 2022-06-29 RX ADMIN — OXYCODONE HYDROCHLORIDE AND ACETAMINOPHEN 1 TABLET: 5; 325 TABLET ORAL at 11:17

## 2022-06-29 RX ADMIN — SODIUM CHLORIDE, POTASSIUM CHLORIDE, SODIUM LACTATE AND CALCIUM CHLORIDE 1000 ML: 600; 310; 30; 20 INJECTION, SOLUTION INTRAVENOUS at 07:30

## 2022-06-29 RX ADMIN — Medication 2 G: at 08:31

## 2022-06-29 RX ADMIN — MIDAZOLAM HYDROCHLORIDE 2 MG: 2 INJECTION, SOLUTION INTRAMUSCULAR; INTRAVENOUS at 08:09

## 2022-06-29 RX ADMIN — Medication 100 MCG: at 08:29

## 2022-06-29 RX ADMIN — FENTANYL CITRATE 50 MCG: 50 INJECTION, SOLUTION INTRAMUSCULAR; INTRAVENOUS at 08:10

## 2022-06-29 RX ADMIN — FENTANYL CITRATE 50 MCG: 50 INJECTION, SOLUTION INTRAMUSCULAR; INTRAVENOUS at 08:08

## 2022-06-29 RX ADMIN — Medication 100 MCG: at 08:47

## 2022-06-29 RX ADMIN — GLYCOPYRROLATE 0.8 MG: 0.2 INJECTION INTRAMUSCULAR; INTRAVENOUS at 10:04

## 2022-06-29 ASSESSMENT — PAIN SCALES - GENERAL
PAINLEVEL_OUTOF10: 5
PAINLEVEL_OUTOF10: 0
PAINLEVEL_OUTOF10: 7
PAINLEVEL_OUTOF10: 0
PAINLEVEL_OUTOF10: 5
PAINLEVEL_OUTOF10: 5

## 2022-06-29 ASSESSMENT — PAIN DESCRIPTION - LOCATION
LOCATION: ARM
LOCATION: ELBOW

## 2022-06-29 ASSESSMENT — PAIN DESCRIPTION - ORIENTATION: ORIENTATION: RIGHT

## 2022-06-29 ASSESSMENT — PAIN DESCRIPTION - PAIN TYPE
TYPE: SURGICAL PAIN
TYPE: SURGICAL PAIN

## 2022-06-29 ASSESSMENT — PAIN - FUNCTIONAL ASSESSMENT: PAIN_FUNCTIONAL_ASSESSMENT: 0-10

## 2022-06-29 NOTE — OP NOTE
Operative Note      Patient: Haja Kulkarni  YOB: 1985  MRN: [id number]    Date of Procedure: 6/29/2022    Pre-Op Diagnosis: RIGHT CUBITAL TUNNEL SYNDROME    Post-Op Diagnosis: Same       Procedure(s):  CUBITAL TUNNEL RELEASE    Surgeon(s):  Tana Abraham DO    Assistant:   First Assistant: Nimesh Chandra    Anesthesia: General    Estimated Blood Loss (mL): Minimal    Complications: None    Specimens:   * No specimens in log *    Implants:  * No implants in log *      Drains: * No LDAs found *    Findings: uneventful surgery    History and indication for surgery: This is a 26-year-old presenting signs and symptoms of cubital tunnel syndrome, EMG proven. She has failed bracing therapy recommend surgical decompression. Risk benefits alternatives discussed with patient all questions answered I was asked to proceed with surgery. Detailed Description of Procedure: Incision was marked centered at the medial epicondyle and posterior to it approximately 10 centimeter long. The incision was infiltrated with 1% lidocaine with epinephrine. Skin was incised with a 10 blade down to the subcutaneous fat. Hemostasis was obtained with gentle bipolar cautery. Dissection was then done with blunt spreading of Metzenbaums or mosquito in order to avoid any injury to the medial antebrachial cutaneous nerve. Immediately posterior to the medial epicondyle, the cubital tunnel retinaculum was identified and dissected and divided to expose the underlying ulnar nerve. This was followed proximally, and the fascia was divided, was followed proximally onto and arcade of struthers was opened about 10 cm proximal.  Distal to Roa's ligament the flexor carpi ulnaris aponeurosis was divided and the seem to be the most prominent part of compression. I followed this distally and opened the fascia covering the FCU as well.    I then flexed and extended the patient's elbow and saw no subluxation of the nerve.   The wound was thoroughly irrigated to ensure no active bleeding. The wound then closed in standard fashion and skin was dressed with Dermabond and Steri-Strip. Patient was wrapped with Kerlix and Ace wrap as well as a sling.       Electronically signed by Umesh Sheriff DO on 6/29/2022 at 10:08 AM

## 2022-06-29 NOTE — BRIEF OP NOTE
Brief Postoperative Note      Patient: Meghan Miller  YOB: 1985  MRN: [id number]    Date of Procedure: 6/29/2022    Pre-Op Diagnosis: RIGHT CUBITAL TUNNEL SYNDROME    Post-Op Diagnosis: Same        Procedure(s):  CUBITAL TUNNEL RELEASE<RIGHT    Surgeon(s):  Lucy Canseco DO    Assistant:  First Assistant: Silvino Chandra    Anesthesia: General    Estimated Blood Loss (mL): Minimal    Complications: None    Specimens:   * No specimens in log *    Implants:  * No implants in log *      Drains: * No LDAs found *    Findings: uneventful procedure     Electronically signed by Lucy Canseco DO on 6/29/2022 at 10:07 AM

## 2022-06-29 NOTE — INTERVAL H&P NOTE
Pt Name: Qing Taylor  MRN: [id number]  [place]: 1985  Date of evaluation: 6/29/2022    I have reviewed the patient's history and physical examination completed in pre-admission testing on 6/14/2022. No changes to history or on examination today, unless noted below. Cardiac clearance obtained.      MORE Castillo - CNP  6/29/22  7:28 AM

## 2022-06-29 NOTE — ANESTHESIA PRE PROCEDURE
Department of Anesthesiology  Preprocedure Note       Name:  Nora Sparks   Age:  39 y.o.  :  1985                                          MRN:  [id number]         Date:  2022      Surgeon: Leela Chris):  Jigna Rosenbaum DO    Procedure: Procedure(s):  CUBITAL TUNNEL RELEASE  (REGULAR TABLE, SUPINE)    Medications prior to admission:   Prior to Admission medications    Medication Sig Start Date End Date Taking? Authorizing Provider   pregabalin (LYRICA) 150 MG capsule TAKE 1 CAPSULE BY MOUTH THREE TIMES DAILY 22  Cristhian Hagan APRN - CNP   ibuprofen (ADVIL;MOTRIN) 800 MG tablet Take 1 tablet by mouth 2 times daily as needed for Pain 22   MORE Murray - CNP   DULoxetine (CYMBALTA) 60 MG extended release capsule TAKE ONE (1) CAPSULE BY MOUTH ONCE DAILY 22   Cristhian Hagan APRN - CNP   carBAMazepine (TEGRETOL XR) 100 MG extended release tablet TAKE 1 TABLET DAILY ~108 TAKE 1 TABLET AT BEDTIME  Patient taking differently: Take 300 mg by mouth 2 times daily  22   Cristhian Hagan APRN - CNP   verapamil (CALAN) 80 MG tablet Take 1 tablet by mouth at bedtime  Patient not taking: Reported on 2022 5/10/22   Liane Goode MD   EPINEPHrine (EPIPEN 2-ELOY) 0.3 MG/0.3ML SOAJ injection Inject 0.3 mLs into the muscle once for 1 dose Use as directed for allergic reaction 22  MORE Murray - CNP   Handicap Placard MISC by Does not apply route 22   Cristhian Hagan APRN - CNP   carBAMazepine (TEGRETOL-XR) 200 MG extended release tablet Take 1 tablet by mouth 2 times daily Two prescription. 200 and morning along with 100 mg in the morning. 200 mg at bedtime along with 100 mg at bedtime.  22   Liane Goode MD   Blood Pressure KIT Blood pressure management 21   MORE Baiely NP   topiramate (TOPAMAX) 50 MG tablet Take 1 tablet by mouth 2 times daily 21  Tono Olivera MD       Current medications:    No current outpatient medications on file. No current facility-administered medications for this visit. Allergies: Allergies   Allergen Reactions    Bee Venom Anaphylaxis    Doxycycline Nausea Only       Problem List:    Patient Active Problem List   Diagnosis Code    Chronic low back pain with sciatica M54.40, G89.29    Thyroid nodule E04.1    Stenosis of cervical spine with myelopathy (HCC) M48.02, G99.2    Cervical radiculopathy M54.12    Thoracic radiculopathy M54.14    Class 1 obesity due to excess calories with serious comorbidity in adult E66.09    Cervical disc disease M50.90    Bunion of great toe of right foot M21.611    S/P cervical disc replacement Z98.890    Cubital tunnel syndrome, right G56.21       Past Medical History:        Diagnosis Date    Anxiety     Blood loss     after     Cervical disc disease     Cervical myopathy     Chronic back pain     GERD (gastroesophageal reflux disease)     Hx of migraine headaches     Neuropathy     both legs, feet    Thyroid nodule     Trigeminal neuralgia of right side of face     Under care of team 2022    NEUROLSURGERY - DR. Gisell Hernandez Under care of team 2022    NEUROLOGY - DR. NOWAK - LAST VISIT 2022    Under care of team 2022    CARDIOLOGY - UPCOMING FIRST VISIT - DOES NOT REMEMBER NAME    Weakness generalized     due to neck issues    Wellness examination 2022    PCP - London Esquivel CNP - LAST VISIT  - 2022       Past Surgical History:        Procedure Laterality Date    CERVICAL DISC ARTHROPLASTY  2021    ANTERIOR C5-6 DISC ARTHROPLASTY     CERVICAL FUSION N/A 2021    ANTERIOR C5-6 DISC ARTHROPLASTY performed by Yola Ann DO at [address] 3      x3   [address] [place] [address]      TUBAL LIGATION         Social History:    Social History     Tobacco Use    Smoking status: Former Smoker     Years: 2.00     Quit date:  Years since quittin.5    Smokeless tobacco: Never Used    Tobacco comment: [id number], smoker then   Substance Use Topics    Alcohol use: Yes     Comment: 2 glasses of wine a month                                Counseling given: Not Answered  Comment: [id number], smoker then      Vital Signs (Current): There were no vitals filed for this visit. BP Readings from Last 3 Encounters:   22 130/60   22 128/89   22 121/87       NPO Status:                                                                                 BMI:   Wt Readings from Last 3 Encounters:   22 213 lb 9.6 oz (96.9 kg)   22 207 lb (93.9 kg)   22 215 lb 6.4 oz (97.7 kg)     There is no height or weight on file to calculate BMI.    CBC:   Lab Results   Component Value Date    WBC 8.9 2022    RBC 4.73 2022    HGB 14.1 2022    HCT 43.8 2022    MCV 92.6 2022    RDW 13.4 2022     2022       CMP:   Lab Results   Component Value Date     2022    K 4.2 2022     2022    CO2 26 2022    BUN 10 2022    CREATININE 0.98 2022    GFRAA >60 2022    LABGLOM >60 2022    GLUCOSE 85 2022    PROT 6.8 2022    CALCIUM 8.7 2022    BILITOT 0.32 2021    ALKPHOS 95 2021    AST 13 2021    ALT 10 2021       POC Tests: No results for input(s): POCGLU, POCNA, POCK, POCCL, POCBUN, POCHEMO, POCHCT in the last 72 hours.     Coags: No results found for: PROTIME, INR, APTT    HCG (If Applicable):   Lab Results   Component Value Date    HCG NEGATIVE 2021        ABGs: No results found for: PHART, PO2ART, RXG3PHT, QWR9MSX, BEART, F4JXDEWC     Type & Screen (If Applicable):  No results found for: LABABO, LABRH    Drug/Infectious Status (If Applicable):  No results found for: HIV, HEPCAB    COVID-19 Screening (If Applicable):   Lab Results   Component Value Date    COVID19 Not Detected 04/26/2021           Anesthesia Evaluation  Patient summary reviewed and Nursing notes reviewed no history of anesthetic complications:   Airway: Mallampati: I  TM distance: >3 FB   Neck ROM: full  Mouth opening: > = 3 FB   Dental: normal exam         Pulmonary:normal exam                               Cardiovascular:Negative CV ROS                      Neuro/Psych:   (+) neuromuscular disease:,             GI/Hepatic/Renal:   (+) GERD:,           Endo/Other: Negative Endo/Other ROS                    Abdominal:             Vascular: Other Findings:             Anesthesia Plan      general     ASA 2       Induction: intravenous. MIPS: Postoperative opioids intended and Prophylactic antiemetics administered. Anesthetic plan and risks discussed with patient. Plan discussed with CRNA.                     Britta Alvarez MD   6/29/2022

## 2022-06-29 NOTE — ANESTHESIA POSTPROCEDURE EVALUATION
Department of Anesthesiology  Postprocedure Note    Patient: Vivian Henderson  MRN: [id number]  YOB: 1985  Date of evaluation: 6/29/2022      Procedure Summary     Date: 06/29/22 Room / Location: [address]    Anesthesia Start: 0801 Anesthesia Stop: 8673    Procedure: [address] [facility] (Right Arm Upper) Diagnosis:       Cubital tunnel syndrome, right      (RIGHT CUBITAL SYNDROME)    Surgeons: Sam Perdue DO Responsible Provider: Britta Alvarez MD    Anesthesia Type: general ASA Status: 2          Anesthesia Type: No value filed.     Jose Phase I: Jose Score: 9    Jose Phase II: Jose Score: 10      Anesthesia Post Evaluation    Patient location during evaluation: PACU  Patient participation: complete - patient participated  Level of consciousness: awake and alert  Pain score: 2  Airway patency: patent  Nausea & Vomiting: no nausea and no vomiting  Complications: no  Cardiovascular status: hemodynamically stable  Respiratory status: acceptable  Hydration status: euvolemic

## 2022-07-06 ENCOUNTER — TELEPHONE (OUTPATIENT)
Dept: NEUROSURGERY | Age: 37
End: 2022-07-06

## 2022-07-06 ENCOUNTER — OFFICE VISIT (OUTPATIENT)
Dept: PRIMARY CARE CLINIC | Age: 37
End: 2022-07-06
Payer: COMMERCIAL

## 2022-07-06 VITALS
DIASTOLIC BLOOD PRESSURE: 88 MMHG | BODY MASS INDEX: 33.67 KG/M2 | HEART RATE: 89 BPM | OXYGEN SATURATION: 99 % | SYSTOLIC BLOOD PRESSURE: 126 MMHG | TEMPERATURE: 97.4 F | WEIGHT: 215 LBS

## 2022-07-06 DIAGNOSIS — R10.9 FLANK PAIN: Primary | ICD-10-CM

## 2022-07-06 LAB
BILIRUBIN, POC: NORMAL
BLOOD URINE, POC: NORMAL
CLARITY, POC: CLEAR
COLOR, POC: YELLOW
GLUCOSE URINE, POC: NORMAL
KETONES, POC: NORMAL
LEUKOCYTE EST, POC: NORMAL
NITRITE, POC: NORMAL
PH, POC: 6
PROTEIN, POC: NORMAL
SPECIFIC GRAVITY, POC: 1.01
UROBILINOGEN, POC: NORMAL

## 2022-07-06 PROCEDURE — G8417 CALC BMI ABV UP PARAM F/U: HCPCS | Performed by: NURSE PRACTITIONER

## 2022-07-06 PROCEDURE — 1036F TOBACCO NON-USER: CPT | Performed by: NURSE PRACTITIONER

## 2022-07-06 PROCEDURE — 99213 OFFICE O/P EST LOW 20 MIN: CPT | Performed by: NURSE PRACTITIONER

## 2022-07-06 PROCEDURE — G8427 DOCREV CUR MEDS BY ELIG CLIN: HCPCS | Performed by: NURSE PRACTITIONER

## 2022-07-06 PROCEDURE — 81003 URINALYSIS AUTO W/O SCOPE: CPT | Performed by: NURSE PRACTITIONER

## 2022-07-06 RX ORDER — CYCLOBENZAPRINE HCL 5 MG
5 TABLET ORAL 2 TIMES DAILY PRN
Qty: 20 TABLET | Refills: 0 | Status: SHIPPED | OUTPATIENT
Start: 2022-07-06 | End: 2022-07-16

## 2022-07-06 ASSESSMENT — ENCOUNTER SYMPTOMS: BOWEL INCONTINENCE: 0

## 2022-07-06 NOTE — PROGRESS NOTES
Tori Burger (:  1985) is a 39 y.o. female,Established patient, here for evaluation of the following chief complaint(s):  Flank Pain (3 weeks)         ASSESSMENT/PLAN:  1. Flank pain  -     POCT Urinalysis No Micro (Auto)  -     cyclobenzaprine (FLEXERIL) 5 MG tablet; Take 1 tablet by mouth 2 times daily as needed for Muscle spasms, Disp-20 tablet, R-0Normal  stretching and icing discussed  Pt states she did have some flexeril at home that helped a bit. No follow-ups on file. Subjective   SUBJECTIVE/OBJECTIVE:  Flank Pain  This is a new problem. The current episode started 1 to 4 weeks ago. The problem occurs constantly. The problem has been gradually worsening since onset. The pain is present in the lumbar spine. The quality of the pain is described as aching. The pain does not radiate. The symptoms are aggravated by twisting, coughing, bending and position. Pertinent negatives include no bladder incontinence, bowel incontinence, fever, pelvic pain, tingling or weakness. She has tried heat (percocet from recent surgery) for the symptoms. The treatment provided mild relief. Review of Systems   Constitutional:  Negative for fever. Gastrointestinal:  Negative for bowel incontinence. Genitourinary:  Positive for flank pain. Negative for bladder incontinence and pelvic pain. Neurological:  Negative for tingling and weakness. Objective    Vitals:    22 1631   BP: 126/88   Pulse: 89   Temp:    SpO2:      Wt Readings from Last 3 Encounters:   22 215 lb (97.5 kg)   22 215 lb (97.5 kg)   22 215 lb (97.5 kg)     Urinalysis wnl. Physical Exam  Constitutional:       Appearance: She is well-developed. HENT:      Right Ear: External ear normal.      Left Ear: External ear normal.      Nose: Nose normal.   Cardiovascular:      Rate and Rhythm: Normal rate and regular rhythm.       Heart sounds: Normal heart sounds, S1 normal and S2 normal.   Pulmonary: Effort: Pulmonary effort is normal. No respiratory distress. Breath sounds: Normal breath sounds. Musculoskeletal:         General: No deformity. Normal range of motion. Cervical back: Full passive range of motion without pain and normal range of motion. Back:    Skin:     General: Skin is warm and dry. Neurological:      Mental Status: She is alert and oriented to person, place, and time. An electronic signature was used to authenticate this note.     --Petrona Lucas, MORE - CNP

## 2022-07-06 NOTE — TELEPHONE ENCOUNTER
Patient called to see if she could be given a different brace to keep her arm stable. The one she has now is harsh on her neck and causes a decent amount of pain.

## 2022-07-06 NOTE — PROGRESS NOTES
Visit Information    Have you changed or started any medications since your last visit including any over-the-counter medicines, vitamins, or herbal medicines? no   Are you having any side effects from any of your medications? -  no  Have you stopped taking any of your medications? Is so, why? -  no    Have you seen any other physician or provider since your last visit? Yes - Records Obtained  Have you had any other diagnostic tests since your last visit? Yes - Records Obtained  Have you been seen in the emergency room and/or had an admission to a hospital since we last saw you? Yes - Records Obtained  Have you had your routine dental cleaning in the past 6 months? no    Have you activated your [employer] account? If not, what are your barriers?  Yes     Patient Care Team:  MORE Poole CNP as PCP - General (Family Nurse Practitioner)  MORE Poole CNP as PCP - [facility] Provider  Sharon Pereira MD as Consulting Physician (Gastroenterology)    Medical History Review  Past Medical, Family, and Social History reviewed and does contribute to the patient presenting condition    Health Maintenance   Topic Date Due    Varicella vaccine (1 of 2 - 2-dose childhood series) Never done    COVID-19 Vaccine (1) Never done    Hepatitis C screen  Never done    Cervical cancer screen  04/29/2022    Flu vaccine (1) 09/01/2022    Depression Monitoring  02/17/2023    DTaP/Tdap/Td vaccine (2 - Td or Tdap) 09/10/2029    HIV screen  Completed    Hepatitis A vaccine  Aged Out    Hepatitis B vaccine  Aged Out    Hib vaccine  Aged Out    Meningococcal (ACWY) vaccine  Aged Out    Pneumococcal 0-64 years Vaccine  Aged Out
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
## 2022-07-07 RX ORDER — CARBAMAZEPINE 200 MG/1
TABLET, EXTENDED RELEASE ORAL
Qty: 60 TABLET | Refills: 10 | Status: SHIPPED | OUTPATIENT
Start: 2022-07-07

## 2022-07-07 NOTE — TELEPHONE ENCOUNTER
Ok to try different sling, or to place towel under the strap that is on her neck. I do not know that we have a different one here in the office. Can evaluate at post-op visit.

## 2022-07-11 DIAGNOSIS — G56.21 CUBITAL TUNNEL SYNDROME, RIGHT: ICD-10-CM

## 2022-07-11 RX ORDER — OXYCODONE HYDROCHLORIDE AND ACETAMINOPHEN 5; 325 MG/1; MG/1
1 TABLET ORAL EVERY 4 HOURS PRN
Qty: 35 TABLET | Refills: 0 | Status: SHIPPED | OUTPATIENT
Start: 2022-07-11 | End: 2022-07-28 | Stop reason: SDUPTHER

## 2022-07-11 NOTE — TELEPHONE ENCOUNTER
Patient calling for refill of oxycodone.     Date of last fill:6/29/2022  Surgery: 6/29/2022  Time elapsed since last surgery: 12 days  Date of next follow up appointment: 7/14/2022    Pt notified response time for refills is 24-48 hours

## 2022-07-14 ENCOUNTER — OFFICE VISIT (OUTPATIENT)
Dept: NEUROSURGERY | Age: 37
End: 2022-07-14

## 2022-07-14 ENCOUNTER — OFFICE VISIT (OUTPATIENT)
Dept: NEUROLOGY | Age: 37
End: 2022-07-14
Payer: COMMERCIAL

## 2022-07-14 VITALS
HEART RATE: 87 BPM | BODY MASS INDEX: 33.74 KG/M2 | WEIGHT: 215 LBS | DIASTOLIC BLOOD PRESSURE: 84 MMHG | SYSTOLIC BLOOD PRESSURE: 123 MMHG | OXYGEN SATURATION: 98 % | HEIGHT: 67 IN

## 2022-07-14 VITALS
OXYGEN SATURATION: 98 % | TEMPERATURE: 97.4 F | HEIGHT: 67 IN | SYSTOLIC BLOOD PRESSURE: 123 MMHG | BODY MASS INDEX: 33.74 KG/M2 | RESPIRATION RATE: 17 BRPM | HEART RATE: 87 BPM | WEIGHT: 215 LBS | DIASTOLIC BLOOD PRESSURE: 84 MMHG

## 2022-07-14 DIAGNOSIS — Z98.890 S/P CUBITAL TUNNEL RELEASE: ICD-10-CM

## 2022-07-14 DIAGNOSIS — G43.001 MIGRAINE WITHOUT AURA AND WITH STATUS MIGRAINOSUS, NOT INTRACTABLE: ICD-10-CM

## 2022-07-14 DIAGNOSIS — G56.21 CUBITAL TUNNEL SYNDROME, RIGHT: Primary | ICD-10-CM

## 2022-07-14 DIAGNOSIS — G50.0 TRIGEMINAL NEURALGIA: ICD-10-CM

## 2022-07-14 DIAGNOSIS — R26.81 UNSTEADY GAIT WHEN WALKING: ICD-10-CM

## 2022-07-14 DIAGNOSIS — M50.90 CERVICAL DISC DISEASE: ICD-10-CM

## 2022-07-14 PROCEDURE — 99214 OFFICE O/P EST MOD 30 MIN: CPT | Performed by: STUDENT IN AN ORGANIZED HEALTH CARE EDUCATION/TRAINING PROGRAM

## 2022-07-14 PROCEDURE — 99024 POSTOP FOLLOW-UP VISIT: CPT | Performed by: NURSE PRACTITIONER

## 2022-07-14 PROCEDURE — 1036F TOBACCO NON-USER: CPT | Performed by: STUDENT IN AN ORGANIZED HEALTH CARE EDUCATION/TRAINING PROGRAM

## 2022-07-14 PROCEDURE — G8428 CUR MEDS NOT DOCUMENT: HCPCS | Performed by: STUDENT IN AN ORGANIZED HEALTH CARE EDUCATION/TRAINING PROGRAM

## 2022-07-14 PROCEDURE — G8417 CALC BMI ABV UP PARAM F/U: HCPCS | Performed by: STUDENT IN AN ORGANIZED HEALTH CARE EDUCATION/TRAINING PROGRAM

## 2022-07-14 RX ORDER — OXYCODONE HYDROCHLORIDE AND ACETAMINOPHEN 5; 325 MG/1; MG/1
1 TABLET ORAL EVERY 4 HOURS PRN
Qty: 35 TABLET | Refills: 0 | Status: CANCELLED | OUTPATIENT
Start: 2022-07-14 | End: 2022-07-21

## 2022-07-14 RX ORDER — SUMATRIPTAN 100 MG/1
100 TABLET, FILM COATED ORAL
Qty: 9 TABLET | Refills: 3 | Status: SHIPPED | OUTPATIENT
Start: 2022-07-14 | End: 2022-10-12

## 2022-07-14 ASSESSMENT — ENCOUNTER SYMPTOMS
CHEST TIGHTNESS: 0
SINUS PAIN: 0
NAUSEA: 0
SHORTNESS OF BREATH: 0
ABDOMINAL DISTENTION: 0
COUGH: 0
APNEA: 0
VOMITING: 0
EYE PAIN: 0
ABDOMINAL PAIN: 0
BACK PAIN: 1

## 2022-07-14 ASSESSMENT — VISUAL ACUITY: VA_NORMAL: 1

## 2022-07-14 NOTE — PROGRESS NOTES
[address]  [facility] [address], [address] [facility] [address]  Dept: [phone]    Patient:  Bela Isidro  YOB: 1985  Date: 7/14/22    The patient is a 39 y.o. female who presents today for consult of the following problems:     Chief Complaint   Patient presents with    Post-Op Check     post op         HPI:     Bela Isidro is a 39 y.o. female who presents to the office for post-op evaluation s/p cubital release. Still with some expected postoperative discomfort, but does report improvement to sharp pain that she was feeling prior in ulnar distribution. No longer having severe pain just some mild ache to her ring and pinky finger. Incision has been healing well, no discharge, drainage, fevers or chills. Sensation grossly intact, mild decrease sensation to right ring and pinky fingers  Strength 5/5  Incision CDI    Date of surgery: 6/29/2022    Assessment and Plan:      1. Cubital tunnel syndrome, right    2. S/P cubital tunnel release          Plan: Patient recovering well. Incision healing well. Referral for occupational therapy provided. Continue elevating, utilizing ice as needed. Continue decreasing pain medication as tolerated. Patient to return in 6 weeks for next postop visit with Dr. Eliseo Thomas. Followup: Return in about 6 weeks (around 8/25/2022), or if symptoms worsen or fail to improve. Prescriptions Ordered:  No orders of the defined types were placed in this encounter.        Orders Placed:  Orders Placed This Encounter   Procedures    [facility] Occupational Therapy - [place]     Referral Priority:   Routine     Referral Type:   Eval and Treat     Referral Reason:   Specialty Services Required     Requested Specialty:   Occupational Therapy     Number of Visits Requested:   1        Electronically signed by MORE Zarate CNP on 7/14/2022 at 2:10 PM    Please note that this chart was generated using voice recognition Dragon dictation software. Although every effort was made to ensure the accuracy of this automated transcription, some errors in transcription may have occurred.

## 2022-07-14 NOTE — PROGRESS NOTES
[address] [place] [address],  [address], [facility], [address] [facility], [address] [place] [address]  P: [phone]  F: [phone]    NEUROLOGY CLINIC NOTE     PATIENT NAME: Marianne Mendez  PATIENT MRN: [id number]  PRIMARY CARE PHYSICIAN: Carolyne Guajardo, APRN - CNP    Interval clinic visit 7/14/22:      Marianne Mendez is a 39 y.o. female who presents to clinic today as a new patient to me however follows with Rudy Sandoval Neurology resident at our facility. Previously followed up with him 5/10/22 for right hand numbness and headache. Patient is also following with Neurosurgery for this and opted for Cupital tunnel surgery. PMH significant for Neuropathy of both feet, Trigeminal Neuralgia right face, cervical Myopathy, Cupital tunnel release of right arm 2 weeks ago, migraine, anxiety. Patient states that she underwent right cubital tunnel release two weeks ago for right arm distal ulnar neuropathic pain. Patient feels the ulnar pain has significantly improved since this however does continue to have cervical pain near her baseline and sharp pain localized to her right armpit region. She rates this 10/10 when it comes and never fully goes away however elevating the arm helps this along with hydrocodone from her recent procedure. Tegretol XR 300mg BID has significantly improved her Trigeminal Neuralgia along with Cymbalta 60mg ER and Lyrica 150mg TID. Her last visit was started on Verapamil 80mg nightly for migraine prevention and patient feels this has helped however is concerned possibly its also being masked by her improved pain control post surgical. Has about one headache every 4-5 days which is significant improvement from previously every 2-3 days. Plan for Occupational therapy to start soon for right arm cupital tunnel release. Patient denies any further complaints and feels overall she is doing very well at this time.    Previous Neurologic workup per raffi Russell:      Interval History: 5/10/2022     Patient was last seen in the clinic on 3/1/2021  Patient continues to have tingling and numbness in the right hand, and headache right side. Patient saw NS for  Right extremity pain and tingling:   MRI C spine:-  Interbody fusion c5-c6. Resulting in metallic artifact. Patient decided for cubital tunnel surgery.      Lab work to identify underlying neuropathy      Sr Electrophoresis  WNL  ACE- 28  Thyroid peroxidase ab- <4  Methylmalonic Acid 0.12  , folate 11.3  CXR: No acute process      We will contact Dr. Lele Caruso to verify the EMG results.      Interval history: 3/1/2022:   Patient was seen in the clinic last on 11/23/2021. Patient fell last Sunday, while walking down the stairs, did not hit her head denies any LOC. Patient recently had EMG which showed right ulnar neuropathy at elbow. Patient complaining of drowsiness since the increase dose of the tegretol.     Patient complaining of right forearm tingling, numbness on the ulnar aspect, difficulty holding, secondary to pain which is consistent with a mild neuropathy.           Interval History: 11/23/2021   Patient had a fall on 11/15/2021. Patient stated that her left leg gave away and she fell down from the stair. Patient went to the ER, because every time she did not wait and she went back home. Patient presenting to the clinic today with complaint of back pain, lower back, radiating bilaterally to the buttocks, heaviness of buttocks, constipation, increased tingling and numbness on the left leg, as compared to right.     Will get x-ray lumbar spine to rule out any acute trauma  CT  lumbar and thoracic spine. Increase the dose of Tegretol to 300 mg and 200 for 1 week followed by 300 twice daily. PT OT            Interval History: 9/21/2021  Patient was last seen in the clinic in May 2021.   Since last clinic visit patient endorses headache located on the right side of the head along with right side of the face.  As per patient her pain starts on the right ear radiating up to the top of the head and to the right side of the face, sharp stabbing pain, getting worse with chewing.  Endorses mild photophobia and phonophobia.  She also endorses tenderness on the skin of her face and head. She has a history of migraine headaches, as per patient this headache is different than her regular migraine headaches.  Denies any nausea or vomiting.  Denies any redness in the eyes or watering from the eyes or watering from the nose.  Endorses mild gait unsteadiness, denies any dizziness or drowsiness, denies any speech difficulty. She was evaluated in the ER on 9/19/2021 for this headache, CT head was done which did not show any acute intracranial pathology.  She was diagnosed to have right-sided trigeminal neuralgia and started on Tegretol 100 mg twice daily.  Endorses improvement in the stabbing pain with it. Endorses improvement in the headache with the heating pad     She continues to have chronic neck pain radiating to the right upper extremity down to the elbow down into the fingers.  She had a fall on 8/5/2021, she fell down several stairs, was evaluated in the ER at that time.  CT cervical thoracic and lumbar spine was done which did not show any acute abnormalities.  Patient was evaluated by neurosurgery after the fall on 8/12/2021 in the clinic.    During that visit neurosurgery ordered upright flexion-extension images along with repeat EMG of bilateral upper extremities.     Interval History: 5/24/2021   Patient was last seen in the clinic in February 2021. Patient had anterior cervical C5-C6 disc arthroplasty done on 4/30/2021.  Postprocedure cervical flexion extension x-rays were stable.   Since then patient continues to complain of neck pain radiating down to the right upper extremity associated with tingling and numbness in the right upper extremity, endorses improvement in the tingling and numbness along with the weakness in the left upper extremity, continues to have some weakness in the right upper extremity. She will be following up with Dr. Jil Lopez on 6/29/2021.     X-ray cervical spine flexion extension 5/1/2021  Impression   Postsurgical changes related to placement of an inter vertebral disc   prosthetic at C5-C6.  No spondylolisthesis.       She continues to have back pain with radicular symptoms in bilateral lower extremities, endorses worsening of paresthesias in her left foot. Currently she is taking Cymbalta 60 mg at nighttime.  Taking Flexeril 5 mg at nighttime.  Also taking Lyrica 100 mg 3 times a day.  Endorses some improvement in the symptoms with that.  Denies any side effects from Lyrica or Cymbalta or Flexeril.     Patient saw the pain physician, was not started on any intervention at present as she needs to complete 6 weeks of physical therapy as per the pain management prior to any intervention.     2/9/2021  Vitamin E, alpha-tocopherol 8.1, gamma tocopherol  1.2  Vitamin B12 577  Folate 12.4  Vitamin B6 34.1  ESR 4  CRP 3.4  Ceruloplasmin normal 28  Copper 140.9 normal  LOUISE negative        Dorothea Hastings is a 38 y. o. right handed  female with PMH significant for chronic back pain, thyroid nodule, anxiety was seen in the clinic for neuropathy      History obtained from Patient and medical chart review.     Patient has a history of chronic neck pain, getting worse for last 1-1/2-year, endorses sharp stabbing pain in the neck radiating down to bilateral upper extremities, worse on the left side as compared to the right side.  She endorses constant tingling and numbness in her hands bilaterally, endorses intermittent tingling and numbness in bilateral upper extremities involving the forearm and.  She also complains of weakness in her bilateral upper extremities, endorses dropping things from her hands, difficulty with her hand , difficulty holding the objects.  Difficulty holding baby, endorses her lumbar spine.  Normal alignment of the lumbar spine without fractures or subluxations. EMG nerve conduction study of bilateral lower extremities was done on 2019 which showed chronic mild left L5 radiculopathy without active denervation.  No electrodiagnostic evidence of generalized large fiber peripheral polyneuropathy.     Patient used to follow-up with a neurologist at [facility], [facility], was last seen in 2020.     Patient had tried gabapentin in the past, denies any improvement in the symptoms with gabapentin.  Does not remember the dose of gabapentin tried. Currently she is on Lyrica 100 mg twice a day, this was started by her PCP. Adra Chew any significant improvement in her symptoms with Lyrica. Has not tried any other pain medications.  Except for Tylenol and motrin      Currently taking Flexeril 5 mg as needed for muscle spasm, endorses improvement in her symptoms with that.  Denies any side effects from Flexeril. Taking Mobic 7.5 mg 2 times a day every day      PREVIOUS WORKUP:     Lab Results   Component Value Date    WBC 8.9 2022    HGB 14.1 2022    HCT 43.8 2022    MCV 92.6 2022     2022       Past Medical History:   Diagnosis Date    Anxiety     Blood loss     after     Cervical disc disease     Cervical myopathy     Chronic back pain     GERD (gastroesophageal reflux disease)     Hx of migraine headaches     Neuropathy     both legs, feet    Thyroid nodule     Trigeminal neuralgia of right side of face     Under care of team 2022    NEUROLSURGERY - DR. Yoni Kc Under care of team 2022    NEUROLOGY - DR. NOWAK - LAST VISIT 2022    Under care of team 2022    CARDIOLOGY - UPCOMING FIRST VISIT - DOES NOT REMEMBER NAME    Weakness generalized     due to neck issues    Wellness examination 2022    PCP - Charlie Quiros CNP - LAST VISIT  - 2022        Past Surgical History:   Procedure Laterality Date    CARPAL TUNNEL RELEASE Right 2022    CUBITAL TUNNEL RELEASE performed by Priyanka Mc DO at [address] [place] [address] N/A 2021    ANTERIOR C5-6 DISC ARTHROPLASTY performed by Priyanka Mc DO at [address] [place] [address],[address]      x3   [address] Right 2022    CUBITAL TUNNEL RELEASE (Right Arm Upper)    TUBAL LIGATION          Social History     Socioeconomic History    Marital status:      Spouse name: Not on file    Number of children: Not on file    Years of education: Not on file    Highest education level: Not on file   Occupational History    Not on file   Tobacco Use    Smoking status: Former Smoker     Years: 2.00     Quit date:      Years since quittin.5    Smokeless tobacco: Never Used    Tobacco comment: [id number], smoker then   Vaping Use    Vaping Use: Never used   Substance and Sexual Activity    Alcohol use: Yes     Comment: 2 glasses of wine a month    Drug use: Never    Sexual activity: Not on file   Other Topics Concern    Not on file   Social History Narrative    Not on file     Social Determinants of Health     Financial Resource Strain: Low Risk     Difficulty of Paying Living Expenses: Not hard at all   Food Insecurity: No Food Insecurity    Worried About [address] [facility] in the Last Year: Never true    [address] [facility] [address] in the Last Year: Never true   Transportation Needs:     Lack of Transportation (Medical): Not on file    Lack of Transportation (Non-Medical):  Not on file   Physical Activity:     Days of Exercise per Week: Not on file    Minutes of Exercise per Session: Not on file   Stress:     Feeling of Stress : Not on file   Social Connections:     Frequency of Communication with Friends and Family: Not on file    Frequency of Social Gatherings with Friends and Family: Not on file    Attends Nondenominational Services: Not on file    Active Member of Clubs or Organizations: Not on file    Attends Club or Organization Meetings: Not on file    Marital Status: Not on file   Intimate Partner Violence:     Fear of Current or Ex-Partner: Not on file    Emotionally Abused: Not on file    Physically Abused: Not on file    Sexually Abused: Not on file   Housing Stability:     Unable to Pay for Housing in the Last Year: Not on file    Number of [place] in the Last Year: Not on file    Unstable Housing in the Last Year: Not on file        Current Outpatient Medications   Medication Sig Dispense Refill    oxyCODONE-acetaminophen (PERCOCET) 5-325 MG per tablet Take 1 tablet by mouth every 4 hours as needed for Pain for up to 7 days. Intended supply: 7 days.  Take lowest dose possible to manage pain 35 tablet 0    carBAMazepine (TEGRETOL XR) 200 MG extended release tablet TAKE 1 TABLET BY MOUTH ONCE DAILY AND TAKE 1 TABLET AT BEDTIME *EMERGENCY REFILL* 60 tablet 10    cyclobenzaprine (FLEXERIL) 5 MG tablet Take 1 tablet by mouth 2 times daily as needed for Muscle spasms 20 tablet 0    pregabalin (LYRICA) 150 MG capsule TAKE 1 CAPSULE BY MOUTH THREE TIMES DAILY 90 capsule 2    ibuprofen (ADVIL;MOTRIN) 800 MG tablet Take 1 tablet by mouth 2 times daily as needed for Pain 60 tablet 5    DULoxetine (CYMBALTA) 60 MG extended release capsule TAKE ONE (1) CAPSULE BY MOUTH ONCE DAILY 30 capsule 10    carBAMazepine (TEGRETOL XR) 100 MG extended release tablet TAKE 1 TABLET DAILY ~108 TAKE 1 TABLET AT BEDTIME (Patient taking differently: Take 300 mg by mouth 2 times daily ) 60 tablet 5    verapamil (CALAN) 80 MG tablet Take 1 tablet by mouth at bedtime 90 tablet 3    Handicap Placard MISC by Does not apply route 1 each 0    Blood Pressure KIT Blood pressure management 1 kit 0    EPINEPHrine (EPIPEN 2-ELOY) 0.3 MG/0.3ML SOAJ injection Inject 0.3 mLs into the muscle once for 1 dose Use as directed for allergic reaction 0.3 mL 2     No current facility-administered medications for this visit. Allergies   Allergen Reactions    Bee Venom Anaphylaxis    Doxycycline Nausea Only        REVIEW OF SYSTEMS:     Review of Systems   Constitutional: Negative for activity change, appetite change, chills and fatigue. HENT: Negative for ear pain and sinus pain. Eyes: Negative for pain and visual disturbance. Respiratory: Negative for apnea, cough, chest tightness and shortness of breath. Cardiovascular: Negative for chest pain, palpitations and leg swelling. Gastrointestinal: Negative for abdominal distention, abdominal pain, nausea and vomiting. Endocrine: Negative for polydipsia and polyuria. Genitourinary: Negative for difficulty urinating and dysuria. Musculoskeletal: Positive for arthralgias and back pain. Negative for myalgias. Skin: Negative for rash. Allergic/Immunologic: Negative for environmental allergies. Neurological: Positive for weakness. Negative for dizziness, facial asymmetry and headaches. Psychiatric/Behavioral: Negative for agitation and behavioral problems. VITALS  /84   Pulse 87   Ht 5' 7\" (1.702 m)   Wt 215 lb (97.5 kg)   LMP 06/14/2022 (Exact Date)   SpO2 98%   BMI 33.67 kg/m²           NEUROLOGICAL EXAMINATION:     Neurological Exam  Mental Status  Awake, alert and oriented to person, place and time. Speech is normal. Language is fluent with no aphasia. Cranial Nerves  CN II: Visual acuity is normal. Visual fields full to confrontation. CN III, IV, VI: Extraocular movements intact bilaterally. Normal lids and orbits bilaterally. Pupils equal round and reactive to light bilaterally. CN V: Facial sensation is normal.  CN VII: Full and symmetric facial movement. CN VIII: Hearing is normal.  CN IX, X: Palate elevates symmetrically. Normal gag reflex. CN XI: Shoulder shrug strength is normal.  CN XII: Tongue midline without atrophy or fasciculations. Motor  Normal muscle bulk throughout. No fasciculations present.  Normal muscle tone. Strength is 5/5 in all four extremities except as noted. RIght hand grasp strength reduced, flexion impaired more than extension . Sensory  Sensation is intact to light touch, pinprick, vibration and proprioception in all four extremities. Sensation: With the exception of right arm decreased sensation mainly in radial distribution however does impact entire arm. Also has bilateral lower extremity neuropathy at night only on bottom plantar surface of her feet. .     Reflexes  Deep tendon reflexes are 2+ and symmetric in all four extremities with downgoing toes bilaterally. Coordination  Finger-to-nose, rapid alternating movements and heel-to-shin normal bilaterally without dysmetria. Gait  Patient able to walk without assistance however does endorse requiring breaks unable to walk far distances to bend over due to severe back pain claudication . ASSESSMENT / PLAN:     -Discontinue Verapamil 80mg nightly and start Verapamil 120mg SR nightly  -Start taking Imitrex 100mg PRN at the onset of migraine for abortive therapy can repeat after one hour no more than 2 in one day or 4 times per week. -FU with Dr. Adela Lenz in 3 months         Ms. Belkis Leonard received counseling on the following healthy behaviors: medical compliance, smoking cessation, blood pressure control, regular follow up with primary doctor.         Electronically signed by Nichol Noguera MD on 7/14/2022 at 2:13 PM

## 2022-07-25 ENCOUNTER — TELEPHONE (OUTPATIENT)
Dept: FAMILY MEDICINE CLINIC | Age: 37
End: 2022-07-25

## 2022-07-25 NOTE — TELEPHONE ENCOUNTER
Have they tried OTC benadryl or cetrizine for itching? Suggest taking the ibuprofen 800mg every 8 hours for the next day or two and using cold compress for symptom relief.

## 2022-07-26 ENCOUNTER — TELEPHONE (OUTPATIENT)
Dept: NEUROSURGERY | Age: 37
End: 2022-07-26

## 2022-07-26 NOTE — TELEPHONE ENCOUNTER
She used Epi pen benadryl, hydrocortisone cream and allergy tabs but nothing worked. She went to ER  and was given nothing. It was getting worse so she returned to ER and was given a injection, script for epi pen, antibiotics and steroid.      Patient does have appt 7/28/2022 and states will keep appt , she is feeling better

## 2022-07-28 ENCOUNTER — OFFICE VISIT (OUTPATIENT)
Dept: NEUROSURGERY | Age: 37
End: 2022-07-28

## 2022-07-28 VITALS
TEMPERATURE: 97.6 F | OXYGEN SATURATION: 98 % | SYSTOLIC BLOOD PRESSURE: 119 MMHG | DIASTOLIC BLOOD PRESSURE: 87 MMHG | HEIGHT: 67 IN | WEIGHT: 215.4 LBS | HEART RATE: 88 BPM | BODY MASS INDEX: 33.81 KG/M2

## 2022-07-28 DIAGNOSIS — G56.21 CUBITAL TUNNEL SYNDROME, RIGHT: ICD-10-CM

## 2022-07-28 PROCEDURE — 99024 POSTOP FOLLOW-UP VISIT: CPT | Performed by: NURSE PRACTITIONER

## 2022-07-28 RX ORDER — OXYCODONE HYDROCHLORIDE AND ACETAMINOPHEN 5; 325 MG/1; MG/1
1 TABLET ORAL EVERY 6 HOURS PRN
Qty: 28 TABLET | Refills: 0 | Status: SHIPPED | OUTPATIENT
Start: 2022-07-28 | End: 2022-08-11 | Stop reason: SDUPTHER

## 2022-07-28 RX ORDER — CLINDAMYCIN HYDROCHLORIDE 300 MG/1
CAPSULE ORAL
COMMUNITY
Start: 2022-07-26

## 2022-07-28 RX ORDER — HYDROXYZINE HYDROCHLORIDE 25 MG/1
TABLET, FILM COATED ORAL
COMMUNITY
Start: 2022-07-26

## 2022-07-28 RX ORDER — METHYLPREDNISOLONE 4 MG/1
TABLET ORAL
COMMUNITY
Start: 2022-07-26

## 2022-07-28 NOTE — PROGRESS NOTES
[address] [facility],[address] [facility] NEUROSURGERY  [address] [facility] [address]  Dept: [phone]    Patient:  Marianne Mendez  YOB: 1985  Date: 22    The patient is a 39 y.o. female who presents today for consult of the following problems:     Chief Complaint   Patient presents with    Follow-up     Stung by wasp in surgery arm, swelling is finally going down. Moderate pain. Had went into anaphylactic shock when stung. HPI:     Marianne Mendez is a 39 y.o. female who presents for follow up of recent right cubital tunnel release after being stung by a wasp in her right hand. Developed anaphylactic reaction and required 2 ED visits. Had significant swelling due to sting, as well as worsened nerve pain in her right arm. Did also exacerbate her ulnar neuropathy, as well as tenderness around cubital tunnel incision. Wanted to be evaluated to ensure there were no concerns regarding her healing. Has been utilizing ice, elevation as well as medications to manage symptoms. Is currently on a course of methylprednisolone. History:     Past Medical History:   Diagnosis Date    Anxiety     Blood loss     after     Cervical disc disease     Cervical myopathy     Chronic back pain     GERD (gastroesophageal reflux disease)     Hx of migraine headaches     Neuropathy     both legs, feet    Thyroid nodule     Trigeminal neuralgia of right side of face     Under care of team 2022    NEUROLSURGERY - DR. Eduar Barraza     Under care of team 2022    NEUROLOGY - DR. NOWAK - LAST VISIT 2022    Under care of team 2022    CARDIOLOGY - UPCOMING FIRST VISIT - DOES NOT REMEMBER NAME    Weakness generalized     due to neck issues    Wellness examination 2022    PCP - Eva Atkinson CNP - LAST VISIT  - 2022     Past Surgical History:   Procedure Laterality Date    CARPAL TUNNEL RELEASE Right 2022 CUBITAL TUNNEL RELEASE performed by Amber Ramirez DO at [address] N/A 04/30/2021    ANTERIOR C5-6 DISC ARTHROPLASTY performed by Amber Ramirez DO at [address]      x3    [place] Right 06/29/2022    CUBITAL TUNNEL RELEASE (Right Arm Upper)    TUBAL LIGATION       Family History   Problem Relation Age of Onset    Diabetes Mother     High Blood Pressure Mother     Other Mother         Tremor    Seizures Mother     Heart Disease Father     Hypertension Father     Cancer Sister         Lymphoblastic lymphoma    Diabetes Maternal Uncle     Heart Attack Maternal Grandmother     Diabetes Maternal Grandmother     Kidney Disease Maternal Grandmother     Breast Cancer Paternal Aunt      Current Outpatient Medications on File Prior to Visit   Medication Sig Dispense Refill    methylPREDNISolone (MEDROL DOSEPACK) 4 MG tablet use as directed FOLLOW DIRECTIONS ON BACK OF FOIL PACK      clindamycin (CLEOCIN) 300 MG capsule take 1 capsule by mouth three times a day for 10 days      hydrOXYzine HCl (ATARAX) 25 MG tablet take 1 tablet by mouth every 6 hours      verapamil (CALAN SR) 120 MG extended release tablet Take 1 tablet by mouth nightly 30 tablet 3    carBAMazepine (TEGRETOL XR) 200 MG extended release tablet TAKE 1 TABLET BY MOUTH ONCE DAILY AND TAKE 1 TABLET AT BEDTIME *EMERGENCY REFILL* 60 tablet 10    pregabalin (LYRICA) 150 MG capsule TAKE 1 CAPSULE BY MOUTH THREE TIMES DAILY 90 capsule 2    ibuprofen (ADVIL;MOTRIN) 800 MG tablet Take 1 tablet by mouth 2 times daily as needed for Pain 60 tablet 5    DULoxetine (CYMBALTA) 60 MG extended release capsule TAKE ONE (1) CAPSULE BY MOUTH ONCE DAILY 30 capsule 10    carBAMazepine (TEGRETOL XR) 100 MG extended release tablet TAKE 1 TABLET DAILY ~108 TAKE 1 TABLET AT BEDTIME (Patient taking differently: Take 300 mg by mouth in the morning and 300 mg before bedtime.) 60 tablet 5    Handicap Placard MISC Physical Exam:      /87 (Site: Left Upper Arm, Position: Sitting, Cuff Size: Medium Adult)   Pulse 88   Temp 97.6 °F (36.4 °C) (Oral)   Ht 5' 7\" (1.702 m)   Wt 215 lb 6.4 oz (97.7 kg)   SpO2 98%   BMI 33.74 kg/m²   Estimated body mass index is 33.74 kg/m² as calculated from the following:    Height as of this encounter: 5' 7\" (1.702 m). Weight as of this encounter: 215 lb 6.4 oz (97.7 kg). General:  Qing Taylor is a 39y.o. year old female who appears her stated age. HEENT: Normocephalic atraumatic. Neck supple. Chest: regular rate; pulses equal  Abdomen: Soft nontender nondistended. Ext: DP and PT pulses 2+, good cap refill  Neuro    Mentation  Appropriate affect  Registration intact  Orientation intact  Judgement intact to situation    Cranial Nerves:   Pupils equal and reactive to light  Extraocular motion intact  Face and shrug symmetric  Tongue midline  No dysarthria  v1-3 sensation symmetric, masseter tone symmetric  Hearing symmetric    Sensation: Decreased ulnar distribution right upper extremity    Motor  L deltoid 5/5; R deltoid 5/5  L biceps 5/5; R biceps 5/5  L triceps 5/5; R triceps 5/5  L wrist extension 5/5; R wrist extension 5/5  L intrinsics 5/5; R intrinsics 5/5     L iliopsoas 5/5 , R iliopsoas 5/5  L quadriceps 5/5; R quadriceps 5/5  L Dorsiflexion 5/5; R dorsiflexion 5/5  L Plantarflexion 5/5; R plantarflexion 5/5  L EHL 5/5; R EHL 5/5    Reflexes  L Brachioradialis 2+/4; R brachioradialis 2+/4  L Biceps 2+/4; R Biceps 2+/4  L Triceps 2+/4; R Triceps 2+/4  L Patellar 2+/4: R Patellar 2+/4  L Achilles 2+/4; R Achilles 2+/4    hoffmans L: neg  hoffmans R: neg  Clonus L: neg  Clonus R: neg  Babinski L: neg  Babinski R: neg    +TTP cubital tunnel; incision well-healed    Studies Review:     Emergency department notes reviewed    Assessment and Plan:      1.  Cubital tunnel syndrome, right          Plan: Postoperative pain medication refilled, continue to decrease use as tolerated. Reassurance provided that incision is healing well, no signs of infection. Continue rest, ice, elevation. Start occupational therapy next week as planned. Maintain upcoming postop visit with Dr. Farhana Espinosa as planned, contact office with any questions or concerns. Followup: Return in about 4 weeks (around 8/25/2022), or if symptoms worsen or fail to improve. Prescriptions Ordered:  Orders Placed This Encounter   Medications    oxyCODONE-acetaminophen (PERCOCET) 5-325 MG per tablet     Sig: Take 1 tablet by mouth every 6 hours as needed for Pain for up to 7 days. Intended supply: 7 days. Take lowest dose possible to manage pain     Dispense:  28 tablet     Refill:  0     Reduce doses taken as pain becomes manageable      Orders Placed:  No orders of the defined types were placed in this encounter. Electronically signed by MORE Castano CNP on 7/28/2022 at 12:58 PM    Please note that this chart was generated using voice recognition Dragon dictation software. Although every effort was made to ensure the accuracy of this automated transcription, some errors in transcription may have occurred.

## 2022-08-08 ENCOUNTER — OFFICE VISIT (OUTPATIENT)
Dept: NEUROSURGERY | Age: 37
End: 2022-08-08
Payer: COMMERCIAL

## 2022-08-08 VITALS
HEART RATE: 90 BPM | BODY MASS INDEX: 33.74 KG/M2 | OXYGEN SATURATION: 99 % | TEMPERATURE: 97 F | DIASTOLIC BLOOD PRESSURE: 84 MMHG | SYSTOLIC BLOOD PRESSURE: 128 MMHG | WEIGHT: 215 LBS | HEIGHT: 67 IN

## 2022-08-08 DIAGNOSIS — M25.511 CHRONIC RIGHT SHOULDER PAIN: ICD-10-CM

## 2022-08-08 DIAGNOSIS — G50.0 TRIGEMINAL NEURALGIA OF RIGHT SIDE OF FACE: ICD-10-CM

## 2022-08-08 DIAGNOSIS — G89.29 CHRONIC RIGHT SHOULDER PAIN: ICD-10-CM

## 2022-08-08 DIAGNOSIS — Z98.890 S/P CERVICAL DISC REPLACEMENT: ICD-10-CM

## 2022-08-08 DIAGNOSIS — Z98.890 S/P CUBITAL TUNNEL RELEASE: Primary | ICD-10-CM

## 2022-08-08 PROCEDURE — 1036F TOBACCO NON-USER: CPT | Performed by: NEUROLOGICAL SURGERY

## 2022-08-08 PROCEDURE — 99214 OFFICE O/P EST MOD 30 MIN: CPT | Performed by: NEUROLOGICAL SURGERY

## 2022-08-08 PROCEDURE — G8417 CALC BMI ABV UP PARAM F/U: HCPCS | Performed by: NEUROLOGICAL SURGERY

## 2022-08-08 PROCEDURE — G8427 DOCREV CUR MEDS BY ELIG CLIN: HCPCS | Performed by: NEUROLOGICAL SURGERY

## 2022-08-08 NOTE — PROGRESS NOTES
Department of Neurosurgery                                                      Follow up visit      History Obtained From:  patient    CHIEF COMPLAINT:         Chief Complaint   Patient presents with    Arm Pain     Right arm pain. HISTORY OF PRESENT ILLNESS:       The patient is a 39 y.o. female who presents for follow up for right cubital tunnel release. She reports improved symptoms. She has not started occupational therapy yet. She does notice for 2 week of right wrist dropping during typing occasionally. Pain down the right phillips and lateral hand is improved, no numbness. Hang weakness is improved. She is on tegretol for trigeminal neuralgia, pain is better. She no longer has eye switching. PAST MEDICAL HISTORY :       Past Medical History:        Diagnosis Date    Anxiety     Blood loss     after     Cervical disc disease     Cervical myopathy     Chronic back pain     GERD (gastroesophageal reflux disease)     Hx of migraine headaches     Neuropathy     both legs, feet    Thyroid nodule     Trigeminal neuralgia of right side of face     Under care of team 2022    NEUROLSURGERY - DR. Gifty Knott     Under care of team 2022    NEUROLOGY - DR. NOWAK - LAST VISIT 2022    Under care of team 2022    CARDIOLOGY - UPCOMING FIRST VISIT - DOES NOT REMEMBER NAME    Weakness generalized     due to neck issues    Wellness examination 2022    PCP - John Coello CNP - LAST VISIT  - 2022       Past Surgical History:        Procedure Laterality Date    CARPAL TUNNEL RELEASE Right 2022    CUBITAL TUNNEL RELEASE performed by Julián Caceres DO at [address] N/A 2021    ANTERIOR C5-6 DISC ARTHROPLASTY performed by Julián Caceres DO at [address]      x3    [place] Right 2022    CUBITAL TUNNEL RELEASE (Right Arm Upper)    TUBAL LIGATION         Social History:   Social History Socioeconomic History    Marital status:      Spouse name: Not on file    Number of children: Not on file    Years of education: Not on file    Highest education level: Not on file   Occupational History    Not on file   Tobacco Use    Smoking status: Former     Years: 2.00     Types: Cigarettes     Quit date:      Years since quittin.6    Smokeless tobacco: Never    Tobacco comments:     [id number], smoker then   Vaping Use    Vaping Use: Never used   Substance and Sexual Activity    Alcohol use: Yes     Comment: 2 glasses of wine a month    Drug use: Never    Sexual activity: Not on file   Other Topics Concern    Not on file   Social History Narrative    Not on file     Social Determinants of Health     Financial Resource Strain: Low Risk     Difficulty of Paying Living Expenses: Not hard at all   Food Insecurity: No Food Insecurity    Worried About Running Out of Food in the Last Year: Never true    Ran Out of Food in the Last Year: Never true   Transportation Needs: Not on file   Physical Activity: Not on file   Stress: Not on file   Social Connections: Not on file   Intimate Partner Violence: Not on file   Housing Stability: Not on file       Family History:       Problem Relation Age of Onset    Diabetes Mother     High Blood Pressure Mother     Other Mother         Tremor    Seizures Mother     Heart Disease Father     Hypertension Father     Cancer Sister         Lymphoblastic lymphoma    Diabetes Maternal Uncle     Heart Attack Maternal Grandmother     Diabetes Maternal Grandmother     Kidney Disease Maternal Grandmother     Breast Cancer Paternal Aunt        Allergies:  Bee venom and Doxycycline    Home Medications:  Prior to Admission medications    Medication Sig Start Date End Date Taking?  Authorizing Provider   methylPREDNISolone (MEDROL DOSEPACK) 4 MG tablet use as directed FOLLOW DIRECTIONS ON BACK OF FOIL PACK 22  Yes Historical Provider, MD   clindamycin (CLEOCIN) 300 MG capsule take 1 capsule by mouth three times a day for 10 days 7/26/22  Yes Historical Provider, MD   hydrOXYzine HCl (ATARAX) 25 MG tablet take 1 tablet by mouth every 6 hours 7/26/22  Yes Historical Provider, MD   verapamil (CALAN SR) 120 MG extended release tablet Take 1 tablet by mouth nightly 7/14/22  Yes Ivin Simmonds, MD   carBAMazepine (TEGRETOL XR) 200 MG extended release tablet TAKE 1 TABLET BY MOUTH ONCE DAILY AND TAKE 1 TABLET AT BEDTIME *EMERGENCY REFILL* 7/7/22  Yes Shirley Gillette MD   pregabalin (LYRICA) 150 MG capsule TAKE 1 CAPSULE BY MOUTH THREE TIMES DAILY 6/24/22 9/24/22 Yes MORE Longoria CNP   ibuprofen (ADVIL;MOTRIN) 800 MG tablet Take 1 tablet by mouth 2 times daily as needed for Pain 6/8/22  Yes MORE Longoria CNP   DULoxetine (CYMBALTA) 60 MG extended release capsule TAKE ONE (1) CAPSULE BY MOUTH ONCE DAILY 5/24/22  Yes MORE Longoria CNP   carBAMazepine (TEGRETOL XR) 100 MG extended release tablet TAKE 1 TABLET DAILY ~108 TAKE 1 TABLET AT BEDTIME  Patient taking differently: Take 300 mg by mouth in the morning and 300 mg before bedtime. 5/16/22  Yes MORE Longoria CNP   Handicap Placard MISC by Does not apply route 2/25/22  Yes MORE Longoria CNP   Blood Pressure KIT Blood pressure management 9/22/21  Yes MORE Uribe NP   SUMAtriptan (IMITREX) 100 MG tablet Take 1 tablet by mouth once as needed for Migraine (at the onset of a headache can repeat after 1 hour if no improvment.  no more than 2 in one day or 4/week) 7/14/22 7/14/22  Ivin Simmonds, MD   EPINEPHrine (EPIPEN 2-ELOY) 0.3 MG/0.3ML SOAJ injection Inject 0.3 mLs into the muscle once for 1 dose Use as directed for allergic reaction  Patient not taking: Reported on 7/28/2022 2/25/22 6/23/22  MORE Longoria CNP   topiramate (TOPAMAX) 50 MG tablet Take 1 tablet by mouth 2 times daily 5/25/21 9/19/21  Kellie Kulkarni MD       Current Medications:   No current facility-administered medications for this visit. PHYSICAL EXAM:       /84 (Site: Left Upper Arm, Position: Sitting, Cuff Size: Large Adult)   Pulse 90   Temp 97 °F (36.1 °C) (Temporal)   Ht 5' 7\" (1.702 m)   Wt 215 lb (97.5 kg)   SpO2 99%   BMI 33.67 kg/m²   Physical Exam     Gen: NAD  HEENT: moist mucus membranes  Cardio: RRR  Pulm: chest rise symmetrically  GI: abd soft  Ext: no edema  Skin: warm    Neuro:    AOX3  CN 2-12 grossly intact  Speech articulate  Motor 5/5 except right  4+/5  No pronator drift  Sensation symmetrical   Wound well-healed        Radiology Review:        ASSESSMENT AND PLAN:       Patient Active Problem List   Diagnosis    Chronic low back pain with sciatica    Thyroid nodule    Stenosis of cervical spine with myelopathy (HCC)    Cervical radiculopathy    Thoracic radiculopathy    Class 1 obesity due to excess calories with serious comorbidity in adult    Cervical disc disease    Bunion of great toe of right foot    S/P cervical disc replacement    Cubital tunnel syndrome, right    Chronic right shoulder pain         A/P:  This is a 39 y.o. female with S/P cubital tunnel release  S/P cervical disc replacement  Chronic right shoulder pain  Trigeminal neuralgia of right side of face   2-month status post right cubital tunnel release with improved symptoms  She still has some right shoulder pain which I recommend occupational therapy as well as for her cubital tunnel  There is not much disease at C4-5, and C5-6 where the arthroplasty was done the last imaging shows maintained motion    Follow-up in 3 months after occupational therapy        Patient and/or family was counseled on the diagnosis and treatment plan    Xiomara Montesinos DO     Board Certified Neurosurgeon  8/8/2022  1:34 PM      This note was created using voice recognition software. There may be inaccuracies of transcription  that are inadvertently overlooked prior to the signature.   There is any questions about the transcription please contact me.

## 2022-08-11 ENCOUNTER — TELEPHONE (OUTPATIENT)
Dept: NEUROLOGY | Age: 37
End: 2022-08-11

## 2022-08-11 DIAGNOSIS — G56.21 CUBITAL TUNNEL SYNDROME, RIGHT: ICD-10-CM

## 2022-08-11 RX ORDER — OXYCODONE HYDROCHLORIDE AND ACETAMINOPHEN 5; 325 MG/1; MG/1
1 TABLET ORAL EVERY 8 HOURS PRN
Qty: 21 TABLET | Refills: 0 | Status: SHIPPED | OUTPATIENT
Start: 2022-08-11 | End: 2022-08-25 | Stop reason: SDUPTHER

## 2022-08-11 NOTE — TELEPHONE ENCOUNTER
Patient states that she is still have pain after her surgery 6/29/22. She would like to know if she can have something for her pain. Pharmacy [facility] on [address].

## 2022-08-24 ENCOUNTER — HOSPITAL ENCOUNTER (OUTPATIENT)
Dept: OCCUPATIONAL THERAPY | Facility: CLINIC | Age: 37
Setting detail: THERAPIES SERIES
Discharge: HOME | End: 2022-08-24
Payer: COMMERCIAL

## 2022-08-24 PROCEDURE — 97110 THERAPEUTIC EXERCISES: CPT

## 2022-08-24 PROCEDURE — 97165 OT EVAL LOW COMPLEX 30 MIN: CPT

## 2022-08-24 NOTE — CONSULTS
[] [facility]       Occupational Therapy 1st floor       [address] [place], [place]         Phone: [phone]       Fax: [phone] [x] [facility]  [address] [place], [place]  Phone: [phone]  Fax: [phone]       Occupational Therapy Hand & Upper Extremity  Initial Evaluation    Date: 2022      Patient: Mike Steve  : 1985  MRN: [id number]  Referring Provider:  Yeny Esquivel APRN - CNP  Insurance: [employer] [employer]  Medical Diagnosis: Cubital tunnel syndrome, Right (G56.21)   Rehab Codes: pain in elbow M25.52,, fine motor skills loss R29.818,, pain in limb right arm M79.601, , pain in right forearm M79.631,, pain in right hand M79.641,, or pain in right finger(s) M79.644,  Onset Date: 22    Next Dr. Camron Valero:  surgery, 10/18 for neurololgy     Subjective:   CC: Pt reports hypersensitively a long scar, and posterior elbow. Pt reports ot being able to tolerate ed sheets touching the scar and it wakes her up due to pn. She also cannot wear long sleeve clothing because it causes too much pn over the scar. Pt reports decreased  strength and difficulty completing ADLs/IADLs due to pn and strength deficits. HPI: Pt reports having discotomy of C5-6 in . Following the discotomy pt reports R UE not \"working well\". Pt stated that she was having numbness/tingling in R UE which brought her to having R cubital tunnel release on 22. Pt stated that following the cubital tunnel release she was stung by a bee in R hand, causing anaphylaxis which delayed her start of therapy. Mechanism of Injury: cubital tunnel release Surgery Date:   Precautions: Other Progress things slowly    Involved Extremity: Right   Dominant Extremity: Right    Past Medical History: Unremarkable and Arthritis          Comorbidities: NA    Medications: Refer to Medical chart in Epic Allergies:   Other Bee/wrasps     Pain: Intensity:  7 /10 Location: From neck down arm     Pain Type: constant  Pain Altered Tx: no  Action Taken:none            Home Environment:    Pt lives in a  and Condo With 2 and No Handrail [address]  The washing machine is on and the second level    Vocation      Job Status [x]Normal duty []Light duty [] Off due to condition []Retired  []Not employed []Disability  [x]Other: w/ precautions  []  Return to work: Work activities/duties  Computer work      Avocational Activities     [x] [address]     [] [facility]     [] Care giver [] OTHER    [] NA       ADL/IADL Previous level of function Current level of function Who assists or modifications made or needed   Bathing  [x] Independent    [] Assist  [x] Independent    [] Assist     Dress/grooming [x] Independent    [] Assist  [] Independent    [x] Assist  Help from teenage children    Transfer/mobility [x] Independent    [] Assist  [x] Independent    [] Assist     Feeding [x] Independent    [] Assist  [x] Independent    [] Assist     Toileting [x] Independent    [] Assist  [x] Independent    [] Assist     Driving [x] Independent    [] Assist  [x] Independent    [] Assist     Housekeeping [x] Independent    [] Assist  [] Independent    [x] Assist  Help from teenage children    Grocery shop/meal prep [x] Independent    [] Assist  [] Independent    [x] Assist  Help from teenage children      Device: Independent   Orthosis: NA    Objective: Tests/Measurements: Upper Extremity Functional Index  Current Functional Level:  7 functionally impaired as measured with the Upper Extremity Functional Index Survey. 0-80 scale, with 80 = no Deficits  (The UEFI model does not provide any specific cut off points that could classify the upper limb disability degree, however, a minimal detectable change of 9 points is provided. This means that for improvement or deterioration to be considered, between two subsequent evaluations, the scores must differ by at least 9 in R elbow extension/flexion  Increase strength to 5/5 in R elbow flex/ext/pronation/supination   Increase function:UE Functional Index Score 15 or more points to promote increased functional abilities  Scar will be soft and pliable with minimal tethering. Pt will report decrease in hypersensitivity in R elbow to all textures   Pt will improve FMC as tested by 9-hole peg test by 10 seconds      Patient Goals: To decrease sensitivity over scar and reduce pain    Treatment Potential: Good Suggested Professional Referral: No  Domestic Concerns: No   Barriers to Goal Achievement: No    Comments/Assessment: Pt is a 39 yr/old R hand dominate female who presents on this date s/p R cubital tunnel release on 6/29/22. Pt presents w/ healed scar on medial aspect of R elbow. Pt is hypersensitive to touch over scar and posterior elbow which radiates distally into R hand. Pt has slightly limited AROM in R elbow as compared to unaffected L UE and decreased strength in R elbow/wrist. Pt displays decreased [address] in dominate R UE as tested by objective 9-hole peg test. Pt would benfit from skilled OT to address radiating hypersensitivity in R UE, increase functional [address], strength, and pain to perform ADLs/IADLs.            Home Program Initiated: Written, Verbal, and Demo Comprehension of Education: Yes       Plans, Goals, Discussed with, and Patient    Treatment Plan:   [x]  Therapeutic Exercise   94156              []  Iontophoresis: 4 mg/mL Dexamethasone Sodium Phosphate  mAmin  33367    [x]  Therapeutic Activity  88240  []  Vasopneumatic cold with compression  99199                []  ADL training  20587  []  Ultrasound        [id number]    []  Neuromuscular Re-education  [phone]  []  Electrical Stimulation Attended  48705    [x]  Manual Therapy  75896  Orthotic    []  Fit  96 [id number]     []  Train 91933    []  Instruction in [address]    []  Fit [id number]      []  Train 27283    []  Cognitive Interventions, (first 15 min 14084, subsequent 15 min 29521)  []  Cold/hotpack      [x]  Massage   55067           []  Medication allergies reviewed for use of    Dexamethasone Sodium Phosphate 4mg/ml     with iontophoresis treatments. Pt is not allergic. Treatment Plan:  Frequency: 2 x/week for 30 visits     Today's Treatment:  Modalities:  Precautions: Progressive ROM and strengthening    Exercise Flow Sheet:  Exercise Reps/Time Weight/Level Comments   Elbow AROM 30  Flex/ext/pro/sup   Wrist stretching [id number]  Flex/ext stretch   Desensitization 2 mins  towel   Scapula retractions 30  Gentle AROM   Nerve glide 10  Prayer ulnar nerve glides          Other:     Specific Instructions for Next Treatment: cont w/ desensitization, begin massage and postural exercises     Evaluation Complexity:  History (Personal factors, comorbidities) []  0 [x]  1-2 []  3+   Exam (limitations, restrictions) [x]  1-2 []  3 []  4+   Decision Making [x]  Low []  Moderate []  High   ? [x]  Low Complexity []  Moderate   Complexity []  High Complexity      Treatment Charges: Mins Units Time In/Out   Evaluation  []  High  []  Moderate  [x]  Low  30  1    [x]  Ther Exercise 20 1    []  Manual Therapy      []  Ther Activities      []  Orthotic fit/train      []  Orthotic recheck      []        Total Treatment time 50 min 2      Time In: 1000   Time out: 3634  Electronically signed by Silvana Pemberton OT on 8/24/2022 at 9:56 AM    Physician Signature: _________________________ Date: _______________  By signing above or cosigning this note, I have reviewed this plan of care and certify a need for medically necessary rehabilitation services.      *PLEASE SIGN ABOVE AND FAX BACK ALL PAGES*

## 2022-08-25 DIAGNOSIS — G56.21 CUBITAL TUNNEL SYNDROME, RIGHT: ICD-10-CM

## 2022-08-25 RX ORDER — OXYCODONE HYDROCHLORIDE AND ACETAMINOPHEN 5; 325 MG/1; MG/1
1 TABLET ORAL 2 TIMES DAILY PRN
Qty: 14 TABLET | Refills: 0 | Status: SHIPPED | OUTPATIENT
Start: 2022-08-25 | End: 2022-09-01

## 2022-08-25 NOTE — TELEPHONE ENCOUNTER
Patient calling for refill of oxycodone.     Date of last fill: 8.11.22  Surgery: 6.29.22  Time elapsed since last surgery: 8 weeks  Date of next follow up appointment: 11.14.22    Pt notified response time for refills is 24-48 hours

## 2022-08-29 ENCOUNTER — HOSPITAL ENCOUNTER (OUTPATIENT)
Dept: OCCUPATIONAL THERAPY | Facility: CLINIC | Age: 37
Setting detail: THERAPIES SERIES
Discharge: HOME | End: 2022-08-29
Payer: COMMERCIAL

## 2022-08-29 NOTE — SIGNIFICANT EVENT
[] [address]        Occupational Therapy       [address] [facility], 1st [address]       Phone: [phone]       Fax: [phone] [x] [facility]       Phone: [phone]       Fax: [phone]          Occupational Therapy Cancel/No Show note    Date: 2022  Patient: Richard Rose  : 1985  MRN: [id number]  Cancels/No Shows to date:     For today's appointment patient:  [x]  Cancelled  []  Rescheduled appointment  []  No-show     Reason given by patient:  []  Patient ill  []  Conflicting appointment  []  No transportation    []  Conflict with work  []  No reason given  [x]  Other:     Comments:  Pt's child is sick, pt scheduled for     Electronically signed by: MELANIE Cali/SIVAN

## 2022-09-06 ENCOUNTER — PATIENT MESSAGE (OUTPATIENT)
Dept: NEUROSURGERY | Age: 37
End: 2022-09-06

## 2022-09-06 DIAGNOSIS — M54.12 CERVICAL RADICULOPATHY: ICD-10-CM

## 2022-09-06 DIAGNOSIS — M54.14 THORACIC RADICULOPATHY: ICD-10-CM

## 2022-09-06 DIAGNOSIS — Z76.0 MEDICATION REFILL: ICD-10-CM

## 2022-09-07 ENCOUNTER — HOSPITAL ENCOUNTER (OUTPATIENT)
Dept: OCCUPATIONAL THERAPY | Facility: CLINIC | Age: 37
Setting detail: THERAPIES SERIES
Discharge: HOME | End: 2022-09-07
Payer: COMMERCIAL

## 2022-09-07 PROCEDURE — 97140 MANUAL THERAPY 1/> REGIONS: CPT

## 2022-09-07 PROCEDURE — 97110 THERAPEUTIC EXERCISES: CPT

## 2022-09-07 RX ORDER — PREGABALIN 150 MG/1
CAPSULE ORAL
Qty: 90 CAPSULE | Refills: 2 | OUTPATIENT
Start: 2022-09-07

## 2022-09-07 NOTE — FLOWSHEET NOTE
[] [place]       Occupational Therapy            1st floor       [address], [place]         Phone: [phone]       Fax: [phone] [x] [facility] Occupational Therapy  [address] [place] [address]  Phone: [phone]  Fax: [phone]      Occupational Therapy Daily Treatment Note    Date:  2022  Patient Name:  Martir Constantino    :  1985  MRN: [id number]  Referring Provider:  Yeny [address]: [employer]onna [employer]  Medical Diagnosis: Cubital tunnel syndrome, Right (G56.21)          Rehab Codes: pain in elbow M25.52,, fine motor skills loss R29.818,, pain in limb right arm M79.601, , pain in right forearm M79.631,, pain in right hand M79.641,, or pain in right finger(s) M79.644,  Onset Date: 22                 Next  [address]:  surgery, 10/18 for neurololgy   Visit# / total visits: ; Progress note for Medicare patient due at visit 8-9    Cancels/No Shows:       Subjective:    Pain:  [x] Yes  [] No Location: Medial elbow over scar Pain Rating: (0-10 scale) 6/10  Pain altered Tx:  [x] No  [] Yes  Action: adjusted there ex   Pt Comments: Pt reports trying to do more w/ hands. She said that it hurts \"pretty bad' tp braid hair. Pt reports that she has pn in R upper trap/shoulder she states that she has been holding her arm close to her side due to pn. Pt report she LVM for MD w/ concerns due to burning pn in elbow.      Objective:  Modalities:  Modality Flow Sheet:  START STOP Tx Modality     Electrical Stim:       Ultrasound: ___ W/cm2 x ___ mins  Duty factor: __100%  __50%  __33% __20%  Head size:    ___ cm  MHz: __1mHz  __3mHz  Location:   22  Hot Pack: R shoulder/upper trap 8 minutes     Cold Pack:      Precautions:  Exercises:  Exercise Reps/Time Weight/Level Comments   Elbow AROM 30   Flex/ext/pro/sup   Wrist stretching [id number]   Flex/ext stretch   Desensitization 2 mins   towel   Scapula retractions 30 Gentle AROM   Nerve glide 10   Not completed   Prabetzy ulnar nerve glides     Wrist AROM  30   Composite AROM   FMC Half bucket Yellow     Other:      Treatment Charges: Mins Units   [x]  Modalities:  8    []  Ultrasound     [x]  Ther Exercise 20    [x]  Manual Therapy 25    []  Ther Activities     []  Orthotic fit/train     []  Orthotic recheck     []  Other     Total Treatment time 53          Assessment: [x] Progressing toward goals. Pt is a 39 yr/old R hand dominate female who presents on this date s/p R cubital tunnel release on 6/29/22. Today is pt's 1st return visit after IE on 8/24. Pt displays hypersensitivity over scar on R medial elbow. Pt could not tolerated light touch over scar w/ gauge pad or finger tips. Tx had to be modified to AROM only due to pn w/ motion. Pt unable to tolerate ulnar nerve glides. Discussed w/ pt the tx process and needing to cont working on desensitization for the scar. [] No change. [] Other     [x] Patient would continue to benefit from skilled occupational therapy services in order to: Improve and Increase  functional /grasp, ROM, Strength, Activity tolerance, Coordination deficit, and Complaint of pain in order to improve functional use of UE in ADL performance, decrease pain in UE for safe completion of ADLs, and return to PLOF     STG/LTG  Short Term Goals: (  8    Treatments)  Decrease Pain: by 3 point on numeric pain scale   Increase AROM by 5 degrees in R elbow extension/flexion  Increase strength to 4/5 in R elbow flex/ext/pronation/supination   Increase function:UE Functional Index Score 10 or more points to promote increased functional abilities  Scar will be soft and pliable with minimal tethering. Pt will report decrease in hypersensitivity in R elbow to bed sheets  Pt will improve FMC as tested by 9-hole peg test by 5 seconds  Patient to be independent with home exercise program as demonstrated by performance with correct form without cues.      Long Term Goals: (  16  Treatments)  Decrease Pain: by 5 point on numeric pain scale   Increase AROM by 10 degrees in R elbow extension/flexion  Increase strength to 5/5 in R elbow flex/ext/pronation/supination   Increase function:UE Functional Index Score 15 or more points to promote increased functional abilities  Scar will be soft and pliable with minimal tethering. Pt will report decrease in hypersensitivity in R elbow to all textures   Pt will improve FMC as tested by 9-hole peg test by 10 seconds      Pt. Education:  [x] Yes  [] No  [x] Reviewed Prior HEP/Ed  Method of Education: [x] Verbal  [x] Demo  [] Written  Re:  Comprehension of Education:  [x] Verbalizes understanding. [x] Demonstrates understanding. [] Needs review. [] Demonstrates/verbalizes HEP/Ed previously given. Plan: [x] Continue current frequency toward short and long term goals.   [x] Specific Instructions for subsequent treatments: Cont w/ desensitization    [] Other:       Time In: 0900  Time Out: 9938        Electronically signed by:  MELANIE Ayala/SIVAN

## 2022-09-07 NOTE — TELEPHONE ENCOUNTER
Placed call to patient and left voicemail to return call to the office and schedule appointment to refill medication.

## 2022-09-07 NOTE — TELEPHONE ENCOUNTER
From: Tori Burger  To: Dr. Esequiel Alcaraz: 9/6/2022 6:24 PM EDT  Subject: Surgical Scar    I am having a very hard time with my surgical scar. I have been feeling like my skin is tearing apart. At times it feels like my arm is on fire. It is still very painful to the touch, which I know will take some time to heal. I have uploaded a picture for reference. The burning sensation wakes me out of my sleep at times. I am still not able to get much rest due to the pain. Is there any topical ointments that I can use to help with the burning sensation?

## 2022-09-14 ENCOUNTER — HOSPITAL ENCOUNTER (OUTPATIENT)
Dept: OCCUPATIONAL THERAPY | Facility: CLINIC | Age: 37
Setting detail: THERAPIES SERIES
Discharge: HOME | End: 2022-09-14
Payer: COMMERCIAL

## 2022-09-14 NOTE — SIGNIFICANT EVENT
[] [address] [place] [address] Blvd[address]        Occupational Therapy       [address] [facility], 1st Floor       Phone: [phone]       Fax: [phone] [x] [facility]       Phone: [phone]       Fax: [phone]          Occupational Therapy Cancel/No Show note    Date: 2022  Patient: Qing Taylor  : 1985  MRN: [id number]  Cancels/No Shows to date: 2/3    For today's appointment patient:  [x]  Cancelled  []  Rescheduled appointment  []  No-show     Reason given by patient:  []  Patient ill  []  Conflicting appointment  []  No transportation    [x]  Conflict with work  []  No reason given  []  Other:     Comments:      Electronically signed by: MELANIE Spivey/SIVAN

## 2022-09-16 ENCOUNTER — TELEPHONE (OUTPATIENT)
Dept: PRIMARY CARE CLINIC | Age: 37
End: 2022-09-16

## 2022-09-19 DIAGNOSIS — Z76.0 MEDICATION REFILL: ICD-10-CM

## 2022-09-19 DIAGNOSIS — M54.12 CERVICAL RADICULOPATHY: ICD-10-CM

## 2022-09-19 DIAGNOSIS — M54.14 THORACIC RADICULOPATHY: ICD-10-CM

## 2022-09-19 RX ORDER — PREGABALIN 150 MG/1
CAPSULE ORAL
Qty: 90 CAPSULE | Refills: 2 | OUTPATIENT
Start: 2022-09-19

## 2022-10-05 DIAGNOSIS — M54.14 THORACIC RADICULOPATHY: ICD-10-CM

## 2022-10-05 DIAGNOSIS — Z76.0 MEDICATION REFILL: ICD-10-CM

## 2022-10-05 DIAGNOSIS — M54.12 CERVICAL RADICULOPATHY: ICD-10-CM

## 2022-10-06 NOTE — TELEPHONE ENCOUNTER
Health Maintenance   Topic Date Due    COVID-19 Vaccine (1) Never done    Varicella vaccine (1 of 2 - 2-dose childhood series) Never done    Hepatitis C screen  Never done    Cervical cancer screen  04/29/2022    Flu vaccine (1) 08/01/2022    Depression Monitoring  02/17/2023    DTaP/Tdap/Td vaccine (2 - Td or Tdap) 09/10/2029    HIV screen  Completed    Hepatitis A vaccine  Aged Out    Hepatitis B vaccine  Aged Out    Hib vaccine  Aged Out    Meningococcal (ACWY) vaccine  Aged Out    Pneumococcal 0-64 years Vaccine  Aged Out             (applicable per patient's age: Cancer Screenings, Depression Screening, Fall Risk Screening, Immunizations)    Hemoglobin A1C (%)   Date Value   06/03/2021 5.1   12/10/2020 5.1     LDL Cholesterol (mg/dL)   Date Value   11/26/2021 104     AST (U/L)   Date Value   11/26/2021 13     ALT (U/L)   Date Value   11/26/2021 10     BUN (mg/dL)   Date Value   06/06/2022 10      (goal A1C is < 7)   (goal LDL is <100) need 30-50% reduction from baseline     BP Readings from Last 3 Encounters:   08/08/22 128/84   07/28/22 119/87   07/14/22 123/84    (goal /80)      All Future Testing planned in [facility]:  Lab Frequency Next Occurrence   Basic Metabolic Panel Once 84/20/7245   Carbamazepine Level, Total Once 05/10/2022   Carbamazepine Level, Free Once 05/10/2022   CBC with Auto Differential Once 05/10/2022       Next Visit Date:  Future Appointments   Date Time Provider Thuy Zhui   10/18/2022  3:30 PM Mayra Escobedo MD Neuro [facility]   11/14/2022  9:00 AM [address], APRN - CNP [id number]            Patient Active Problem List:     Chronic low back pain with sciatica     Thyroid nodule     Stenosis of cervical spine with myelopathy (HCC)     Cervical radiculopathy     Thoracic radiculopathy     Class 1 obesity due to excess calories with serious comorbidity in adult     Cervical disc disease     Bunion of great toe of right foot     S/P cervical disc replacement Cubital tunnel syndrome, right     Chronic right shoulder pain

## 2022-10-07 RX ORDER — PREGABALIN 150 MG/1
CAPSULE ORAL
Qty: 90 CAPSULE | Refills: 0 | Status: SHIPPED | OUTPATIENT
Start: 2022-10-07 | End: 2022-10-31 | Stop reason: SDUPTHER

## 2022-10-11 DIAGNOSIS — M54.12 CERVICAL RADICULOPATHY: ICD-10-CM

## 2022-10-11 DIAGNOSIS — M54.14 THORACIC RADICULOPATHY: ICD-10-CM

## 2022-10-11 DIAGNOSIS — Z76.0 MEDICATION REFILL: ICD-10-CM

## 2022-10-12 ENCOUNTER — OFFICE VISIT (OUTPATIENT)
Dept: PRIMARY CARE CLINIC | Age: 37
End: 2022-10-12
Payer: COMMERCIAL

## 2022-10-12 VITALS
SYSTOLIC BLOOD PRESSURE: 130 MMHG | HEART RATE: 72 BPM | OXYGEN SATURATION: 96 % | TEMPERATURE: 97.7 F | DIASTOLIC BLOOD PRESSURE: 85 MMHG

## 2022-10-12 DIAGNOSIS — G89.29 CHRONIC PAIN OF RIGHT UPPER EXTREMITY: ICD-10-CM

## 2022-10-12 DIAGNOSIS — M79.601 CHRONIC PAIN OF RIGHT UPPER EXTREMITY: ICD-10-CM

## 2022-10-12 DIAGNOSIS — M54.14 THORACIC RADICULOPATHY: ICD-10-CM

## 2022-10-12 DIAGNOSIS — Z76.0 MEDICATION REFILL: ICD-10-CM

## 2022-10-12 DIAGNOSIS — G56.21 CUBITAL TUNNEL SYNDROME, RIGHT: ICD-10-CM

## 2022-10-12 DIAGNOSIS — M54.12 CERVICAL RADICULOPATHY: ICD-10-CM

## 2022-10-12 DIAGNOSIS — G50.0 TRIGEMINAL NEURALGIA: Primary | ICD-10-CM

## 2022-10-12 PROCEDURE — 1036F TOBACCO NON-USER: CPT | Performed by: NURSE PRACTITIONER

## 2022-10-12 PROCEDURE — G8417 CALC BMI ABV UP PARAM F/U: HCPCS | Performed by: NURSE PRACTITIONER

## 2022-10-12 PROCEDURE — G8484 FLU IMMUNIZE NO ADMIN: HCPCS | Performed by: NURSE PRACTITIONER

## 2022-10-12 PROCEDURE — G8427 DOCREV CUR MEDS BY ELIG CLIN: HCPCS | Performed by: NURSE PRACTITIONER

## 2022-10-12 PROCEDURE — 99213 OFFICE O/P EST LOW 20 MIN: CPT | Performed by: NURSE PRACTITIONER

## 2022-10-12 RX ORDER — PREGABALIN 150 MG/1
CAPSULE ORAL
Qty: 90 CAPSULE | Refills: 0 | OUTPATIENT
Start: 2022-10-12

## 2022-10-12 SDOH — ECONOMIC STABILITY: FOOD INSECURITY: WITHIN THE PAST 12 MONTHS, YOU WORRIED THAT YOUR FOOD WOULD RUN OUT BEFORE YOU GOT MONEY TO BUY MORE.: NEVER TRUE

## 2022-10-12 SDOH — ECONOMIC STABILITY: FOOD INSECURITY: WITHIN THE PAST 12 MONTHS, THE FOOD YOU BOUGHT JUST DIDN'T LAST AND YOU DIDN'T HAVE MONEY TO GET MORE.: NEVER TRUE

## 2022-10-12 ASSESSMENT — SOCIAL DETERMINANTS OF HEALTH (SDOH): HOW HARD IS IT FOR YOU TO PAY FOR THE VERY BASICS LIKE FOOD, HOUSING, MEDICAL CARE, AND HEATING?: NOT HARD AT ALL

## 2022-10-12 ASSESSMENT — ENCOUNTER SYMPTOMS
SHORTNESS OF BREATH: 0
COUGH: 0

## 2022-10-12 NOTE — PROGRESS NOTES
Bernice Bennett (:  1985) is a 44 y.o. female,Established patient, here for evaluation of the following chief complaint(s):  Check-Up and Medication Refill         ASSESSMENT/PLAN:  1. Trigeminal neuralgia  -     Jeremy Reese MD, Pain Management, [place]  2. Chronic pain of right upper extremity  -     Jeremy Reese MD, Pain Management, [place]  3. Cubital tunnel syndrome, right  -     Jeremy Reese MD, Pain Management, [place]  4. Cervical radiculopathy  -     pregabalin (LYRICA) 150 MG capsule; Take one capsule by mouth three times a day, Disp-90 capsule, R-2Normal  5. Thoracic radiculopathy  -     pregabalin (LYRICA) 150 MG capsule; Take one capsule by mouth three times a day, Disp-90 capsule, R-2Normal  6. Medication refill  -     pregabalin (LYRICA) 150 MG capsule; Take one capsule by mouth three times a day, Disp-90 capsule, R-2Normal    No follow-ups on file. Subjective   SUBJECTIVE/OBJECTIVE:  HPI  Continues to have daily pain in right arm. Had cubital tunnel release in July. Is willing to get opinion of pain management. She does not want narcotics, she wants relief to be able to take care of kids and work. Review of Systems   Constitutional:  Negative for chills and fever. Respiratory:  Negative for cough and shortness of breath. Cardiovascular:  Negative for chest pain, palpitations and leg swelling. Objective   Vitals:    10/12/22 1645   BP: 130/85   Pulse: 72   Temp: 97.7 °F (36.5 °C)   SpO2: 96%     Wt Readings from Last 3 Encounters:   10/21/22 216 lb (98 kg)   22 215 lb (97.5 kg)   22 215 lb 6.4 oz (97.7 kg)       Physical Exam  Constitutional:       Appearance: She is well-developed. HENT:      Right Ear: External ear normal.      Left Ear: External ear normal.      Nose: Nose normal.   Cardiovascular:      Rate and Rhythm: Normal rate and regular rhythm.       Heart sounds: Normal heart sounds, S1 normal and S2 normal.   Pulmonary:      Effort: Pulmonary effort is normal. No respiratory distress. Breath sounds: Normal breath sounds. Musculoskeletal:         General: No deformity. Normal range of motion. Cervical back: Full passive range of motion without pain and normal range of motion. Skin:     General: Skin is warm and dry. Neurological:      Mental Status: She is alert and oriented to person, place, and time. An electronic signature was used to authenticate this note.     --Brain Chandra, MORE - CNP

## 2022-10-12 NOTE — PROGRESS NOTES
Visit Information    Have you changed or started any medications since your last visit including any over-the-counter medicines, vitamins, or herbal medicines? no   Are you having any side effects from any of your medications? -  no  Have you stopped taking any of your medications? Is so, why? -  no    Have you seen any other physician or provider since your last visit? No  Have you had any other diagnostic tests since your last visit? No  Have you been seen in the emergency room and/or had an admission to a hospital since we last saw you? No  Have you had your routine dental cleaning in the past 6 months? no    Have you activated your [employer] account? If not, what are your barriers?  Yes     Patient Care Team:  MORE Andino CNP as PCP - General (Family Nurse Practitioner)  MORE Andino CNP as PCP [facility]  Sushila Giron MD as Consulting Physician (Gastroenterology)    Medical History Review  Past Medical, Family, and Social History reviewed and does not contribute to the patient presenting condition    Health Maintenance   Topic Date Due    Varicella vaccine (1 of 2 - 2-dose childhood series) Never done    Hepatitis C screen  Never done    COVID-19 Vaccine (3 - Booster) 11/01/2021    Cervical cancer screen  04/29/2022    Flu vaccine (1) 10/12/2023 (Originally 8/1/2022)    Depression Monitoring  02/17/2023    DTaP/Tdap/Td vaccine (2 - Td or Tdap) 09/10/2029    HIV screen  Completed    Hepatitis A vaccine  Aged Out    Hib vaccine  Aged Out    Meningococcal (ACWY) vaccine  Aged Out    Pneumococcal 0-64 years Vaccine  Aged Out

## 2022-10-19 DIAGNOSIS — M54.12 CERVICAL RADICULOPATHY: ICD-10-CM

## 2022-10-19 DIAGNOSIS — M54.14 THORACIC RADICULOPATHY: ICD-10-CM

## 2022-10-19 DIAGNOSIS — Z76.0 MEDICATION REFILL: ICD-10-CM

## 2022-10-19 RX ORDER — PREGABALIN 150 MG/1
CAPSULE ORAL
Qty: 90 CAPSULE | Refills: 0 | OUTPATIENT
Start: 2022-10-19

## 2022-10-20 DIAGNOSIS — G50.0 TRIGEMINAL NEURALGIA: ICD-10-CM

## 2022-10-20 DIAGNOSIS — M54.12 CERVICAL RADICULOPATHY: ICD-10-CM

## 2022-10-21 ENCOUNTER — APPOINTMENT (OUTPATIENT)
Dept: GENERAL RADIOLOGY | Facility: CLINIC | Age: 37
End: 2022-10-21
Payer: COMMERCIAL

## 2022-10-21 ENCOUNTER — HOSPITAL ENCOUNTER (EMERGENCY)
Facility: CLINIC | Age: 37
Discharge: HOME | End: 2022-10-21
Attending: EMERGENCY MEDICINE
Payer: COMMERCIAL

## 2022-10-21 VITALS
HEART RATE: 100 BPM | OXYGEN SATURATION: 99 % | SYSTOLIC BLOOD PRESSURE: 149 MMHG | WEIGHT: 216 LBS | TEMPERATURE: 97.9 F | RESPIRATION RATE: 21 BRPM | HEIGHT: 67 IN | DIASTOLIC BLOOD PRESSURE: 73 MMHG | BODY MASS INDEX: 33.9 KG/M2

## 2022-10-21 DIAGNOSIS — S46.811A STRAIN OF RIGHT TRAPEZIUS MUSCLE, INITIAL ENCOUNTER: Primary | ICD-10-CM

## 2022-10-21 DIAGNOSIS — R51.9 NONINTRACTABLE EPISODIC HEADACHE, UNSPECIFIED HEADACHE TYPE: ICD-10-CM

## 2022-10-21 DIAGNOSIS — R07.9 CHEST PAIN, UNSPECIFIED TYPE: ICD-10-CM

## 2022-10-21 LAB
ABSOLUTE EOS #: 0.2 K/UL (ref 0–0.4)
ABSOLUTE LYMPH #: 2.3 K/UL (ref 1–4.8)
ABSOLUTE MONO #: 0.7 K/UL (ref 0.1–1.2)
ALBUMIN SERPL-MCNC: 4.1 G/DL (ref 3.5–5.2)
ALBUMIN/GLOBULIN RATIO: 1.4 (ref 1–2.5)
ALP BLD-CCNC: 104 U/L (ref 35–104)
ALT SERPL-CCNC: 16 U/L (ref 5–33)
ANION GAP SERPL CALCULATED.3IONS-SCNC: 8 MMOL/L (ref 9–17)
AST SERPL-CCNC: 19 U/L
BACTERIA: ABNORMAL
BASOPHILS # BLD: 1 % (ref 0–2)
BASOPHILS ABSOLUTE: 0.1 K/UL (ref 0–0.2)
BILIRUB SERPL-MCNC: 0.2 MG/DL (ref 0.3–1.2)
BILIRUBIN URINE: NEGATIVE
BUN BLDV-MCNC: 11 MG/DL (ref 6–20)
CALCIUM SERPL-MCNC: 8.8 MG/DL (ref 8.6–10.4)
CHLORIDE BLD-SCNC: 103 MMOL/L (ref 98–107)
CO2: 26 MMOL/L (ref 20–31)
COLOR: YELLOW
CREAT SERPL-MCNC: 0.6 MG/DL (ref 0.5–0.9)
EOSINOPHILS RELATIVE PERCENT: 3 % (ref 1–4)
EPITHELIAL CELLS UA: ABNORMAL /HPF (ref 0–5)
GFR SERPL CREATININE-BSD FRML MDRD: >60 ML/MIN/1.73M2
GLUCOSE BLD-MCNC: 88 MG/DL (ref 70–99)
GLUCOSE URINE: NEGATIVE
HCG QUALITATIVE: NEGATIVE
HCT VFR BLD CALC: 44.6 % (ref 36–46)
HEMOGLOBIN: 14.6 G/DL (ref 12–16)
KETONES, URINE: NEGATIVE
LEUKOCYTE ESTERASE, URINE: ABNORMAL
LIPASE: 21 U/L (ref 13–60)
LYMPHOCYTES # BLD: 26 % (ref 24–44)
MCH RBC QN AUTO: 30.2 PG (ref 26–34)
MCHC RBC AUTO-ENTMCNC: 32.7 G/DL (ref 31–37)
MCV RBC AUTO: 92.3 FL (ref 80–100)
MONOCYTES # BLD: 8 % (ref 2–11)
NITRITE, URINE: NEGATIVE
OTHER OBSERVATIONS UA: ABNORMAL
PDW BLD-RTO: 14 % (ref 12.5–15.4)
PH UA: 5.5 (ref 5–8)
PLATELET # BLD: 341 K/UL (ref 140–450)
PMV BLD AUTO: 9.3 FL (ref 6–12)
POTASSIUM SERPL-SCNC: 4.3 MMOL/L (ref 3.7–5.3)
PROTEIN UA: NEGATIVE
RBC # BLD: 4.83 M/UL (ref 4–5.2)
RBC UA: ABNORMAL /HPF (ref 0–2)
SARS-COV-2, RAPID: NOT DETECTED
SEG NEUTROPHILS: 62 % (ref 36–66)
SEGMENTED NEUTROPHILS ABSOLUTE COUNT: 5.6 K/UL (ref 1.8–7.7)
SODIUM BLD-SCNC: 137 MMOL/L (ref 135–144)
SPECIFIC GRAVITY UA: 1.02 (ref 1–1.03)
SPECIMEN DESCRIPTION: NORMAL
TOTAL PROTEIN: 7 G/DL (ref 6.4–8.3)
TROPONIN, HIGH SENSITIVITY: <6 NG/L (ref 0–14)
TURBIDITY: CLEAR
URINE HGB: NEGATIVE
UROBILINOGEN, URINE: NORMAL
WBC # BLD: 8.9 K/UL (ref 3.5–11)
WBC UA: ABNORMAL /HPF (ref 0–5)

## 2022-10-21 PROCEDURE — 84484 ASSAY OF TROPONIN QUANT: CPT

## 2022-10-21 PROCEDURE — 83690 ASSAY OF LIPASE: CPT

## 2022-10-21 PROCEDURE — 81001 URINALYSIS AUTO W/SCOPE: CPT

## 2022-10-21 PROCEDURE — 99285 EMERGENCY DEPT VISIT HI MDM: CPT

## 2022-10-21 PROCEDURE — 93005 ELECTROCARDIOGRAM TRACING: CPT | Performed by: PHYSICIAN ASSISTANT

## 2022-10-21 PROCEDURE — 36415 COLL VENOUS BLD VENIPUNCTURE: CPT

## 2022-10-21 PROCEDURE — 6360000002 HC RX W HCPCS: Performed by: PHYSICIAN ASSISTANT

## 2022-10-21 PROCEDURE — 6360000002 HC RX W HCPCS: Performed by: EMERGENCY MEDICINE

## 2022-10-21 PROCEDURE — 71045 X-RAY EXAM CHEST 1 VIEW: CPT

## 2022-10-21 PROCEDURE — 84703 CHORIONIC GONADOTROPIN ASSAY: CPT

## 2022-10-21 PROCEDURE — 87635 SARS-COV-2 COVID-19 AMP PRB: CPT

## 2022-10-21 PROCEDURE — 96372 THER/PROPH/DIAG INJ SC/IM: CPT

## 2022-10-21 PROCEDURE — 80053 COMPREHEN METABOLIC PANEL: CPT

## 2022-10-21 PROCEDURE — 96374 THER/PROPH/DIAG INJ IV PUSH: CPT

## 2022-10-21 PROCEDURE — 85025 COMPLETE CBC W/AUTO DIFF WBC: CPT

## 2022-10-21 RX ORDER — ORPHENADRINE CITRATE 100 MG/1
100 TABLET, EXTENDED RELEASE ORAL 2 TIMES DAILY
Qty: 14 TABLET | Refills: 0 | Status: SHIPPED | OUTPATIENT
Start: 2022-10-21

## 2022-10-21 RX ORDER — DIPHENHYDRAMINE HYDROCHLORIDE 50 MG/ML
50 INJECTION INTRAMUSCULAR; INTRAVENOUS ONCE
Status: COMPLETED | OUTPATIENT
Start: 2022-10-21 | End: 2022-10-21

## 2022-10-21 RX ORDER — DEXAMETHASONE SODIUM PHOSPHATE 10 MG/ML
10 INJECTION, SOLUTION INTRAMUSCULAR; INTRAVENOUS ONCE
Status: COMPLETED | OUTPATIENT
Start: 2022-10-21 | End: 2022-10-21

## 2022-10-21 RX ORDER — ORPHENADRINE CITRATE 30 MG/ML
60 INJECTION INTRAMUSCULAR; INTRAVENOUS ONCE
Status: COMPLETED | OUTPATIENT
Start: 2022-10-21 | End: 2022-10-21

## 2022-10-21 RX ORDER — KETOROLAC TROMETHAMINE 15 MG/ML
15 INJECTION, SOLUTION INTRAMUSCULAR; INTRAVENOUS ONCE
Status: COMPLETED | OUTPATIENT
Start: 2022-10-21 | End: 2022-10-21

## 2022-10-21 RX ADMIN — KETOROLAC TROMETHAMINE 15 MG: 15 INJECTION, SOLUTION INTRAMUSCULAR; INTRAVENOUS at 16:26

## 2022-10-21 RX ADMIN — ORPHENADRINE CITRATE 60 MG: 30 INJECTION INTRAMUSCULAR; INTRAVENOUS at 17:06

## 2022-10-21 RX ADMIN — DIPHENHYDRAMINE HYDROCHLORIDE 50 MG: 50 INJECTION, SOLUTION INTRAMUSCULAR; INTRAVENOUS at 17:05

## 2022-10-21 RX ADMIN — DEXAMETHASONE SODIUM PHOSPHATE 10 MG: 10 INJECTION, SOLUTION INTRAMUSCULAR; INTRAVENOUS at 17:04

## 2022-10-21 ASSESSMENT — PAIN SCALES - GENERAL
PAINLEVEL_OUTOF10: 8
PAINLEVEL_OUTOF10: 9
PAINLEVEL_OUTOF10: 9

## 2022-10-21 ASSESSMENT — LIFESTYLE VARIABLES
HOW MANY STANDARD DRINKS CONTAINING ALCOHOL DO YOU HAVE ON A TYPICAL DAY: 1 OR 2
HOW OFTEN DO YOU HAVE A DRINK CONTAINING ALCOHOL: 2-4 TIMES A MONTH

## 2022-10-21 ASSESSMENT — PAIN - FUNCTIONAL ASSESSMENT: PAIN_FUNCTIONAL_ASSESSMENT: 0-10

## 2022-10-21 NOTE — TELEPHONE ENCOUNTER
Health Maintenance   Topic Date Due    Varicella vaccine (1 of 2 - 2-dose childhood series) Never done    Hepatitis C screen  Never done    COVID-19 Vaccine (3 - Booster) 07/27/2021    Cervical cancer screen  04/29/2022    Flu vaccine (1) 10/12/2023 (Originally 8/1/2022)    Depression Monitoring  02/17/2023    DTaP/Tdap/Td vaccine (2 - Td or Tdap) 09/10/2029    HIV screen  Completed    Hepatitis A vaccine  Aged Out    Hib vaccine  Aged Out    Meningococcal (ACWY) vaccine  Aged Out    Pneumococcal 0-64 years Vaccine  Aged Out             (applicable per patient's age: Cancer Screenings, Depression Screening, Fall Risk Screening, Immunizations)    Hemoglobin A1C (%)   Date Value   06/03/2021 5.1   12/10/2020 5.1     LDL Cholesterol (mg/dL)   Date Value   11/26/2021 104     AST (U/L)   Date Value   10/21/2022 19     ALT (U/L)   Date Value   10/21/2022 16     BUN (mg/dL)   Date Value   10/21/2022 11      (goal A1C is < 7)   (goal LDL is <100) need 30-50% reduction from baseline     BP Readings from Last 3 Encounters:   10/21/22 (!) 134/102   10/12/22 130/85   08/08/22 128/84    (goal /80)      All Future Testing planned in [facility]:  Lab Frequency Next Occurrence   Basic Metabolic Panel Once 41/04/4096   Carbamazepine Level, Total Once 05/10/2022   Carbamazepine Level, Free Once 05/10/2022   CBC with Auto Differential Once 05/10/2022       Next Visit Date:  Future Appointments   Date Time Provider Thuy Tao   11/14/2022  9:00 AM MORE Stephenson - CNP [facility]            Patient Active Problem List:     Chronic low back pain with sciatica     Thyroid nodule     Stenosis of cervical spine with myelopathy (HCC)     Cervical radiculopathy     Thoracic radiculopathy     Class 1 obesity due to excess calories with serious comorbidity in adult     Cervical disc disease     Bunion of great toe of right foot     S/P cervical disc replacement     Cubital tunnel syndrome, right     Chronic right shoulder pain

## 2022-10-21 NOTE — ED TRIAGE NOTES
Pt c/o midsternal chest pain, L breast pain started last night, numbness in R cheek, pain in RUE- pain present since \" July 2022 surgery\" denies recent or trauma to RUE, pt appears drowsy  intermittently

## 2022-10-21 NOTE — DISCHARGE INSTRUCTIONS
Do not take Norflex with your Flexeril. Norflex is another muscle relaxer, use this instead of your Flexeril. PLEASE RETURN TO THE EMERGENCY DEPARTMENT IMMEDIATELY if your symptoms worsen in anyway or in 8-12 hours if not improved for re-evaluation. You should immediately return to the ER for symptoms such as increasing pain, shortness of breath, fever, a feeling of passing out, light headed, dizziness, abdominal pain, numbness or weakness to the arms or legs, coolness or color change of the arms or legs. Take your medication as indicated and prescribed. If you are given an antibiotic then, make sure you get the prescription filled and take the antibiotics until finished. Please understand that at this time there is no evidence for a more serious underlying process, but that early in the process of an illness or injury, an emergency department workup can be falsely reassuring. You should contact your family doctor within the next 24 hours for a follow up appointment    Bala Moreno!!!    From [facility]) and [address] [place] [address] Emergency Services    On behalf of the Emergency Department staff at [facility]), I would like to thank you for giving us the opportunity to address your health care needs and concerns. We hope that during your visit, our service was delivered in a professional and caring manner. Please keep [facility]) in mind as we walk with you down the path to your own personal wellness. Please expect an automated text message or email from us so we can ask a few questions about your health and progress. Based on your answers, a clinician may call you back to offer help and instructions. Please understand that early in the process of an illness or injury, an emergency department workup can be falsely reassuring. If you notice any worsening, changing or persistent symptoms please call your family doctor or return to the ER immediately.      Tell us how we did during your visit at [email or web address]   and let us know about your experience     ACETAMINOPHEN (Tylenol):  [Pain relief, fever reducer]    Pediatric Dosing-    > 12 YRS OLD  =  Adult dosing    1 YR - 12 YRS OLD:  10-15 mg/kg dose every 4-6 hours as needed     DO NOT EXCEED 5 doses/24 hr    Birth - 1 YR OLD:   10-15 mg/kg dose every 6-8 hours as needed       Adult Dosin-650 mg every 4-6 hr as needed    1000 mg  every 4-6hr as needed; NMT 4 g/daily    Extended Relief: 2 caplets (1300 mg) every 8 hrs as needed    DO NOT EXCEED 4000mg DAILY !!!        IBUPROFEN  (Motrin, Advil):  [Anti-inflammatory, Pain relief and Fever reducer)    Pediatric Dosing:  10 mg/kg dose every 8 hours as needed    Adult Dosin-800mg every 8 hrs as needed    DO NOT EXCEED 2400mg DAILY !!!

## 2022-10-21 NOTE — ED PROVIDER NOTES
[facility]  [facility]  Phone: [place]      Pt Name: Yesika Hemphill  MRN: [id number]  [place] 1985  Date of evaluation: 10/21/2022  Provider: Lidia Bridges PA-C    CHIEF COMPLAINT       Chief Complaint   Patient presents with    Arm Pain     RUE- surgery 7/22    Chest Pain           HISTORY OF PRESENT ILLNESS  (Location/Symptom, Timing/Onset, Context/Setting, Quality, Duration, Modifying Factors, Severity.)   Yesika Hemphill is a 44 y.o. female who presents to the emergency department complaining of chest symptoms. Context/Setting:   Patient here for evaluation of persistence of chest pain that started last night and into today. Patient reports feeling very fatigued as well as persistence of some chronic right arm pain which she has had since having surgery on her right elbow in July of this year. Patient states that the right thumb symptoms have been unchanged. She denies any rash/redness or swelling of the right chest wall/breast area but she states that this right breast is a little bit sore. No fever/chills/nausea/vomiting. She does report some decreased appetite today. There is no associated pleuritic pain or any sort of shortness of breath/dyspnea on exertion or new cough. She does report a little bit of some mild headache and achiness. No significant upper respiratory symptoms. She denies any recent medical procedures in the last 30 days, nor any travel. She has no previous thrombotic or cardiac history. No new swelling or pain of the lower extremities. She denies any new swelling of the upper extremities either.       Timing/Onset:   As above  Quality:    Pressure   Duration:   As above  Modifying Factors:   none  Severity:   Moderate      Heart Score         Score 0 - 3 = 2.5%  MACE over next 6 wks = Discharge home  Score 4 - 6 = 20.3%  MACE over next 6 wks = Obs admit  Score 7 - 10 = 72.7%  MACE over next 6 wks = Early invasive Rx       HEART Risk Score:   History         Highly Suspicious                    2                      Moderately Suspicious            1                      Slight Suspicious                     0  EKG            Significant ST Depression       2                      Nonspecific                              1                      Normal                                     0  Age              > or = 65                                  2                      > 45 to < 65                             1                     < or = 45                                  0  Risk Factors    > or = 3                         2                          1 or 2                            1                          0                                 0  Troponin          > or = 3 times normal limit       2                          > 1 and < 3 times limit             1                          Normal                                     0  Score               0 - 3      Low Risk                           4 - 6      Moderate risk                           7 - 10    High Risk    * Risk Factors include: Diabetes, Tobacco use, Hypertension, Hyperlipidemia, Family History of CAD and Obesity    Risk Stratification for Thoracic Aortic Dissection (TAD)  Sudden Onset, Maximal at Onset, with radiation to back -  No  Family history of TAD - No  Neurologic Deficits with Chest Pain -    No  History of connective tissue disorder (i.e. Marfans Syndrome, Todd-Danlos Syndrome) - No  Grays Syndrome - No  History of hypertension - No  History of aortic valve disease - No  Pregnancy - pending  Elevated D-Dimer with any of the above   pending        PERC Criteria     Age      > or = 50    No  Heart Rate     > or = 100   No  Pulse Oximetry    >  95%    Yes  Previous History of DVT   No  Trauma or Surgery within 4 Weeks-   No  Hemoptysis-    No  Exogenous Estrogen use-  No  Unilateral Leg Swelling-   No         Nursing Notes were reviewed. REVIEW OF SYSTEMS    (2-9 systems for level 4, 10 or more for level 5)     Constitutional: Denies recent fever, chills. Eyes: No visual changes. Neck: No neck pain. Respiratory: per HPI  Cardiac:  per HPI  GI:  Denies abdominal pain/nausea/vomiting/diarrhea. Musculoskeletal: Denies focal weakness. Neurologic: per HPI  Skin:  No rash. Except as noted above the remainder of the review of systems was reviewed and negative. PAST MEDICAL HISTORY   History reviewed. Diagnosis Date    Anxiety     Blood loss     after     Cervical disc disease     Cervical myopathy     Chronic back pain     GERD (gastroesophageal reflux disease)     Hx of migraine headaches     Neuropathy     both legs, feet    Thyroid nodule     Trigeminal neuralgia of right side of face     Under care of team 2022    NEUROLSURGERY - DR. Aki Wright     Under care of team 2022    NEUROLOGY - DR. NOWAK - LAST VISIT 2022    Under care of team 2022    CARDIOLOGY - UPCOMING FIRST VISIT - DOES NOT REMEMBER NAME    Weakness generalized     due to neck issues    Wellness examination 2022    PCP - Cristhian Mcbride CNP - LAST VISIT  - 2022         SURGICAL HISTORY           Procedure Laterality Date    CARPAL TUNNEL RELEASE Right 2022    CUBITAL TUNNEL RELEASE performed by Magen Melissa DO at [facility] N/A 2021    ANTERIOR C5-6 DISC ARTHROPLASTY performed by Magen Melissa DO at [address] Right 2022    CUBITAL TUNNEL RELEASE (Right Arm Upper)    TUBAL LIGATION         CURRENT MEDICATIONS       Discharge Medication List as of 10/21/2022  5:00 PM        CONTINUE these medications which have NOT CHANGED    Details   pregabalin (LYRICA) 150 MG capsule TAKE 1 CAPSULE BY MOUTH THREE TIMES DAILY, Disp-90 capsule, R-0Normal      methylPREDNISolone (MEDROL DOSEPACK) 4 MG tablet use as directed FOLLOW DIRECTIONS ON BACK OF FOIL PACKHistorical Med      clindamycin (CLEOCIN) 300 MG capsule take 1 capsule by mouth three times a day for 10 daysHistorical Med      hydrOXYzine HCl (ATARAX) 25 MG tablet take 1 tablet by mouth every 6 hoursHistorical Med      verapamil (CALAN SR) 120 MG extended release tablet Take 1 tablet by mouth nightly, Disp-30 tablet, R-3Normal      SUMAtriptan (IMITREX) 100 MG tablet Take 1 tablet by mouth once as needed for Migraine (at the onset of a headache can repeat after 1 hour if no improvment. no more than 2 in one day or 4/week), Disp-9 tablet, R-3Normal      !! carBAMazepine (TEGRETOL XR) 200 MG extended release tablet TAKE 1 TABLET BY MOUTH ONCE DAILY AND TAKE 1 TABLET AT BEDTIME *EMERGENCY REFILL*, Disp-60 tablet, R-10Normal      ibuprofen (ADVIL;MOTRIN) 800 MG tablet Take 1 tablet by mouth 2 times daily as needed for Pain, Disp-60 tablet, R-5Normal      DULoxetine (CYMBALTA) 60 MG extended release capsule TAKE ONE (1) CAPSULE BY MOUTH ONCE DAILY, Disp-30 capsule, R-10Normal      !! carBAMazepine (TEGRETOL XR) 100 MG extended release tablet TAKE 1 TABLET DAILY ~108 TAKE 1 TABLET AT BEDTIME, Disp-60 tablet, R-5Normal      EPINEPHrine (EPIPEN 2-ELOY) 0.3 MG/0.3ML SOAJ injection Inject 0.3 mLs into the muscle once for 1 dose Use as directed for allergic reaction, Disp-0.3 mL, R-2Normal      Handicap [facility] Starting Fri 2/25/2022, Disp-1 each, R-0, Print      Blood Pressure KIT Disp-1 kit, R-0, NormalBlood pressure management       !! - Potential duplicate medications found. Please discuss with provider.           ALLERGIES     Bee venom and Doxycycline    FAMILY HISTORY           Problem Relation Age of Onset    Diabetes Mother     High Blood Pressure Mother     Other Mother         Tremor    Seizures Mother     Heart Disease Father     Hypertension Father     Cancer Sister         Lymphoblastic lymphoma    Diabetes Maternal Uncle     Heart Attack Maternal Grandmother Diabetes Maternal Grandmother     Kidney Disease Maternal Grandmother     Breast Cancer Paternal Aunt      Family Status   Relation Name Status    Mother  Alive    Father  Alive    Sister  (Not Specified)    MUnc  (Not Specified)    MGM      PAunt  (Not Specified)        SOCIAL HISTORY      reports that she quit smoking about 17 years ago. Her smoking use included cigarettes. She has never used smokeless tobacco. She reports current alcohol use. She reports that she does not use drugs. Lives with others. PHYSICAL EXAM    (up to 7 for level 4, 8 or more for level 5)     ED Triage Vitals [10/21/22 1348]   BP Temp Temp src Heart Rate Resp SpO2 Height Weight   (!) 134/102 97.9 °F (36.6 °C) -- 98 16 100 % 5' 7\" (1.702 m) 216 lb (98 kg)       Constitutional:  Well developed, nontoxic, + malaise. Eyes:  Pupils equal/round  HENT:  Atraumatic, external ears normal, nose normal, oropharynx moist. Neck- supple   Respiratory:  Clear to auscultation bilaterally with good air exchange, no W/R/R. Right mid/upper chestwall tenderness w/o erythema/edema/rash. Cardiovascular:  RRR with normal S1 and S2. Gastrointestinal/Abdomen:  Soft, NT. BS wnl. Musculoskeletal:  Right upper trazezius musculature pain and very mild TTP. No edema/erythema. RUE w/o acute edema/erythema/rash or muscular atrophy. All RUE muscle compartments soft. Active ROM of RUE, no deficits. 2+ DR on right. LUE/BLE wnl. No edema, no tenderness, no deformities. No calf TTP or pitting edema. No midline cervical TTP. Active ROM, no pain . No  meningismus. Back:  No CVA tenderness. Normal to inspection. Integument:  No rash.   Neurologic:  Alert, appropriate mentation/interaction, no focal deficits noted     DIAGNOSTIC RESULTS     EKG: All EKG's are interpreted by the Emergency Department Physician who either signs or Co-signs this chart in the absence of a cardiologist.    EKG Interpretation    Interpreted by attending    Rhythm: normal sinus   Rate: normal  Axis: normal  Ectopy: none  Conduction: normal  ST Segments: no acute change  T Waves: no acute change  Q Waves: none    Clinical Impression: no acute changes,   Nonspecific  EKG       RADIOLOGY:   Non-plain film images such as CT, Ultrasound and MRI are read by the radiologist. Plain radiographic images are visualized and preliminarily interpreted by the emergency physician with the below findings:      Interpretation per the Radiologist below, if available at the time of this note:    XR CHEST PORTABLE   Final Result   No acute focal airspace consolidation. LABS:  Labs Reviewed   COMPREHENSIVE METABOLIC PANEL - Abnormal; Notable for the following components:       Result Value    Anion Gap 8 (*)     Total Bilirubin 0.2 (*)     All other components within normal limits   URINALYSIS WITH REFLEX TO CULTURE - Abnormal; Notable for the following components:    Leukocyte Esterase, Urine TRACE (*)     All other components within normal limits   MICROSCOPIC URINALYSIS - Abnormal; Notable for the following components:    Other Observations UA   (*)     Value: Utilizing a urinalysis as the only screening method to exclude a potential uropathogen can be unreliable in many patient populations. Rapid screening tests are less sensitive than culture and if UTI is a clinical possibility, culture should be considered despite a negative urinalysis. All other components within normal limits   COVID-19, RAPID   CBC WITH AUTO DIFFERENTIAL   LIPASE   HCG, SERUM, QUALITATIVE   TROPONIN       All other labs were within normal range or not returned as of this dictation.     EMERGENCY DEPARTMENT COURSE and DIFFERENTIAL DIAGNOSIS/MDM:   Vitals:    Vitals:    10/21/22 1500 10/21/22 1627 10/21/22 1657 10/21/22 1732   BP: 125/86 (!) 126/96 (!) 128/90 (!) 149/73   Pulse: 96 85 73 100   Resp: 22 16 16 21   Temp:       SpO2: 99% 97% 98% 99%   Weight:       Height:         Orders Placed This Encounter   Medications    ketorolac (TORADOL) injection 15 mg    dexamethasone (PF) (DECADRON) injection 10 mg    orphenadrine (NORFLEX) injection 60 mg    diphenhydrAMINE (BENADRYL) injection 50 mg    orphenadrine (NORFLEX) 100 MG extended release tablet     Sig: Take 1 tablet by mouth 2 times daily     Dispense:  14 tablet     Refill:  0       1414  Fatigue/chestwall pain that started this morning. LCTA, no pleuritic pain. Right chestwall TTP. Almost seems like a viral/COVID presentation. Cardiac workup with COVID ordered. Pt is PERC (-).        1702  Treating pt for her episodic HA as well as chronic muscular pain s/p RUE surgery in 7/2022. Giving Norflex in lieu of Flexeril as this isn't working for her she reports. No acute process on our workup. Chest symptoms likely MS in nature as no other acute findings. This patient was seen by the attending physician and they agreed with the assessment and plan. I have reviewed the disposition diagnosis with the patient and or their family/guardian. I have answered their questions and given discharge instructions. They voiced understanding of these instructions and did not have any further questions or complaints. The patient presents with chest pain that is not suggestive of in nature of pulmonary embolus, pneumothorax, aortic dissection, cardiac ischemia, aortic dissection, or other serious etiology. Given the extremely low risk of these diagnoses further testing and evaluation are not indicated at this time. The patient has been instructed to follow up with their primary care physician and to return immediately to an ED if the symptoms change or worsen in any way. Patient verbalized understanding. CONSULTS:  None    PROCEDURES:  None    FINAL IMPRESSION      1. Strain of right trapezius muscle, initial encounter    2. Chest pain, unspecified type    3.  Nonintractable episodic headache, unspecified headache type          DISPOSITION/PLAN DISPOSITION Decision To Discharge 10/21/2022 04:56:36 PM      PATIENT REFERRED TO:  Eva Bhatia, MORE - [facility]  [address]  [phone]    Schedule an appointment as soon as possible for a visit in 3 days  for re-evaluation of your symptoms    [facility] ED  [address]   [phone]  Go to   for worsening of symptoms    DISCHARGE MEDICATIONS:  Discharge Medication List as of 10/21/2022  5:00 PM        START taking these medications    Details   orphenadrine (NORFLEX) 100 MG extended release tablet Take 1 tablet by mouth 2 times daily, Disp-14 tablet, R-0Normal             (Please note that portions of this note were completed with a voice recognition program.  Efforts were made to edit the dictations but occasionally words are mis-transcribed.)    KHLOE Silvestre PA-C  10/21/22 Lino [id number]  10/21/22 0371

## 2022-10-21 NOTE — ED PROVIDER NOTES
[facility]  [facility]  Phone: [phone]      Attending Physician [address]       Chief Complaint   Patient presents with    Arm Pain     RUE- surgery 7/22    Chest Pain       DIAGNOSTIC RESULTS     LABS:  Labs Reviewed   COMPREHENSIVE METABOLIC PANEL - Abnormal; Notable for the following components:       Result Value    Anion Gap 8 (*)     Total Bilirubin 0.2 (*)     All other components within normal limits   URINALYSIS WITH REFLEX TO CULTURE - Abnormal; Notable for the following components:    Leukocyte Esterase, Urine TRACE (*)     All other components within normal limits   MICROSCOPIC URINALYSIS - Abnormal; Notable for the following components:    Other Observations UA   (*)     Value: Utilizing a urinalysis as the only screening method to exclude a potential uropathogen can be unreliable in many patient populations. Rapid screening tests are less sensitive than culture and if UTI is a clinical possibility, culture should be considered despite a negative urinalysis. All other components within normal limits   COVID-19, RAPID   CBC WITH AUTO DIFFERENTIAL   LIPASE   HCG, SERUM, QUALITATIVE   TROPONIN       All other labs were within normal range or not returned as of this dictation. RADIOLOGY:  XR CHEST PORTABLE   Final Result   No acute focal airspace consolidation. EMERGENCY DEPARTMENT COURSE:   Vitals:    Vitals:    10/21/22 1348 10/21/22 1400 10/21/22 1430 10/21/22 1500   BP: (!) 134/102  (!) 134/94 125/86   Pulse: 98 98 92 96   Resp: 16 19 22   Temp: 97.9 °F (36.6 °C)      SpO2: 100%  99% 99%   Weight: 98 kg (216 lb)      Height: 5' 7\" (1.702 m)        -------------------------  BP: 125/86, Temp: 97.9 °F (36.6 °C), Heart Rate: 96, Resp: 22             PERTINENT ATTENDING PHYSICIAN COMMENTS:    I performed a history and physical examination of the patient and discussed management with the mid level provider.  I reviewed the mid level provider's note and agree with the documented findings and plan of care. Any areas of disagreement are noted on the chart. I was personally present for the key portions of any procedures. I have documented in the chart those procedures where I was not present during the key portions. I have reviewed the emergency nurses triage note. I agree with the chief complaint, past medical history, past surgical history, allergies, medications, social and family history as documented unless otherwise noted below. Documentation of the HPI, Physical Exam and Medical Decision Making performed by mid level providers is based on my personal performance of the HPI, PE and MDM. For Physician Assistant/ Nurse Practitioner cases/documentation I have personally evaluated this patient and have completed at least one if not all key elements of the E/M (history, physical exam, and MDM). Additional findings are as noted.         (Please note that portions of this note were completed with a voice recognition program.  Efforts were made to edit the dictations but occasionally words are mis-transcribed.)    Mack Dumont,   Attending Emergency Medicine Physician       Mack Dumont DO  10/21/22 9009

## 2022-10-23 LAB
EKG ATRIAL RATE: 100 BPM
EKG P AXIS: 39 DEGREES
EKG P-R INTERVAL: 194 MS
EKG Q-T INTERVAL: 328 MS
EKG QRS DURATION: 76 MS
EKG QTC CALCULATION (BAZETT): 423 MS
EKG R AXIS: 17 DEGREES
EKG T AXIS: 53 DEGREES
EKG VENTRICULAR RATE: 100 BPM

## 2022-10-25 NOTE — TELEPHONE ENCOUNTER
Need this dose clarified.    Reported she is taking 300mg, sig is 2 times daily, current sig says:  TAKE 1 TABLET DAILY ~108 TAKE 1 TABLET AT BEDTIME

## 2022-10-26 RX ORDER — CARBAMAZEPINE 300 MG/1
300 CAPSULE, EXTENDED RELEASE ORAL 2 TIMES DAILY
Qty: 60 CAPSULE | Refills: 3 | OUTPATIENT
Start: 2022-10-26

## 2022-10-26 NOTE — TELEPHONE ENCOUNTER
Thank you for clarifying. Looks like neuro is actually prescribing this, Rx was sent in 7/7/22. Needs to be sent to them. Or she needs follow up with them if it is getting refused.

## 2022-10-31 DIAGNOSIS — G50.0 TRIGEMINAL NEURALGIA: ICD-10-CM

## 2022-10-31 DIAGNOSIS — M54.12 CERVICAL RADICULOPATHY: ICD-10-CM

## 2022-10-31 RX ORDER — PREGABALIN 150 MG/1
CAPSULE ORAL
Qty: 90 CAPSULE | Refills: 2 | Status: SHIPPED | OUTPATIENT
Start: 2022-11-11 | End: 2022-11-12

## 2022-10-31 RX ORDER — CARBAMAZEPINE 100 MG/1
TABLET, EXTENDED RELEASE ORAL
Qty: 60 TABLET | Refills: 10 | OUTPATIENT
Start: 2022-10-31

## 2022-10-31 NOTE — DISCHARGE SUMMARY
[] [place]       Occupational Therapy             1st floor       [address] [facility]         Phone: [phone]       Fax: [phone] [x] [facility] Occupational Therapy  [address] [facility]  Phone: [phone]  Fax: [phone]         Occupational Therapy Discharge Note    Date: 10/31/2022      Patient: Amparo Mack  : 1985  MRN: [id number]    Referring Provider:  Yeny [address]: [place] [employer]  Medical Diagnosis: Cubital tunnel syndrome, Right (G56.21)          Rehab Codes: pain in elbow M25.52,, fine motor skills loss R29.818,, pain in limb right arm M79.601, , pain in right forearm M79.631,, pain in right hand M79.641,, or pain in right finger(s) M79.644,  Onset Date: 22                 Next  [address]:  surgery, 10/18 for neurololgy   Total visits attended: 2  Cancels/No shows: 2/3  Date of initial visit: 22                Date of final visit: 22      Subjective: See IE dated 22 for details   Pain:  [] Yes  [] No  Location:  N/A Pain Rating: (0-10 scale) /10  Pain altered Tx:  [] No  [] Yes  Action:  Comments:    Objective:See IE dated 22 for details  Test Measurements:  Function:    Assessment: See IE dated 22 for details  Short Term Goals: (  8    Treatments)  Decrease Pain: by 3 point on numeric pain scale   Increase AROM by 5 degrees in R elbow extension/flexion  Increase strength to 4/5 in R elbow flex/ext/pronation/supination   Increase function:UE Functional Index Score 10 or more points to promote increased functional abilities  Scar will be soft and pliable with minimal tethering. Pt will report decrease in hypersensitivity in R elbow to bed sheets  Pt will improve FMC as tested by 9-hole peg test by 5 seconds  Patient to be independent with home exercise program as demonstrated by performance with correct form without cues.      Long Term Goals: (  16 Treatments)  Decrease Pain: by 5 point on numeric pain scale   Increase AROM by 10 degrees in R elbow extension/flexion  Increase strength to 5/5 in R elbow flex/ext/pronation/supination   Increase function:UE Functional Index Score 15 or more points to promote increased functional abilities  Scar will be soft and pliable with minimal tethering. Pt will report decrease in hypersensitivity in R elbow to all textures   Pt will improve FMC as tested by 9-hole peg test by 10 seconds    Treatment to Date:     [x]  Therapeutic Exercise   15718              []  Iontophoresis: 4 mg/mL Dexamethasone Sodium Phosphate  mAmin  07911    []  Therapeutic Activity  03020  []  Vasopneumatic cold with compression  06097                []  ADL training  87779  []  Ultrasound        58385    []  Neuromuscular Re-education  21921  []  Electrical Stimulation Attended  44920    [x]  Manual Therapy  77048  Orthotic    []  Fit  64111     []  Train 55696    [x]  Instruction in HEP        Prosthetic    []  Fit 90685      []  Train [id number]    []  Cognitive Interventions, (first 15 min 11235, subsequent 15 min 28011)  []  Cold/hotpack      [x]  Massage   97645             Discharge Status:     [] Pt recovered from conditions. Treatment goals were met. [] Pt received maximum benefit. No further therapy indicated at this time. [] Pt to continue exercise/home instructions independently. [] Therapy interrupted due to:    [x] Pt has 2 or more no shows/cancels, is discontinued per our policy. [] Pt has completed prescribed number of treatment sessions. [x] Other: Pt attended 1 visit following IE on 8/24/22. Pt canceled or no showed to all other scheduled visits. Pt has not returned  to schedule more appointments. Pt to be DC at this time d/t cancellation/no show policy. Electronically signed by: Enedina Pace OTR/L    If you have any questions or concerns, please don't hesitate to call.   Thank you for your referral.

## 2022-11-03 DIAGNOSIS — M54.14 THORACIC RADICULOPATHY: ICD-10-CM

## 2022-11-03 DIAGNOSIS — M54.12 CERVICAL RADICULOPATHY: ICD-10-CM

## 2022-11-03 DIAGNOSIS — Z76.0 MEDICATION REFILL: ICD-10-CM

## 2022-11-03 RX ORDER — PREGABALIN 150 MG/1
CAPSULE ORAL
Qty: 90 CAPSULE | Refills: 0 | OUTPATIENT
Start: 2022-11-03

## 2022-11-04 ENCOUNTER — INITIAL CONSULT (OUTPATIENT)
Dept: PAIN MANAGEMENT | Age: 37
End: 2022-11-04
Payer: COMMERCIAL

## 2022-11-04 VITALS — HEIGHT: 67 IN | RESPIRATION RATE: 18 BRPM | BODY MASS INDEX: 33.9 KG/M2 | WEIGHT: 216 LBS

## 2022-11-04 DIAGNOSIS — M47.812 CERVICAL SPONDYLOSIS: ICD-10-CM

## 2022-11-04 DIAGNOSIS — M79.18 MYOFASCIAL PAIN SYNDROME: ICD-10-CM

## 2022-11-04 DIAGNOSIS — M96.1 CERVICAL POST-LAMINECTOMY SYNDROME: ICD-10-CM

## 2022-11-04 DIAGNOSIS — M54.12 CERVICAL RADICULOPATHY: Primary | ICD-10-CM

## 2022-11-04 PROCEDURE — G8417 CALC BMI ABV UP PARAM F/U: HCPCS | Performed by: STUDENT IN AN ORGANIZED HEALTH CARE EDUCATION/TRAINING PROGRAM

## 2022-11-04 PROCEDURE — 1036F TOBACCO NON-USER: CPT | Performed by: STUDENT IN AN ORGANIZED HEALTH CARE EDUCATION/TRAINING PROGRAM

## 2022-11-04 PROCEDURE — 99214 OFFICE O/P EST MOD 30 MIN: CPT | Performed by: STUDENT IN AN ORGANIZED HEALTH CARE EDUCATION/TRAINING PROGRAM

## 2022-11-04 PROCEDURE — G8484 FLU IMMUNIZE NO ADMIN: HCPCS | Performed by: STUDENT IN AN ORGANIZED HEALTH CARE EDUCATION/TRAINING PROGRAM

## 2022-11-04 PROCEDURE — G8427 DOCREV CUR MEDS BY ELIG CLIN: HCPCS | Performed by: STUDENT IN AN ORGANIZED HEALTH CARE EDUCATION/TRAINING PROGRAM
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
## 2022-11-07 ENCOUNTER — TELEPHONE (OUTPATIENT)
Dept: PRIMARY CARE CLINIC | Age: 37
End: 2022-11-07

## 2022-11-07 NOTE — TELEPHONE ENCOUNTER
----- Message from Juana Xiongsaniya sent at 11/7/2022  9:00 AM EST -----  Subject: Message to Provider    QUESTIONS  Information for Provider? pt called in and stated that she seen her pain   management doctor and she is wanting to know what her next step is please   follow up   ---------------------------------------------------------------------------  --------------  [address]  [phone]; OK to leave message on voicemail  ---------------------------------------------------------------------------  --------------  SCRIPT ANSWERS  Relationship to Patient?  Self

## 2022-11-11 ENCOUNTER — HOSPITAL ENCOUNTER (OUTPATIENT)
Dept: MRI IMAGING | Age: 37
Discharge: HOME | End: 2022-11-13
Payer: COMMERCIAL

## 2022-11-11 DIAGNOSIS — M54.12 CERVICAL RADICULOPATHY: ICD-10-CM

## 2022-11-11 PROCEDURE — 72141 MRI NECK SPINE W/O DYE: CPT

## 2022-11-14 ENCOUNTER — OFFICE VISIT (OUTPATIENT)
Dept: NEUROSURGERY | Age: 37
End: 2022-11-14
Payer: COMMERCIAL

## 2022-11-14 ENCOUNTER — TELEPHONE (OUTPATIENT)
Dept: NEUROLOGY | Age: 37
End: 2022-11-14

## 2022-11-14 VITALS
OXYGEN SATURATION: 98 % | TEMPERATURE: 97 F | BODY MASS INDEX: 33.74 KG/M2 | HEIGHT: 67 IN | WEIGHT: 215 LBS | HEART RATE: 82 BPM | DIASTOLIC BLOOD PRESSURE: 80 MMHG | SYSTOLIC BLOOD PRESSURE: 126 MMHG

## 2022-11-14 DIAGNOSIS — G56.01 RIGHT CARPAL TUNNEL SYNDROME: ICD-10-CM

## 2022-11-14 DIAGNOSIS — G56.21 CUBITAL TUNNEL SYNDROME, RIGHT: Primary | ICD-10-CM

## 2022-11-14 DIAGNOSIS — Z98.890 S/P CERVICAL DISCECTOMY: ICD-10-CM

## 2022-11-14 DIAGNOSIS — R20.2 PARESTHESIA OF UPPER EXTREMITY: ICD-10-CM

## 2022-11-14 PROCEDURE — 1036F TOBACCO NON-USER: CPT | Performed by: NURSE PRACTITIONER

## 2022-11-14 PROCEDURE — G8417 CALC BMI ABV UP PARAM F/U: HCPCS | Performed by: NURSE PRACTITIONER

## 2022-11-14 PROCEDURE — G8484 FLU IMMUNIZE NO ADMIN: HCPCS | Performed by: NURSE PRACTITIONER

## 2022-11-14 PROCEDURE — G8427 DOCREV CUR MEDS BY ELIG CLIN: HCPCS | Performed by: NURSE PRACTITIONER

## 2022-11-14 PROCEDURE — 99214 OFFICE O/P EST MOD 30 MIN: CPT | Performed by: NURSE PRACTITIONER

## 2022-11-14 NOTE — PROGRESS NOTES
[address]  [facility] [address], [address]  Dept: [phone]    Patient:  Nancy Apodaca  YOB: 1985  Date: 22    The patient is a 44 y.o. female who presents today for consult of the following problems:     Chief Complaint   Patient presents with    3 Month Follow-Up         HPI:     Nancy Apodaca is a 44 y.o. female who presents for follow-up visit of persistent right upper extremity dysesthetic pain. Patient has history significant for cervical arthroplasty, as well as right cubital tunnel release. Did initially have improvement to hand strength, dexterity. Feels that she has had some regression to this. Does continue to struggle with significant pain, spasms to right trapezius, radiates down right upper extremity in approximate C 7/8 distribution/ulnar distribution. Significant tenderness to right elbow. The been experiencing some vibratory sensations to her right palm, with extension into right ring and pinky finger, but also having some tingling to distal fingertips on right hand diffusely. Was referred for occupational therapy, did attend 2 sessions, reports significant worsening of pain. Was referred for pain management evaluation, did undergo repeat cervical MRI. Has not undergone any interventions, was referred for outside neurology evaluation. Has also not had follow-up visit with neurologist, missed last visit secondary to pain, has not yet rescheduled.     History:     Past Medical History:   Diagnosis Date    Anxiety     Blood loss     after     Cervical disc disease     Cervical myopathy     Chronic back pain     GERD (gastroesophageal reflux disease)     Hx of migraine headaches     Neuropathy     both legs, feet    Thyroid nodule     Trigeminal neuralgia of right side of face     Under care of team 2022 NEUROLSURGERY - DR. Shila Fragoso     Under care of team 06/14/2022    NEUROLOGY - DR. NOWAK - LAST VISIT 5/2022    Under care of team 06/14/2022    CARDIOLOGY - UPCOMING FIRST VISIT - DOES NOT REMEMBER NAME    Weakness generalized     due to neck issues    Wellness examination 06/14/2022    PCP - Roberto Mo CNP - LAST VISIT  - 6/2022     Past Surgical History:   Procedure Laterality Date    CARPAL TUNNEL RELEASE Right 6/29/2022    CUBITAL TUNNEL RELEASE performed by Link Norton DO at [address] N/A 04/30/2021    ANTERIOR C5-6 DISC ARTHROPLASTY performed by Link Norton DO at [address]      x3    [place] Right 06/29/2022    CUBITAL TUNNEL RELEASE (Right Arm Upper)    TUBAL LIGATION       Family History   Problem Relation Age of Onset    Diabetes Mother     High Blood Pressure Mother     Other Mother         Tremor    Seizures Mother     Heart Disease Father     Hypertension Father     Cancer Sister         Lymphoblastic lymphoma    Diabetes Maternal Uncle     Heart Attack Maternal Grandmother     Diabetes Maternal Grandmother     Kidney Disease Maternal Grandmother     Breast Cancer Paternal Aunt      Current Outpatient Medications on File Prior to Visit   Medication Sig Dispense Refill    orphenadrine (NORFLEX) 100 MG extended release tablet Take 1 tablet by mouth 2 times daily 14 tablet 0    methylPREDNISolone (MEDROL DOSEPACK) 4 MG tablet       clindamycin (CLEOCIN) 300 MG capsule       hydrOXYzine HCl (ATARAX) 25 MG tablet       verapamil (CALAN SR) 120 MG extended release tablet Take 1 tablet by mouth nightly 30 tablet 3    carBAMazepine (TEGRETOL XR) 200 MG extended release tablet TAKE 1 TABLET BY MOUTH ONCE DAILY AND TAKE 1 TABLET AT BEDTIME *EMERGENCY REFILL* (Patient taking differently: Take 300 mg by mouth 2 times daily) 60 tablet 10    ibuprofen (ADVIL;MOTRIN) 800 MG tablet Take 1 tablet by mouth 2 times daily as needed for Pain 60 tablet 5    DULoxetine (CYMBALTA) 60 MG extended release capsule TAKE ONE (1) CAPSULE BY MOUTH ONCE DAILY 30 capsule 10    carBAMazepine (TEGRETOL XR) 100 MG extended release tablet TAKE 1 TABLET DAILY ~108 TAKE 1 TABLET AT BEDTIME (Patient taking differently: Take 300 mg by mouth 2 times daily) 60 tablet 5    Handicap Placard MISC by Does not apply route 1 each 0    Blood Pressure KIT Blood pressure management 1 kit 0    pregabalin (LYRICA) 150 MG capsule Take one capsule by mouth three times a day 90 capsule 2    SUMAtriptan (IMITREX) 100 MG tablet Take 1 tablet by mouth once as needed for Migraine (at the onset of a headache can repeat after 1 hour if no improvment. no more than 2 in one day or 4/week) 9 tablet 3    [DISCONTINUED] topiramate (TOPAMAX) 50 MG tablet Take 1 tablet by mouth 2 times daily 60 tablet 3     No current facility-administered medications on file prior to visit. Social History     Tobacco Use    Smoking status: Former     Years: 2.00     Types: Cigarettes     Quit date:      Years since quittin.8    Smokeless tobacco: Never    Tobacco comments:     [id number], smoker then   Vaping Use    Vaping Use: Never used   Substance Use Topics    Alcohol use: Yes     Comment: 2 glasses of wine a month    Drug use: Never       Allergies   Allergen Reactions    Bee Venom Anaphylaxis    Doxycycline Nausea Only       Review of Systems  Constitutional: Negative for activity change and appetite change. HENT: Negative for ear pain and facial swelling. Eyes: Negative for discharge and itching. Respiratory: Negative for choking and chest tightness. Cardiovascular: Negative for chest pain and leg swelling. Gastrointestinal: Negative for nausea and abdominal pain. Endocrine: Negative for cold intolerance and heat intolerance. Genitourinary: Negative for frequency and flank pain. Musculoskeletal: Negative for myalgias and joint swelling. Skin: Negative for rash and wound. Allergic/Immunologic: Negative for environmental allergies and food allergies. Hematological: Negative for adenopathy. Does not bruise/bleed easily. Psychiatric/Behavioral: Negative for self-injury. The patient is not nervous/anxious. Physical Exam:      /80   Pulse 82   Temp 97 °F (36.1 °C) (Temporal)   Ht 5' 7\" (1.702 m)   Wt 215 lb (97.5 kg)   SpO2 98%   BMI 33.67 kg/m²   Estimated body mass index is 33.67 kg/m² as calculated from the following:    Height as of this encounter: 5' 7\" (1.702 m). Weight as of this encounter: 215 lb (97.5 kg). General:  Edgard Berrios is a 44y.o. year old female who appears her stated age. HEENT: Normocephalic atraumatic. Neck supple. Chest: regular rate; pulses equal  Abdomen: Soft nontender nondistended.   Ext: DP and PT pulses 2+, good cap refill  Neuro    Mentation  Appropriate affect  Registration intact  Orientation intact  Judgement intact to situation    Cranial Nerves:   Pupils equal and reactive to light  Extraocular motion intact  Face and shrug symmetric  Tongue midline  No dysarthria  v1-3 sensation symmetric, masseter tone symmetric  Hearing symmetric    Sensation: Decreased lateral aspect right hand    Motor  L deltoid 5/5; R deltoid 4+/5  L biceps 5/5; R biceps 4+/5  L triceps 5/5; R triceps 4+/5  L wrist extension 5/5; R wrist extension 5/5  L intrinsics 5/5; R intrinsics 4+/5     L iliopsoas 5/5 , R iliopsoas 5/5  L quadriceps 5/5; R quadriceps 5/5  L Dorsiflexion 5/5; R dorsiflexion 5/5  L Plantarflexion 5/5; R plantarflexion 5/5  L EHL 5/5; R EHL 5/5    Reflexes  L Brachioradialis 2+/4; R brachioradialis 2+/4  L Biceps 2+/4; R Biceps 2+/4  L Triceps 2+/4; R Triceps 2+/4  L Patellar 2+/4: R Patellar 2+/4  L Achilles 2+/4; R Achilles 2+/4    hoffmans L: neg  hoffmans R: neg  Clonus L: neg  Clonus R: neg  Babinski L: neg  Babinski R: neg    + Tinel's right wrist and elbow  Positive Phalen's right side-tingling to distal fingertips diffusely    Studies Review:     MRI cervical 11/11/2022:    FINDINGS:   Evaluation is somewhat limited due to patient motion artifact. BONES/ALIGNMENT: There is normal alignment of the spine. The vertebral body   heights are maintained. The bone marrow signal appears unremarkable. SPINAL CORD: No abnormal cord signal is seen. SOFT TISSUES: No paraspinal mass identified. C2-C3: There is no significant disc protrusion, spinal canal stenosis or   neural foraminal narrowing. C3-C4: There is no significant disc protrusion, spinal canal stenosis or   neural foraminal narrowing. C4-C5: There is no significant disc protrusion, spinal canal stenosis or   neural foraminal narrowing. C5-C6: The prosthetic disc at C5-6 results in prominent susceptibility   artifact, which obscures regional anatomy. Within this limitation, no   significant central canal or neural foraminal stenosis is seen. C6-C7: Questionable small disc protrusion versus artifact. No gross central   canal stenosis or neural foraminal stenoses. C7-T1: There is no significant disc protrusion, spinal canal stenosis or   neural foraminal narrowing. Pain management notes reviewed  Occupational Therapy notes reviewed    Assessment and Plan:      1. Cubital tunnel syndrome, right    2. Paresthesia of upper extremity    3. Right carpal tunnel syndrome    4. S/P cervical discectomy          Plan: MRI reviewed, no clear foraminal narrowing/stenosis to account for persistent upper extremity symptoms. Recommend undergoing repeat EMG for further evaluation. Right wrist brace provided to start wearing nocturnally as well given positive Tinel's and Phalen's. Recommend resuming occupational therapy, as well as rescheduling neurology follow-up.   Patient to return for follow-up visit with Dr. Abhishek Wheeler after EMG and bracing trial.    Followup: Return in about 3 months (around 2/14/2023), or if symptoms worsen or fail to improve. Prescriptions Ordered:  No orders of the defined types were placed in this encounter. Orders Placed:  Orders Placed This Encounter   Procedures    EMG     Standing Status:   Future     Standing Expiration Date:   11/14/2023     Order Specific Question:   Which body part? Answer:   bilateral upper extremities        Electronically signed by MORE Taylor CNP on 11/14/2022 at 11:51 AM    Please note that this chart was generated using voice recognition Dragon dictation software. Although every effort was made to ensure the accuracy of this automated transcription, some errors in transcription may have occurred.

## 2022-11-14 NOTE — LETTER
[address] [facility], [address] [place] [address], [address] [place] [address]  Phone: [phone]  Fax: MORE Edwards CNP        November 14, 2022     Patient: Riki Garcia   YOB: 1985   Date of Visit: 11/14/2022       To Whom it May Concern:    Danelle Koyanagi was seen in my clinic on 11/14/2022. Please allow her the comfort of her emotional support animal, as it provides a great deal of help. If you have any questions or concerns, please don't hesitate to call.     Sincerely,         MORE Aiken CNP

## 2022-11-14 NOTE — TELEPHONE ENCOUNTER
I wrote the letter while she was here in the office. I just tried to make it visible through [facility] verify that it is, so that she can print it herself at home. Thanks.

## 2022-11-16 DIAGNOSIS — G50.0 TRIGEMINAL NEURALGIA: ICD-10-CM

## 2022-11-16 DIAGNOSIS — M54.12 CERVICAL RADICULOPATHY: ICD-10-CM

## 2022-11-16 RX ORDER — CARBAMAZEPINE 100 MG/1
TABLET, EXTENDED RELEASE ORAL
Qty: 60 TABLET | Refills: 5 | OUTPATIENT
Start: 2022-11-16

## 2022-11-16 NOTE — TELEPHONE ENCOUNTER
Pharmacy is requesting a med refill on pended medication, patient has upcoming appt with provider on 11/30/22    Health Maintenance   Topic Date Due    Varicella vaccine (1 of 2 - 2-dose childhood series) Never done    Hepatitis C screen  Never done    COVID-19 Vaccine (3 - Booster) 07/27/2021    Cervical cancer screen  04/29/2022    Flu vaccine (1) 10/12/2023 (Originally 8/1/2022)    Depression Monitoring  02/17/2023    DTaP/Tdap/Td vaccine (2 - Td or Tdap) 09/10/2029    HIV screen  Completed    Hepatitis A vaccine  Aged Out    Hib vaccine  Aged Out    Meningococcal (ACWY) vaccine  Aged Out    Pneumococcal 0-64 years Vaccine  Aged Out             (applicable per patient's age: Cancer Screenings, Depression Screening, Fall Risk Screening, Immunizations)    Hemoglobin A1C (%)   Date Value   06/03/2021 5.1   12/10/2020 5.1     LDL Cholesterol (mg/dL)   Date Value   11/26/2021 104     AST (U/L)   Date Value   10/21/2022 19     ALT (U/L)   Date Value   10/21/2022 16     BUN (mg/dL)   Date Value   10/21/2022 11      (goal A1C is < 7)   (goal LDL is <100) need 30-50% reduction from baseline     BP Readings from Last 3 Encounters:   11/14/22 126/80   10/21/22 (!) 149/73   10/12/22 130/85    (goal /80)      All Future Testing planned in [facility]:  Lab Frequency Next Occurrence   Basic Metabolic Panel Once 36/61/0488   Carbamazepine Level, Total Once 05/10/2022   Carbamazepine Level, Free Once 05/10/2022   CBC with Auto Differential Once 05/10/2022   EMG Once 11/28/2022       Next Visit Date:  Future Appointments   Date Time Provider Thuy Tao   11/30/2022  9:00 AM MORE Kelsey - CNP [facility]   12/22/2022  9:00 AM Young Henry DO [facility]   1/17/2023  3:30 PM Aiden Block MD Neuro [facility] Neurology -            Patient Active Problem List:     Chronic low back pain with sciatica     Thyroid nodule     Stenosis of cervical spine with myelopathy (HCC)     Cervical radiculopathy     Thoracic radiculopathy     Class 1 obesity due to excess calories with serious comorbidity in adult     Cervical disc disease     Bunion of great toe of right foot     S/P cervical disc replacement     Cubital tunnel syndrome, right     Chronic right shoulder pain

## 2022-11-21 DIAGNOSIS — G50.0 TRIGEMINAL NEURALGIA: ICD-10-CM

## 2022-11-21 DIAGNOSIS — M54.12 CERVICAL RADICULOPATHY: ICD-10-CM

## 2022-11-22 RX ORDER — CARBAMAZEPINE 100 MG/1
TABLET, EXTENDED RELEASE ORAL
Qty: 60 TABLET | Refills: 10 | OUTPATIENT
Start: 2022-11-22

## 2022-11-22 NOTE — TELEPHONE ENCOUNTER
Needs refused, neurology is the office prescribing this medication. I am unable to refuse this medication refill for some reason.

## 2022-12-01 DIAGNOSIS — M54.14 THORACIC RADICULOPATHY: ICD-10-CM

## 2022-12-01 DIAGNOSIS — G50.0 TRIGEMINAL NEURALGIA: ICD-10-CM

## 2022-12-01 DIAGNOSIS — Z76.0 MEDICATION REFILL: ICD-10-CM

## 2022-12-01 DIAGNOSIS — M54.12 CERVICAL RADICULOPATHY: ICD-10-CM

## 2022-12-01 RX ORDER — CARBAMAZEPINE 100 MG/1
TABLET, EXTENDED RELEASE ORAL
Qty: 60 TABLET | Refills: 10 | OUTPATIENT
Start: 2022-12-01

## 2022-12-02 RX ORDER — PREGABALIN 150 MG/1
CAPSULE ORAL
Qty: 90 CAPSULE | Refills: 1 | Status: SHIPPED | OUTPATIENT
Start: 2022-12-02 | End: 2022-12-27

## 2022-12-02 NOTE — TELEPHONE ENCOUNTER
Health Maintenance   Topic Date Due    Varicella vaccine (1 of 2 - 2-dose childhood series) Never done    Hepatitis C screen  Never done    COVID-19 Vaccine (3 - Booster) 07/27/2021    Cervical cancer screen  04/29/2022    Flu vaccine (1) 10/12/2023 (Originally 8/1/2022)    Depression Monitoring  02/17/2023    DTaP/Tdap/Td vaccine (2 - Td or Tdap) 09/10/2029    HIV screen  Completed    Hepatitis A vaccine  Aged Out    Hib vaccine  Aged Out    Meningococcal (ACWY) vaccine  Aged Out    Pneumococcal 0-64 years Vaccine  Aged Out             (applicable per patient's age: Cancer Screenings, Depression Screening, Fall Risk Screening, Immunizations)    Hemoglobin A1C (%)   Date Value   06/03/2021 5.1   12/10/2020 5.1     LDL Cholesterol (mg/dL)   Date Value   11/26/2021 104     AST (U/L)   Date Value   10/21/2022 19     ALT (U/L)   Date Value   10/21/2022 16     BUN (mg/dL)   Date Value   10/21/2022 11      (goal A1C is < 7)   (goal LDL is <100) need 30-50% reduction from baseline     BP Readings from Last 3 Encounters:   11/14/22 126/80   10/21/22 (!) 149/73   10/12/22 130/85    (goal /80)      All Future Testing planned in [facility]:  Lab Frequency Next Occurrence   Basic Metabolic Panel Once 13/37/9436   Carbamazepine Level, Total Once 05/10/2022   Carbamazepine Level, Free Once 05/10/2022   CBC with Auto Differential Once 05/10/2022   EMG Once 11/28/2022       Next Visit Date:  Future Appointments   Date Time Provider Thuy Tao   12/22/2022  9:00 AM Stephani Steven DO [facility]   1/4/2023  4:15 PM MORE Dee - CNP [facility]   1/17/2023  3:30 PM Flavia Baugh MD Neuro [facility] Neurology -            Patient Active Problem List:     Chronic low back pain with sciatica     Thyroid nodule     Stenosis of cervical spine with myelopathy (HCC)     Cervical radiculopathy     Thoracic radiculopathy     Class 1 obesity due to excess calories with serious comorbidity in adult Cervical disc disease     Bunion of great toe of right foot     S/P cervical disc replacement     Cubital tunnel syndrome, right     Chronic right shoulder pain

## 2022-12-20 DIAGNOSIS — M54.12 CERVICAL RADICULOPATHY: ICD-10-CM

## 2022-12-20 DIAGNOSIS — G50.0 TRIGEMINAL NEURALGIA: ICD-10-CM

## 2022-12-21 NOTE — TELEPHONE ENCOUNTER
Should the patient be getting this refilled by his neurologist DR. Dilma Booker? It was last filled on 7/7/2022 with 10 refills.

## 2022-12-27 DIAGNOSIS — M54.12 CERVICAL RADICULOPATHY: ICD-10-CM

## 2022-12-27 DIAGNOSIS — M54.14 THORACIC RADICULOPATHY: ICD-10-CM

## 2022-12-27 DIAGNOSIS — Z76.0 MEDICATION REFILL: ICD-10-CM

## 2022-12-27 RX ORDER — PREGABALIN 150 MG/1
CAPSULE ORAL
Qty: 90 CAPSULE | Refills: 0 | Status: SHIPPED | OUTPATIENT
Start: 2022-12-27 | End: 2023-01-27

## 2022-12-27 NOTE — TELEPHONE ENCOUNTER
LAST VISIT:   10/12/2022     Future Appointments   Date Time Provider Thuy Tao   1/4/2023  4:15 PM MORE Moody - CNP [facility]   1/17/2023  3:30 PM Robbi Mueller MD Neuro [facility] Neurology -           ;

## 2023-01-01 NOTE — TELEPHONE ENCOUNTER
Patient is requesting a letter for an NANI (Emotional Support Animal). Patient states that is can be uploaded to [address] [place] [address] [address] for access.

## 2023-01-12 RX ORDER — CARBAMAZEPINE 100 MG/1
TABLET, EXTENDED RELEASE ORAL
Qty: 60 TABLET | Refills: 10 | OUTPATIENT
Start: 2023-01-12

## 2023-02-19 DIAGNOSIS — M54.14 THORACIC RADICULOPATHY: ICD-10-CM

## 2023-02-19 DIAGNOSIS — Z76.0 MEDICATION REFILL: ICD-10-CM

## 2023-02-19 DIAGNOSIS — M54.12 CERVICAL RADICULOPATHY: ICD-10-CM

## 2023-03-15 DIAGNOSIS — M54.14 THORACIC RADICULOPATHY: ICD-10-CM

## 2023-03-15 DIAGNOSIS — Z76.0 MEDICATION REFILL: ICD-10-CM

## 2023-03-15 DIAGNOSIS — M54.12 CERVICAL RADICULOPATHY: ICD-10-CM

## 2023-03-15 RX ORDER — PREGABALIN 150 MG/1
CAPSULE ORAL
Qty: 90 CAPSULE | Refills: 0 | OUTPATIENT
Start: 2023-03-15

## 2023-03-21 RX ORDER — PREGABALIN 150 MG/1
CAPSULE ORAL
Qty: 90 CAPSULE | Refills: 0 | OUTPATIENT
Start: 2023-03-21

## 2023-04-19 DIAGNOSIS — G99.2 STENOSIS OF CERVICAL SPINE WITH MYELOPATHY (HCC): ICD-10-CM

## 2023-04-19 DIAGNOSIS — M54.12 CERVICAL RADICULOPATHY: ICD-10-CM

## 2023-04-19 DIAGNOSIS — M48.02 STENOSIS OF CERVICAL SPINE WITH MYELOPATHY (HCC): ICD-10-CM

## 2023-04-19 RX ORDER — DULOXETIN HYDROCHLORIDE 60 MG/1
CAPSULE, DELAYED RELEASE ORAL
Qty: 30 CAPSULE | Refills: 10 | OUTPATIENT
Start: 2023-04-19

## 2023-04-26 DIAGNOSIS — G99.2 STENOSIS OF CERVICAL SPINE WITH MYELOPATHY (HCC): ICD-10-CM

## 2023-04-26 DIAGNOSIS — M48.02 STENOSIS OF CERVICAL SPINE WITH MYELOPATHY (HCC): ICD-10-CM

## 2023-04-26 DIAGNOSIS — M54.12 CERVICAL RADICULOPATHY: ICD-10-CM

## 2023-04-27 RX ORDER — DULOXETIN HYDROCHLORIDE 60 MG/1
CAPSULE, DELAYED RELEASE ORAL
Qty: 30 CAPSULE | Refills: 5 | Status: SHIPPED | OUTPATIENT
Start: 2023-04-27

## 2023-04-29 ENCOUNTER — HOSPITAL ENCOUNTER (EMERGENCY)
Facility: CLINIC | Age: 38
Discharge: HOME | End: 2023-04-29
Attending: EMERGENCY MEDICINE
Payer: COMMERCIAL

## 2023-04-29 VITALS
SYSTOLIC BLOOD PRESSURE: 134 MMHG | WEIGHT: 206 LBS | BODY MASS INDEX: 32.33 KG/M2 | HEART RATE: 90 BPM | TEMPERATURE: 98.6 F | HEIGHT: 67 IN | RESPIRATION RATE: 16 BRPM | DIASTOLIC BLOOD PRESSURE: 90 MMHG | OXYGEN SATURATION: 99 %

## 2023-04-29 DIAGNOSIS — J01.00 ACUTE MAXILLARY SINUSITIS, RECURRENCE NOT SPECIFIED: Primary | ICD-10-CM

## 2023-04-29 LAB
S PYO AG THROAT QL: NEGATIVE
SOURCE: NORMAL

## 2023-04-29 PROCEDURE — 99283 EMERGENCY DEPT VISIT LOW MDM: CPT

## 2023-04-29 PROCEDURE — 87880 STREP A ASSAY W/OPTIC: CPT

## 2023-04-29 RX ORDER — CETIRIZINE HYDROCHLORIDE 10 MG/1
10 TABLET ORAL DAILY
Qty: 10 TABLET | Refills: 0 | Status: SHIPPED | OUTPATIENT
Start: 2023-04-29

## 2023-04-29 RX ORDER — AMOXICILLIN AND CLAVULANATE POTASSIUM 875; 125 MG/1; MG/1
1 TABLET, FILM COATED ORAL 2 TIMES DAILY
Qty: 20 TABLET | Refills: 0 | Status: SHIPPED | OUTPATIENT
Start: 2023-04-29 | End: 2023-05-13

## 2023-04-29 RX ORDER — HYDROCODONE BITARTRATE AND ACETAMINOPHEN 5; 325 MG/1; MG/1
1 TABLET ORAL EVERY 6 HOURS PRN
Qty: 12 TABLET | Refills: 0 | Status: SHIPPED | OUTPATIENT
Start: 2023-04-29 | End: 2023-05-02

## 2023-04-29 ASSESSMENT — LIFESTYLE VARIABLES
HOW OFTEN DO YOU HAVE A DRINK CONTAINING ALCOHOL: NEVER
HOW MANY STANDARD DRINKS CONTAINING ALCOHOL DO YOU HAVE ON A TYPICAL DAY: PATIENT DOES NOT DRINK

## 2023-04-29 ASSESSMENT — PAIN SCALES - GENERAL: PAINLEVEL_OUTOF10: 7

## 2023-04-29 ASSESSMENT — PAIN DESCRIPTION - LOCATION: LOCATION: THROAT
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
## 2023-05-17 DIAGNOSIS — M54.14 THORACIC RADICULOPATHY: ICD-10-CM

## 2023-05-17 DIAGNOSIS — Z76.0 MEDICATION REFILL: ICD-10-CM

## 2023-05-17 DIAGNOSIS — M54.12 CERVICAL RADICULOPATHY: ICD-10-CM

## 2023-05-17 DIAGNOSIS — G50.0 TRIGEMINAL NEURALGIA: ICD-10-CM

## 2023-05-19 RX ORDER — PREGABALIN 150 MG/1
CAPSULE ORAL
Qty: 90 CAPSULE | Refills: 0 | Status: SHIPPED | OUTPATIENT
Start: 2023-05-19 | End: 2023-06-19

## 2023-05-22 DIAGNOSIS — M54.12 CERVICAL RADICULOPATHY: ICD-10-CM

## 2023-05-22 DIAGNOSIS — M54.14 THORACIC RADICULOPATHY: ICD-10-CM

## 2023-05-22 DIAGNOSIS — Z76.0 MEDICATION REFILL: ICD-10-CM

## 2023-05-22 RX ORDER — CARBAMAZEPINE 200 MG/1
TABLET, EXTENDED RELEASE ORAL
Qty: 60 TABLET | Refills: 10 | Status: SHIPPED | OUTPATIENT
Start: 2023-05-22

## 2023-05-22 NOTE — TELEPHONE ENCOUNTER
Please ask the patient get the BMP level checked before her next Visit.       Electronically signed by Naresh Castro MD on 5/22/2023 at 9:21 AM

## 2023-05-23 RX ORDER — PREGABALIN 150 MG/1
CAPSULE ORAL
Qty: 90 CAPSULE | Refills: 0 | OUTPATIENT
Start: 2023-05-23

## 2023-05-28 DIAGNOSIS — M54.14 THORACIC RADICULOPATHY: ICD-10-CM

## 2023-05-28 DIAGNOSIS — Z76.0 MEDICATION REFILL: ICD-10-CM

## 2023-05-28 DIAGNOSIS — M54.12 CERVICAL RADICULOPATHY: ICD-10-CM

## 2023-05-30 RX ORDER — PREGABALIN 150 MG/1
CAPSULE ORAL
Qty: 90 CAPSULE | Refills: 0 | OUTPATIENT
Start: 2023-05-30

## 2023-06-04 DIAGNOSIS — M54.12 CERVICAL RADICULOPATHY: ICD-10-CM

## 2023-06-04 DIAGNOSIS — Z76.0 MEDICATION REFILL: ICD-10-CM

## 2023-06-04 DIAGNOSIS — M54.14 THORACIC RADICULOPATHY: ICD-10-CM

## 2023-06-05 RX ORDER — PREGABALIN 150 MG/1
CAPSULE ORAL
Qty: 90 CAPSULE | Refills: 0 | OUTPATIENT
Start: 2023-06-05

## 2023-06-07 DIAGNOSIS — M54.12 CERVICAL RADICULOPATHY: ICD-10-CM

## 2023-06-07 DIAGNOSIS — Z76.0 MEDICATION REFILL: ICD-10-CM

## 2023-06-07 DIAGNOSIS — M54.14 THORACIC RADICULOPATHY: ICD-10-CM

## 2023-06-07 RX ORDER — PREGABALIN 150 MG/1
CAPSULE ORAL
Qty: 90 CAPSULE | Refills: 0 | OUTPATIENT
Start: 2023-06-07

## 2023-06-16 ENCOUNTER — HOSPITAL ENCOUNTER (OUTPATIENT)
Dept: MRI IMAGING | Age: 38
Discharge: HOME | End: 2023-06-18
Payer: COMMERCIAL

## 2023-06-16 DIAGNOSIS — G89.29 CHRONIC RIGHT-SIDED LOW BACK PAIN WITH RIGHT-SIDED SCIATICA: ICD-10-CM

## 2023-06-16 DIAGNOSIS — M54.41 CHRONIC RIGHT-SIDED LOW BACK PAIN WITH RIGHT-SIDED SCIATICA: ICD-10-CM

## 2023-06-16 PROCEDURE — 72148 MRI LUMBAR SPINE W/O DYE: CPT

## 2023-06-23 ENCOUNTER — TELEPHONE (OUTPATIENT)
Dept: NEUROSURGERY | Age: 38
End: 2023-06-23

## 2023-06-27 ENCOUNTER — OFFICE VISIT (OUTPATIENT)
Dept: FAMILY MEDICINE CLINIC | Age: 38
End: 2023-06-27
Payer: COMMERCIAL

## 2023-06-27 VITALS
HEART RATE: 86 BPM | DIASTOLIC BLOOD PRESSURE: 80 MMHG | WEIGHT: 203 LBS | SYSTOLIC BLOOD PRESSURE: 110 MMHG | BODY MASS INDEX: 31.79 KG/M2 | OXYGEN SATURATION: 97 %

## 2023-06-27 DIAGNOSIS — G50.0 TRIGEMINAL NEURALGIA OF RIGHT SIDE OF FACE: ICD-10-CM

## 2023-06-27 DIAGNOSIS — G56.21 CUBITAL TUNNEL SYNDROME, RIGHT: Primary | ICD-10-CM

## 2023-06-27 DIAGNOSIS — M54.12 CERVICAL RADICULOPATHY: ICD-10-CM

## 2023-06-27 PROCEDURE — G8417 CALC BMI ABV UP PARAM F/U: HCPCS | Performed by: NURSE PRACTITIONER

## 2023-06-27 PROCEDURE — 1036F TOBACCO NON-USER: CPT | Performed by: NURSE PRACTITIONER

## 2023-06-27 PROCEDURE — 99213 OFFICE O/P EST LOW 20 MIN: CPT | Performed by: NURSE PRACTITIONER

## 2023-06-27 PROCEDURE — G8427 DOCREV CUR MEDS BY ELIG CLIN: HCPCS | Performed by: NURSE PRACTITIONER

## 2023-06-27 ASSESSMENT — PATIENT HEALTH QUESTIONNAIRE - PHQ9
5. POOR APPETITE OR OVEREATING: 0
SUM OF ALL RESPONSES TO PHQ QUESTIONS 1-9: 0
2. FEELING DOWN, DEPRESSED OR HOPELESS: 0
10. IF YOU CHECKED OFF ANY PROBLEMS, HOW DIFFICULT HAVE THESE PROBLEMS MADE IT FOR YOU TO DO YOUR WORK, TAKE CARE OF THINGS AT HOME, OR GET ALONG WITH OTHER PEOPLE: 0
9. THOUGHTS THAT YOU WOULD BE BETTER OFF DEAD, OR OF HURTING YOURSELF: 0
SUM OF ALL RESPONSES TO PHQ QUESTIONS 1-9: 0
4. FEELING TIRED OR HAVING LITTLE ENERGY: 0
3. TROUBLE FALLING OR STAYING ASLEEP: 0
SUM OF ALL RESPONSES TO PHQ9 QUESTIONS 1 & 2: 0
8. MOVING OR SPEAKING SO SLOWLY THAT OTHER PEOPLE COULD HAVE NOTICED. OR THE OPPOSITE, BEING SO FIGETY OR RESTLESS THAT YOU HAVE BEEN MOVING AROUND A LOT MORE THAN USUAL: 0
1. LITTLE INTEREST OR PLEASURE IN DOING THINGS: 0
SUM OF ALL RESPONSES TO PHQ QUESTIONS 1-9: 0
SUM OF ALL RESPONSES TO PHQ QUESTIONS 1-9: 0
6. FEELING BAD ABOUT YOURSELF - OR THAT YOU ARE A FAILURE OR HAVE LET YOURSELF OR YOUR FAMILY DOWN: 0
7. TROUBLE CONCENTRATING ON THINGS, SUCH AS READING THE NEWSPAPER OR WATCHING TELEVISION: 0

## 2023-06-27 ASSESSMENT — ENCOUNTER SYMPTOMS
SHORTNESS OF BREATH: 0
COUGH: 0

## 2023-07-31 ENCOUNTER — APPOINTMENT (OUTPATIENT)
Dept: CT IMAGING | Age: 38
End: 2023-07-31
Payer: COMMERCIAL

## 2023-07-31 ENCOUNTER — HOSPITAL ENCOUNTER (EMERGENCY)
Age: 38
Discharge: HOME | End: 2023-07-31
Attending: EMERGENCY MEDICINE
Payer: COMMERCIAL

## 2023-07-31 VITALS
SYSTOLIC BLOOD PRESSURE: 104 MMHG | BODY MASS INDEX: 31.79 KG/M2 | TEMPERATURE: 98 F | HEIGHT: 67 IN | DIASTOLIC BLOOD PRESSURE: 69 MMHG | RESPIRATION RATE: 16 BRPM | HEART RATE: 70 BPM | OXYGEN SATURATION: 97 %

## 2023-07-31 DIAGNOSIS — G43.409 HEMIPLEGIC MIGRAINE WITHOUT STATUS MIGRAINOSUS, NOT INTRACTABLE: Primary | ICD-10-CM

## 2023-07-31 LAB
ANION GAP SERPL CALCULATED.3IONS-SCNC: 8 MMOL/L (ref 9–17)
BASOPHILS # BLD: 0.07 K/UL (ref 0–0.2)
BASOPHILS NFR BLD: 1 % (ref 0–2)
BUN SERPL-MCNC: 11 MG/DL (ref 6–20)
BUN/CREAT SERPL: 16 (ref 9–20)
CALCIUM SERPL-MCNC: 8.2 MG/DL (ref 8.6–10.4)
CHLORIDE SERPL-SCNC: 102 MMOL/L (ref 98–107)
CK SERPL-CCNC: 99 U/L (ref 26–192)
CO2 SERPL-SCNC: 23 MMOL/L (ref 20–31)
CREAT SERPL-MCNC: 0.7 MG/DL (ref 0.5–0.9)
EOSINOPHIL # BLD: 0.16 K/UL (ref 0–0.44)
EOSINOPHILS RELATIVE PERCENT: 2 % (ref 1–4)
ERYTHROCYTE [DISTWIDTH] IN BLOOD BY AUTOMATED COUNT: 13.3 % (ref 11.8–14.4)
GFR SERPL CREATININE-BSD FRML MDRD: >60 ML/MIN/1.73M2
GLUCOSE SERPL-MCNC: 82 MG/DL (ref 70–99)
HCG SERPL QL: NEGATIVE
HCT VFR BLD AUTO: 40.3 % (ref 36.3–47.1)
HGB BLD-MCNC: 13 G/DL (ref 11.9–15.1)
IMM GRANULOCYTES # BLD AUTO: 0.02 K/UL (ref 0–0.3)
IMM GRANULOCYTES NFR BLD: 0 %
INR PPP: 1.1
LYMPHOCYTES NFR BLD: 2.72 K/UL (ref 1.1–3.7)
LYMPHOCYTES RELATIVE PERCENT: 34 % (ref 24–43)
MCH RBC QN AUTO: 29.6 PG (ref 25.2–33.5)
MCHC RBC AUTO-ENTMCNC: 32.3 G/DL (ref 28.4–34.8)
MCV RBC AUTO: 91.8 FL (ref 82.6–102.9)
METER GLUCOSE: 93 MG/DL (ref 65–105)
MONOCYTES NFR BLD: 0.6 K/UL (ref 0.1–1.2)
MONOCYTES NFR BLD: 8 % (ref 3–12)
MYOGLOBIN SERPL-MCNC: 29 NG/ML (ref 25–58)
NEUTROPHILS NFR BLD: 55 % (ref 36–65)
NEUTS SEG NFR BLD: 4.47 K/UL (ref 1.5–8.1)
NRBC BLD-RTO: 0 PER 100 WBC
PARTIAL THROMBOPLASTIN TIME: 25.9 SEC (ref 23.9–33.8)
PLATELET # BLD AUTO: 306 K/UL (ref 138–453)
PMV BLD AUTO: 10.9 FL (ref 8.1–13.5)
POTASSIUM SERPL-SCNC: 3.7 MMOL/L (ref 3.7–5.3)
PROTHROMBIN TIME: 14.1 SEC (ref 11.5–14.2)
RBC # BLD AUTO: 4.39 M/UL (ref 3.95–5.11)
REASON FOR REJECTION: NORMAL
SODIUM SERPL-SCNC: 133 MMOL/L (ref 135–144)
SPECIMEN SOURCE: NORMAL
TROPONIN I SERPL HS-MCNC: <6 NG/L (ref 0–14)
WBC OTHER # BLD: 8 K/UL (ref 3.5–11.3)
ZZ NTE CLEAN UP: ORDERED TEST: NORMAL

## 2023-07-31 PROCEDURE — 85025 COMPLETE CBC W/AUTO DIFF WBC: CPT

## 2023-07-31 PROCEDURE — 93005 ELECTROCARDIOGRAM TRACING: CPT | Performed by: EMERGENCY MEDICINE

## 2023-07-31 PROCEDURE — 6360000002 HC RX W HCPCS: Performed by: EMERGENCY MEDICINE

## 2023-07-31 PROCEDURE — 82947 ASSAY GLUCOSE BLOOD QUANT: CPT

## 2023-07-31 PROCEDURE — 80048 BASIC METABOLIC PNL TOTAL CA: CPT

## 2023-07-31 PROCEDURE — 36415 COLL VENOUS BLD VENIPUNCTURE: CPT

## 2023-07-31 PROCEDURE — 84484 ASSAY OF TROPONIN QUANT: CPT

## 2023-07-31 PROCEDURE — 96366 THER/PROPH/DIAG IV INF ADDON: CPT

## 2023-07-31 PROCEDURE — 82553 CREATINE MB FRACTION: CPT

## 2023-07-31 PROCEDURE — 83874 ASSAY OF MYOGLOBIN: CPT

## 2023-07-31 PROCEDURE — 70498 CT ANGIOGRAPHY NECK: CPT

## 2023-07-31 PROCEDURE — 99285 EMERGENCY DEPT VISIT HI MDM: CPT

## 2023-07-31 PROCEDURE — 6360000004 HC RX CONTRAST MEDICATION: Performed by: EMERGENCY MEDICINE

## 2023-07-31 PROCEDURE — 2580000003 HC RX 258: Performed by: EMERGENCY MEDICINE

## 2023-07-31 PROCEDURE — 70450 CT HEAD/BRAIN W/O DYE: CPT

## 2023-07-31 PROCEDURE — 96365 THER/PROPH/DIAG IV INF INIT: CPT

## 2023-07-31 PROCEDURE — 85610 PROTHROMBIN TIME: CPT

## 2023-07-31 PROCEDURE — 85730 THROMBOPLASTIN TIME PARTIAL: CPT

## 2023-07-31 PROCEDURE — 84703 CHORIONIC GONADOTROPIN ASSAY: CPT

## 2023-07-31 PROCEDURE — 82550 ASSAY OF CK (CPK): CPT

## 2023-07-31 RX ORDER — KETOROLAC TROMETHAMINE 30 MG/ML
30 INJECTION, SOLUTION INTRAMUSCULAR; INTRAVENOUS ONCE
Status: COMPLETED | OUTPATIENT
Start: 2023-07-31 | End: 2023-07-31

## 2023-07-31 RX ORDER — DIPHENHYDRAMINE HYDROCHLORIDE 50 MG/ML
12.5 INJECTION INTRAMUSCULAR; INTRAVENOUS ONCE
Status: COMPLETED | OUTPATIENT
Start: 2023-07-31 | End: 2023-07-31

## 2023-07-31 RX ORDER — MAGNESIUM SULFATE IN WATER 40 MG/ML
2000 INJECTION, SOLUTION INTRAVENOUS ONCE
Status: COMPLETED | OUTPATIENT
Start: 2023-07-31 | End: 2023-07-31

## 2023-07-31 RX ORDER — SODIUM CHLORIDE 0.9 % (FLUSH) 0.9 %
10 SYRINGE (ML) INJECTION ONCE
Status: COMPLETED | OUTPATIENT
Start: 2023-07-31 | End: 2023-07-31

## 2023-07-31 RX ORDER — PROCHLORPERAZINE EDISYLATE 5 MG/ML
10 INJECTION INTRAMUSCULAR; INTRAVENOUS EVERY 6 HOURS PRN
Status: DISCONTINUED | OUTPATIENT
Start: 2023-07-31 | End: 2023-07-31 | Stop reason: HOSPADM

## 2023-07-31 RX ORDER — 0.9 % SODIUM CHLORIDE 0.9 %
80 INTRAVENOUS SOLUTION INTRAVENOUS ONCE
Status: COMPLETED | OUTPATIENT
Start: 2023-07-31 | End: 2023-07-31

## 2023-07-31 RX ADMIN — MAGNESIUM SULFATE HEPTAHYDRATE 2000 MG: 40 INJECTION, SOLUTION INTRAVENOUS at 14:24

## 2023-07-31 RX ADMIN — SODIUM CHLORIDE, PRESERVATIVE FREE 10 ML: 5 INJECTION INTRAVENOUS at 13:20

## 2023-07-31 RX ADMIN — IOPAMIDOL 75 ML: 755 INJECTION, SOLUTION INTRAVENOUS at 13:20

## 2023-07-31 RX ADMIN — PROCHLORPERAZINE EDISYLATE 10 MG: 5 INJECTION, SOLUTION INTRAMUSCULAR; INTRAVENOUS at 14:07

## 2023-07-31 RX ADMIN — SODIUM CHLORIDE 80 ML: 9 INJECTION, SOLUTION INTRAVENOUS at 13:21

## 2023-07-31 RX ADMIN — DIPHENHYDRAMINE HYDROCHLORIDE 12.5 MG: 50 INJECTION INTRAMUSCULAR; INTRAVENOUS at 14:08

## 2023-07-31 RX ADMIN — KETOROLAC TROMETHAMINE 30 MG: 30 INJECTION, SOLUTION INTRAMUSCULAR; INTRAVENOUS at 14:08

## 2023-07-31 ASSESSMENT — ENCOUNTER SYMPTOMS
ANAL BLEEDING: 0
CHEST TIGHTNESS: 0
SHORTNESS OF BREATH: 0

## 2023-07-31 ASSESSMENT — PAIN DESCRIPTION - DESCRIPTORS: DESCRIPTORS: ACHING;SHARP

## 2023-07-31 ASSESSMENT — PAIN - FUNCTIONAL ASSESSMENT: PAIN_FUNCTIONAL_ASSESSMENT: 0-10

## 2023-07-31 ASSESSMENT — PAIN DESCRIPTION - FREQUENCY: FREQUENCY: CONTINUOUS

## 2023-07-31 ASSESSMENT — PAIN SCALES - GENERAL
PAINLEVEL_OUTOF10: 9
PAINLEVEL_OUTOF10: 6

## 2023-07-31 ASSESSMENT — PAIN DESCRIPTION - PAIN TYPE: TYPE: ACUTE PAIN

## 2023-07-31 ASSESSMENT — PAIN DESCRIPTION - ORIENTATION: ORIENTATION: RIGHT

## 2023-07-31 ASSESSMENT — PAIN DESCRIPTION - LOCATION: LOCATION: HEAD

## 2023-07-31 NOTE — ED NOTES
Patient to CT  via stretcher, Patient placed on monitor and accompanied by RN at beside.      Damaris Bernard, AUBREE  07/31/23 [address], RN  07/31/23 [address], RN  07/31/23 5422

## 2023-07-31 NOTE — ED NOTES
Patient presents to ER from work due with concerns for stroke. Patient states she was at work and started to have an intense headache. Patient states she sat down at work and took medication. Patient states S/S worsen. Patient states headache is located right side behind ear and temporal region. Patient states foggy vision in right eye. Patient states right sided faces numbness to cheek. Twitching noted to right sided top lip. Right sided Upper extremity weakness noted. Slurred speech noted.      Nini Lee RN  07/31/23 3517

## 2023-07-31 NOTE — ED NOTES
Patient returned to CT via stretcher ,Patient placed on monitor accompanied by RN at bedside.      Quinn Antonio RN  07/31/23 5417 Rimma Dickerson RN  07/31/23 3939

## 2023-07-31 NOTE — ED PROVIDER NOTES
IMPRESSION      1.  Hemiplegic migraine without status migrainosus, not intractable          DISPOSITION / PLAN     DISPOSITION Decision To Discharge 07/31/2023 03:43:08 PM      PATIENT REFERRED TO:  MORE Nichole - CNP  [address]  [phone]    Schedule an appointment as soon as possible for a visit       [address] [facility]  [address] [facility] [address] [phone]  Schedule an appointment as soon as possible for a visit         DISCHARGE MEDICATIONS:  Discharge Medication List as of 7/31/2023  3:46 PM          Candice Jurado DO  Emergency Medicine Resident    (Please note that portions of thisnote were completed with a voice recognition program.  Efforts were made to edit the dictations but occasionally words are mis-transcribed.)        Candice Jurado DO  08/03/23 7304

## 2023-07-31 NOTE — VIRTUAL HEALTH
Consults    Dyllan Stephens, was evaluated through a synchronous (real-time) audio-video encounter. The patient (and/or guardian if applicable) is aware that this is a billable service, which includes applicable co-pays. This virtual visit was conducted with patient's (and/or legal guardian's) consent. Patient identification was verified, and a caregiver was present when appropriate. The patient was located at [facility]): [address] [facility] ED  [address] [facility]  Loc: [phone]  The provider was located at [facility] CT Dept): [facility]  [address]  [facility] [address] [phone]     Total time spent on this encounter:  23    --George Perez MD on 7/31/2023 at 1:39 PM    An electronic signature was used to authenticate this note. [facility] Stroke and Telestroke Consult for  Celester Bis Stroke Alert through [address] @ 1:21pm  7/31/2023 1:40 PM    Pt Name: Dyllan Stephens  MRN: [id number]  [address] [facility] [place] 1985  Date of evaluation: 7/31/2023  Primary Care Physician: MORE Talbert CNP  Reason for Evaluation: Stroke Evaluation with Discussion with Ed or primary team with Telemedicine and stroke evaluation with Review of imaging and labs    Dyllan Stephens is a 44 y.o. female with known history of migraine, trigeminal neuragial on the right face, p/w a bad headache this AM  around 10am with right side feeling numb and weak. This is not new, it happened to her a few times in the past, but the headache seems worse, no photophobic, but phonophobic. LKW: 10am  NIH:  3  - right face droop  - right leg drift  - right side numbness    Allergies  is allergic to bee venom and doxycycline. Medications  Prior to Admission medications    Medication Sig Start Date End Date Taking? Authorizing Provider   pregabalin (LYRICA) 200 MG capsule Take 1 capsule by mouth 3 times daily for 90 days.  Max Daily Amount:

## 2023-08-01 ENCOUNTER — TELEPHONE (OUTPATIENT)
Dept: NEUROLOGY | Age: 38
End: 2023-08-01

## 2023-08-01 LAB
EKG ATRIAL RATE: 81 BPM
EKG P AXIS: 42 DEGREES
EKG P-R INTERVAL: 192 MS
EKG Q-T INTERVAL: 350 MS
EKG QRS DURATION: 86 MS
EKG QTC CALCULATION (BAZETT): 406 MS
EKG R AXIS: 9 DEGREES
EKG T AXIS: 42 DEGREES
EKG VENTRICULAR RATE: 81 BPM

## 2023-08-02 ENCOUNTER — CARE COORDINATION (OUTPATIENT)
Dept: CARE COORDINATION | Age: 38
End: 2023-08-02

## 2023-08-02 ENCOUNTER — OFFICE VISIT (OUTPATIENT)
Dept: PRIMARY CARE CLINIC | Age: 38
End: 2023-08-02
Payer: COMMERCIAL

## 2023-08-02 VITALS
WEIGHT: 207.8 LBS | BODY MASS INDEX: 32.55 KG/M2 | SYSTOLIC BLOOD PRESSURE: 136 MMHG | HEART RATE: 86 BPM | OXYGEN SATURATION: 99 % | DIASTOLIC BLOOD PRESSURE: 96 MMHG

## 2023-08-02 DIAGNOSIS — Z09 HOSPITAL DISCHARGE FOLLOW-UP: Primary | ICD-10-CM

## 2023-08-02 PROCEDURE — 99214 OFFICE O/P EST MOD 30 MIN: CPT | Performed by: NURSE PRACTITIONER

## 2023-08-02 PROCEDURE — G8427 DOCREV CUR MEDS BY ELIG CLIN: HCPCS | Performed by: NURSE PRACTITIONER

## 2023-08-02 PROCEDURE — G8417 CALC BMI ABV UP PARAM F/U: HCPCS | Performed by: NURSE PRACTITIONER

## 2023-08-02 PROCEDURE — 1036F TOBACCO NON-USER: CPT | Performed by: NURSE PRACTITIONER

## 2023-08-02 PROCEDURE — 1111F DSCHRG MED/CURRENT MED MERGE: CPT | Performed by: NURSE PRACTITIONER

## 2023-08-02 SDOH — ECONOMIC STABILITY: HOUSING INSECURITY
IN THE LAST 12 MONTHS, WAS THERE A TIME WHEN YOU DID NOT HAVE A STEADY PLACE TO SLEEP OR SLEPT IN A SHELTER (INCLUDING NOW)?: NO

## 2023-08-02 SDOH — ECONOMIC STABILITY: INCOME INSECURITY: HOW HARD IS IT FOR YOU TO PAY FOR THE VERY BASICS LIKE FOOD, HOUSING, MEDICAL CARE, AND HEATING?: NOT VERY HARD

## 2023-08-02 SDOH — ECONOMIC STABILITY: FOOD INSECURITY: WITHIN THE PAST 12 MONTHS, YOU WORRIED THAT YOUR FOOD WOULD RUN OUT BEFORE YOU GOT MONEY TO BUY MORE.: NEVER TRUE

## 2023-08-02 SDOH — ECONOMIC STABILITY: FOOD INSECURITY: WITHIN THE PAST 12 MONTHS, THE FOOD YOU BOUGHT JUST DIDN'T LAST AND YOU DIDN'T HAVE MONEY TO GET MORE.: NEVER TRUE

## 2023-08-02 NOTE — PROGRESS NOTES
Post-Discharge Transitional Care  Follow Up      Johny Martinez   YOB: 1985    Date of Office Visit:  8/2/2023  Date of Hospital Admission: 7/31/23  Date of Hospital Discharge: 7/31/23  Risk of hospital readmission (high >=14%. Medium >=10%) :No data recorded    Care management risk score Rising risk (score 2-5) and Complex Care (Scores >=6): No Risk Score On File     Non face to face  following discharge, date last encounter closed (first attempt may have been earlier): *No documented post hospital discharge outreach found in the last 14 days    Call initiated 2 business days of discharge: *No response recorded in the last 14 days    ASSESSMENT/PLAN:   Hospital discharge follow-up  -     [facility] DISCHARGE MEDS RECONCILED W/ CURRENT OUTPATIENT MED LIST      Medical Decision Making: moderate complexity  No follow-ups on file. Subjective:   HPI:  Follow up of Hospital problems/diagnosis(es):  Mauyri Mcdaniel is here for er follow up. She states she was at work when she started developing stroke like sxs. Per dc summary:  Johny Martinez is a 44 y.o. female who presents with history of trigeminal neuralgia and radicular back pain who presents today with sudden onset of right-sided weakness, slurred speech and severe headache in the back of her head. Patient rates the headache as severe, nonradiating. No nausea or vomiting photophobia noted. Patient notes he has onset of symptoms around 1050. Patient denies any history of high blood pressure, smoking, hypercholesterolemia. Patient denies any chest pain, abdominal pain. Neurology stated that she was having complex migraines which patient says she has never had before. Referral to verena given at last visit- will involve care coordinator. Inpatient course: Discharge summary reviewed- see chart.     Patient Active Problem List   Diagnosis    Chronic low back pain with sciatica    Thyroid nodule    Stenosis of cervical spine with myelopathy

## 2023-08-02 NOTE — CARE COORDINATION
PCP referred to [facility] 6/27, patient hasn't received a call to schedule. Writer called, [facility], they don't have a referral. Faxed order, demographic info, office progress note and patient summary and information about recent imaging to [facility] at [phone].

## 2023-08-03 ENCOUNTER — TELEPHONE (OUTPATIENT)
Dept: PRIMARY CARE CLINIC | Age: 38
End: 2023-08-03

## 2023-08-03 ENCOUNTER — OFFICE VISIT (OUTPATIENT)
Dept: NEUROLOGY | Age: 38
End: 2023-08-03
Payer: COMMERCIAL

## 2023-08-03 VITALS
DIASTOLIC BLOOD PRESSURE: 87 MMHG | TEMPERATURE: 98.3 F | SYSTOLIC BLOOD PRESSURE: 128 MMHG | RESPIRATION RATE: 22 BRPM | OXYGEN SATURATION: 98 % | WEIGHT: 207 LBS | BODY MASS INDEX: 32.42 KG/M2 | HEART RATE: 83 BPM

## 2023-08-03 DIAGNOSIS — M48.02 STENOSIS OF CERVICAL SPINE WITH MYELOPATHY (HCC): ICD-10-CM

## 2023-08-03 DIAGNOSIS — M54.81 OCCIPITAL NEURALGIA OF RIGHT SIDE: Primary | ICD-10-CM

## 2023-08-03 DIAGNOSIS — G99.2 STENOSIS OF CERVICAL SPINE WITH MYELOPATHY (HCC): ICD-10-CM

## 2023-08-03 PROCEDURE — G8417 CALC BMI ABV UP PARAM F/U: HCPCS | Performed by: STUDENT IN AN ORGANIZED HEALTH CARE EDUCATION/TRAINING PROGRAM

## 2023-08-03 PROCEDURE — G8427 DOCREV CUR MEDS BY ELIG CLIN: HCPCS | Performed by: STUDENT IN AN ORGANIZED HEALTH CARE EDUCATION/TRAINING PROGRAM

## 2023-08-03 PROCEDURE — 1036F TOBACCO NON-USER: CPT | Performed by: STUDENT IN AN ORGANIZED HEALTH CARE EDUCATION/TRAINING PROGRAM

## 2023-08-03 PROCEDURE — 99214 OFFICE O/P EST MOD 30 MIN: CPT | Performed by: STUDENT IN AN ORGANIZED HEALTH CARE EDUCATION/TRAINING PROGRAM

## 2023-08-03 RX ORDER — PREDNISONE 20 MG/1
TABLET ORAL
Qty: 18 TABLET | Refills: 0 | Status: SHIPPED | OUTPATIENT
Start: 2023-08-03

## 2023-08-03 RX ORDER — VERAPAMIL HYDROCHLORIDE 120 MG/1
120 TABLET, FILM COATED ORAL DAILY
Qty: 30 TABLET | Refills: 3 | Status: SHIPPED | OUTPATIENT
Start: 2023-08-03

## 2023-08-03 ASSESSMENT — ENCOUNTER SYMPTOMS
VOICE CHANGE: 0
WHEEZING: 0
ABDOMINAL PAIN: 0
BACK PAIN: 1
COLOR CHANGE: 0
VOMITING: 0
NAUSEA: 0
SHORTNESS OF BREATH: 0
CHEST TIGHTNESS: 0
TROUBLE SWALLOWING: 0
PHOTOPHOBIA: 0

## 2023-08-03 NOTE — PROGRESS NOTES
of right lumbar spine   Right side Trigeminal Neuralgia  Right shoulder and arm pain with Hx of C5-6 spine fusion, chronic   Migraines    Patient is a 45 y/o female with multiple pregnancies in the past, trigeminal neuralgia, chronic right shoulder and arm pain, ulnar neuropathy s/p cubital tunnel decompression presents for multiple chronic pain symptoms including acute on chronic sciatic pain. Her sciatic pain on the right lumbar spine was reproducible with superficial palpation, and has been worsening over the past 1 week. She also states her medications have not really been addressing her pain, and her functioning has decreased as a result. The dose of Lyrica will be increased to help with neuropathic pain, however she has tried high doses of 3 neuropathic medications and still continues to be in pain. She will benefit from pain medicine evaluation. MRI of lumbar spine will help elucidate compressive pathology. PLAN:       Occipital Neuralgia   Referred to Dr. Anders Agustin for occipital nerve block     Trigeminal Neuralgia   Continue Tegretol XR 1 tab daily   Cont Cymbalta 60 mg po daily   Cont Lyrica 200 mg tid. Migraines w aura w status migrainosus   Start Verapamil 120 mg daily for migraines prevention   Prednisone taper to break current migraine cycle  Recommend migraine diary   Avoid migraine triggers    Follow up in the clinic in 3 months. Instructed patient to call the clinic if symptoms worsen or develop any new symptoms. Ms. Edna Yeung received counseling on the following healthy behaviors: medical compliance, smoking cessation, blood pressure control, regular follow up with primary doctor. I have spent 60 minutes face to face with the patient more than 50% of this time was spent counseling and coordinating care. Case discussed with attending Dr. Anders Agustin.        Electronically signed by Isamar Helm MD on 8/3/2023 at 4:42 PM

## 2023-08-03 NOTE — TELEPHONE ENCOUNTER
Patient called stating she was in office yesterday and forgot to ask for a work note to be able to take extended lunch breaks due to her migraines and/or allow her to work from home when needed.  States note can be uploaded into my chat

## 2023-08-04 ENCOUNTER — TELEPHONE (OUTPATIENT)
Dept: NEUROLOGY | Age: 38
End: 2023-08-04

## 2023-08-04 ENCOUNTER — CARE COORDINATION (OUTPATIENT)
Dept: CARE COORDINATION | Age: 38
End: 2023-08-04

## 2023-08-04 NOTE — CARE COORDINATION
Left VM message asking patient to return call to assess for care management. Informed her [facility] neurology referral was refaxed so should expect a call in about a week to schedule appt. Will call again next week.     Future Appointments   Date Time Provider [address]   9/13/2023  2:30 PM Ralf Negron DO Neuro [facility] -   11/8/2023  1:30 PM Ralf Negron DO [facility] -

## 2023-08-04 NOTE — CARE COORDINATION
Ambulatory Care Coordination Note  2023    Patient Current Location:  Home: [address] [place] [address]    ACM contacted the patient by telephone. Verified name and  with patient as identifiers. Provided introduction to self, and explanation of the ACM role. Patient accepting of care management calls, to follow short term while getting testing, appts, medication changes to treat trigeminal neuralgia, migraine headaches, cervical radiculopathy, right sided pain. Weak right hand since cubital tunnel surgery, is right handed. Very difficult to do anything due to right facial pain and constant twitching, occipital headaches. Right leg pain with sciatica. Saw neuro yesterday, will start prednisone tapering dose and verapamil for migraines. Will get an occipital nerve block to hopefully help until more testing or permanent relief can be found. She still wants to see [facility] neuro for second opinions and other options. Has had several MRIs, CTs, had an EMG the past year. On and off constipation, takes Miralax PRN, tries to keep up water intake. Medications reviewed. No SDOH needs identified. She drives, lives with  and children, independent. Employer allowing her to work from home some but getting very difficult to do her job with the constant face twitching and pain, not sure how much longer she can keep working. CC Plan:   -follow up in a few weeks to check on [facility] appt, other appts, response with prednisone.   General Assessment    Do you have any symptoms that are causing concern?: Yes  Progression since Onset: Unchanged  Reported Symptoms: Pain (Comment: pain and twitching right side of face, right occipital headache)            Offered patient enrollment in the Remote Patient Monitoring (RPM) program for in-home monitoring: Patient is not eligible for RPM program.        Future Appointments   Date Time Provider [address]   2023  2:30 PM DO Juany Drummond

## 2023-08-04 NOTE — TELEPHONE ENCOUNTER
There is a referral from Dr. Walter Chávez at the residency clinic. Patient needs to see Dr Josefa Hernandez for an occipital nerve block. Please do prior with for procedure.   Thank you, May Chaves

## 2023-08-07 ENCOUNTER — TELEPHONE (OUTPATIENT)
Dept: PRIMARY CARE CLINIC | Age: 38
End: 2023-08-07

## 2023-08-07 ENCOUNTER — TELEPHONE (OUTPATIENT)
Dept: NEUROSURGERY | Age: 38
End: 2023-08-07

## 2023-08-07 DIAGNOSIS — R45.86 MOOD CHANGE: Primary | ICD-10-CM

## 2023-08-07 NOTE — TELEPHONE ENCOUNTER
Patient is calling to request a letter stating that needs to be off of work due to the high stress levels at her job. Patient states that the high stress levels are affecting her nerves in her head, and is requesting to remain off of work until she has recovered from her nerve block. *When said letter is created, patient requests to pick-up letter. *

## 2023-08-09 DIAGNOSIS — G43.111 INTRACTABLE MIGRAINE WITH AURA WITH STATUS MIGRAINOSUS: Primary | ICD-10-CM

## 2023-08-09 RX ORDER — CALCIUM CARBONATE/VITAMIN D3 500-10/5ML
400 LIQUID (ML) ORAL DAILY
Qty: 30 CAPSULE | Refills: 3 | Status: SHIPPED | OUTPATIENT
Start: 2023-08-09

## 2023-08-09 NOTE — PROGRESS NOTES
Ms. Antionette Quiroz was contacted via phone to discuss ongoing debilitating headache. Patient has right sided headaches, originating from the neck and radiating to the right side of the head. She has right eye throbbing pressure, and rates the pain at 8/10. The pain has been constant since Monday 7/31/23. Patient already went to the ER at that time and was seen as stroke alert given right sided weakness, but symptoms were deemed to be from complicated migraine. As of now, I started her on Magnesium oxide 400 mg pills to take daily to treat the migraine headaches. She is unsure of when the occipital nerve block can be done, and insurance will likely require 10-14 days. I have given a note to be off work until Monday 8/14/23. Ms. Antionette Quiroz was instructed to go to the ER if migraine is still persistent at that time.
no

## 2023-08-14 ENCOUNTER — PATIENT MESSAGE (OUTPATIENT)
Dept: NEUROLOGY | Age: 38
End: 2023-08-14

## 2023-08-14 ENCOUNTER — HOSPITAL ENCOUNTER (EMERGENCY)
Age: 38
Discharge: HOME | End: 2023-08-14
Attending: EMERGENCY MEDICINE
Payer: COMMERCIAL

## 2023-08-14 VITALS
RESPIRATION RATE: 16 BRPM | DIASTOLIC BLOOD PRESSURE: 104 MMHG | WEIGHT: 209 LBS | TEMPERATURE: 98.2 F | BODY MASS INDEX: 32.73 KG/M2 | HEART RATE: 76 BPM | OXYGEN SATURATION: 100 % | SYSTOLIC BLOOD PRESSURE: 149 MMHG

## 2023-08-14 DIAGNOSIS — R51.9 NONINTRACTABLE HEADACHE, UNSPECIFIED CHRONICITY PATTERN, UNSPECIFIED HEADACHE TYPE: Primary | ICD-10-CM

## 2023-08-14 PROCEDURE — 64450 NJX AA&/STRD OTHER PN/BRANCH: CPT

## 2023-08-14 PROCEDURE — 6360000002 HC RX W HCPCS: Performed by: STUDENT IN AN ORGANIZED HEALTH CARE EDUCATION/TRAINING PROGRAM

## 2023-08-14 PROCEDURE — 99284 EMERGENCY DEPT VISIT MOD MDM: CPT

## 2023-08-14 PROCEDURE — 96375 TX/PRO/DX INJ NEW DRUG ADDON: CPT

## 2023-08-14 PROCEDURE — 6370000000 HC RX 637 (ALT 250 FOR IP): Performed by: STUDENT IN AN ORGANIZED HEALTH CARE EDUCATION/TRAINING PROGRAM

## 2023-08-14 PROCEDURE — 2580000003 HC RX 258: Performed by: STUDENT IN AN ORGANIZED HEALTH CARE EDUCATION/TRAINING PROGRAM

## 2023-08-14 PROCEDURE — 64405 NJX AA&/STRD GR OCPL NRV: CPT | Performed by: PSYCHIATRY & NEUROLOGY

## 2023-08-14 PROCEDURE — 96361 HYDRATE IV INFUSION ADD-ON: CPT

## 2023-08-14 PROCEDURE — 2500000003 HC RX 250 WO HCPCS: Performed by: STUDENT IN AN ORGANIZED HEALTH CARE EDUCATION/TRAINING PROGRAM

## 2023-08-14 PROCEDURE — 64405 NJX AA&/STRD GR OCPL NRV: CPT

## 2023-08-14 PROCEDURE — 96365 THER/PROPH/DIAG IV INF INIT: CPT

## 2023-08-14 RX ORDER — LIDOCAINE HYDROCHLORIDE 10 MG/ML
20 INJECTION, SOLUTION INFILTRATION; PERINEURAL ONCE
Status: COMPLETED | OUTPATIENT
Start: 2023-08-14 | End: 2023-08-14

## 2023-08-14 RX ORDER — 0.9 % SODIUM CHLORIDE 0.9 %
1000 INTRAVENOUS SOLUTION INTRAVENOUS ONCE
Status: COMPLETED | OUTPATIENT
Start: 2023-08-14 | End: 2023-08-14

## 2023-08-14 RX ORDER — DIPHENHYDRAMINE HYDROCHLORIDE 50 MG/ML
25 INJECTION INTRAMUSCULAR; INTRAVENOUS ONCE
Status: COMPLETED | OUTPATIENT
Start: 2023-08-14 | End: 2023-08-14

## 2023-08-14 RX ORDER — DEXAMETHASONE SODIUM PHOSPHATE 10 MG/ML
10 INJECTION, SOLUTION INTRAMUSCULAR; INTRAVENOUS ONCE
Status: COMPLETED | OUTPATIENT
Start: 2023-08-14 | End: 2023-08-14

## 2023-08-14 RX ORDER — BUTALBITAL, ACETAMINOPHEN AND CAFFEINE 50; 325; 40 MG/1; MG/1; MG/1
1 TABLET ORAL ONCE
Status: COMPLETED | OUTPATIENT
Start: 2023-08-14 | End: 2023-08-14

## 2023-08-14 RX ORDER — KETOROLAC TROMETHAMINE 30 MG/ML
30 INJECTION, SOLUTION INTRAMUSCULAR; INTRAVENOUS ONCE
Status: COMPLETED | OUTPATIENT
Start: 2023-08-14 | End: 2023-08-14

## 2023-08-14 RX ORDER — PROCHLORPERAZINE EDISYLATE 5 MG/ML
10 INJECTION INTRAMUSCULAR; INTRAVENOUS ONCE
Status: COMPLETED | OUTPATIENT
Start: 2023-08-14 | End: 2023-08-14

## 2023-08-14 RX ADMIN — KETOROLAC TROMETHAMINE 30 MG: 30 INJECTION, SOLUTION INTRAMUSCULAR; INTRAVENOUS at 10:09

## 2023-08-14 RX ADMIN — BUTALBITAL, ACETAMINOPHEN, AND CAFFEINE 1 TABLET: 50; 325; 40 TABLET ORAL at 12:11

## 2023-08-14 RX ADMIN — PROCHLORPERAZINE EDISYLATE 10 MG: 5 INJECTION INTRAMUSCULAR; INTRAVENOUS at 10:09

## 2023-08-14 RX ADMIN — DIPHENHYDRAMINE HYDROCHLORIDE 25 MG: 50 INJECTION INTRAMUSCULAR; INTRAVENOUS at 10:09

## 2023-08-14 RX ADMIN — DEXAMETHASONE SODIUM PHOSPHATE 10 MG: 10 INJECTION INTRAMUSCULAR; INTRAVENOUS at 15:22

## 2023-08-14 RX ADMIN — VALPROATE SODIUM 500 MG: 100 INJECTION, SOLUTION INTRAVENOUS at 12:12

## 2023-08-14 RX ADMIN — LIDOCAINE HYDROCHLORIDE 20 ML: 10 INJECTION, SOLUTION INFILTRATION; PERINEURAL at 15:21

## 2023-08-14 RX ADMIN — SODIUM CHLORIDE 1000 ML: 9 INJECTION, SOLUTION INTRAVENOUS at 10:09

## 2023-08-14 ASSESSMENT — PAIN DESCRIPTION - LOCATION: LOCATION: HEAD

## 2023-08-14 ASSESSMENT — ENCOUNTER SYMPTOMS
COLOR CHANGE: 0
ABDOMINAL PAIN: 0
DIARRHEA: 0
BACK PAIN: 0
RHINORRHEA: 0
VOMITING: 0
SHORTNESS OF BREATH: 0
NAUSEA: 0

## 2023-08-14 ASSESSMENT — PAIN DESCRIPTION - FREQUENCY: FREQUENCY: CONTINUOUS

## 2023-08-14 ASSESSMENT — PAIN - FUNCTIONAL ASSESSMENT: PAIN_FUNCTIONAL_ASSESSMENT: 0-10

## 2023-08-14 ASSESSMENT — PAIN DESCRIPTION - DESCRIPTORS: DESCRIPTORS: ACHING;SHARP;SHOOTING

## 2023-08-14 ASSESSMENT — PAIN DESCRIPTION - PAIN TYPE: TYPE: ACUTE PAIN

## 2023-08-14 NOTE — ED NOTES
Pt to ed with complaints of an ongoing migraine  Pt states she was advised here per neurologist due to not being able to break pain  Pt states she's been taking prednisone for the past 10 days  Pt states right sided pain that is a constant ache that is also sharp and shooting   Pt states pain 7/10  Pt states she is sensitive to light   Pt alert and oriented x4, talking in complete sentences, respirations even and unlabored. Pt acting age appropriate.  White board updated, will continue to plan of care      Martin Rodriguez RN  08/14/23 2986

## 2023-08-14 NOTE — ED PROVIDER NOTES
[facility] ED  Emergency Department Encounter  Emergency Medicine Resident     Pt Name:Dorothea Judge  MRN: [id number]  [address] [facility] [place] 1985  Date of evaluation: 23  PCP:  MORE Dunn CNP  Note Started: 9:50 AM EDT      CHIEF COMPLAINT       Chief Complaint   Patient presents with    Migraine       HISTORY OF PRESENT ILLNESS  (Location/Symptom, Timing/Onset, Context/Setting, Quality, Duration, Modifying Factors, Severity.)      Reinier Morrell is a 44 y.o. female who presents with migraine headache is been going on for approximately 2 weeks. Past medical history seen for migraines as well as trigeminal neuralgia. Patient was initially seen [facility] for headache with right-sided deficits. Stroke alert was called. Neurology felt that this was more likely secondary to her migraines as well as her chronic right-sided deficits from prior cervical spine surgeries. Patient was discharged followed up with neurology outpatient. Still having severe posterior headaches that are worse with movement. Denies any fever or chills. States that this does not feel like her migraine headaches but has been constant since she had her CT CTAs performed is also been evaluated by neurology. Patient was told by her neurologist that if she continues to have a headache she needs to come the emergency department for a occipital nerve block. PAST MEDICAL / SURGICAL / SOCIAL / FAMILY HISTORY      has a past medical history of Anxiety, Blood loss, Cervical disc disease, Cervical myopathy, Chronic back pain, GERD (gastroesophageal reflux disease), Hx of migraine headaches, Neuropathy, Thyroid nodule, Trigeminal neuralgia of right side of face, Under care of team, Under care of team, Under care of team, Weakness generalized, and Wellness examination.        has a past surgical history that includes  section; Tubal ligation; Dilation and curettage of uterus; cervical fusion

## 2023-08-14 NOTE — PROCEDURES
Sandra,        Procedure Note    Procedure: Bilateral Greater and Lesser Occipital Nerve Block (CPT code 03956, 80095)    Indications:  severe right occipital neuralgia (ICD 10 M54.81)    Informed verbal consent was obtained (explaining the procedure and risks and benefits) from patient. A time out was completed, verifying correct patient, procedure,site, positioning, and implants or special equipment. Patient's right occipital area was palpated to identify location of the greater and the lesser occipital nerve. Alcohol was applied topically to the skin. Using a 27 gauge needle (aspirating during insertion), 7 ml of 2% lidocaine (from a 20 ml bottle) and 1 ml (40 mg) of methylprednisolone was aspirated. 2 ml was injected on the greater and lesser occipital nerve on the right side (directing needle to center, right of painful focus). Pressure with a gauze pad was held briefly upon the site of puncture to minimize bleeding and to further spread anaesthetic subcutaneously. There were no complications. Patient was comfortable and left without complaint. Empty vial of methylprednisolone was discarded. Marcus Fine M.D.  PGY3 Neurology Resident. [address]   Under supervision of  Abhi Ramírez MD

## 2023-08-14 NOTE — CONSULTS
acute abnormality. SOFT TISSUES/SKULL:  No acute abnormality of the visualized skull or soft tissues. No acute intracranial abnormality. These results were sent to the CORE Team on 7/31/2023 at 1:31 pm to be communicated to the referring/covering health care provider/office. CTA HEAD NECK W CONTRAST    Result Date: 7/31/2023  EXAMINATION: CTA OF THE HEAD AND NECK WITH CONTRAST 7/31/2023 1:21 pm: TECHNIQUE: CTA of the head and neck was performed with the administration of intravenous contrast. Multiplanar reformatted images are provided for review. MIP images are provided for review. Stenosis of the internal carotid arteries measured using NASCET criteria. Automated exposure control, iterative reconstruction, and/or weight based adjustment of the mA/kV was utilized to reduce the radiation dose to as low as reasonably achievable. This scan was analyzed using [employer]. ai contact LVO. Identification of suspected findings is not for diagnostic use beyond notification. Viz LVO is limited to analysis of imaging data and should not be used in-lieu of full patient evaluation or relied upon to make or confirm diagnosis. COMPARISON: 05/31/2022. HISTORY: ORDERING SYSTEM PROVIDED HISTORY: eval for [employer] TECHNOLOGIST PROVIDED HISTORY: eval for [employer] Decision Support Exception - unselect if not a suspected or confirmed emergency medical condition->Emergency Medical Condition (MA) Reason for Exam: Tongue swelling, headache, aphasia LKW 615am toda, left sided extremity weakness Initial evaluation. FINDINGS: CTA NECK: AORTIC ARCH/ARCH VESSELS: No dissection or arterial injury. No significant stenosis of the brachiocephalic or subclavian arteries. CAROTID ARTERIES: No dissection, arterial injury, or hemodynamically significant stenosis by NASCET criteria. VERTEBRAL ARTERIES: No dissection, arterial injury, or significant stenosis. SOFT TISSUES: No focal consolidation within the visualized lung apices.   No acute abnormality

## 2023-08-15 ENCOUNTER — CARE COORDINATION (OUTPATIENT)
Dept: CARE COORDINATION | Age: 38
End: 2023-08-15

## 2023-08-15 PROBLEM — R51.9 NONINTRACTABLE HEADACHE: Status: ACTIVE | Noted: 2023-08-15

## 2023-08-15 RX ORDER — LANOLIN ALCOHOL/MO/W.PET/CERES
1000 CREAM (GRAM) TOPICAL DAILY
COMMUNITY

## 2023-08-15 NOTE — TELEPHONE ENCOUNTER
From: Marsha Yañez  To:  Hemal Rizzo DO  Sent: 8/14/2023 8:55 AM EDT  Subject: Continued headache pain    Good morning I am heading over to [facility] for help with constant headaches as you requested

## 2023-08-15 NOTE — CARE COORDINATION
Ambulatory Care Coordination Note  8/15/2023    Patient Current Location:  Home: [address] [place] [id number]      She saw neurology. Went to ED yesterday for intractable headache, was given a right occipital nerve block with methylprednisolone. Headache not constantly throbbing, not excruciating any more, still with nerve issues whenever turns head, is not a new symptom. She started a B12 supplement and got an eye exam, trying to do anything she can on her own to help symptoms. She finished prednisone from neuro, did not really improve symptoms. Hasn't heard from [facility] yet for consult appt, she will call soon to check on it. Was referred to [facility] provider at PCP office for depression, has appt in a few weeks. CC Plan:   -Follow up in a few weeks to review symptoms, check if scheduled with [facility].   General Assessment    Do you have any symptoms that are causing concern?: Yes  Reported Symptoms: Other (Comment: slightly improved headache)                Offered patient enrollment in the Remote Patient Monitoring (RPM) program for in-home monitoring: Patient is not eligible for RPM program.    Lab Results       None            Care Coordination Interventions    Referral from Primary Care Provider: Yes  Suggested Interventions and Community Resources          Goals Addressed    None         Future Appointments   Date Time Provider [address]   9/11/2023 10:00 AM Rex Henriquez, [address] [facility]   9/13/2023  2:30 PM Niyah Talbot DO Neuro [facility] -   11/8/2023  1:30 PM Niyah Talbot DO [facility] -   11/28/2023  2:00 PM [facility]  [facility]   11/28/2023  2:00 PM Saji Kelly MD [facility]

## 2023-08-30 ENCOUNTER — OFFICE VISIT (OUTPATIENT)
Dept: PRIMARY CARE CLINIC | Age: 38
End: 2023-08-30
Payer: COMMERCIAL

## 2023-08-30 ENCOUNTER — CARE COORDINATION (OUTPATIENT)
Dept: CARE COORDINATION | Age: 38
End: 2023-08-30

## 2023-08-30 VITALS
DIASTOLIC BLOOD PRESSURE: 92 MMHG | SYSTOLIC BLOOD PRESSURE: 127 MMHG | BODY MASS INDEX: 33.89 KG/M2 | OXYGEN SATURATION: 100 % | WEIGHT: 216.4 LBS | HEART RATE: 82 BPM

## 2023-08-30 DIAGNOSIS — M79.601 CHRONIC PAIN OF RIGHT UPPER EXTREMITY: ICD-10-CM

## 2023-08-30 DIAGNOSIS — G50.0 TRIGEMINAL NEURALGIA: Primary | ICD-10-CM

## 2023-08-30 DIAGNOSIS — G56.21 CUBITAL TUNNEL SYNDROME, RIGHT: ICD-10-CM

## 2023-08-30 DIAGNOSIS — M54.41 CHRONIC RIGHT-SIDED LOW BACK PAIN WITH RIGHT-SIDED SCIATICA: ICD-10-CM

## 2023-08-30 DIAGNOSIS — M79.2 RADICULAR PAIN IN RIGHT ARM: ICD-10-CM

## 2023-08-30 DIAGNOSIS — G89.29 CHRONIC PAIN OF RIGHT UPPER EXTREMITY: ICD-10-CM

## 2023-08-30 DIAGNOSIS — G89.29 CHRONIC RIGHT-SIDED LOW BACK PAIN WITH RIGHT-SIDED SCIATICA: ICD-10-CM

## 2023-08-30 PROCEDURE — 99214 OFFICE O/P EST MOD 30 MIN: CPT | Performed by: NURSE PRACTITIONER

## 2023-08-30 PROCEDURE — G8427 DOCREV CUR MEDS BY ELIG CLIN: HCPCS | Performed by: NURSE PRACTITIONER

## 2023-08-30 PROCEDURE — 1036F TOBACCO NON-USER: CPT | Performed by: NURSE PRACTITIONER

## 2023-08-30 PROCEDURE — G8417 CALC BMI ABV UP PARAM F/U: HCPCS | Performed by: NURSE PRACTITIONER

## 2023-08-30 RX ORDER — PREGABALIN 200 MG/1
200 CAPSULE ORAL 3 TIMES DAILY
Qty: 90 CAPSULE | Refills: 2 | Status: SHIPPED | OUTPATIENT
Start: 2023-08-30 | End: 2023-11-28

## 2023-08-30 RX ORDER — CETIRIZINE HYDROCHLORIDE 10 MG/1
10 TABLET ORAL DAILY
Qty: 10 TABLET | Refills: 0 | Status: SHIPPED | OUTPATIENT
Start: 2023-08-30

## 2023-08-30 ASSESSMENT — ENCOUNTER SYMPTOMS
COUGH: 0
SHORTNESS OF BREATH: 0

## 2023-08-30 NOTE — CARE COORDINATION
She saw PCP today. Per PCP, patient hasn't been called by [facility] to schedule appt with neurology. Writer called [facility], they received referral 8/2/23, they didn't call patient to schedule, recommended patient call their office to schedule. Attempted to call patient, unable to leave a message on her phone. Sent her message on [employer] with phone number at oRn to call to schedule. Will follow up next week to check on appt.     Future Appointments   Date Time Provider [address] [place] [address]   9/11/2023 10:00 AM [place]   9/13/2023  2:30 PM Torrey Hernandez DO [facility] -   11/1/2023  1:15 PM April Sic, APRN - CNP [facility]   11/8/2023  1:30 PM Torrey Hernandez DO [facility] -   11/28/2023  2:00 PM [facility] EM[facility]  [facility] EM[facility]   11/28/2023  2:00 PM Sony Marcial MD [address] [facility]

## 2023-08-30 NOTE — PROGRESS NOTES
chills and fever. Respiratory:  Negative for cough and shortness of breath. Cardiovascular:  Negative for chest pain, palpitations and leg swelling. Objective   Vitals:    08/30/23 1050   BP: (!) 127/92   Pulse: 82   SpO2: 100%     Physical Exam  Constitutional:       Appearance: She is well-developed. HENT:      Right Ear: External ear normal.      Left Ear: External ear normal.      Nose: Nose normal.   Cardiovascular:      Rate and Rhythm: Normal rate and regular rhythm. Heart sounds: Normal heart sounds, S1 normal and S2 normal.   Pulmonary:      Effort: Pulmonary effort is normal. No respiratory distress. Breath sounds: Normal breath sounds. Musculoskeletal:         General: No deformity. Normal range of motion. Cervical back: Full passive range of motion without pain and normal range of motion. Skin:     General: Skin is warm and dry. Neurological:      Mental Status: She is alert and oriented to person, place, and time. An electronic signature was used to authenticate this note.     --MORE Chahal - CNP

## 2023-09-06 ENCOUNTER — OFFICE VISIT (OUTPATIENT)
Dept: NEUROSURGERY | Age: 38
End: 2023-09-06
Payer: COMMERCIAL

## 2023-09-06 ENCOUNTER — HOSPITAL ENCOUNTER (OUTPATIENT)
Age: 38
Discharge: HOME | End: 2023-09-08
Payer: COMMERCIAL

## 2023-09-06 ENCOUNTER — HOSPITAL ENCOUNTER (OUTPATIENT)
Dept: GENERAL RADIOLOGY | Age: 38
Discharge: HOME | End: 2023-09-08
Payer: COMMERCIAL

## 2023-09-06 VITALS
WEIGHT: 216 LBS | DIASTOLIC BLOOD PRESSURE: 83 MMHG | HEART RATE: 82 BPM | OXYGEN SATURATION: 98 % | SYSTOLIC BLOOD PRESSURE: 134 MMHG | BODY MASS INDEX: 33.9 KG/M2 | TEMPERATURE: 97.9 F | HEIGHT: 67 IN

## 2023-09-06 DIAGNOSIS — G50.0 TRIGEMINAL NEURALGIA OF RIGHT SIDE OF FACE: ICD-10-CM

## 2023-09-06 DIAGNOSIS — R20.2 PARESTHESIA OF UPPER EXTREMITY: ICD-10-CM

## 2023-09-06 DIAGNOSIS — M54.12 CERVICAL RADICULOPATHY: Primary | ICD-10-CM

## 2023-09-06 DIAGNOSIS — M54.12 CERVICAL RADICULOPATHY: ICD-10-CM

## 2023-09-06 PROCEDURE — 99214 OFFICE O/P EST MOD 30 MIN: CPT | Performed by: NURSE PRACTITIONER

## 2023-09-06 PROCEDURE — 72040 X-RAY EXAM NECK SPINE 2-3 VW: CPT

## 2023-09-06 PROCEDURE — 1036F TOBACCO NON-USER: CPT | Performed by: NURSE PRACTITIONER

## 2023-09-06 PROCEDURE — G8427 DOCREV CUR MEDS BY ELIG CLIN: HCPCS | Performed by: NURSE PRACTITIONER

## 2023-09-06 PROCEDURE — G8417 CALC BMI ABV UP PARAM F/U: HCPCS | Performed by: NURSE PRACTITIONER

## 2023-09-06 RX ORDER — PREDNISONE 20 MG/1
TABLET ORAL
Qty: 30 TABLET | Refills: 0 | Status: SHIPPED | OUTPATIENT
Start: 2023-09-06 | End: 2023-09-21

## 2023-09-06 NOTE — PROGRESS NOTES
and completion of upcoming EMG for evaluation with Dr. Shanti Mccain. Followup: No follow-ups on file. Prescriptions Ordered:  Orders Placed This Encounter   Medications    predniSONE (DELTASONE) 20 MG tablet     Sig: Take 3 tablets PO daily for first 5 days, then take 2 tablets PO daily for next 5 days, then take 1 tablet PO daily for last 5 days     Dispense:  30 tablet     Refill:  0      Orders Placed:  Orders Placed This Encounter   Procedures    MRI CERVICAL SPINE WO CONTRAST     Standing Status:   Future     Standing Expiration Date:   9/6/2024     Order Specific Question:   Reason for exam:     Answer:   persistent cervical pain with RUE radiculopathy     Order Specific Question:   What is the sedation requirement? Answer:   None    MRI BRAIN WO CONTRAST     Standing Status:   Future     Standing Expiration Date:   9/6/2024     Order Specific Question:   Reason for exam:     Answer:   thin cut T2 slices with attention to right trigeminal nerve     Order Specific Question:   What is the sedation requirement? Answer:   None    XR CERVICAL SPINE FLEXION AND EXTENSION     Standing Status:   Future     Number of Occurrences:   1     Standing Expiration Date:   9/6/2024     Order Specific Question:   Reason for exam:     Answer:   eval prior arthroplasty        Electronically signed by MORE Reese CNP on 9/6/2023 at 12:54 PM    Please note that this chart was generated using voice recognition Dragon dictation software. Although every effort was made to ensure the accuracy of this automated transcription, some errors in transcription may have occurred.

## 2023-09-11 ENCOUNTER — OFFICE VISIT (OUTPATIENT)
Age: 38
End: 2023-09-11

## 2023-09-11 DIAGNOSIS — F41.9 ANXIETY: ICD-10-CM

## 2023-09-11 DIAGNOSIS — F33.0 MILD EPISODE OF RECURRENT MAJOR DEPRESSIVE DISORDER (HCC): Primary | ICD-10-CM

## 2023-09-11 NOTE — PROGRESS NOTES
ADULT BEHAVIORAL HEALTH ASSESSMENT  TREVOR Moore  Licensed Independent        Visit Date: 9/11/2023   Time of appointment: 10:02 AM    Time spent with Patient: 50 minutes. This is patient's first appointment. Reason for Consult:  Anxiety and Depression     PCP:  MORE Nelson CNP      Pt provided informed consent for the behavioral health program. Discussed with patient model of service to include the limits of confidentiality (i.e. abuse reporting, suicide intervention, etc.) and short-term intervention focused approach. Pt indicated understanding. PRESENTING PROBLEM AND HISTORY  Shantanu Davis is a 44 y.o. female who presents for new evaluation and treatment of anxiety, depression. Shantanu Davis presented for assessment, referred by PCP, r/t worsening stress and depressive symptoms. Shantanu Davis reported she feels this stems from medical issues that began when she was only 22years old. She reported starting in 2004 she experienced significant complications following pregnancy of her first child. She stated she was paralyzed for 6 months. She has struggled with not feeling she has gotten answers to why her health has continued to progressively decline without explanation. Shantanu Davis reported at the time of her first pregnancy she was told the complication could have been due to epidural. Shantanu Davis expressed trying to manage daily responsibilities amidst her health conditions have made functioning harder, stating she now has nerve issues, and feels like a \"science experiment\". She expressed she feels she tries to overcompensate because of this and has not found peace with it.      She has the following symptoms: depressed mood, anhedonia, decreased appetite, decreased sleep, fatigue/lack of energy, lack of motivation, excessive guilt, low self-esteem, isolating self, feeling unable to make decisions, feeling nervous, anxious, or on edge, racing worry thoughts, excessive anxiety and worry about

## 2023-09-13 ENCOUNTER — APPOINTMENT (OUTPATIENT)
Dept: MRI IMAGING | Age: 38
End: 2023-09-13
Payer: COMMERCIAL

## 2023-09-13 ENCOUNTER — OFFICE VISIT (OUTPATIENT)
Dept: NEUROLOGY | Age: 38
End: 2023-09-13

## 2023-09-13 ENCOUNTER — HOSPITAL ENCOUNTER (EMERGENCY)
Age: 38
Discharge: HOME | End: 2023-09-13
Attending: EMERGENCY MEDICINE
Payer: COMMERCIAL

## 2023-09-13 VITALS
OXYGEN SATURATION: 99 % | RESPIRATION RATE: 18 BRPM | DIASTOLIC BLOOD PRESSURE: 93 MMHG | TEMPERATURE: 97.6 F | SYSTOLIC BLOOD PRESSURE: 122 MMHG | HEART RATE: 99 BPM

## 2023-09-13 VITALS
OXYGEN SATURATION: 97 % | HEART RATE: 112 BPM | HEIGHT: 67 IN | DIASTOLIC BLOOD PRESSURE: 87 MMHG | WEIGHT: 215.2 LBS | BODY MASS INDEX: 33.78 KG/M2 | SYSTOLIC BLOOD PRESSURE: 127 MMHG

## 2023-09-13 DIAGNOSIS — M54.9 OTHER CHRONIC BACK PAIN: Primary | ICD-10-CM

## 2023-09-13 DIAGNOSIS — G89.29 OTHER CHRONIC BACK PAIN: Primary | ICD-10-CM

## 2023-09-13 DIAGNOSIS — M54.12 CERVICAL RADICULOPATHY: Primary | ICD-10-CM

## 2023-09-13 PROCEDURE — 99284 EMERGENCY DEPT VISIT MOD MDM: CPT | Performed by: EMERGENCY MEDICINE

## 2023-09-13 PROCEDURE — 72141 MRI NECK SPINE W/O DYE: CPT

## 2023-09-13 PROCEDURE — 6370000000 HC RX 637 (ALT 250 FOR IP)

## 2023-09-13 RX ORDER — PREGABALIN 75 MG/1
75 CAPSULE ORAL 2 TIMES DAILY
Status: DISCONTINUED | OUTPATIENT
Start: 2023-09-13 | End: 2023-09-13

## 2023-09-13 RX ORDER — PREGABALIN 75 MG/1
75 CAPSULE ORAL ONCE
Status: COMPLETED | OUTPATIENT
Start: 2023-09-13 | End: 2023-09-13

## 2023-09-13 RX ORDER — OXYCODONE HYDROCHLORIDE AND ACETAMINOPHEN 5; 325 MG/1; MG/1
1 TABLET ORAL ONCE
Status: COMPLETED | OUTPATIENT
Start: 2023-09-13 | End: 2023-09-13

## 2023-09-13 RX ORDER — OXYCODONE HYDROCHLORIDE AND ACETAMINOPHEN 5; 325 MG/1; MG/1
1 TABLET ORAL EVERY 8 HOURS PRN
Qty: 12 TABLET | Refills: 0 | Status: SHIPPED | OUTPATIENT
Start: 2023-09-13 | End: 2023-09-16

## 2023-09-13 RX ORDER — BACLOFEN 10 MG/1
5 TABLET ORAL 2 TIMES DAILY PRN
Qty: 60 TABLET | Refills: 3 | Status: SHIPPED | OUTPATIENT
Start: 2023-09-13 | End: 2024-05-10

## 2023-09-13 RX ADMIN — PREGABALIN 75 MG: 75 CAPSULE ORAL at 21:08

## 2023-09-13 RX ADMIN — OXYCODONE HYDROCHLORIDE AND ACETAMINOPHEN 1 TABLET: 5; 325 TABLET ORAL at 16:28

## 2023-09-13 ASSESSMENT — ENCOUNTER SYMPTOMS
DIARRHEA: 0
ABDOMINAL PAIN: 0
EYE PAIN: 0
COLOR CHANGE: 0
COUGH: 0
PHOTOPHOBIA: 0
NAUSEA: 0
TROUBLE SWALLOWING: 0
SHORTNESS OF BREATH: 0
NAUSEA: 0
EYE REDNESS: 0
CHEST TIGHTNESS: 0
WHEEZING: 0
VOMITING: 0
VOICE CHANGE: 0
RHINORRHEA: 0
VOMITING: 0
SORE THROAT: 0
SHORTNESS OF BREATH: 0

## 2023-09-13 ASSESSMENT — PAIN SCALES - GENERAL: PAINLEVEL_OUTOF10: 9

## 2023-09-13 NOTE — PROGRESS NOTES
doctor. I have spent 60 minutes face to face with the patient more than 50% of this time was spent counseling and coordinating care.       Electronically signed by Paul Ahuja DO on 9/13/2023 at 3:11 PM

## 2023-09-13 NOTE — ED NOTES
Returned from [address]  09/13/23 1934 Number Of Freeze-Thaw Cycles: 1 freeze-thaw cycle Post-Care Instructions: I reviewed with the patient in detail post-care instructions. Patient is to wear sunprotection, and avoid picking at any of the treated lesions. Pt may apply Vaseline to crusted or scabbing areas. Render Post-Care Instructions In Note?: no Duration Of Freeze Thaw-Cycle (Seconds): 3 Detail Level: Detailed Consent: The patient's consent was obtained including but not limited to risks of crusting, scabbing, blistering, scarring, darker or lighter pigmentary change, recurrence, incomplete removal and infection.

## 2023-09-13 NOTE — ED NOTES
Pt arrived to ED via triage after being sent over by her neurologist.   Pt states her neurologist wanted her to have scans done. Pt states she has been having spine pain for three days that radiates to her chest.   Denies precipitating factor. RR even and unlabored, A+O x 4, ambulatory, call light in reach.       Laura Velasquez, AUBREE  09/13/23 [address], RN  09/13/23 1425

## 2023-09-14 NOTE — DISCHARGE INSTRUCTIONS
IMPRESSION:  Status post interbody fusion at C5-6. Straightening of normal cervical lordosis, may be positional or related to  muscle spasm. Degenerative disc disease as described above, without spinal canal or  foraminal stenosis. Mildly increased T2 signal in the cervical spinal cord at C6, likely related  to mild myelomalacia.

## 2023-09-14 NOTE — ED PROVIDER NOTES
[address] [place] [address] ED  Emergency Department Encounter  Emergency Medicine Resident     Pt Name:Dorothea Henriquez  MRN: [id number]  [address] [facility] [place] 1985  Date of evaluation: 23  PCP:  MORE Huang CNP  Note Started: 6:45 PM EDT      CHIEF COMPLAINT       Chief Complaint   Patient presents with    Back Pain       HISTORY OF PRESENT ILLNESS  (Location/Symptom, Timing/Onset, Context/Setting, Quality, Duration, Modifying Factors, Severity.)      Ricardo Llanes is a 44 y.o. female who presents with neck pain and worsening weakness of the right upper extremity. She was seen by her cardiologist earlier today who recommended that she come to the emergency department for a stat MRI. The patient is a history of cervical disc disease (C5 - C6) with his disk fusion performed in . She has chronic right upper extremity weakness and numbness, however she states that the weakness has been getting worse over the past several days. She states she is able to hold things with her right hand however she feels like she will drop them. No new changes in sensation. She denies any trauma to the head, neck, being involved in MVC, fall. She denies fevers, chills, sweats loss of consciousness, dizziness, blurry vision, denies urinary retention/incontinence, denies fecal incontinence, denies saddle anesthesia. PAST MEDICAL / SURGICAL / SOCIAL / FAMILY HISTORY      has a past medical history of Anxiety, Blood loss, Cervical disc disease, Cervical myopathy, Chronic back pain, GERD (gastroesophageal reflux disease), Hx of migraine headaches, Neuropathy, Thyroid nodule, Trigeminal neuralgia of right side of face, Under care of team, Under care of team, Under care of team, Weakness generalized, and Wellness examination. has a past surgical history that includes  section; Tubal ligation; Dilation and curettage of uterus; cervical fusion (N/A, 2021);  Elbow surgery (Right, 2022);
[address] [place] [address] ED  Emergency Department  Emergency Medicine Resident Sign-out     Care of Juliann Hampton was assumed from Dr. Juan Hill and is being seen for Back Pain  . The patient's initial evaluation and plan have been discussed with the prior provider who initially evaluated the patient. EMERGENCY DEPARTMENT COURSE / MEDICAL DECISION MAKING:       MEDICATIONS GIVEN:  Orders Placed This Encounter   Medications    DISCONTD: pregabalin (LYRICA) capsule 75 mg    oxyCODONE-acetaminophen (PERCOCET) 5-325 MG per tablet 1 tablet    pregabalin (LYRICA) capsule 75 mg    oxyCODONE-acetaminophen (PERCOCET) 5-325 MG per tablet     Sig: Take 1 tablet by mouth every 8 hours as needed for Pain for up to 3 days. Intended supply: 3 days. Take lowest dose possible to manage pain Max Daily Amount: 3 tablets     Dispense:  12 tablet     Refill:  0       LABS / RADIOLOGY:     Labs Reviewed - No data to display    MRI CERVICAL SPINE WO CONTRAST    Result Date: 9/13/2023  EXAMINATION: MRI OF THE CERVICAL SPINE WITHOUT CONTRAST 9/13/2023 7:09 pm TECHNIQUE: Multiplanar multisequence MRI of the cervical spine was performed without the administration of intravenous contrast. COMPARISON: MRI cervical spine November 11, 2022 HISTORY: ORDERING SYSTEM PROVIDED HISTORY: Cervical radiculopathy TECHNOLOGIST PROVIDED HISTORY: Cervical radiculopathy Decision Support Exception - unselect if not a suspected or confirmed emergency medical condition->Emergency Medical Condition ([place]) Reason for Exam: Cervical radiculopathy FINDINGS: BONES/ALIGNMENT: There is straightening of normal cervical lordosis. The patient is status post interbody fusion at C5-6. There is normal alignment of the cervical spine. The vertebral body heights are maintained. No fracture or destructive osseous lesion. There is degenerative disc disease with disc desiccation and mild endplate changes. The intervertebral disc heights are grossly maintained.
Nursing notes reviewed and accepted.    Apoorva Webb is a 34 month old female who presents for 2.5 year old well child exam. Patient presents with Mother.    Concerns raised today include: bed wetting x 1 month, intermittently.    Urinary frequency x 1 wk.  Always thirsty.     Pooped on floor last week:  Potty trained for 6months     No F/C/no other  or GI sx.    Toilet training: yes- day and night  Diet/eating: good eater  Milk: 2%    SOCIAL:  Primary caretakers: mom  : none    DEVELOPMENT:  kicks ball forward, walks up stairs, removes article of clothing (not hat), combines 2 words, uses own name to refer to self, vocabulary of more than 20 words, strangers understand half child's speech, understands a two-step verbal command (\" the toy: put it away\") and points to 6 named body parts    Birth history, medical history, surgical history, and family history reviewed and updated.    PHYSICAL EXAM:  Temperature 98.1 °F (36.7 °C), height 3' 1\" (0.94 m), weight 14 kg.  GENERAL: Well appearing  female, nontoxic, no acute distress.  Alert and interactive.  SKIN: Warm, normal turgor.  No cyanosis. No bruises or lesions.  HEAD: Normocephalic, atraumatic.    EYES: Conjunctivae without injection or icterus. Pupils equal, round, reactive to light; extraocular movements intact.  NOSE: No flaring.  EARS: Tympanic membranes transparent with good landmarks.  THROAT: Oropharynx with moist mucous membranes and no lesions.  NECK: Supple, no lymphadenopathy or masses.  HEART:  Regular rate and rhythm. Quiet precordium. Normal S1, S2. No murmurs, rubs, gallops.   LUNGS:  Clear to auscultation bilaterally.  No wheezes, rales, rhonchi.  Normal work of breathing.  ABDOMEN:  Soft, nontender.  No organomegaly or masses.  GENITOURINARY:  Not examined..  EXTREMITIES:  Warm, dry, without abnormalities.  NEUROLOGIC:  Normal tone, bulk, strength.    ASSESSMENT:  34 month old female well child.  1. Encounter for routine child 
health examination without abnormal findings    2. Need for lead screening    3. Screening for iron deficiency anemia    4. Urine frequency    5. Nocturnal enuresis        PLAN:    Orders Placed This Encounter   • OFFICE/OUTPT VISIT EST LEVEL III   • Hemoglobin and Hematocrit   • Lead Blood/Venous   • Urinalysis & Reflex Microscopy With Culture If Indicated         All parental concerns and questions discussed.  Anticipatory guidance provided, handout given.              Safety/car/bicycle/fire/sharp objects/falls/water              Development              Discipline              Diet              Television              Analgesics/antipyretics              Sun exposure / insect repellent              Tobacco-free home              Dental care              Lead exposure risk: none  Immunizations per orders.  Counseling given for each component including the risks, benefits and possible side effects.  Return to clinic in 6 months for Well Child Exam or sooner prn illness/concerns.      Ddx:  R/o uti/T1DM.    This is a WCC + additional 15 mins spent addressing Parent's chief complaint(s)/coordination of care with answering her questions in detail.    
to the head or neck. No fevers or chills. She reports difficulty swallowing which apparently is chronic secondary to her previous disc surgery. Awake, alert, cooperative, responsive. Speech fluent, normal comprehension. Lungs clear bilaterally. No rales, rhonchi, wheezes, stridor, retractions. Cardiac-S1S2, RRR, no murmur, rub, or gallop. Abdomen soft, nondistended, nontender. No organomegaly, mass, bruit. Normal bowel sounds. On examination of the right upper extremity there is subjectively decreased sensation to simple touch in a glove distribution. Pulses and capillary refill are normal and symmetric with the other side. Strength is diminished with hand grasp biceps triceps and digital extensors. Positive Spurling test is noted. Examination of the neck and back reveal hyperesthesia in the right upper back and lower cervical region. Impression: Possible cervical radiculopathy, potentially compressive in nature. Plan: MRI of the cervical spine is pending.           Priya Vidal MD  09/13/23 0835

## 2023-09-18 ENCOUNTER — CARE COORDINATION (OUTPATIENT)
Dept: CARE COORDINATION | Age: 38
End: 2023-09-18

## 2023-09-18 NOTE — CARE COORDINATION
Ambulatory Care Coordination Note  9/18/2023    Patient Current Location:  Home: [address] [address]      She has appt at [facility] neurology office in November. Recent neuro appt, went to ED for stat MRI cervical spine. Right arm pain worse, feels like a ton of bricks laying on her shoulder, like there's pressure or swelling in it. Can't do much. Working at home, types a lot, hard to write. Scheduled to get EMG and  MRI brain, also will have PT eval for a functional capacity test. She hopes she has results which will show her deficits so everyone will believe what's going on with her. Mood seems very low. She feels she's declining rapidly, constant pain is affecting her home life, being able to care for her children. Appt with [facility] provider in a few weeks. Has problems focusing right eye. She has urinary urgency which is new, doesn't feel like UTI symptoms like she's had before. No urinary incontinence, just feels like she can't hold it at all, can't delay urinating even for a few minutes. Discussed needing to call neurology back regarding her symptoms since they sent her to the ED, discuss pain relief options or what else is being done. She will call PT to schedule appt. CC Plan:   -Follow up in a few weeks to check on testing, appts, symptoms. General Assessment    Do you have any symptoms that are causing concern?: Yes  Progression since Onset: Gradually Worsening  Reported Symptoms: Other (Comment: right arm pain)              Offered patient enrollment in the Remote Patient Monitoring (RPM) program for in-home monitoring: Patient is not eligible for RPM program.    Lab Results       None                 Goals Addressed                   This Visit's Progress     Conditions and Symptoms   On track     I will schedule office visits, as directed by my provider. I will keep my appointment or reschedule if I have to cancel. I will notify my provider of any barriers to my plan of care.   I will

## 2023-09-21 ENCOUNTER — HOSPITAL ENCOUNTER (OUTPATIENT)
Dept: MRI IMAGING | Age: 38
Discharge: HOME | End: 2023-09-23
Payer: COMMERCIAL

## 2023-09-21 ENCOUNTER — TELEPHONE (OUTPATIENT)
Dept: NEUROLOGY | Age: 38
End: 2023-09-21

## 2023-09-21 DIAGNOSIS — M54.12 CERVICAL RADICULOPATHY: ICD-10-CM

## 2023-09-21 DIAGNOSIS — R20.2 PARESTHESIA OF UPPER EXTREMITY: ICD-10-CM

## 2023-09-21 DIAGNOSIS — G50.0 TRIGEMINAL NEURALGIA OF RIGHT SIDE OF FACE: ICD-10-CM

## 2023-09-21 PROCEDURE — 70551 MRI BRAIN STEM W/O DYE: CPT

## 2023-09-21 PROCEDURE — 72141 MRI NECK SPINE W/O DYE: CPT

## 2023-09-21 NOTE — TELEPHONE ENCOUNTER
Patient left voicemail stating that she is unable to lift her right arm. Writer spoke with patient, patient complains of right arm pain that radiates down to fingers. Pain 9/10.

## 2023-10-02 DIAGNOSIS — G43.001 MIGRAINE WITHOUT AURA AND WITH STATUS MIGRAINOSUS, NOT INTRACTABLE: Primary | ICD-10-CM

## 2023-10-02 DIAGNOSIS — G43.809 OTHER MIGRAINE WITHOUT STATUS MIGRAINOSUS, NOT INTRACTABLE: ICD-10-CM

## 2023-10-02 DIAGNOSIS — M50.90 CERVICAL DISC DISEASE: ICD-10-CM

## 2023-10-02 RX ORDER — BUTALBITAL, ACETAMINOPHEN AND CAFFEINE 50; 325; 40 MG/1; MG/1; MG/1
1 TABLET ORAL EVERY 12 HOURS PRN
Qty: 24 TABLET | Refills: 0 | Status: SHIPPED | OUTPATIENT
Start: 2023-10-02 | End: 2023-11-01

## 2023-10-04 ENCOUNTER — HOSPITAL ENCOUNTER (OUTPATIENT)
Dept: PHYSICAL THERAPY | Age: 38
Setting detail: THERAPIES SERIES
Discharge: HOME | End: 2023-10-04

## 2023-10-04 NOTE — FLOWSHEET NOTE
[] [address]  [address] [facility].  [phone]  F: [phone] [] [address] [facility]  [address]   [address]  P: [phone]  F: [phone] [] [address]  [address] [facility]  P: [phone]  F: [phone] [] [facility] Ericka: [phone]  F: [phone] [] [address] [facility]  [facility]   [address]   P: [phone]  F: [phone]  [] [address] [facility].   P: [phone]  F: [phone] [] [address] [facility]  [address]  P: [phone]  F: [phone] [] [address] [facility]  [address] [facility]  [place]: [phone]  F: [phone] [] [facility] [facility] [address]  [place]: [phone]  F: [phone]      Therapy Cancel/No Show note    Date: 10/4/2023  Patient: Marsha Yañez  : 1985  MRN: [id number]      For today's appointment patient:    [x]  Cancelled by clinic    [] Rescheduled appointment    [] No-show     Reason given by patient:    []  Patient ill    []  Conflicting appointment    [] No transportation      [] Conflict with work    [] No reason given    [] Weather related    [] [id number]    [] Other:      Comments: Pt's order is for a FCE, pt was provided with the number and location of clinic to schedule with in order to have this done       [] Next appointment was confirmed    Electronically signed by: Parveen Phillips PT

## 2023-10-12 ENCOUNTER — CARE COORDINATION (OUTPATIENT)
Dept: CARE COORDINATION | Age: 38
End: 2023-10-12

## 2023-10-12 NOTE — CARE COORDINATION
Ambulatory Care Coordination Note  10/12/2023    Patient Current Location: [address] [place], [place] [address]  with patient. No improvement of pain symptoms, feels limited in what she can do. Can't hardly lift right arm, very hard to get out of bed and get moving in morning due to pain. Still working but is very difficult, employer won't let her work from home but cut her hours to part time  Several upcoming appts and tests:  10/24 EMG  11/1 PCP to talk about FMLA for work, she may schedule appt sooner  11/8  neurology appt  11/30 [facility] neuro appt 11 am  11/30 functional capacity exam at [facility], will have to reschedule due to [facility] appt, is on cancellation list  12/12 neurosurgeon appt    Currently has a cough, sneezing, sinus drainage, colder temperatures and weather change also making pain worse. Graduated from care management as denied any further needs. Encouraged her to call writer for any needs I can assist with. General Assessment    Do you have any symptoms that are causing concern?: Yes  Progression since Onset: Unchanged  Reported Symptoms: Pain              Offered patient enrollment in the Remote Patient Monitoring (RPM) program for in-home monitoring: Patient is not eligible for RPM program.    Lab Results       None                 Goals Addressed                   This Visit's Progress     COMPLETED: Conditions and Symptoms   On track     I will schedule office visits, as directed by my provider. I will keep my appointment or reschedule if I have to cancel. I will notify my provider of any barriers to my plan of care. I will notify my provider of any symptoms that indicate a worsening of my condition.     Barriers: lack of education  Plan for overcoming my barriers: ACM calls, education  Confidence: 9/10  Anticipated Goal Completion Date: 10/10/23                Future Appointments   Date Time Provider [address]   10/24/2023  3:00 PM Sunil Ly MD [id number]   11/1/2023  1:15 PM Keily Jones

## 2023-10-23 PROBLEM — G43.409 HEMIPLEGIC MIGRAINE WITHOUT STATUS MIGRAINOSUS, NOT INTRACTABLE: Status: ACTIVE | Noted: 2023-10-23

## 2023-10-24 ENCOUNTER — PROCEDURE VISIT (OUTPATIENT)
Dept: PHYSICAL MEDICINE AND REHAB | Age: 38
End: 2023-10-24

## 2023-10-24 DIAGNOSIS — R20.2 PARESTHESIA OF UPPER EXTREMITY: ICD-10-CM

## 2023-10-24 DIAGNOSIS — G56.21 CUBITAL TUNNEL SYNDROME, RIGHT: Primary | ICD-10-CM

## 2023-10-26 ENCOUNTER — PATIENT MESSAGE (OUTPATIENT)
Dept: NEUROSURGERY | Age: 38
End: 2023-10-26

## 2023-10-26 ENCOUNTER — TELEPHONE (OUTPATIENT)
Dept: SURGERY | Age: 38
End: 2023-10-26

## 2023-10-26 ENCOUNTER — TELEPHONE (OUTPATIENT)
Dept: NEUROSURGERY | Age: 38
End: 2023-10-26

## 2023-10-26 NOTE — TELEPHONE ENCOUNTER
From: Ander Rico  To: Jennifer Curtis  Sent: 10/26/2023 3:07 PM EDT  Subject: Work Release    Can I please have a work note that releases me from working from home. I can go back to full time. Please included an allowance to take breaks when needed.

## 2023-10-26 NOTE — TELEPHONE ENCOUNTER
Patient scheduled with Dr Moraima Pat 12.12.23. No appointment needed today. Patient appointment cancelled. Pending EMG results.

## 2023-10-26 NOTE — TELEPHONE ENCOUNTER
Patient called in 10/26/23. Stated after EMG testing done 10/24/23, she has been experiencing swelling and pain in her right arm that radiates to fingers. Unable to perform ADL's properly or work duties. Request for next available appointment to be seen by NP, Shaheen Hernandez.  Scheduled patient to see NP at [place] location today, 10/26/23 at 12:30pm

## 2023-11-01 ENCOUNTER — OFFICE VISIT (OUTPATIENT)
Dept: PRIMARY CARE CLINIC | Age: 38
End: 2023-11-01
Payer: COMMERCIAL

## 2023-11-01 VITALS
HEART RATE: 73 BPM | OXYGEN SATURATION: 98 % | SYSTOLIC BLOOD PRESSURE: 118 MMHG | DIASTOLIC BLOOD PRESSURE: 74 MMHG | BODY MASS INDEX: 35.18 KG/M2 | WEIGHT: 224.6 LBS

## 2023-11-01 DIAGNOSIS — M54.12 CERVICAL RADICULOPATHY: Primary | ICD-10-CM

## 2023-11-01 PROCEDURE — G8427 DOCREV CUR MEDS BY ELIG CLIN: HCPCS | Performed by: NURSE PRACTITIONER

## 2023-11-01 PROCEDURE — G8417 CALC BMI ABV UP PARAM F/U: HCPCS | Performed by: NURSE PRACTITIONER

## 2023-11-01 PROCEDURE — 99213 OFFICE O/P EST LOW 20 MIN: CPT | Performed by: NURSE PRACTITIONER

## 2023-11-01 PROCEDURE — G8484 FLU IMMUNIZE NO ADMIN: HCPCS | Performed by: NURSE PRACTITIONER

## 2023-11-01 PROCEDURE — 1036F TOBACCO NON-USER: CPT | Performed by: NURSE PRACTITIONER

## 2023-11-01 ASSESSMENT — ENCOUNTER SYMPTOMS
SHORTNESS OF BREATH: 0
COUGH: 0

## 2023-11-01 NOTE — PROGRESS NOTES
Cachorro Butterfield (:  1985) is a 43 y.o. female,Established patient, here for evaluation of the following chief complaint(s):  Numbness (Pt c/o right arm numbness, states its nerve damage- was just demoted at work. Pt states they told her she cant perform her job duties. )         ASSESSMENT/PLAN:  1. Cervical radiculopathy  Pt is asking for a  note to return to work full time. There is already communication in the computer regarding this and NS response okaying the note, NS staff has not provided yet.  will reach out. Return in about 3 months (around 2024). Subjective   SUBJECTIVE/OBJECTIVE:  Neuropathy          Review of Systems   Constitutional:  Negative for chills and fever. Respiratory:  Negative for cough and shortness of breath. Cardiovascular:  Negative for chest pain, palpitations and leg swelling. Neurological:  Positive for numbness. Objective   Vitals:    23 1320   BP: 118/74   Pulse: 73   SpO2: 98%     Physical Exam  Constitutional:       Appearance: She is well-developed. HENT:      Right Ear: External ear normal.      Left Ear: External ear normal.      Nose: Nose normal.   Cardiovascular:      Rate and Rhythm: Normal rate and regular rhythm. Heart sounds: Normal heart sounds, S1 normal and S2 normal.   Pulmonary:      Effort: Pulmonary effort is normal. No respiratory distress. Breath sounds: Normal breath sounds. Musculoskeletal:         General: No deformity. Cervical back: Full passive range of motion without pain and normal range of motion. Skin:     General: Skin is warm and dry. Neurological:      Mental Status: She is alert and oriented to person, place, and time. An electronic signature was used to authenticate this note.     --Geoffrey Hagan, APRN - CNP

## 2023-11-08 ENCOUNTER — OFFICE VISIT (OUTPATIENT)
Dept: NEUROLOGY | Age: 38
End: 2023-11-08

## 2023-11-08 VITALS
SYSTOLIC BLOOD PRESSURE: 122 MMHG | BODY MASS INDEX: 35.16 KG/M2 | HEART RATE: 92 BPM | DIASTOLIC BLOOD PRESSURE: 85 MMHG | HEIGHT: 67 IN | WEIGHT: 224 LBS | TEMPERATURE: 98.2 F

## 2023-11-08 DIAGNOSIS — M54.41 CHRONIC BILATERAL LOW BACK PAIN WITH RIGHT-SIDED SCIATICA: ICD-10-CM

## 2023-11-08 DIAGNOSIS — G62.9 SMALL FIBER NEUROPATHY: ICD-10-CM

## 2023-11-08 DIAGNOSIS — G89.29 CHRONIC BILATERAL LOW BACK PAIN WITH RIGHT-SIDED SCIATICA: ICD-10-CM

## 2023-11-08 DIAGNOSIS — G50.0 TRIGEMINAL NEURALGIA OF RIGHT SIDE OF FACE: ICD-10-CM

## 2023-11-08 DIAGNOSIS — M25.511 CHRONIC RIGHT SHOULDER PAIN: Primary | ICD-10-CM

## 2023-11-08 DIAGNOSIS — G89.29 CHRONIC RIGHT SHOULDER PAIN: Primary | ICD-10-CM

## 2023-11-08 RX ORDER — CAPSAICIN 0.025 %
CREAM (GRAM) TOPICAL
Qty: 50 G | Refills: 1 | Status: SHIPPED | OUTPATIENT
Start: 2023-11-08 | End: 2023-12-08

## 2023-11-08 ASSESSMENT — ENCOUNTER SYMPTOMS
COLOR CHANGE: 0
BACK PAIN: 1
NAUSEA: 0
VOMITING: 0

## 2023-11-08 NOTE — PROGRESS NOTES
[address], [facility], [address], [address]  P: [phone]  F: [phone]    NEUROLOGY CLINIC NOTE     PATIENT NAME: Twin Carey  PATIENT MRN: [id number]  PRIMARY CARE PHYSICIAN: Valencia Tilley, MORE - CNP    HPI:      Twin Carey is a 43 y.o. Twin Carey is a 44 y.o.  with past medical history of migraines, ulnar neuropathy s/p cubital tunnel release in June 2022, neuropathy of both feet, trigeminal neuralgia of right side of face, anxiety, cervical myopathy presents today for follow up. She takes Tegretol 300 mg BID, Cymbalta 60 mg ER, and Lyrica 150 mg TID for trigeminal neuralgia. She complains of increased sciatic pain on right side, radiating to the toes. She denies such complaints on the left side of the back, and these symptoms started 1 week ago. She has been having waxing and waning sciatic pain for years since her first pregnancy. She states her medications do not really help her pain. She continues to experience right hand carpal tunnel symptoms, right sided facial trigeminal neuralgia, and right shoulder and arm pain. These conditions are all chronic for her. Patient underwent cervical spine fusion at C5-6 disc arthroplasty in 4/30/21. Previous lumbar imaging was back in 2021, which showed no evidence of spondylosis or disc bulge. She has undergone imaging of neuraxis from brain to lumbar spine over the past 2 years. She also has chronic pain symptoms including right-sided trigeminal neuralgia which occurs every other day. Previously, the trigeminal pain occurred daily, however decrease in frequency after increasing Tegretol dose to 300 mg twice daily. She rates the pain at 4/10 to 9/10, describes it as sharp, stabbing pain that lasts from a couple of seconds to 30 minutes. She also has right-sided occipital neuralgia which was treated in the ER with occipital nerve block.   She stated she found

## 2023-11-08 NOTE — PATIENT INSTRUCTIONS
Please complete the ordered labs. Continue taking current medications. Please make an appointment with Pain medicine for re-evaluation. Please attempt treatment with Acupuncture.     C crea

## 2023-12-12 ENCOUNTER — OFFICE VISIT (OUTPATIENT)
Dept: NEUROSURGERY | Age: 38
End: 2023-12-12
Payer: COMMERCIAL

## 2023-12-12 VITALS
BODY MASS INDEX: 34.21 KG/M2 | HEIGHT: 67 IN | HEART RATE: 62 BPM | WEIGHT: 218 LBS | DIASTOLIC BLOOD PRESSURE: 100 MMHG | SYSTOLIC BLOOD PRESSURE: 134 MMHG

## 2023-12-12 DIAGNOSIS — G51.39 FACIAL SPASM: Primary | ICD-10-CM

## 2023-12-12 DIAGNOSIS — Z98.890 S/P CERVICAL DISC REPLACEMENT: ICD-10-CM

## 2023-12-12 DIAGNOSIS — M25.511 CHRONIC RIGHT SHOULDER PAIN: ICD-10-CM

## 2023-12-12 DIAGNOSIS — G89.29 CHRONIC RIGHT SHOULDER PAIN: ICD-10-CM

## 2023-12-12 PROCEDURE — G8417 CALC BMI ABV UP PARAM F/U: HCPCS | Performed by: NEUROLOGICAL SURGERY

## 2023-12-12 PROCEDURE — 99214 OFFICE O/P EST MOD 30 MIN: CPT | Performed by: NEUROLOGICAL SURGERY

## 2023-12-12 PROCEDURE — G8484 FLU IMMUNIZE NO ADMIN: HCPCS | Performed by: NEUROLOGICAL SURGERY

## 2023-12-12 PROCEDURE — 1036F TOBACCO NON-USER: CPT | Performed by: NEUROLOGICAL SURGERY

## 2023-12-12 PROCEDURE — G8427 DOCREV CUR MEDS BY ELIG CLIN: HCPCS | Performed by: NEUROLOGICAL SURGERY

## 2023-12-12 NOTE — PROGRESS NOTES
Department of Neurosurgery                                                      Follow up visit      History Obtained From:  patient    CHIEF COMPLAINT:         Chief Complaint   Patient presents with    Follow-up       HISTORY OF PRESENT ILLNESS:       The patient is a 43 y.o. female who presents for follow up for cervical radiculopathy, paresthesia of upper extremity, trigeminal neuralgia of right side of face. Patient currently reports that she is experiencing right sided neck pain that shoots all the way down to her finger. These pains are dependent on each other and all start with the neck and shoulder. She describes this pain as a \"dull ache\" in her neck and down her arm. She reports that the shoulder pain is more trouble some than the neck pain or finger pain. The finger pain is present in her first two fingers on the right hand and is described as \"sharp\". All of her pain is constant and worst in the morning with some days being better than others. Reports that whenever she performed cervical flexion/extension, left side flexion, and right rotation she experiences the shooting pain from the neck to her first two fingers on the right hand. The pain in the fingers remains local to the lateral aspect of the palmar of her right hand and does not go to the tip of her thumb, but can reach the tip of her index finger. Has tried to use a carpal tunnel brace but this did not help. She reports that she has been experiencing headaches and twitching which is a new symptom since July 2023. The worst headache lasted for 2 weeks and felt like her face was \"pulling to the right side\". She presented to the ED where a stroke was ruled out. Reports of occasional twitching in the right eye but most of it is near her mandible. The twitching can last for multiple hours. Denies facial pain.   She has underwent a occipital nerve block which stopped the \"constant pounding headache\" but she still experiences

## 2023-12-20 ENCOUNTER — OFFICE VISIT (OUTPATIENT)
Dept: NEUROLOGY | Age: 38
End: 2023-12-20
Payer: COMMERCIAL

## 2023-12-20 VITALS
BODY MASS INDEX: 36.41 KG/M2 | DIASTOLIC BLOOD PRESSURE: 85 MMHG | WEIGHT: 232 LBS | HEART RATE: 74 BPM | HEIGHT: 67 IN | SYSTOLIC BLOOD PRESSURE: 126 MMHG

## 2023-12-20 DIAGNOSIS — M54.12 CERVICAL RADICULOPATHY: ICD-10-CM

## 2023-12-20 DIAGNOSIS — G50.0 TRIGEMINAL NEURALGIA OF RIGHT SIDE OF FACE: ICD-10-CM

## 2023-12-20 DIAGNOSIS — G62.9 SMALL FIBER NEUROPATHY: Primary | ICD-10-CM

## 2023-12-20 PROCEDURE — G8484 FLU IMMUNIZE NO ADMIN: HCPCS

## 2023-12-20 PROCEDURE — G8427 DOCREV CUR MEDS BY ELIG CLIN: HCPCS

## 2023-12-20 PROCEDURE — 99214 OFFICE O/P EST MOD 30 MIN: CPT

## 2023-12-20 PROCEDURE — G8417 CALC BMI ABV UP PARAM F/U: HCPCS

## 2023-12-20 PROCEDURE — 1036F TOBACCO NON-USER: CPT

## 2023-12-20 NOTE — PROGRESS NOTES
full, no ptosis  V     -     decreased sensation to light touch and pinprick over right hemiface  VII    -     Normal facial symmetry  VIII   -     Intact hearing   IX,X -     Symmetrical palate  XI    -     Symmetrical shoulder shrug  XII   -     Midline tongue, no atrophy    MOTOR FUNCTION: RUE: Significant for good strength of grade 4/5 in proximal and distal muscle groups   LUE: Significant for good strength of grade 5/5 in proximal and distal muscle groups   RLE: Significant for good strength of grade 4/5 in proximal and distal muscle groups   LLE: Significant for good strength of grade 5/5 in proximal and distal muscle groups      Normal bulk, normal tone and no involuntary movements, no tremor   SENSORY FUNCTION: Significant loss of sensation to light touch, pain, temperature in right upper extremity medial surface of hand and forearm and lateral surface of arm on the right as well as right leg up to the knee   CEREBELLAR FUNCTION:  Intact fine motor control over upper limbs and lower limbs   REFLEX FUNCTION:  Symmetric in upper and lower extremities, no Babinski sign or Romeo   STATION and GAIT Slightly ataxic gait       IMAGING:     MRI cervical spine without contrast 2/9/2021: Disc protrusion at C5-6 causing moderate stenosis of the thecal sac and narrowing of the neural foramina as discussed above. MRI brain with and without contrast 9/28/2021:  1. Unremarkable pre and post contrast enhanced MRI of the brain. 2. Unremarkable appearance of the trigeminal nerves, without evidence of  vascular impingement, mass lesion or abnormal enhancement. 3. A right anterior ethmoid air cell contacts the midline nasal septum. In  some patients, such anatomic variation may result in occasional pain and  pressure. If clinically indicated, dedicated CT of the paranasal sinuses may  be obtained for further evaluation    MRI cervical spine wo contrast 5/10/2022:  Interbody fusion at C5-6 resulting in metallic

## 2023-12-28 ENCOUNTER — TELEPHONE (OUTPATIENT)
Dept: NEUROLOGY | Age: 38
End: 2023-12-28

## 2023-12-28 DIAGNOSIS — R13.10 DIFFICULTY SWALLOWING LIQUIDS: Primary | ICD-10-CM

## 2023-12-28 NOTE — TELEPHONE ENCOUNTER
Pt called and stated that she had a disc removed and replaced by Dr. Aquino in 2021 and has slowly gotten worse with her swallowing. States she is having more difficulty swallowing, \"feels like their is a knot in my throat and I have to stop and think to swallow where before I never had to do that.\" States she has not had any other neck surgeries, no dietary changes and has not seen an ENT specialist  in years. Please advise.    Did inform pt that if her breathing is ever compromised or it continually gets worse to where she cannot swallow to go to the ER to be evaluated.

## 2024-01-02 ENCOUNTER — HOSPITAL ENCOUNTER (OUTPATIENT)
Dept: PHYSICAL THERAPY | Age: 39
Setting detail: THERAPIES SERIES
Discharge: HOME | End: 2024-01-02
Payer: COMMERCIAL

## 2024-01-02 PROCEDURE — 97750 PHYSICAL PERFORMANCE TEST: CPT

## 2024-01-02 NOTE — PROGRESS NOTES
Physical Therapy: Initial Evaluation    Patient: Dorothea Hastings (38 y.o. female)   Examination Date: 2024  Plan of Care Certification Period: 2024 to        :  1985 ;    Confirmed: {YES/NO:::\"Yes\"} MRN: [id number]  CSN: [id number]   Insurance: Payor: [place] / Plan: [place] PLAN / Product Type: *No Product type* /   Insurance ID: [id number] - (Medicaid Managed) Secondary Insurance (if applicable):    Referring Physician: El Fuentes APRN - CNP     PCP: El Fuentes APRN - CNP Visits to Date/Visits Approved:   /      No Show/Cancelled Appts:   /       Medical Diagnosis:   M54.41, G89.29 (ICD-10-CM) - Chronic right-sided low back pain with right-sided sciatica   G50.0 (ICD-10-CM) - Trigeminal neuralgia   M79.601, G89.29 (ICD-10-CM) - Chronic pain of right upper extremity   G56.21 (ICD-10-CM) - Cubital tunnel syndrome, right   M79.2 (ICD-10-CM) - Radicular pain in right arm     Treatment Diagnosis:       PERTINENT MEDICAL HISTORY           Medical History:     Past Medical History:   Diagnosis Date    Anxiety     Blood loss     after     Cervical disc disease     Cervical myopathy     Chronic back pain     GERD (gastroesophageal reflux disease)     Hx of migraine headaches     Neuropathy     both legs, feet    Thyroid nodule     Trigeminal neuralgia of right side of face     Under care of team 2022    NEUROLSURGERY - DR. GREENWOOD     Under care of team 2022    NEUROLOGY - DR. NOWAK - LAST VISIT 2022    Under care of team 2022    CARDIOLOGY - UPCOMING FIRST VISIT - DOES NOT REMEMBER NAME    Weakness generalized     due to neck issues    Wellness examination 2022    PCP - EL FUENTES CNP - LAST VISIT  - 2022     Surgical History:   Past Surgical History:   Procedure Laterality Date    CARPAL TUNNEL RELEASE Right 2022    CUBITAL TUNNEL RELEASE performed by Kenia Greenwood DO at [facility] OR

## 2024-01-03 ENCOUNTER — OFFICE VISIT (OUTPATIENT)
Dept: ORTHOPEDIC SURGERY | Age: 39
End: 2024-01-03
Payer: COMMERCIAL

## 2024-01-03 VITALS — RESPIRATION RATE: 14 BRPM | HEIGHT: 67 IN | WEIGHT: 232 LBS | BODY MASS INDEX: 36.41 KG/M2

## 2024-01-03 DIAGNOSIS — M25.511 RIGHT SHOULDER PAIN, UNSPECIFIED CHRONICITY: Primary | ICD-10-CM

## 2024-01-03 PROCEDURE — 99203 OFFICE O/P NEW LOW 30 MIN: CPT | Performed by: ORTHOPAEDIC SURGERY

## 2024-01-03 PROCEDURE — G8484 FLU IMMUNIZE NO ADMIN: HCPCS | Performed by: ORTHOPAEDIC SURGERY

## 2024-01-03 PROCEDURE — 1036F TOBACCO NON-USER: CPT | Performed by: ORTHOPAEDIC SURGERY

## 2024-01-03 PROCEDURE — G8427 DOCREV CUR MEDS BY ELIG CLIN: HCPCS | Performed by: ORTHOPAEDIC SURGERY

## 2024-01-03 PROCEDURE — G8417 CALC BMI ABV UP PARAM F/U: HCPCS | Performed by: ORTHOPAEDIC SURGERY

## 2024-01-03 RX ORDER — DICLOFENAC SODIUM 75 MG/1
75 TABLET, DELAYED RELEASE ORAL 2 TIMES DAILY WITH MEALS
Qty: 28 TABLET | Refills: 0 | Status: SHIPPED | OUTPATIENT
Start: 2024-01-03

## 2024-01-03 NOTE — PROGRESS NOTES
Orthopedic Shoulder Encounter Note     Chief complaint: right shoulder pain    HPI: Dorothea Hastings is a 38 y.o.  right-hand dominant female who presents for evaluation of her right shoulder.  She indicates that she has been dealing with pain for about 2 years now.  This developed after she had cervical spine surgery in 2021.  She describes having a \"heavy weight\" on her right shoulder.  It feels like \"someone constantly\" pulling on this arm.  She has pain primarily localized to the posterior aspect of the shoulder.  It also radiates from the cervical and thoracic spine into the shoulder blade she does have some pain in the axillary region as well.    Previous treatment:    NSAIDs: Motrin as needed    Physical Therapy: No    Injections: None    Surgeries: None    Review of Systems:   Constitutional: Negative for fever, chills, sweats.   Pain Score:   6  Neurological: Negative for headache, numbness, or weakness.   Musculoskeletal: As noted in HPI     Past Medical History  Dorothea  has a past medical history of Anxiety, Blood loss, Cervical disc disease, Cervical myopathy, Chronic back pain, GERD (gastroesophageal reflux disease), Hx of migraine headaches, Neuropathy, Thyroid nodule, Trigeminal neuralgia of right side of face, Under care of team, Under care of team, Under care of team, Weakness generalized, and Wellness examination.    Past Surgical History  Dorothea  has a past surgical history that includes  section; Tubal ligation; Dilation and curettage of uterus; cervical fusion (N/A, 2021); Elbow surgery (Right, 2022); and Carpal tunnel release (Right, 2022).    Current Medications  Current Outpatient Medications   Medication Sig Dispense Refill    DULoxetine (CYMBALTA) 60 MG extended release capsule TAKE 1 CAPSULE BY MOUTH ONCE DAILY 30 capsule 5    butalbital-acetaminophen-caffeine (FIORICET, ESGIC) -40 MG per tablet Take 1 tablet by mouth every 12 hours as needed for

## 2024-01-03 NOTE — TELEPHONE ENCOUNTER
Called pt and Lm for her with notes, and for her to call back with who she would like referral sent to

## 2024-01-09 ENCOUNTER — OFFICE VISIT (OUTPATIENT)
Dept: PAIN MANAGEMENT | Age: 39
End: 2024-01-09
Payer: COMMERCIAL

## 2024-01-09 VITALS — BODY MASS INDEX: 36.41 KG/M2 | WEIGHT: 232 LBS | OXYGEN SATURATION: 98 % | HEART RATE: 71 BPM | HEIGHT: 67 IN

## 2024-01-09 DIAGNOSIS — M54.12 CERVICAL RADICULITIS: Primary | ICD-10-CM

## 2024-01-09 DIAGNOSIS — G95.9 CERVICAL MYELOPATHY (HCC): ICD-10-CM

## 2024-01-09 DIAGNOSIS — Z98.1 HX OF FUSION OF CERVICAL SPINE: ICD-10-CM

## 2024-01-09 PROCEDURE — G8427 DOCREV CUR MEDS BY ELIG CLIN: HCPCS | Performed by: ANESTHESIOLOGY

## 2024-01-09 PROCEDURE — G8484 FLU IMMUNIZE NO ADMIN: HCPCS | Performed by: ANESTHESIOLOGY

## 2024-01-09 PROCEDURE — 99215 OFFICE O/P EST HI 40 MIN: CPT | Performed by: ANESTHESIOLOGY

## 2024-01-09 PROCEDURE — G8417 CALC BMI ABV UP PARAM F/U: HCPCS | Performed by: ANESTHESIOLOGY

## 2024-01-09 PROCEDURE — 1036F TOBACCO NON-USER: CPT | Performed by: ANESTHESIOLOGY

## 2024-01-09 ASSESSMENT — ENCOUNTER SYMPTOMS
BACK PAIN: 1
CONSTIPATION: 0
DIARRHEA: 1
VOMITING: 0
WHEEZING: 0
CHEST TIGHTNESS: 0
SHORTNESS OF BREATH: 0
NAUSEA: 0

## 2024-01-09 NOTE — PROGRESS NOTES
The patient is a 38 y.o. /  female.    Chief Complaint   Patient presents with    Back Pain     New to Dr. Shukla    Neck Pain        HPI    Requesting physician for the evaluation of Dorothea Hastings 1985: Constantin Judd, DO     Pain History  38-year-old female with history of chronic neck pain  Pain is predominantly located on the right side of the neck  Reports radiation of pain down right arm all the way to the fingers  Pain is constant dull aching sensation  Associated with right arm numbness  Symptoms aggravated with neck movement  No previous cervical spine injection history  She did extensive physical therapy  At previous cervical spine surgery 3 years ago ACDF and  As well ulnar nerve release  Continuing to have neck and right arm pain  Was recently evaluated by the orthopedics to rule out shoulder pathology  Follow-up imaging recently for ongoing pain with MRI cervical spine did not show nerve root impingement but did show mild myelopathic changes  Was recently evaluated by neurosurgery Dr. harrison and was recommended for cervical epidural injection  Pain score today  7  Symptoms are progressively worsening affecting quality of life refractory to NSAIDs and physical therapy  1. Location:back & neck pain   2. Radiation:right side of body  3. Character:shooting, burning, Pressure   5. Duration:years  6. Onset: years  7. Did an injury cause pain: yes surgical injury  8. Aggravating factors:walking, standing, bending, twisting  9. Alleviating factors:heat  10. Associated symptoms (numbness / tingling / weakness):  yes  -Where at:bilateral legs and arms  -Down into finger tips or toes (specify which finger or toes):yes fingertips and toes  -constant or intermitting: constant  11. Red Flags: (weight loss / chills / loss of bladder or bowel control):no    Previous management history  1. Previous diagnostic workup: (Imaging/EMG)   CT, MRI, or Xray: yes  What part of the body:back and right

## 2024-01-09 NOTE — TELEPHONE ENCOUNTER
Called pt and informed her of notes and to call her PCP to be seen for swallowing issues. They might also be able to refer her to ENT for further evaluation. Pt states she wants her speech therapy referral to [facility], gave pt [facility] number for speech and another number to [facility] but unsure if they will see her. Pt states her verbal understanding of the information.

## 2024-01-10 ENCOUNTER — TELEPHONE (OUTPATIENT)
Dept: NEUROSURGERY | Age: 39
End: 2024-01-10

## 2024-01-10 NOTE — TELEPHONE ENCOUNTER
Indira from [facility] authorization dept called and stated that the MRI of R shoulder was denied and we need do a peer to peer. Asked about other imaging orders and Indira does not have information on other others and whether they have been denied or approved.    Case/Reference #: [id number]  Insurance call number: [phone]

## 2024-01-10 NOTE — TELEPHONE ENCOUNTER
Need to find out why it was denied, patient has also been able to see ortho surgery for her shoulder.

## 2024-01-12 ENCOUNTER — HOSPITAL ENCOUNTER (OUTPATIENT)
Dept: MRI IMAGING | Age: 39
End: 2024-01-12
Attending: NEUROLOGICAL SURGERY
Payer: COMMERCIAL

## 2024-01-12 ENCOUNTER — HOSPITAL ENCOUNTER (OUTPATIENT)
Dept: CT IMAGING | Age: 39
End: 2024-01-12
Attending: NEUROLOGICAL SURGERY
Payer: COMMERCIAL

## 2024-01-12 DIAGNOSIS — G51.39 FACIAL SPASM: ICD-10-CM

## 2024-01-12 DIAGNOSIS — M25.511 CHRONIC RIGHT SHOULDER PAIN: ICD-10-CM

## 2024-01-12 DIAGNOSIS — Z98.890 S/P CERVICAL DISC REPLACEMENT: ICD-10-CM

## 2024-01-12 DIAGNOSIS — G89.29 CHRONIC RIGHT SHOULDER PAIN: ICD-10-CM

## 2024-01-12 PROCEDURE — 70553 MRI BRAIN STEM W/O & W/DYE: CPT

## 2024-01-12 PROCEDURE — 72125 CT NECK SPINE W/O DYE: CPT

## 2024-01-12 PROCEDURE — 73221 MRI JOINT UPR EXTREM W/O DYE: CPT

## 2024-01-12 PROCEDURE — 6360000004 HC RX CONTRAST MEDICATION: Performed by: NEUROLOGICAL SURGERY

## 2024-01-12 PROCEDURE — A9579 GAD-BASE MR CONTRAST NOS,1ML: HCPCS | Performed by: NEUROLOGICAL SURGERY

## 2024-01-12 PROCEDURE — 2580000003 HC RX 258: Performed by: NEUROLOGICAL SURGERY

## 2024-01-12 RX ORDER — SODIUM CHLORIDE 0.9 % (FLUSH) 0.9 %
10 SYRINGE (ML) INJECTION PRN
Status: DISCONTINUED | OUTPATIENT
Start: 2024-01-12 | End: 2024-01-15 | Stop reason: HOSPADM

## 2024-01-12 RX ADMIN — GADOTERIDOL 15 ML: 279.3 INJECTION, SOLUTION INTRAVENOUS at 14:32

## 2024-01-12 RX ADMIN — SODIUM CHLORIDE, PRESERVATIVE FREE 10 ML: 5 INJECTION INTRAVENOUS at 14:33

## 2024-01-17 ENCOUNTER — HOSPITAL ENCOUNTER (OUTPATIENT)
Dept: PHYSICAL THERAPY | Facility: CLINIC | Age: 39
Setting detail: THERAPIES SERIES
Discharge: HOME | End: 2024-01-17
Attending: ORTHOPAEDIC SURGERY
Payer: COMMERCIAL

## 2024-01-17 PROCEDURE — 97161 PT EVAL LOW COMPLEX 20 MIN: CPT

## 2024-01-17 PROCEDURE — 97110 THERAPEUTIC EXERCISES: CPT

## 2024-01-17 NOTE — CONSULTS
in: 1000       Time out: 1051    Electronically signed by: Ej Martínez PT        Physician Signature:________________________________Date:__________________  By signing above or cosigning this note, I have reviewed this plan of care and certify a need for medically necessary rehabilitation services.     *PLEASE SIGN ABOVE AND FAX BACK ALL PAGES*

## 2024-01-18 NOTE — TELEPHONE ENCOUNTER
Called Insurance company at number provided and they state there is no consideration request as an appeal should have been completed within 5 days from denial. MRI R shoulder was denied due to no PT visit notes being sent, as needed to go to PT for 4-6 weeks and in encounters the only PT evaluation was 1/2/24 but has 3 more PT days scheduled this month. If want to resubmit would need new clinical notes from PT, submitted with our most recent office notes. But looking in notes MRI was completed 2 days later, so did ortho get approval? Please advise.    reference# [id number]

## 2024-01-22 ENCOUNTER — HOSPITAL ENCOUNTER (OUTPATIENT)
Dept: PHYSICAL THERAPY | Facility: CLINIC | Age: 39
Setting detail: THERAPIES SERIES
Discharge: HOME | End: 2024-01-22
Attending: ORTHOPAEDIC SURGERY
Payer: COMMERCIAL

## 2024-01-22 PROCEDURE — 97110 THERAPEUTIC EXERCISES: CPT

## 2024-01-22 NOTE — FLOWSHEET NOTE
[facility]  [address] [address]  Phone: [phone]  Fax: [phone]      Physical Therapy Daily Treatment Note    Date:  2024  Patient Name:  Dorothea Hastings    :  1985  MRN: [id number]  Physician: Eric Laura MD                                  Insurance: [place] ( Visits Approved, AUTH AFTER )  Medical Diagnosis: M25.511 (ICD-10-CM) - Right shoulder pain, unspecified chronicity                   Rehab Codes: M25.511, M25.611  Onset Date: 21                                Next 's appt.: --  Visit# / total visits: ; Progress note for Medicare patient due at visit -     Cancels/No Shows:     Subjective:  Pt arriving to PT with 8/10 pain noting that she has been having elevated pain over this past weekend.  Pain:  [x] Yes  [] No  Location: R Shoulder   Pain Rating: (0-10 scale) 8/10  Pain altered Tx:  [x] No  [] Yes  Action:  Comments:    Objective:  Modalities:   Precautions: Standard   Work on right shoulder, trapezius, periscapular muscle stretching and strengthening   Exercise  R Shoulder Reps/ Time Weight/ Level Comments    UBE 6'   x   Pulleys 4'   Flex, scapt  x              UT S  3x30\"     x   Levator S  3x30\"     x   Doorway lat S                                           Wall slides 10x10\"    Flex, scp x                         Isometrics 2x10, 3\"   Flex, ER, IR x              Other:      Treatment Charges: Mins Units   []  Modalities     [x]  Ther Exercise 42 3   []  Manual Therapy     []  Ther Activities     []  Neuro Re-ed     []  Vasocompression     [] Gait     []  Other     Total Billable time 42 3       Assessment: [x] Progressing toward goals. Completed tx per log with poor tolerance. Pt continues to complete all of program in painful manner, taking her time and completing exercises slowly. Began with warm up and then to pulleys. Followed up with wall slides to keep working motion. Next able to

## 2024-02-01 ENCOUNTER — TELEPHONE (OUTPATIENT)
Dept: PAIN MANAGEMENT | Age: 39
End: 2024-02-01

## 2024-02-01 NOTE — TELEPHONE ENCOUNTER
Dr montesinos is an FYI on Appeal/Peer 2 Peer for pt:    Started appeal on 01/30 per [employer] at [place] they did not have the date of service as a denial or procedure. We have ltr stating that it was she more or less said that it is an error on our part to have the dept resubmit. Talked with dept and Lanette was advised to call back and ask for a SUPR did that talked with Konstantin and got connected to the right dept but she said that their timeframe for an appeal and or peer to peer is 48 hours from the date on letter (01/23/24) She transferred gave them all the information and they will call us back to set up if in their guidelines.

## 2024-02-02 NOTE — TELEPHONE ENCOUNTER
Called insurance company ([employer] preauthorization line) to see if there is anything else we can submit as we cannot overturn appeal as Pt did not complete enough PT notes, only went 2 times, rest were no shows. Line provided information for multiple studies for MRI and request is approved through 2/3/24. Auth #: [id number]. But also stated that MRI R shoulder needs further discussion and Dr. Aquino not ready for peer to peer.     Discussed with representative Taylor and she states peer to peer cannot be scheduled, the provider will just call and discuss information, but need results of information before peer to peer. Fax results for MRI shoulder, clinical notes, and can send PT.     Fax:[phone] just add tracking number to notes  Call:[phone]   Case/Reference #: [id number]     2/2/24: faxed PT notes, office notes, MRI results

## 2024-02-06 ENCOUNTER — OFFICE VISIT (OUTPATIENT)
Dept: PAIN MANAGEMENT | Age: 39
End: 2024-02-06
Payer: COMMERCIAL

## 2024-02-06 VITALS — BODY MASS INDEX: 36.41 KG/M2 | OXYGEN SATURATION: 96 % | HEART RATE: 90 BPM | WEIGHT: 232 LBS | HEIGHT: 67 IN

## 2024-02-06 DIAGNOSIS — G95.9 CERVICAL MYELOPATHY (HCC): ICD-10-CM

## 2024-02-06 DIAGNOSIS — M54.12 CERVICAL RADICULITIS: Primary | ICD-10-CM

## 2024-02-06 DIAGNOSIS — Z98.1 HX OF FUSION OF CERVICAL SPINE: ICD-10-CM

## 2024-02-06 PROCEDURE — G8427 DOCREV CUR MEDS BY ELIG CLIN: HCPCS | Performed by: ANESTHESIOLOGY

## 2024-02-06 PROCEDURE — 1036F TOBACCO NON-USER: CPT | Performed by: ANESTHESIOLOGY

## 2024-02-06 PROCEDURE — G8484 FLU IMMUNIZE NO ADMIN: HCPCS | Performed by: ANESTHESIOLOGY

## 2024-02-06 PROCEDURE — G8417 CALC BMI ABV UP PARAM F/U: HCPCS | Performed by: ANESTHESIOLOGY

## 2024-02-06 PROCEDURE — 99214 OFFICE O/P EST MOD 30 MIN: CPT | Performed by: ANESTHESIOLOGY

## 2024-02-06 RX ORDER — HYDROCODONE BITARTRATE AND ACETAMINOPHEN 5; 325 MG/1; MG/1
1 TABLET ORAL DAILY PRN
Qty: 30 TABLET | Refills: 0 | Status: SHIPPED | OUTPATIENT
Start: 2024-02-06 | End: 2024-03-07

## 2024-02-06 ASSESSMENT — ENCOUNTER SYMPTOMS
BACK PAIN: 1
RESPIRATORY NEGATIVE: 1
GASTROINTESTINAL NEGATIVE: 1

## 2024-02-06 NOTE — PROGRESS NOTES
The patient is a 38 y.o. /  female.    Chief Complaint   Patient presents with    Back Pain    Neck Pain     Procedure denial         Patient is here today for: Back neck pain  Pain level: 9/10  Character: shooting aching  Radiating: yes bilateral arms and legs  Weakness or numbness: both arms hands  Aggravating Factors: anything  Alleviating Factors: nothing  Constant or intermitting: consent  Bladder/bowel loss: no      Past Medical History:   Diagnosis Date    Anxiety     Blood loss     after     Cervical disc disease     Cervical myopathy     Chronic back pain     GERD (gastroesophageal reflux disease)     Hx of migraine headaches     Neuropathy     both legs, feet    Thyroid nodule     Trigeminal neuralgia of right side of face     Under care of team 2022    NEUROLSURGERY - DR. AQUINO     Under care of team 2022    NEUROLOGY - DR. NOWAK - LAST VISIT 2022    Under care of team 2022    CARDIOLOGY - UPCOMING FIRST VISIT - DOES NOT REMEMBER NAME    Weakness generalized     due to neck issues    Wellness examination 2022    PCP - DYAN FUENTESCNP - LAST VISIT  - 2022        Past Surgical History:   Procedure Laterality Date    CARPAL TUNNEL RELEASE Right 2022    CUBITAL TUNNEL RELEASE performed by Kenia Aquino DO at [facility] OR    CERVICAL FUSION N/A 2021    ANTERIOR C5-6 DISC ARTHROPLASTY performed by Kenia Aquino DO at [facility] OR     SECTION      x3    DILATION AND CURETTAGE OF UTERUS      ELBOW SURGERY Right 2022    CUBITAL TUNNEL RELEASE (Right Arm Upper)    TUBAL LIGATION         Social History     Socioeconomic History    Marital status:      Spouse name: None    Number of children: None    Years of education: None    Highest education level: None   Tobacco Use    Smoking status: Former     Current packs/day: 0.00     Types: Cigarettes     Start date:      Quit date:      Years since quittin.1    Smokeless

## 2024-02-06 NOTE — PROGRESS NOTES
Chronic Pain Clinic Note     Encounter Date: 2/6/2024     SUBJECTIVE:    Chief Complaint:  Chief Complaint   Patient presents with    Back Pain    Neck Pain     Procedure denial 2/1       History of Present Illness:   Dorothea Hastings is a 38 y.o. female with past medical history of chronic neck pain predominantly located in the right side radiating to the shoulder and right arm numbness and tingling.  Has a history of C5-C6 ACDF surgery in 2021.  Patient was supposed to get an epidural steroid injection, however was denied by insurance.     Current Complaints of Pain:   Location: Neck    Radiation: Right arm  Severity: 9/10   Character/Quality: Complains of pain that is dull, sharp, and shooting pain  Timing: Morning, Prevents or limits ADLs, Constant  Associated symptoms: weakness-of the right arm and numbness and tingling  Exacerbating factors: Normal daily activities  Alleviating factors:Heat,  Mild to moderate relief with Lyrica And Cymbalta, however nothing helps with the shooting pain when it flares up      History of Interventions:   Surgery: No previous lumbar/cervical surgeries  Injections: None  Physical Therapy: Goes to physical therapy when able, and sometimes pain is severe enough for her not to be able to do physical therapy    Medications & Allergies:   Current Outpatient Medications   Medication Instructions    baclofen (LIORESAL) 5 mg, Oral, 2 TIMES DAILY PRN    Blood Pressure KIT Blood pressure management    butalbital-acetaminophen-caffeine (FIORICET, ESGIC) -40 MG per tablet 1 tablet, Oral, EVERY 12 HOURS PRN    carBAMazepine (TEGRETOL XR) 200 MG extended release tablet TAKE 1 TABLET BY MOUTH ONCE DAILY AND TAKE 1 TABLET AT BEDTIME    cetirizine (ZYRTEC) 10 mg, Oral, DAILY    diclofenac (VOLTAREN) 75 mg, Oral, 2 TIMES DAILY WITH MEALS    DULoxetine (CYMBALTA) 60 MG extended release capsule TAKE 1 CAPSULE BY MOUTH ONCE DAILY    Handicap Placard MISC Does not apply    Magnesium Oxide 400

## 2024-02-07 ENCOUNTER — OFFICE VISIT (OUTPATIENT)
Dept: PRIMARY CARE CLINIC | Age: 39
End: 2024-02-07
Payer: COMMERCIAL

## 2024-02-07 VITALS — WEIGHT: 232 LBS | BODY MASS INDEX: 36.34 KG/M2 | OXYGEN SATURATION: 98 % | HEART RATE: 68 BPM | TEMPERATURE: 97.2 F

## 2024-02-07 DIAGNOSIS — N64.4 BREAST PAIN: Primary | ICD-10-CM

## 2024-02-07 DIAGNOSIS — Z86.39 HISTORY OF THYROID NODULE: ICD-10-CM

## 2024-02-07 DIAGNOSIS — R22.1 NECK FULLNESS: ICD-10-CM

## 2024-02-07 PROCEDURE — G8427 DOCREV CUR MEDS BY ELIG CLIN: HCPCS | Performed by: NURSE PRACTITIONER

## 2024-02-07 PROCEDURE — G8484 FLU IMMUNIZE NO ADMIN: HCPCS | Performed by: NURSE PRACTITIONER

## 2024-02-07 PROCEDURE — 1036F TOBACCO NON-USER: CPT | Performed by: NURSE PRACTITIONER

## 2024-02-07 PROCEDURE — G8417 CALC BMI ABV UP PARAM F/U: HCPCS | Performed by: NURSE PRACTITIONER

## 2024-02-07 PROCEDURE — 99214 OFFICE O/P EST MOD 30 MIN: CPT | Performed by: NURSE PRACTITIONER

## 2024-02-07 SDOH — ECONOMIC STABILITY: INCOME INSECURITY: HOW HARD IS IT FOR YOU TO PAY FOR THE VERY BASICS LIKE FOOD, HOUSING, MEDICAL CARE, AND HEATING?: NOT VERY HARD

## 2024-02-07 SDOH — ECONOMIC STABILITY: FOOD INSECURITY: WITHIN THE PAST 12 MONTHS, YOU WORRIED THAT YOUR FOOD WOULD RUN OUT BEFORE YOU GOT MONEY TO BUY MORE.: NEVER TRUE

## 2024-02-07 SDOH — ECONOMIC STABILITY: FOOD INSECURITY: WITHIN THE PAST 12 MONTHS, THE FOOD YOU BOUGHT JUST DIDN'T LAST AND YOU DIDN'T HAVE MONEY TO GET MORE.: NEVER TRUE

## 2024-02-07 ASSESSMENT — PATIENT HEALTH QUESTIONNAIRE - PHQ9
1. LITTLE INTEREST OR PLEASURE IN DOING THINGS: 0
7. TROUBLE CONCENTRATING ON THINGS, SUCH AS READING THE NEWSPAPER OR WATCHING TELEVISION: 0
10. IF YOU CHECKED OFF ANY PROBLEMS, HOW DIFFICULT HAVE THESE PROBLEMS MADE IT FOR YOU TO DO YOUR WORK, TAKE CARE OF THINGS AT HOME, OR GET ALONG WITH OTHER PEOPLE: 0
SUM OF ALL RESPONSES TO PHQ QUESTIONS 1-9: 1
9. THOUGHTS THAT YOU WOULD BE BETTER OFF DEAD, OR OF HURTING YOURSELF: 0
8. MOVING OR SPEAKING SO SLOWLY THAT OTHER PEOPLE COULD HAVE NOTICED. OR THE OPPOSITE, BEING SO FIGETY OR RESTLESS THAT YOU HAVE BEEN MOVING AROUND A LOT MORE THAN USUAL: 0
SUM OF ALL RESPONSES TO PHQ QUESTIONS 1-9: 1
3. TROUBLE FALLING OR STAYING ASLEEP: 0
SUM OF ALL RESPONSES TO PHQ9 QUESTIONS 1 & 2: 0
5. POOR APPETITE OR OVEREATING: 0
6. FEELING BAD ABOUT YOURSELF - OR THAT YOU ARE A FAILURE OR HAVE LET YOURSELF OR YOUR FAMILY DOWN: 0
2. FEELING DOWN, DEPRESSED OR HOPELESS: 0
SUM OF ALL RESPONSES TO PHQ QUESTIONS 1-9: 1
SUM OF ALL RESPONSES TO PHQ QUESTIONS 1-9: 1
4. FEELING TIRED OR HAVING LITTLE ENERGY: 1

## 2024-02-07 NOTE — PROGRESS NOTES
Normal breath sounds.   Musculoskeletal:         General: No deformity. Normal range of motion.      Cervical back: Full passive range of motion without pain and normal range of motion.   Skin:     General: Skin is warm and dry.   Neurological:      Mental Status: She is alert and oriented to person, place, and time.            An electronic signature was used to authenticate this note.    --DYAN FUENTES, MORE - CNP

## 2024-02-08 ENCOUNTER — TELEPHONE (OUTPATIENT)
Dept: PRIMARY CARE CLINIC | Age: 39
End: 2024-02-08

## 2024-02-08 DIAGNOSIS — R22.1 NECK FULLNESS: ICD-10-CM

## 2024-02-08 DIAGNOSIS — N64.4 BREAST PAIN: Primary | ICD-10-CM

## 2024-02-08 DIAGNOSIS — Z86.39 HISTORY OF THYROID NODULE: ICD-10-CM

## 2024-02-08 NOTE — TELEPHONE ENCOUNTER
[facility] scheduling called with request to change order of US of neck/thorax to US of head, neck and soft tissue thyroid d/t insurance.  Also needs separate US order for breast to be done  along with mammogram.

## 2024-02-09 ENCOUNTER — CLINICAL DOCUMENTATION (OUTPATIENT)
Dept: PAIN MANAGEMENT | Age: 39
End: 2024-02-09

## 2024-02-09 NOTE — PROGRESS NOTES
Spoke with insurance company MD for peer to peer. Clarified pt has failed PT and the expected level for RYLEY is C7-T1. Insurance will review and let us know updated outcome

## 2024-02-15 ENCOUNTER — HOSPITAL ENCOUNTER (EMERGENCY)
Age: 39
Discharge: HOME | End: 2024-02-16
Attending: EMERGENCY MEDICINE
Payer: COMMERCIAL

## 2024-02-15 VITALS
WEIGHT: 220 LBS | RESPIRATION RATE: 18 BRPM | BODY MASS INDEX: 34.46 KG/M2 | OXYGEN SATURATION: 99 % | SYSTOLIC BLOOD PRESSURE: 130 MMHG | DIASTOLIC BLOOD PRESSURE: 95 MMHG | TEMPERATURE: 98.1 F | HEART RATE: 84 BPM

## 2024-02-15 DIAGNOSIS — M54.2 NECK PAIN: ICD-10-CM

## 2024-02-15 DIAGNOSIS — M54.13 RADICULOPATHY OF CERVICOTHORACIC REGION: Primary | ICD-10-CM

## 2024-02-15 PROCEDURE — 99284 EMERGENCY DEPT VISIT MOD MDM: CPT

## 2024-02-16 ENCOUNTER — APPOINTMENT (OUTPATIENT)
Dept: GENERAL RADIOLOGY | Age: 39
End: 2024-02-16
Payer: COMMERCIAL

## 2024-02-16 ENCOUNTER — APPOINTMENT (OUTPATIENT)
Dept: CT IMAGING | Age: 39
End: 2024-02-16
Payer: COMMERCIAL

## 2024-02-16 PROCEDURE — 72125 CT NECK SPINE W/O DYE: CPT

## 2024-02-16 PROCEDURE — 96372 THER/PROPH/DIAG INJ SC/IM: CPT

## 2024-02-16 PROCEDURE — 6360000002 HC RX W HCPCS: Performed by: EMERGENCY MEDICINE

## 2024-02-16 PROCEDURE — 71045 X-RAY EXAM CHEST 1 VIEW: CPT

## 2024-02-16 RX ORDER — PREDNISONE 20 MG/1
50 TABLET ORAL DAILY
Qty: 13 TABLET | Refills: 0 | Status: SHIPPED | OUTPATIENT
Start: 2024-02-16 | End: 2024-02-21

## 2024-02-16 RX ORDER — HYDROMORPHONE HYDROCHLORIDE 1 MG/ML
1 INJECTION, SOLUTION INTRAMUSCULAR; INTRAVENOUS; SUBCUTANEOUS ONCE
Status: COMPLETED | OUTPATIENT
Start: 2024-02-16 | End: 2024-02-16

## 2024-02-16 RX ORDER — KETOROLAC TROMETHAMINE 30 MG/ML
30 INJECTION, SOLUTION INTRAMUSCULAR; INTRAVENOUS ONCE
Status: COMPLETED | OUTPATIENT
Start: 2024-02-16 | End: 2024-02-16

## 2024-02-16 RX ORDER — HYDROCODONE BITARTRATE AND ACETAMINOPHEN 5; 325 MG/1; MG/1
1 TABLET ORAL EVERY 4 HOURS PRN
Qty: 12 TABLET | Refills: 0 | Status: SHIPPED | OUTPATIENT
Start: 2024-02-16 | End: 2024-02-21

## 2024-02-16 RX ORDER — IBUPROFEN 600 MG/1
600 TABLET ORAL EVERY 8 HOURS PRN
Qty: 120 TABLET | Refills: 0 | Status: SHIPPED | OUTPATIENT
Start: 2024-02-16

## 2024-02-16 RX ADMIN — HYDROMORPHONE HYDROCHLORIDE 1 MG: 1 INJECTION, SOLUTION INTRAMUSCULAR; INTRAVENOUS; SUBCUTANEOUS at 00:33

## 2024-02-16 RX ADMIN — KETOROLAC TROMETHAMINE 30 MG: 30 INJECTION INTRAMUSCULAR; INTRAVENOUS at 00:33

## 2024-02-16 NOTE — ED PROVIDER NOTES
hours as needed for Headaches, Disp-24 tablet, R-0Normal      baclofen (LIORESAL) 10 MG tablet Take 0.5 tablets by mouth 2 times daily as needed (back pain), Disp-60 tablet, R-3Normal      cetirizine (ZYRTEC) 10 MG tablet Take 1 tablet by mouth daily, Disp-10 tablet, R-0Normal      pregabalin (LYRICA) 200 MG capsule Take 1 capsule by mouth 3 times daily for 90 days. Max Daily Amount: 600 mg, Disp-90 capsule, R-2Normal      vitamin B-12 (CYANOCOBALAMIN) 1000 MCG tablet Take 1 tablet by mouth dailyHistorical Med      Magnesium Oxide 400 MG CAPS Take 400 mg by mouth daily, Disp-30 capsule, R-3Normal      verapamil (CALAN) 120 MG tablet Take 1 tablet by mouth daily, Disp-30 tablet, R-3Normal      carBAMazepine (TEGRETOL XR) 200 MG extended release tablet TAKE 1 TABLET BY MOUTH ONCE DAILY AND TAKE 1 TABLET AT BEDTIME, Disp-60 tablet, R-10Normal      Handicap Placard MISC Starting 2022, Disp-1 each, R-0, Print      Blood Pressure KIT Disp-1 kit, R-0, NormalBlood pressure management       !! - Potential duplicate medications found. Please discuss with provider.        ALLERGIES     is allergic to bee venom and doxycycline.  FAMILY HISTORY     She indicated that her mother is alive. She indicated that her father is alive. She indicated that the status of her sister is unknown. She indicated that her maternal grandmother is . She indicated that the status of her maternal uncle is unknown. She indicated that the status of her paternal aunt is unknown.     SOCIAL HISTORY       Social History     Tobacco Use    Smoking status: Former     Current packs/day: 0.00     Types: Cigarettes     Start date:      Quit date:      Years since quittin.1    Smokeless tobacco: Never    Tobacco comments:     [id number], smoker then   Vaping Use    Vaping Use: Never used   Substance Use Topics    Alcohol use: Yes     Comment: 2 glasses of wine a month    Drug use: Never     PHYSICAL EXAM     INITIAL VITALS: BP (!)

## 2024-02-16 NOTE — DISCHARGE INSTRUCTIONS
You may take the additional pain meds being prescribed up to every 4 hours as needed for more severe episodes of pain.  I do recommend an anti-inflammatory.  Make sure to discuss with your doctor steroids given your upcoming epidural.   [Annual] : an annual visit.

## 2024-02-27 ENCOUNTER — HOSPITAL ENCOUNTER (OUTPATIENT)
Dept: MAMMOGRAPHY | Age: 39
Discharge: HOME | End: 2024-02-29
Payer: COMMERCIAL

## 2024-02-27 ENCOUNTER — HOSPITAL ENCOUNTER (OUTPATIENT)
Dept: ULTRASOUND IMAGING | Age: 39
Discharge: HOME | End: 2024-02-29
Payer: COMMERCIAL

## 2024-02-27 VITALS — HEIGHT: 67 IN | WEIGHT: 210 LBS | BODY MASS INDEX: 32.96 KG/M2

## 2024-02-27 DIAGNOSIS — R22.1 NECK FULLNESS: ICD-10-CM

## 2024-02-27 DIAGNOSIS — Z86.39 HISTORY OF THYROID NODULE: ICD-10-CM

## 2024-02-27 DIAGNOSIS — N64.4 BREAST PAIN: ICD-10-CM

## 2024-02-27 PROCEDURE — G0279 TOMOSYNTHESIS, MAMMO: HCPCS

## 2024-02-27 PROCEDURE — 76536 US EXAM OF HEAD AND NECK: CPT

## 2024-02-27 PROCEDURE — 76642 ULTRASOUND BREAST LIMITED: CPT

## 2024-02-27 NOTE — RESULT ENCOUNTER NOTE
No mammographic or sonographic evidence of malignancy.  No suspicious imaging  correlate for the pain/irritation concerns of the left breast.  Clinical  follow-up is advised.    Based on the Tyrer Cuzick (DINO) model, this patient's lifetime risk for  developing breast cancer is 20.04%.    The American Cancer Society considers women with a 20% or greater lifetime  risk for developing breast cancer as \"high risk\" .  Women at risk for breast  cancer may benefit from additional screening, which may include an annual  screening mammogram alternating every six (6) months with a breast MRI, as    appropriate.    Recommend that this patient consult with her preferred provider about: A  genetic counseling consultation to discuss formal risk assessment, and a  medical oncology consultation to discuss risk reduction strategies (if not  already performed). Assistance, if requested, is available through the [facility]  high risk breast program at [phone], or by placing an [facility] ambulatory  referral to the high risk breast clinic\" .

## 2024-03-13 ENCOUNTER — HOSPITAL ENCOUNTER (OUTPATIENT)
Dept: PAIN MANAGEMENT | Facility: CLINIC | Age: 39
Discharge: HOME | End: 2024-03-13
Payer: COMMERCIAL

## 2024-03-13 VITALS
BODY MASS INDEX: 32.96 KG/M2 | TEMPERATURE: 97.8 F | HEART RATE: 68 BPM | DIASTOLIC BLOOD PRESSURE: 70 MMHG | HEIGHT: 67 IN | RESPIRATION RATE: 17 BRPM | WEIGHT: 210 LBS | OXYGEN SATURATION: 100 % | SYSTOLIC BLOOD PRESSURE: 115 MMHG

## 2024-03-13 DIAGNOSIS — R52 PAIN MANAGEMENT: ICD-10-CM

## 2024-03-13 DIAGNOSIS — G99.2 STENOSIS OF CERVICAL SPINE WITH MYELOPATHY (HCC): ICD-10-CM

## 2024-03-13 DIAGNOSIS — M54.12 CERVICAL RADICULITIS: Primary | ICD-10-CM

## 2024-03-13 DIAGNOSIS — M48.02 STENOSIS OF CERVICAL SPINE WITH MYELOPATHY (HCC): ICD-10-CM

## 2024-03-13 DIAGNOSIS — Z98.1 HX OF FUSION OF CERVICAL SPINE: ICD-10-CM

## 2024-03-13 LAB — HCG, PREGNANCY URINE (POC): NEGATIVE

## 2024-03-13 PROCEDURE — 6360000004 HC RX CONTRAST MEDICATION: Performed by: ANESTHESIOLOGY

## 2024-03-13 PROCEDURE — 2500000003 HC RX 250 WO HCPCS: Performed by: ANESTHESIOLOGY

## 2024-03-13 PROCEDURE — 99152 MOD SED SAME PHYS/QHP 5/>YRS: CPT | Performed by: ANESTHESIOLOGY

## 2024-03-13 PROCEDURE — 6360000002 HC RX W HCPCS: Performed by: ANESTHESIOLOGY

## 2024-03-13 PROCEDURE — 2580000003 HC RX 258: Performed by: ANESTHESIOLOGY

## 2024-03-13 PROCEDURE — 81025 URINE PREGNANCY TEST: CPT

## 2024-03-13 PROCEDURE — 62321 NJX INTERLAMINAR CRV/THRC: CPT

## 2024-03-13 PROCEDURE — 62321 NJX INTERLAMINAR CRV/THRC: CPT | Performed by: ANESTHESIOLOGY

## 2024-03-13 RX ORDER — DEXAMETHASONE SODIUM PHOSPHATE 10 MG/ML
INJECTION, SOLUTION INTRAMUSCULAR; INTRAVENOUS
Status: COMPLETED | OUTPATIENT
Start: 2024-03-13 | End: 2024-03-13

## 2024-03-13 RX ORDER — LIDOCAINE HYDROCHLORIDE 10 MG/ML
INJECTION, SOLUTION EPIDURAL; INFILTRATION; INTRACAUDAL; PERINEURAL
Status: COMPLETED | OUTPATIENT
Start: 2024-03-13 | End: 2024-03-13

## 2024-03-13 RX ORDER — MIDAZOLAM HYDROCHLORIDE 2 MG/2ML
INJECTION, SOLUTION INTRAMUSCULAR; INTRAVENOUS
Status: COMPLETED | OUTPATIENT
Start: 2024-03-13 | End: 2024-03-13

## 2024-03-13 RX ORDER — FENTANYL CITRATE 50 UG/ML
INJECTION, SOLUTION INTRAMUSCULAR; INTRAVENOUS
Status: COMPLETED | OUTPATIENT
Start: 2024-03-13 | End: 2024-03-13

## 2024-03-13 RX ORDER — SODIUM CHLORIDE 0.9 % (FLUSH) 0.9 %
SYRINGE (ML) INJECTION
Status: COMPLETED | OUTPATIENT
Start: 2024-03-13 | End: 2024-03-13

## 2024-03-13 RX ADMIN — LIDOCAINE HYDROCHLORIDE 3 ML: 10 INJECTION, SOLUTION EPIDURAL; INFILTRATION; INTRACAUDAL at 07:40

## 2024-03-13 RX ADMIN — IOHEXOL 1 ML: 180 INJECTION INTRAVENOUS at 07:41

## 2024-03-13 RX ADMIN — DEXAMETHASONE SODIUM PHOSPHATE 10 MG: 10 INJECTION, SOLUTION INTRAMUSCULAR; INTRAVENOUS at 07:42

## 2024-03-13 RX ADMIN — Medication 5 ML: at 07:42

## 2024-03-13 RX ADMIN — MIDAZOLAM HYDROCHLORIDE 1 MG: 1 INJECTION, SOLUTION INTRAMUSCULAR; INTRAVENOUS at 07:38

## 2024-03-13 RX ADMIN — FENTANYL CITRATE 50 MCG: 50 INJECTION, SOLUTION INTRAMUSCULAR; INTRAVENOUS at 07:39

## 2024-03-13 ASSESSMENT — PAIN - FUNCTIONAL ASSESSMENT
PAIN_FUNCTIONAL_ASSESSMENT: PREVENTS OR INTERFERES WITH ALL ACTIVE AND SOME PASSIVE ACTIVITIES
PAIN_FUNCTIONAL_ASSESSMENT: 0-10
PAIN_FUNCTIONAL_ASSESSMENT: 0-10

## 2024-03-13 ASSESSMENT — PAIN DESCRIPTION - DESCRIPTORS: DESCRIPTORS: SHARP;TINGLING

## 2024-03-13 ASSESSMENT — PAIN SCALES - GENERAL: PAINLEVEL_OUTOF10: 5

## 2024-03-13 NOTE — OP NOTE
Preoperative Diagnosis: Cervical disc herniation and radiculitis  Postoperative Diagnosis:  Cervical disc herniation and radiculitis    Procedure Performed:  Cervical epidural steroid injection under fluoroscopy guidance    BLOOD LOSS: NONE    Procedure:      The Patient was seen in the preop area, chart was reviewed, informed consent was obtained. Patient was taken to procedure room and was placed in prone position. Vital signs were monitored through out the  Procedure. A time out was completed. The site was prepped and draped in sterile manner.     The target point was marked at The interlaminar space at C7/T1 . Skin and deep tissues were anesthetized with 1 % lidocaine.A  19-gauge Tuohy epidural needlele was advanced  under fluoroscopy guidance in AP view. Epidural space was identified using MICHELLE technique. A 19 G catheter was threaded up in epidural space for 2 levels. Position was confirmed in Lateral view.   Then after negative aspiration contrast dye was injected with live fluoroscopy in AP views that showed  spread of the contrast in the epidural space  and no vascular runoff or intrathecal spread. Finally 5 ml of treatment solution containing 4 ml of PF NS and 1 ml of dexamethasone 10 mg / ml was injected.  The needle was removed and a Band-Aid was placed over the needle  insertion site.  The patient's vital signs remained stable and the patient tolerated the procedure well.      SEDATION NOTE:    ASA CLASSIFICATION  2  MP   CLASSIFICATION  2    Moderate intravenous conscious sedation was supervised by Dr. Shukla  The patient was independently monitored by a Registered Nurse assigned to the Procedure Room  Monitoring included automated blood pressure, continuous EKG, Capnography and continuous pulse oximetry.   The detailed Conscious Record is permanently stored in the Hospital Information System.     The following is the conscious sedation record;  Start Time:  0732  End times:  0745  Duration:  13

## 2024-03-13 NOTE — DISCHARGE INSTRUCTIONS

## 2024-03-13 NOTE — H&P
as pertinent to diagnosis, except as stated in HPI.      Physical Exam  Constitutional:       Appearance: Normal appearance.   Pulmonary:      Effort: Pulmonary effort is normal.   Neurological:      Mental Status: alert.   Psychiatric:         Attention and Perception: Attention and perception normal.         Mood and Affect: Mood and affect normal.   Cardiovascular:      Rate: Normal rate.         ASA: 2          Mallampati: 2       Patient's current physical status, medications, medical history, and HPI have been reviewed and updated as appropriate on this date: 03/13/24    Risk/Benefit(s): The risks, benefits, alternatives, and potential complications have been discussed with the patient/family and informed consent has been obtained for the procedure/sedation.    Diagnosis:   1. Cervical radiculitis    2. Hx of fusion of cervical spine    3. Stenosis of cervical spine with myelopathy (HCC)            Plan:   Cervical letty        Navin Shukla MD

## 2024-03-14 ENCOUNTER — CLINICAL DOCUMENTATION (OUTPATIENT)
Dept: PHYSICAL THERAPY | Facility: CLINIC | Age: 39
End: 2024-03-14

## 2024-03-14 NOTE — DISCHARGE SUMMARY
Therapy  71795 [] Electrical Stimulation Attended  97104   [x] Instruction in HEP  [] Lumbar/Cervical Traction  17735   [] Aquatic Therapy   53160 [] Cold/hotpack    [] Massage   78090      [] Dry Needling, 1 or 2 muscles  20560   [] Biofeedback, first 15 minutes   90912  [] Biofeedback, additional 15 minutes   90913 [] Dry Needling, 3 or more muscles  20561                If you have any questions or concerns regarding this patient's care, please contact us.   Thank you for your referral.      Electronically signed by: Ej Martínez PT

## 2024-03-26 ENCOUNTER — OFFICE VISIT (OUTPATIENT)
Dept: NEUROSURGERY | Age: 39
End: 2024-03-26
Payer: COMMERCIAL

## 2024-03-26 ENCOUNTER — OFFICE VISIT (OUTPATIENT)
Dept: PAIN MANAGEMENT | Age: 39
End: 2024-03-26
Payer: COMMERCIAL

## 2024-03-26 VITALS
DIASTOLIC BLOOD PRESSURE: 87 MMHG | HEIGHT: 67 IN | HEART RATE: 86 BPM | WEIGHT: 223.1 LBS | SYSTOLIC BLOOD PRESSURE: 141 MMHG | BODY MASS INDEX: 35.02 KG/M2

## 2024-03-26 VITALS — WEIGHT: 223 LBS | OXYGEN SATURATION: 97 % | BODY MASS INDEX: 35 KG/M2 | HEIGHT: 67 IN | HEART RATE: 90 BPM

## 2024-03-26 DIAGNOSIS — M47.812 CERVICAL SPONDYLOSIS: Primary | ICD-10-CM

## 2024-03-26 DIAGNOSIS — G56.21 ULNAR NEUROPATHY OF RIGHT UPPER EXTREMITY: ICD-10-CM

## 2024-03-26 DIAGNOSIS — Z98.890 S/P CUBITAL TUNNEL RELEASE: ICD-10-CM

## 2024-03-26 DIAGNOSIS — G56.21 CUBITAL TUNNEL SYNDROME, RIGHT: ICD-10-CM

## 2024-03-26 DIAGNOSIS — Z98.890 S/P CERVICAL DISC REPLACEMENT: ICD-10-CM

## 2024-03-26 DIAGNOSIS — Z98.890 S/P CUBITAL TUNNEL RELEASE: Primary | ICD-10-CM

## 2024-03-26 PROCEDURE — 1036F TOBACCO NON-USER: CPT | Performed by: NEUROLOGICAL SURGERY

## 2024-03-26 PROCEDURE — G8427 DOCREV CUR MEDS BY ELIG CLIN: HCPCS | Performed by: NEUROLOGICAL SURGERY

## 2024-03-26 PROCEDURE — G8427 DOCREV CUR MEDS BY ELIG CLIN: HCPCS | Performed by: ANESTHESIOLOGY

## 2024-03-26 PROCEDURE — G8417 CALC BMI ABV UP PARAM F/U: HCPCS | Performed by: NEUROLOGICAL SURGERY

## 2024-03-26 PROCEDURE — 99214 OFFICE O/P EST MOD 30 MIN: CPT | Performed by: ANESTHESIOLOGY

## 2024-03-26 PROCEDURE — G8484 FLU IMMUNIZE NO ADMIN: HCPCS | Performed by: NEUROLOGICAL SURGERY

## 2024-03-26 PROCEDURE — 99214 OFFICE O/P EST MOD 30 MIN: CPT | Performed by: NEUROLOGICAL SURGERY

## 2024-03-26 PROCEDURE — G8417 CALC BMI ABV UP PARAM F/U: HCPCS | Performed by: ANESTHESIOLOGY

## 2024-03-26 PROCEDURE — 1036F TOBACCO NON-USER: CPT | Performed by: ANESTHESIOLOGY

## 2024-03-26 PROCEDURE — G8484 FLU IMMUNIZE NO ADMIN: HCPCS | Performed by: ANESTHESIOLOGY

## 2024-03-26 RX ORDER — HYDROCODONE BITARTRATE AND ACETAMINOPHEN 5; 325 MG/1; MG/1
1 TABLET ORAL DAILY PRN
Qty: 30 TABLET | Refills: 0 | Status: SHIPPED | OUTPATIENT
Start: 2024-03-26 | End: 2024-04-25

## 2024-03-26 ASSESSMENT — ENCOUNTER SYMPTOMS
SORE THROAT: 0
EYES NEGATIVE: 1
CHEST TIGHTNESS: 0
BACK PAIN: 1
ALLERGIC/IMMUNOLOGIC NEGATIVE: 1
GASTROINTESTINAL NEGATIVE: 1
SHORTNESS OF BREATH: 0

## 2024-03-26 NOTE — PROGRESS NOTES
after.    We have given the patient a cubital tunnel brace for her right arm and I have instructed her to wear it at night.    Follow up in 6 months    By signing my name below, I, Keny Hernandez, attest that this documentation has been prepared under the direction and in the presence of Kenia Aquino DO. Electronically signed: Zari Neal,     03/26/2024    This note was created using voice recognition software. There may be inaccuracies of transcription  that are inadvertently overlooked prior to the signature.  There is any questions about the transcription please contact me.

## 2024-03-26 NOTE — PROGRESS NOTES
The patient is a 38 y.o. /  female.    Chief Complaint   Patient presents with    Follow Up After Procedure    Neck Pain        HPI      Dx:Cervical disc herniation and radiculitis   S/P:Cervical epidural steroid injection under fluoroscopy guidance       Outcome   Any improvement of activity?  No   Any side effects (appetite,leg cramping,facial fleshing): Migraines daily. Worse at night. Constant throbbing in right eye, watery eyes.   Increase of pain:  No  Pain score Today:  7  % of pain relief: 10  Pain diary (medial branch block): No    Hemoglobin A1C   Date Value Ref Range Status   2021 5.1 % Final         Past Medical History:   Diagnosis Date    Anxiety     Blood loss     after     Cervical disc disease     Cervical myopathy     Chronic back pain     GERD (gastroesophageal reflux disease)     Hx of migraine headaches     Neuropathy     both legs, feet    Thyroid nodule     Trigeminal neuralgia of right side of face     Under care of team 2022    NEUROLSURGERY - DR. AQUINO     Under care of team 2022    NEUROLOGY - DR. NOWAK - LAST VISIT 2022    Under care of team 2022    CARDIOLOGY - UPCOMING FIRST VISIT - DOES NOT REMEMBER NAME    Weakness generalized     due to neck issues    Wellness examination 2022    PCP - DYAN FUENTES CNP - LAST VISIT  - 2022        Past Surgical History:   Procedure Laterality Date    CARPAL TUNNEL RELEASE Right 2022    CUBITAL TUNNEL RELEASE performed by Kenia Aquino DO at [facility] OR    CERVICAL FUSION N/A 2021    ANTERIOR C5-6 DISC ARTHROPLASTY performed by Kenia Aquino DO at [facility] OR     SECTION      x3    DILATION AND CURETTAGE OF UTERUS      ELBOW SURGERY Right 2022    CUBITAL TUNNEL RELEASE (Right Arm Upper)    TUBAL LIGATION         Social History     Socioeconomic History    Marital status:      Spouse name: None    Number of children: None    Years of education: None    Highest

## 2024-04-12 DIAGNOSIS — G99.2 STENOSIS OF CERVICAL SPINE WITH MYELOPATHY (HCC): ICD-10-CM

## 2024-04-12 DIAGNOSIS — M48.02 STENOSIS OF CERVICAL SPINE WITH MYELOPATHY (HCC): ICD-10-CM

## 2024-04-12 DIAGNOSIS — M54.12 CERVICAL RADICULOPATHY: ICD-10-CM

## 2024-04-17 ENCOUNTER — HOSPITAL ENCOUNTER (EMERGENCY)
Age: 39
Discharge: HOME | End: 2024-04-17
Attending: EMERGENCY MEDICINE
Payer: COMMERCIAL

## 2024-04-17 VITALS
SYSTOLIC BLOOD PRESSURE: 107 MMHG | DIASTOLIC BLOOD PRESSURE: 77 MMHG | WEIGHT: 220.46 LBS | TEMPERATURE: 98.4 F | HEART RATE: 76 BPM | RESPIRATION RATE: 18 BRPM | OXYGEN SATURATION: 99 % | BODY MASS INDEX: 33.41 KG/M2 | HEIGHT: 68 IN

## 2024-04-17 DIAGNOSIS — M79.605 PAIN AND SWELLING OF LEFT LOWER EXTREMITY: ICD-10-CM

## 2024-04-17 DIAGNOSIS — M79.89 PAIN AND SWELLING OF LEFT LOWER EXTREMITY: ICD-10-CM

## 2024-04-17 DIAGNOSIS — R79.89 D-DIMER, ELEVATED: Primary | ICD-10-CM

## 2024-04-17 LAB
ANION GAP SERPL CALCULATED.3IONS-SCNC: 9 MMOL/L (ref 9–17)
BASOPHILS # BLD: 0.1 K/UL (ref 0–0.2)
BASOPHILS NFR BLD: 1 % (ref 0–2)
BUN SERPL-MCNC: 13 MG/DL (ref 6–20)
CALCIUM SERPL-MCNC: 9 MG/DL (ref 8.6–10.4)
CHLORIDE SERPL-SCNC: 107 MMOL/L (ref 98–107)
CO2 SERPL-SCNC: 26 MMOL/L (ref 20–31)
CREAT SERPL-MCNC: 0.8 MG/DL (ref 0.5–0.9)
D DIMER PPP FEU-MCNC: 0.88 UG/ML FEU
EOSINOPHIL # BLD: 0.2 K/UL (ref 0–0.4)
EOSINOPHILS RELATIVE PERCENT: 2 % (ref 1–4)
ERYTHROCYTE [DISTWIDTH] IN BLOOD BY AUTOMATED COUNT: 14 % (ref 12.5–15.4)
GFR SERPL CREATININE-BSD FRML MDRD: >90 ML/MIN/1.73M2
GLUCOSE SERPL-MCNC: 94 MG/DL (ref 70–99)
HCT VFR BLD AUTO: 39.7 % (ref 36–46)
HGB BLD-MCNC: 13.2 G/DL (ref 12–16)
LYMPHOCYTES NFR BLD: 3.1 K/UL (ref 1–4.8)
LYMPHOCYTES RELATIVE PERCENT: 27 % (ref 24–44)
MCH RBC QN AUTO: 29.9 PG (ref 26–34)
MCHC RBC AUTO-ENTMCNC: 33.3 G/DL (ref 31–37)
MCV RBC AUTO: 89.7 FL (ref 80–100)
MONOCYTES NFR BLD: 0.7 K/UL (ref 0.1–1.2)
MONOCYTES NFR BLD: 6 % (ref 2–11)
NEUTROPHILS NFR BLD: 64 % (ref 36–66)
NEUTS SEG NFR BLD: 7.3 K/UL (ref 1.8–7.7)
PLATELET # BLD AUTO: 405 K/UL (ref 140–450)
PMV BLD AUTO: 9.2 FL (ref 6–12)
POTASSIUM SERPL-SCNC: 4.3 MMOL/L (ref 3.7–5.3)
RBC # BLD AUTO: 4.43 M/UL (ref 4–5.2)
SODIUM SERPL-SCNC: 142 MMOL/L (ref 135–144)
WBC OTHER # BLD: 11.4 K/UL (ref 3.5–11)

## 2024-04-17 PROCEDURE — 99284 EMERGENCY DEPT VISIT MOD MDM: CPT

## 2024-04-17 PROCEDURE — 85025 COMPLETE CBC W/AUTO DIFF WBC: CPT

## 2024-04-17 PROCEDURE — 85379 FIBRIN DEGRADATION QUANT: CPT

## 2024-04-17 PROCEDURE — 6360000002 HC RX W HCPCS: Performed by: NURSE PRACTITIONER

## 2024-04-17 PROCEDURE — 6370000000 HC RX 637 (ALT 250 FOR IP): Performed by: NURSE PRACTITIONER

## 2024-04-17 PROCEDURE — 36415 COLL VENOUS BLD VENIPUNCTURE: CPT

## 2024-04-17 PROCEDURE — 96372 THER/PROPH/DIAG INJ SC/IM: CPT

## 2024-04-17 PROCEDURE — 80048 BASIC METABOLIC PNL TOTAL CA: CPT

## 2024-04-17 RX ORDER — ENOXAPARIN SODIUM 100 MG/ML
1 INJECTION SUBCUTANEOUS ONCE
Status: COMPLETED | OUTPATIENT
Start: 2024-04-17 | End: 2024-04-17

## 2024-04-17 RX ORDER — HYDROCODONE BITARTRATE AND ACETAMINOPHEN 5; 325 MG/1; MG/1
1 TABLET ORAL ONCE
Status: COMPLETED | OUTPATIENT
Start: 2024-04-17 | End: 2024-04-17

## 2024-04-17 RX ADMIN — HYDROCODONE BITARTRATE AND ACETAMINOPHEN 1 TABLET: 5; 325 TABLET ORAL at 21:01

## 2024-04-17 RX ADMIN — ENOXAPARIN SODIUM 100 MG: 100 INJECTION SUBCUTANEOUS at 21:01

## 2024-04-17 ASSESSMENT — PAIN DESCRIPTION - ORIENTATION
ORIENTATION: LEFT
ORIENTATION: LEFT

## 2024-04-17 ASSESSMENT — PAIN SCALES - GENERAL
PAINLEVEL_OUTOF10: 8
PAINLEVEL_OUTOF10: 8

## 2024-04-17 ASSESSMENT — PAIN DESCRIPTION - DESCRIPTORS: DESCRIPTORS: ACHING;SHARP;SHOOTING

## 2024-04-17 ASSESSMENT — PAIN DESCRIPTION - PAIN TYPE: TYPE: ACUTE PAIN

## 2024-04-17 ASSESSMENT — PAIN DESCRIPTION - LOCATION
LOCATION: LEG
LOCATION: LEG

## 2024-04-17 ASSESSMENT — PAIN - FUNCTIONAL ASSESSMENT: PAIN_FUNCTIONAL_ASSESSMENT: 0-10

## 2024-04-17 NOTE — ED PROVIDER NOTES
[facility] EMERGENCY DEPARTMENT  EMERGENCY DEPARTMENT ENCOUNTER      Pt Name: Dorothea Hastings  MRN: [id number]  Birthdate 1985  Date of evaluation: 4/17/2024  Provider: MORE Vaughn CNP    CHIEF COMPLAINT       Chief Complaint   Patient presents with    Leg Pain     Left hip and buttocks pain.  Range of motion causes shooting pains through left groin area and down entire leg.  Onset about 1 week ago.  Pt denies acute injury. Pt also reports her leg is swollen.          HISTORY OF PRESENT ILLNESS   (Location/Symptom, Timing/Onset, Context/Setting, Quality, Duration, Modifying Factors, Severity)  Note limiting factors.   Dorothea Hastings is a 38 y.o. female who presents to the emergency department for ration of left leg pain patient states about a week ago she began having some dull generalized pain in the left hip and buttock.  She states over the past several days the pain is worsened down the inner left thigh all the way down her leg to her ankle.  She does report swelling to the entire leg.  She denies any injury falls or trauma.  Denies any rashes or sores to the area.  She denies any history of blood clots or pulmonary embolisms.  She denies any recent surgery.  Patient states the pain is worsened with certain movements standing or sitting.  She denies any relieving factors.  She states she is try taking over-the-counter Tylenol and Motrin without any relief.  Patient is tearful.    Patient admits to having cervical disc disease in the past and right-sided sciatica, but has never had left-sided sciatica or this type of pain previously.      Nursing Notes were reviewed.    REVIEW OF SYSTEMS       Constitutional: No fevers or chills   HEENT: No sore throat, rhinorrhea, or earache   Eyes: No blurry vision or double vision no drainage   Cardiovascular: No chest pain or tachycardia   Respiratory: No wheezing or shortness of breath no cough   Gastrointestinal: No nausea, vomiting, diarrhea,

## 2024-04-18 NOTE — ED PROVIDER NOTES
[facility] Emergency Department  [id number]  [place] [address]  Phone: [phone]  Fax: [phone]      Attending Physician Attestation          CHIEF COMPLAINT       Chief Complaint   Patient presents with    Leg Pain     Left hip and buttocks pain.  Range of motion causes shooting pains through left groin area and down entire leg.  Onset about 1 week ago.  Pt denies acute injury. Pt also reports her leg is swollen.        DIAGNOSTIC RESULTS     LABS:  Labs Reviewed   CBC WITH AUTO DIFFERENTIAL - Abnormal; Notable for the following components:       Result Value    WBC 11.4 (*)     All other components within normal limits   BASIC METABOLIC PANEL   D-DIMER, QUANTITATIVE       All other labs were within normal range or not returned as of this dictation.    RADIOLOGY:  Vascular duplex lower extremity venous left    (Results Pending)         EMERGENCY DEPARTMENT COURSE:   Vitals:    Vitals:    04/17/24 1854   BP: 107/77   Pulse: 76   Resp: 18   Temp: 98.4 °F (36.9 °C)   TempSrc: Oral   SpO2: 99%   Weight: 100 kg (220 lb 7.4 oz)   Height: 1.72 m (5' 7.72\")     -------------------------  BP: 107/77, Temp: 98.4 °F (36.9 °C), Pulse: 76, Respirations: 18             PERTINENT ATTENDING PHYSICIAN COMMENTS:    I performed a history and physical examination of the patient and discussed management with the mid level provider. I reviewed the mid level provider's note and agree with the documented findings and plan of care. Any areas of disagreement are noted on the chart. I was personally present for the key portions of any procedures. I have documented in the chart those procedures where I was not present during the key portions. I have reviewed the emergency nurses triage note. I agree with the chief complaint, past medical history, past surgical history, allergies, medications, social and family history as documented unless otherwise noted below. Documentation of the HPI, Physical

## 2024-04-18 NOTE — DISCHARGE INSTRUCTIONS
Please return to the hospital tomorrow morning to have venous duplex scan performed to rule out DVT and left lower extremity as the D-dimer blood test was elevated and needs further evaluation to rule out blood clot in your left lower extremity.     Keep left leg elevated at all times    If any your symptoms worsen or any new or concerning symptoms arise please return to the emergency department immediately

## 2024-04-19 ENCOUNTER — HOSPITAL ENCOUNTER (OUTPATIENT)
Dept: VASCULAR LAB | Age: 39
End: 2024-04-19
Payer: COMMERCIAL

## 2024-04-19 DIAGNOSIS — M79.89 PAIN AND SWELLING OF LEFT LOWER EXTREMITY: ICD-10-CM

## 2024-04-19 DIAGNOSIS — R79.89 D-DIMER, ELEVATED: ICD-10-CM

## 2024-04-19 DIAGNOSIS — M79.605 PAIN AND SWELLING OF LEFT LOWER EXTREMITY: ICD-10-CM

## 2024-04-19 PROCEDURE — 93971 EXTREMITY STUDY: CPT

## 2024-04-19 RX ORDER — DULOXETIN HYDROCHLORIDE 60 MG/1
CAPSULE, DELAYED RELEASE ORAL
Qty: 30 CAPSULE | Refills: 5 | Status: SHIPPED | OUTPATIENT
Start: 2024-04-19

## 2024-04-21 PROCEDURE — 93971 EXTREMITY STUDY: CPT | Performed by: STUDENT IN AN ORGANIZED HEALTH CARE EDUCATION/TRAINING PROGRAM

## 2024-04-24 ENCOUNTER — OFFICE VISIT (OUTPATIENT)
Dept: PRIMARY CARE CLINIC | Age: 39
End: 2024-04-24
Payer: COMMERCIAL

## 2024-04-24 VITALS
OXYGEN SATURATION: 98 % | HEART RATE: 89 BPM | TEMPERATURE: 98.2 F | SYSTOLIC BLOOD PRESSURE: 128 MMHG | HEIGHT: 67 IN | WEIGHT: 222 LBS | BODY MASS INDEX: 34.84 KG/M2 | DIASTOLIC BLOOD PRESSURE: 84 MMHG

## 2024-04-24 DIAGNOSIS — M54.16 LUMBAR RADICULAR PAIN: Primary | ICD-10-CM

## 2024-04-24 DIAGNOSIS — Z59.9 FINANCIAL DIFFICULTIES: ICD-10-CM

## 2024-04-24 PROCEDURE — 1036F TOBACCO NON-USER: CPT | Performed by: NURSE PRACTITIONER

## 2024-04-24 PROCEDURE — G8427 DOCREV CUR MEDS BY ELIG CLIN: HCPCS | Performed by: NURSE PRACTITIONER

## 2024-04-24 PROCEDURE — G8417 CALC BMI ABV UP PARAM F/U: HCPCS | Performed by: NURSE PRACTITIONER

## 2024-04-24 PROCEDURE — 99214 OFFICE O/P EST MOD 30 MIN: CPT | Performed by: NURSE PRACTITIONER

## 2024-04-24 RX ORDER — HYDROCODONE BITARTRATE AND ACETAMINOPHEN 5; 325 MG/1; MG/1
1 TABLET ORAL EVERY 6 HOURS PRN
Qty: 10 TABLET | Refills: 0 | Status: ON HOLD | OUTPATIENT
Start: 2024-04-24 | End: 2024-04-28 | Stop reason: HOSPADM

## 2024-04-24 RX ORDER — PREDNISONE 20 MG/1
TABLET ORAL
Qty: 18 TABLET | Refills: 0 | Status: ON HOLD | OUTPATIENT
Start: 2024-04-24 | End: 2024-04-28 | Stop reason: HOSPADM

## 2024-04-24 SDOH — ECONOMIC STABILITY - INCOME SECURITY: PROBLEM RELATED TO HOUSING AND ECONOMIC CIRCUMSTANCES, UNSPECIFIED: Z59.9

## 2024-04-24 ASSESSMENT — ENCOUNTER SYMPTOMS
SHORTNESS OF BREATH: 0
BACK PAIN: 1
COUGH: 0

## 2024-04-24 NOTE — PROGRESS NOTES
Dorothea Hastings (:  1985) is a 38 y.o. female,Established patient, here for evaluation of the following chief complaint(s):  Follow up ER (Having pain from low back and that is going down left leg x 2 weeks/ was seen at [facility] ER 24)      Assessment & Plan   ASSESSMENT/PLAN:  1. Lumbar radicular pain  -     MRI LUMBAR SPINE WO CONTRAST; Future  -     HYDROcodone-acetaminophen (NORCO) 5-325 MG per tablet; Take 1 tablet by mouth every 6 hours as needed for Pain for up to 3 days. Intended supply: 3 days. Take lowest dose possible to manage pain Max Daily Amount: 4 tablets, Disp-10 tablet, R-0Normal  2. Financial difficulties  -     [facility] Referral for Social Determinants of Health (Primary Care Practices)      No follow-ups on file.     Dr pacheco has been giving patient norco monthly, she states she is currently not taking, I did advise her to let Dr Bunch know about our acute treatment plan prior to taking.     Subjective   SUBJECTIVE/OBJECTIVE:  HPI  Pt states for the past two weeks she has had left lumbar pain into her buttocks, wraps around her groin and goes all the way down her leg medially. There is a spot in ehr lower back and it is shooting pain down her right and left side, the right side stays in the back.  She was seen in the er for this on . The pain has worsened. She is losing sensation in her left inner thigh. She is tearful. In the er she was treated prophylactically for a dvt d/t a d- dimer of 0.88.  she did return the next day for a venous doppler that was wnl. No red flag sxs.   She hasn't worked since April 15, 2024      Review of Systems   Constitutional:  Negative for chills and fever.   Respiratory:  Negative for cough and shortness of breath.    Cardiovascular:  Negative for chest pain, palpitations and leg swelling.   Musculoskeletal:  Positive for back pain.          Objective   Vitals:    24 1127   BP: 128/84   Pulse: 89   Temp: 98.2 °F (36.8 °C)   SpO2: 98%

## 2024-04-25 ENCOUNTER — APPOINTMENT (OUTPATIENT)
Dept: MRI IMAGING | Age: 39
DRG: 310 | End: 2024-04-25
Payer: COMMERCIAL

## 2024-04-25 ENCOUNTER — HOSPITAL ENCOUNTER (INPATIENT)
Age: 39
LOS: 1 days | Discharge: HOME | DRG: 310 | End: 2024-04-28
Attending: EMERGENCY MEDICINE | Admitting: NEUROLOGICAL SURGERY
Payer: COMMERCIAL

## 2024-04-25 ENCOUNTER — OFFICE VISIT (OUTPATIENT)
Dept: NEUROLOGY | Age: 39
End: 2024-04-25

## 2024-04-25 VITALS
HEIGHT: 67 IN | DIASTOLIC BLOOD PRESSURE: 90 MMHG | HEART RATE: 93 BPM | TEMPERATURE: 98.1 F | BODY MASS INDEX: 34.84 KG/M2 | SYSTOLIC BLOOD PRESSURE: 131 MMHG | WEIGHT: 222 LBS

## 2024-04-25 DIAGNOSIS — G89.29 CHRONIC BILATERAL LOW BACK PAIN WITH SCIATICA, SCIATICA LATERALITY UNSPECIFIED: Primary | ICD-10-CM

## 2024-04-25 DIAGNOSIS — M54.40 CHRONIC BILATERAL LOW BACK PAIN WITH SCIATICA, SCIATICA LATERALITY UNSPECIFIED: Primary | ICD-10-CM

## 2024-04-25 DIAGNOSIS — M54.16 RADICULOPATHY OF LUMBAR REGION: ICD-10-CM

## 2024-04-25 DIAGNOSIS — M51.16 LUMBAR DISC HERNIATION WITH RADICULOPATHY: ICD-10-CM

## 2024-04-25 DIAGNOSIS — S39.012A BACK STRAIN, INITIAL ENCOUNTER: Primary | ICD-10-CM

## 2024-04-25 PROBLEM — M54.50 LOWER BACK PAIN: Status: ACTIVE | Noted: 2024-04-25

## 2024-04-25 LAB
ANION GAP SERPL CALCULATED.3IONS-SCNC: 12 MMOL/L (ref 9–16)
BASOPHILS # BLD: 0.05 K/UL (ref 0–0.2)
BASOPHILS NFR BLD: 0 % (ref 0–2)
BUN SERPL-MCNC: 12 MG/DL (ref 6–20)
CALCIUM SERPL-MCNC: 8.9 MG/DL (ref 8.6–10.4)
CHLORIDE SERPL-SCNC: 104 MMOL/L (ref 98–107)
CO2 SERPL-SCNC: 22 MMOL/L (ref 20–31)
CREAT SERPL-MCNC: 0.8 MG/DL (ref 0.5–0.9)
EOSINOPHIL # BLD: <0.03 K/UL (ref 0–0.44)
EOSINOPHILS RELATIVE PERCENT: 0 % (ref 1–4)
ERYTHROCYTE [DISTWIDTH] IN BLOOD BY AUTOMATED COUNT: 13.6 % (ref 11.8–14.4)
GFR SERPL CREATININE-BSD FRML MDRD: >90 ML/MIN/1.73M2
GLUCOSE SERPL-MCNC: 144 MG/DL (ref 74–99)
HCG SERPL QL: NEGATIVE
HCT VFR BLD AUTO: 42.8 % (ref 36.3–47.1)
HGB BLD-MCNC: 13.5 G/DL (ref 11.9–15.1)
IMM GRANULOCYTES # BLD AUTO: 0.09 K/UL (ref 0–0.3)
IMM GRANULOCYTES NFR BLD: 1 %
LYMPHOCYTES NFR BLD: 1.66 K/UL (ref 1.1–3.7)
LYMPHOCYTES RELATIVE PERCENT: 10 % (ref 24–43)
MCH RBC QN AUTO: 29 PG (ref 25.2–33.5)
MCHC RBC AUTO-ENTMCNC: 31.5 G/DL (ref 28.4–34.8)
MCV RBC AUTO: 92 FL (ref 82.6–102.9)
MONOCYTES NFR BLD: 0.62 K/UL (ref 0.1–1.2)
MONOCYTES NFR BLD: 4 % (ref 3–12)
NEUTROPHILS NFR BLD: 85 % (ref 36–65)
NEUTS SEG NFR BLD: 14.67 K/UL (ref 1.5–8.1)
NRBC BLD-RTO: 0 PER 100 WBC
PLATELET # BLD AUTO: 399 K/UL (ref 138–453)
PMV BLD AUTO: 11 FL (ref 8.1–13.5)
POTASSIUM SERPL-SCNC: 4 MMOL/L (ref 3.7–5.3)
RBC # BLD AUTO: 4.65 M/UL (ref 3.95–5.11)
SODIUM SERPL-SCNC: 138 MMOL/L (ref 136–145)
WBC OTHER # BLD: 17.1 K/UL (ref 3.5–11.3)

## 2024-04-25 PROCEDURE — 96375 TX/PRO/DX INJ NEW DRUG ADDON: CPT

## 2024-04-25 PROCEDURE — 99285 EMERGENCY DEPT VISIT HI MDM: CPT

## 2024-04-25 PROCEDURE — 72148 MRI LUMBAR SPINE W/O DYE: CPT

## 2024-04-25 PROCEDURE — 6360000002 HC RX W HCPCS

## 2024-04-25 PROCEDURE — 80048 BASIC METABOLIC PNL TOTAL CA: CPT

## 2024-04-25 PROCEDURE — 84703 CHORIONIC GONADOTROPIN ASSAY: CPT

## 2024-04-25 PROCEDURE — 6370000000 HC RX 637 (ALT 250 FOR IP)

## 2024-04-25 PROCEDURE — G0378 HOSPITAL OBSERVATION PER HR: HCPCS

## 2024-04-25 PROCEDURE — 85025 COMPLETE CBC W/AUTO DIFF WBC: CPT

## 2024-04-25 PROCEDURE — 96374 THER/PROPH/DIAG INJ IV PUSH: CPT

## 2024-04-25 RX ORDER — DULOXETIN HYDROCHLORIDE 30 MG/1
60 CAPSULE, DELAYED RELEASE ORAL DAILY
Status: DISCONTINUED | OUTPATIENT
Start: 2024-04-25 | End: 2024-04-28 | Stop reason: HOSPADM

## 2024-04-25 RX ORDER — ACETAMINOPHEN 500 MG
1000 TABLET ORAL ONCE
Status: COMPLETED | OUTPATIENT
Start: 2024-04-25 | End: 2024-04-25

## 2024-04-25 RX ORDER — CYCLOBENZAPRINE HCL 10 MG
10 TABLET ORAL ONCE
Status: COMPLETED | OUTPATIENT
Start: 2024-04-25 | End: 2024-04-25

## 2024-04-25 RX ORDER — CHOLECALCIFEROL (VITAMIN D3) 125 MCG
1000 CAPSULE ORAL DAILY
Status: DISCONTINUED | OUTPATIENT
Start: 2024-04-25 | End: 2024-04-28 | Stop reason: HOSPADM

## 2024-04-25 RX ORDER — HYDROCODONE BITARTRATE AND ACETAMINOPHEN 5; 325 MG/1; MG/1
1 TABLET ORAL EVERY 6 HOURS PRN
Status: DISCONTINUED | OUTPATIENT
Start: 2024-04-25 | End: 2024-04-27

## 2024-04-25 RX ORDER — DEXAMETHASONE SODIUM PHOSPHATE 4 MG/ML
4 INJECTION, SOLUTION INTRA-ARTICULAR; INTRALESIONAL; INTRAMUSCULAR; INTRAVENOUS; SOFT TISSUE ONCE
Status: COMPLETED | OUTPATIENT
Start: 2024-04-25 | End: 2024-04-25

## 2024-04-25 RX ORDER — GABAPENTIN 100 MG/1
100 CAPSULE ORAL ONCE
Status: DISCONTINUED | OUTPATIENT
Start: 2024-04-25 | End: 2024-04-25

## 2024-04-25 RX ORDER — LIDOCAINE 4 G/G
1 PATCH TOPICAL ONCE
Status: COMPLETED | OUTPATIENT
Start: 2024-04-25 | End: 2024-04-26

## 2024-04-25 RX ORDER — KETOROLAC TROMETHAMINE 15 MG/ML
15 INJECTION, SOLUTION INTRAMUSCULAR; INTRAVENOUS ONCE
Status: COMPLETED | OUTPATIENT
Start: 2024-04-25 | End: 2024-04-25

## 2024-04-25 RX ORDER — PREGABALIN 100 MG/1
200 CAPSULE ORAL 3 TIMES DAILY
Status: DISCONTINUED | OUTPATIENT
Start: 2024-04-25 | End: 2024-04-28 | Stop reason: HOSPADM

## 2024-04-25 RX ORDER — CARBAMAZEPINE 100 MG/1
100 TABLET, EXTENDED RELEASE ORAL NIGHTLY
Status: DISCONTINUED | OUTPATIENT
Start: 2024-04-25 | End: 2024-04-28 | Stop reason: HOSPADM

## 2024-04-25 RX ORDER — CETIRIZINE HYDROCHLORIDE 10 MG/1
10 TABLET ORAL DAILY
Status: DISCONTINUED | OUTPATIENT
Start: 2024-04-25 | End: 2024-04-28 | Stop reason: HOSPADM

## 2024-04-25 RX ORDER — MORPHINE SULFATE 4 MG/ML
4 INJECTION, SOLUTION INTRAMUSCULAR; INTRAVENOUS ONCE
Status: COMPLETED | OUTPATIENT
Start: 2024-04-25 | End: 2024-04-25

## 2024-04-25 RX ADMIN — CETIRIZINE HYDROCHLORIDE 10 MG: 10 TABLET ORAL at 21:15

## 2024-04-25 RX ADMIN — DULOXETINE HYDROCHLORIDE 60 MG: 30 CAPSULE, DELAYED RELEASE ORAL at 21:15

## 2024-04-25 RX ADMIN — KETOROLAC TROMETHAMINE 15 MG: 15 INJECTION, SOLUTION INTRAMUSCULAR; INTRAVENOUS at 15:40

## 2024-04-25 RX ADMIN — CARBAMAZEPINE 100 MG: 100 TABLET, EXTENDED RELEASE ORAL at 21:15

## 2024-04-25 RX ADMIN — ACETAMINOPHEN 1000 MG: 500 TABLET ORAL at 15:39

## 2024-04-25 RX ADMIN — HYDROCODONE BITARTRATE AND ACETAMINOPHEN 1 TABLET: 5; 325 TABLET ORAL at 19:00

## 2024-04-25 RX ADMIN — DEXAMETHASONE SODIUM PHOSPHATE 4 MG: 4 INJECTION, SOLUTION INTRAMUSCULAR; INTRAVENOUS at 18:22

## 2024-04-25 RX ADMIN — Medication 1000 MCG: at 21:14

## 2024-04-25 RX ADMIN — PREGABALIN 200 MG: 100 CAPSULE ORAL at 21:15

## 2024-04-25 RX ADMIN — MORPHINE SULFATE 4 MG: 4 INJECTION INTRAVENOUS at 18:22

## 2024-04-25 RX ADMIN — CYCLOBENZAPRINE 10 MG: 10 TABLET, FILM COATED ORAL at 15:39

## 2024-04-25 ASSESSMENT — PAIN DESCRIPTION - FREQUENCY
FREQUENCY: CONTINUOUS

## 2024-04-25 ASSESSMENT — PAIN DESCRIPTION - ORIENTATION
ORIENTATION: MID;LOWER;POSTERIOR;LEFT
ORIENTATION: LOWER
ORIENTATION: MID;LOWER;POSTERIOR;LEFT
ORIENTATION: MID;LOWER;POSTERIOR

## 2024-04-25 ASSESSMENT — PAIN SCALES - GENERAL
PAINLEVEL_OUTOF10: 6
PAINLEVEL_OUTOF10: 10
PAINLEVEL_OUTOF10: 9
PAINLEVEL_OUTOF10: 10
PAINLEVEL_OUTOF10: 9

## 2024-04-25 ASSESSMENT — PAIN - FUNCTIONAL ASSESSMENT
PAIN_FUNCTIONAL_ASSESSMENT: PREVENTS OR INTERFERES SOME ACTIVE ACTIVITIES AND ADLS
PAIN_FUNCTIONAL_ASSESSMENT: 0-10

## 2024-04-25 ASSESSMENT — PAIN DESCRIPTION - LOCATION
LOCATION: BACK;GROIN;LEG
LOCATION: BACK
LOCATION: BACK;LEG;GROIN
LOCATION: BACK;GROIN;LEG
LOCATION: BACK

## 2024-04-25 ASSESSMENT — PAIN DESCRIPTION - DESCRIPTORS
DESCRIPTORS: ACHING;DISCOMFORT;SHOOTING

## 2024-04-25 ASSESSMENT — PAIN DESCRIPTION - PAIN TYPE
TYPE: ACUTE PAIN;CHRONIC PAIN

## 2024-04-25 ASSESSMENT — PAIN DESCRIPTION - ONSET
ONSET: ON-GOING
ONSET: GRADUAL
ONSET: ON-GOING

## 2024-04-25 NOTE — ED NOTES
Pt to MRI  
Pt. Arrives to ED via private auto for c/o sciatica pain.  Pt states her pain began two weeks ago and was seen at [place] where she got a pain med.  Pt was sent today to ED by her neurologist.  Pt states that she is having pain from the left side of her back that travels down her leg.  Pt rates her pain a 10/10    
well as over the piriformis muscle.     Concern for:  ........................    Plan/Impression:  ............................   [AS]   1622 hCG Qual: NEGATIVE [AS]   1624 WBC(!): 17.1  Concern for infectious process. Patient also just had a dose of steroids administered yesterday and she does receive steroid injections in her cervical spine through her pain specialist. [AS]   1709 Sodium: 138  BNP shows no acute electrolyte derangement. Glucose mildly elevated. No indication of [place] [AS]   1711 MRI Final IMPRESSION:  1. Stable moderate broad-based left lateral foraminal disc extrusion at L3-4 impinging the exiting L3 nerve root.  2. Moderate bilateral neural foraminal stenosis at L4-5 secondary to hypertrophic facet disease.   [AS]   1715 Reassessed patient. She is resting comfortably in bed following the medication administration.    Discussed results with patient.    Will attempt ambulation. IF she can, will discharge with steroid prescription and gabapentin. Will include NeuroSx referral    If she cannot, will offer admission to observation with Neurosurgery to assess. [AS]   1749 Attempted ambulation. Patient unable to go more than 6 feet due to pain. Will give pain meds as well as steroid dose and admit to observation. [AS]   1759 Obs orders placed with med reconciliation.  [AS]      ED Course User Index  [AS] Mychal Fowler, DO          Skin Assessment:        Pain Score:  Pain Assessment  Pain Assessment: 0-10  Pain Level: 9  Pain Location: Back  Pain Orientation: Lower      SOCIAL HISTORY       Social History     Socioeconomic History    Marital status:    Tobacco Use    Smoking status: Former     Current packs/day: 0.00     Types: Cigarettes     Start date:      Quit date:      Years since quittin.3    Smokeless tobacco: Never    Tobacco comments:     2805-3106, smoker then   Vaping Use    Vaping Use: Never used   Substance and Sexual Activity    Alcohol use: Yes     Comment: 2

## 2024-04-25 NOTE — ED PROVIDER NOTES
[facility] ED     Emergency Department     Faculty Attestation    I performed a history and physical examination of the patient and discussed management with the resident. I reviewed the resident’s note and agree with the documented findings and plan of care. Any areas of disagreement are noted on the chart. I was personally present for the key portions of any procedures. I have documented in the chart those procedures where I was not present during the key portions. I have reviewed the emergency nurses triage note. I agree with the chief complaint, past medical history, past surgical history, allergies, medications, social and family history as documented unless otherwise noted below. For Physician Assistant/ Nurse Practitioner cases/documentation I have personally evaluated this patient and have completed at least one if not all key elements of the E/M (history, physical exam, and MDM). Additional findings are as noted.    Note Started: 3:26 PM EDT    Patient here with severe low back pain felt a \"pop\" going up the stairs just about 2 weeks ago no injury with that no fall.  History of neck and back issues in the past no recent injections or surgeries or procedures.  No prior lumbar injections procedures just a remote epidurals for delivery.  Does complain of some overflow incontinence but no stool issues.  On exam pills very uncomfortable.  Abdomen soft nontender equal pedal pulses bilaterally both feet pink and warm.  Decree sensation in the left medial distal lower extremity but no motor deficits.  Reflexes equal the knee.  Will order MRI, neurosurg consult if needed      Critical Care     none    Bereket Garcia MD, FACEP, FAAEM  Attending Emergency  Physician           Bereket Garcia MD  04/25/24 8922    
CONSULT TO NEUROSURGERY    CRITICAL CARE:  There was significant risk of life threatening deterioration of patient's condition requiring my direct management. Critical care time  minutes, excluding any documented procedures.    FINAL IMPRESSION      1. Back strain, initial encounter    2. Radiculopathy of lumbar region          DISPOSITION / PLAN     DISPOSITION Admitted 04/25/2024 05:56:45 PM      PATIENT REFERRED TO:  No follow-up provider specified.    DISCHARGE MEDICATIONS:  Current Discharge Medication List          Mychal Fowler DO  Emergency Medicine Resident    (Please note that portions of this note were completed with a voice recognition program.  Efforts were made to edit the dictations but occasionally words are mis-transcribed.)    
Never

## 2024-04-26 PROBLEM — M51.16 LUMBAR DISC HERNIATION WITH RADICULOPATHY: Status: ACTIVE | Noted: 2024-04-26

## 2024-04-26 PROCEDURE — 96375 TX/PRO/DX INJ NEW DRUG ADDON: CPT

## 2024-04-26 PROCEDURE — 6360000002 HC RX W HCPCS

## 2024-04-26 PROCEDURE — 6370000000 HC RX 637 (ALT 250 FOR IP)

## 2024-04-26 PROCEDURE — A4216 STERILE WATER/SALINE, 10 ML: HCPCS

## 2024-04-26 PROCEDURE — 99222 1ST HOSP IP/OBS MODERATE 55: CPT | Performed by: NEUROLOGICAL SURGERY

## 2024-04-26 PROCEDURE — 96376 TX/PRO/DX INJ SAME DRUG ADON: CPT

## 2024-04-26 PROCEDURE — 2580000003 HC RX 258

## 2024-04-26 PROCEDURE — 6370000000 HC RX 637 (ALT 250 FOR IP): Performed by: STUDENT IN AN ORGANIZED HEALTH CARE EDUCATION/TRAINING PROGRAM

## 2024-04-26 PROCEDURE — 6360000002 HC RX W HCPCS: Performed by: EMERGENCY MEDICINE

## 2024-04-26 PROCEDURE — G0378 HOSPITAL OBSERVATION PER HR: HCPCS

## 2024-04-26 PROCEDURE — 2500000003 HC RX 250 WO HCPCS

## 2024-04-26 RX ORDER — METHOCARBAMOL 500 MG/1
1000 TABLET, FILM COATED ORAL 3 TIMES DAILY
Status: DISCONTINUED | OUTPATIENT
Start: 2024-04-26 | End: 2024-04-27

## 2024-04-26 RX ORDER — DEXAMETHASONE SODIUM PHOSPHATE 4 MG/ML
4 INJECTION, SOLUTION INTRA-ARTICULAR; INTRALESIONAL; INTRAMUSCULAR; INTRAVENOUS; SOFT TISSUE EVERY 6 HOURS
Status: DISCONTINUED | OUTPATIENT
Start: 2024-04-26 | End: 2024-04-28 | Stop reason: HOSPADM

## 2024-04-26 RX ORDER — SODIUM CHLORIDE 9 MG/ML
INJECTION, SOLUTION INTRAVENOUS CONTINUOUS
Status: DISCONTINUED | OUTPATIENT
Start: 2024-04-27 | End: 2024-04-28 | Stop reason: HOSPADM

## 2024-04-26 RX ORDER — MORPHINE SULFATE 4 MG/ML
4 INJECTION, SOLUTION INTRAMUSCULAR; INTRAVENOUS ONCE
Status: COMPLETED | OUTPATIENT
Start: 2024-04-26 | End: 2024-04-26

## 2024-04-26 RX ORDER — MORPHINE SULFATE 4 MG/ML
4 INJECTION, SOLUTION INTRAMUSCULAR; INTRAVENOUS
Status: DISCONTINUED | OUTPATIENT
Start: 2024-04-26 | End: 2024-04-27

## 2024-04-26 RX ADMIN — HYDROMORPHONE HYDROCHLORIDE 0.5 MG: 1 INJECTION, SOLUTION INTRAMUSCULAR; INTRAVENOUS; SUBCUTANEOUS at 10:51

## 2024-04-26 RX ADMIN — PREGABALIN 200 MG: 100 CAPSULE ORAL at 15:12

## 2024-04-26 RX ADMIN — METHOCARBAMOL 1000 MG: 500 TABLET ORAL at 15:12

## 2024-04-26 RX ADMIN — HYDROCODONE BITARTRATE AND ACETAMINOPHEN 1 TABLET: 5; 325 TABLET ORAL at 01:32

## 2024-04-26 RX ADMIN — MORPHINE SULFATE 4 MG: 4 INJECTION, SOLUTION INTRAMUSCULAR; INTRAVENOUS at 18:30

## 2024-04-26 RX ADMIN — METHOCARBAMOL 1000 MG: 500 TABLET ORAL at 23:27

## 2024-04-26 RX ADMIN — CETIRIZINE HYDROCHLORIDE 10 MG: 10 TABLET ORAL at 07:57

## 2024-04-26 RX ADMIN — SODIUM CHLORIDE, PRESERVATIVE FREE 20 MG: 5 INJECTION INTRAVENOUS at 21:55

## 2024-04-26 RX ADMIN — HYDROCODONE BITARTRATE AND ACETAMINOPHEN 1 TABLET: 5; 325 TABLET ORAL at 07:57

## 2024-04-26 RX ADMIN — PREGABALIN 200 MG: 100 CAPSULE ORAL at 23:27

## 2024-04-26 RX ADMIN — SODIUM CHLORIDE: 9 INJECTION, SOLUTION INTRAVENOUS at 23:33

## 2024-04-26 RX ADMIN — PREGABALIN 200 MG: 100 CAPSULE ORAL at 07:58

## 2024-04-26 RX ADMIN — DULOXETINE HYDROCHLORIDE 60 MG: 30 CAPSULE, DELAYED RELEASE ORAL at 07:57

## 2024-04-26 RX ADMIN — HYDROCODONE BITARTRATE AND ACETAMINOPHEN 1 TABLET: 5; 325 TABLET ORAL at 21:49

## 2024-04-26 RX ADMIN — DEXAMETHASONE SODIUM PHOSPHATE 4 MG: 4 INJECTION, SOLUTION INTRA-ARTICULAR; INTRALESIONAL; INTRAMUSCULAR; INTRAVENOUS; SOFT TISSUE at 18:05

## 2024-04-26 RX ADMIN — MORPHINE SULFATE 4 MG: 4 INJECTION INTRAVENOUS at 00:15

## 2024-04-26 RX ADMIN — CARBAMAZEPINE 100 MG: 100 TABLET, EXTENDED RELEASE ORAL at 21:49

## 2024-04-26 RX ADMIN — Medication 1000 MCG: at 07:57

## 2024-04-26 RX ADMIN — METHOCARBAMOL 1000 MG: 500 TABLET ORAL at 09:57

## 2024-04-26 ASSESSMENT — PAIN DESCRIPTION - PAIN TYPE
TYPE: ACUTE PAIN
TYPE: ACUTE PAIN;CHRONIC PAIN
TYPE: ACUTE PAIN

## 2024-04-26 ASSESSMENT — PAIN DESCRIPTION - ORIENTATION
ORIENTATION: LEFT
ORIENTATION: LEFT
ORIENTATION: LEFT;LOWER
ORIENTATION: LEFT
ORIENTATION: LEFT;LOWER
ORIENTATION: LEFT

## 2024-04-26 ASSESSMENT — PAIN - FUNCTIONAL ASSESSMENT
PAIN_FUNCTIONAL_ASSESSMENT: ACTIVITIES ARE NOT PREVENTED
PAIN_FUNCTIONAL_ASSESSMENT: ACTIVITIES ARE NOT PREVENTED
PAIN_FUNCTIONAL_ASSESSMENT: PREVENTS OR INTERFERES SOME ACTIVE ACTIVITIES AND ADLS

## 2024-04-26 ASSESSMENT — PAIN SCALES - GENERAL
PAINLEVEL_OUTOF10: 7
PAINLEVEL_OUTOF10: 8
PAINLEVEL_OUTOF10: 10
PAINLEVEL_OUTOF10: 8
PAINLEVEL_OUTOF10: 5
PAINLEVEL_OUTOF10: 0
PAINLEVEL_OUTOF10: 3
PAINLEVEL_OUTOF10: 8
PAINLEVEL_OUTOF10: 5
PAINLEVEL_OUTOF10: 9
PAINLEVEL_OUTOF10: 0
PAINLEVEL_OUTOF10: 7

## 2024-04-26 ASSESSMENT — PAIN DESCRIPTION - FREQUENCY
FREQUENCY: CONTINUOUS

## 2024-04-26 ASSESSMENT — PAIN SCALES - WONG BAKER
WONGBAKER_NUMERICALRESPONSE: NO HURT

## 2024-04-26 ASSESSMENT — PAIN DESCRIPTION - DESCRIPTORS
DESCRIPTORS: ACHING
DESCRIPTORS: SHOOTING;SHARP
DESCRIPTORS: SHOOTING;SHARP
DESCRIPTORS: ACHING;DISCOMFORT
DESCRIPTORS: ACHING

## 2024-04-26 ASSESSMENT — PAIN DESCRIPTION - LOCATION
LOCATION: BACK;LEG
LOCATION: BUTTOCKS;GROIN
LOCATION: BACK;HIP;LEG
LOCATION: BACK;GROIN;LEG
LOCATION: GROIN
LOCATION: BACK;LEG
LOCATION: LEG;BACK
LOCATION: HIP;LEG;BACK
LOCATION: BACK;LEG

## 2024-04-26 ASSESSMENT — PAIN DESCRIPTION - ONSET
ONSET: AWAKENED FROM SLEEP
ONSET: ON-GOING
ONSET: AWAKENED FROM SLEEP

## 2024-04-26 NOTE — PROGRESS NOTES
Patient had severe low back pain and was unable to walk.  Pain was rated 10/10.  She was taken to the ER in a wheelchair.

## 2024-04-26 NOTE — CARE COORDINATION
Case Management Assessment  Initial Evaluation    Date/Time of Evaluation: 4/26/2024 9:51 AM  Assessment Completed by: DAYANA THIBODEAUX RN    If patient is discharged prior to next notation, then this note serves as note for discharge by case management.    Patient Name: Dorothea Hastings                   YOB: 1985  Diagnosis: Lower back pain [M54.50]  Radiculopathy of lumbar region [M54.16]  Back strain, initial encounter [S39.012A]                   Date / Time: 4/25/2024  2:43 PM    Patient Admission Status: Observation   Readmission Risk (Low < 19, Mod (19-27), High > 27): No data recorded  Current PCP: El Kat APRN - CNP  PCP verified by CM? Yes (kae kat np)    Chart Reviewed: Yes      History Provided by: Patient  Patient Orientation: Alert and Oriented    Patient Cognition: Alert    Hospitalization in the last 30 days (Readmission):  No    If yes, Readmission Assessment in  Navigator will be completed.    Advance Directives:      Code Status: Prior   Patient's Primary Decision Maker is: Legal Next of Kin      Discharge Planning:    Patient lives with: Spouse/Significant Other Type of Home: House  Primary Care Giver: Self  Patient Support Systems include: Spouse/Significant Other   Current Financial resources: Medicaid  Current community resources: None  Current services prior to admission: None            Current DME:              Type of Home Care services:  None    ADLS  Prior functional level: Assistance with the following:, Housework  Current functional level: Housework, Assistance with the following:    PT AM-PAC:   /24  OT AM-PAC:   /24    Family can provide assistance at DC: Yes  Would you like Case Management to discuss the discharge plan with any other family members/significant others, and if so, who?    Plans to Return to Present Housing: Yes  Other Identified Issues/Barriers to RETURNING to current housing: pain  Potential Assistance needed at

## 2024-04-26 NOTE — H&P
administration of intravenous contrast. COMPARISON: MR lumbar spine June 21, 2023 HISTORY: ORDERING SYSTEM PROVIDED HISTORY: Lower back pain with L medial thigh numbness and urinary urgency TECHNOLOGIST PROVIDED HISTORY: Lower back pain with L medial thigh numbness and urinary urgency Decision Support Exception - unselect if not a suspected or confirmed emergency medical condition->Emergency Medical Condition (MA) Reason for Exam: Lower back pain with L medial thigh numbness and urinary urgency FINDINGS: BONES/ALIGNMENT: There is normal alignment of the spine. The vertebral body heights are maintained. The bone marrow signal appears unremarkable. SPINAL CORD: The conus terminates normally. SOFT TISSUES: No paraspinal mass identified. L1-L2: There is no significant disc herniation, spinal canal stenosis or neural foraminal narrowing. L2-L3: There is no significant disc herniation, spinal canal stenosis or neural foraminal narrowing. L3-L4: Moderate broad-based left lateral foraminal disc extrusion unchanged impinging the exiting L3 nerve root.  Central canal and right neural foramen patent. L4-L5: Hypertrophic facet disease causing moderate narrowing of the neural foramina bilaterally.  Central canal patent. L5-S1: There is no significant disc herniation, spinal canal stenosis or neural foraminal narrowing.     1. Stable moderate broad-based left lateral foraminal disc extrusion at L3-4 impinging the exiting L3 nerve root. 2. Moderate bilateral neural foraminal stenosis at L4-5 secondary to hypertrophic facet disease.       LABS:  I have reviewed and interpreted all available lab results.  Labs Reviewed   CBC WITH AUTO DIFFERENTIAL - Abnormal; Notable for the following components:       Result Value    WBC 17.1 (*)     Neutrophils % 85 (*)     Lymphocytes % 10 (*)     Eosinophils % 0 (*)     Immature Granulocytes % 1 (*)     Neutrophils Absolute 14.67 (*)     All other components within normal limits   BASIC

## 2024-04-26 NOTE — CONSULTS
Department of Neurosurgery                                            Nurse Practitioner Consult Note      Reason for Consult:  acute on chronic lower back pain with radicular pain. MRI lumbar showing left lateral foraminal disc extrusion at L3-4 impinging the exiting L3 nerve root.   Requesting Physician:    Neurosurgeon:   [x] Dr. Bautista  [] Dr. Aquino  [] Dr. Siddiqui  [] Dr. Kennedy      History Obtained From:  patient    CHIEF COMPLAINT:         Chief Complaint   Patient presents with    Back Pain       HISTORY OF PRESENT ILLNESS:       The patient is a 38 y.o. female who presents with PMH of right sided sciatica, prior C5-6 anterior disc arthroplasty, carpal tunnel release, chronic back pain, cervical SOY 03/13/2024. Neuropathy of both legs and feet that presents to ED for low back pain with radiculopathy. Patient states that because of her right sided sciatica, she is now left side dominant due to the pain. Patient states she was going up the stairs on the 11th when she suddenly felt a sharp shooting pain down her left leg. This pain radiated down her left hip, wrapping around to the inside of her left thigh. She also states that she has left medial thigh numbness only on the left side. Patient states that she also has some numbness to her inner left thigh. She denies any saddle anesthesia or loss of genital or rectal sensation. Patient states however she has noticed more urgency and needs to void the second she feels the sensation come on. Patient presented to [place] ED on 04/17 where she had an elevated D-dimer and complaints of pain and swelling to E. They gave her norco, a dose of Lovenox, and sent her home with outpatient BLE dopplers ordered. They were completed on 04/19 and were negative for any DVTs. Patient went to her PCP on 04/24 who ordered an outpatient MRI as well as a prednisone taper pack. Patient presented to [facility] ED due to increased pain causing her the inability to

## 2024-04-27 ENCOUNTER — ANESTHESIA EVENT (OUTPATIENT)
Dept: OPERATING ROOM | Age: 39
End: 2024-04-27
Payer: COMMERCIAL

## 2024-04-27 ENCOUNTER — APPOINTMENT (OUTPATIENT)
Dept: GENERAL RADIOLOGY | Age: 39
DRG: 310 | End: 2024-04-27
Payer: COMMERCIAL

## 2024-04-27 ENCOUNTER — ANESTHESIA (OUTPATIENT)
Dept: OPERATING ROOM | Age: 39
End: 2024-04-27
Payer: COMMERCIAL

## 2024-04-27 PROBLEM — M54.9 INTRACTABLE BACK PAIN: Status: ACTIVE | Noted: 2024-04-27

## 2024-04-27 LAB
ABO + RH BLD: NORMAL
ARM BAND NUMBER: NORMAL
BLOOD BANK SAMPLE EXPIRATION: NORMAL
BLOOD GROUP ANTIBODIES SERPL: NEGATIVE
GLUCOSE BLD-MCNC: 125 MG/DL (ref 65–105)

## 2024-04-27 PROCEDURE — 2500000003 HC RX 250 WO HCPCS: Performed by: NEUROLOGICAL SURGERY

## 2024-04-27 PROCEDURE — 2580000003 HC RX 258: Performed by: PHYSICIAN ASSISTANT

## 2024-04-27 PROCEDURE — 6360000002 HC RX W HCPCS: Performed by: PHYSICIAN ASSISTANT

## 2024-04-27 PROCEDURE — 01NB0ZZ RELEASE LUMBAR NERVE, OPEN APPROACH: ICD-10-PCS | Performed by: NEUROLOGICAL SURGERY

## 2024-04-27 PROCEDURE — 86850 RBC ANTIBODY SCREEN: CPT

## 2024-04-27 PROCEDURE — 82947 ASSAY GLUCOSE BLOOD QUANT: CPT

## 2024-04-27 PROCEDURE — 7100000000 HC PACU RECOVERY - FIRST 15 MIN: Performed by: NEUROLOGICAL SURGERY

## 2024-04-27 PROCEDURE — 6360000002 HC RX W HCPCS: Performed by: NEUROLOGICAL SURGERY

## 2024-04-27 PROCEDURE — 3700000000 HC ANESTHESIA ATTENDED CARE: Performed by: NEUROLOGICAL SURGERY

## 2024-04-27 PROCEDURE — 36415 COLL VENOUS BLD VENIPUNCTURE: CPT

## 2024-04-27 PROCEDURE — 86901 BLOOD TYPING SEROLOGIC RH(D): CPT

## 2024-04-27 PROCEDURE — 6360000002 HC RX W HCPCS

## 2024-04-27 PROCEDURE — 2500000003 HC RX 250 WO HCPCS

## 2024-04-27 PROCEDURE — 2500000003 HC RX 250 WO HCPCS: Performed by: PHYSICIAN ASSISTANT

## 2024-04-27 PROCEDURE — 3600000014 HC SURGERY LEVEL 4 ADDTL 15MIN: Performed by: NEUROLOGICAL SURGERY

## 2024-04-27 PROCEDURE — 3600000004 HC SURGERY LEVEL 4 BASE: Performed by: NEUROLOGICAL SURGERY

## 2024-04-27 PROCEDURE — 86900 BLOOD TYPING SEROLOGIC ABO: CPT

## 2024-04-27 PROCEDURE — 6370000000 HC RX 637 (ALT 250 FOR IP)

## 2024-04-27 PROCEDURE — 2720000010 HC SURG SUPPLY STERILE: Performed by: NEUROLOGICAL SURGERY

## 2024-04-27 PROCEDURE — 1200000000 HC SEMI PRIVATE

## 2024-04-27 PROCEDURE — 6370000000 HC RX 637 (ALT 250 FOR IP): Performed by: PHYSICIAN ASSISTANT

## 2024-04-27 PROCEDURE — 2580000003 HC RX 258: Performed by: NEUROLOGICAL SURGERY

## 2024-04-27 PROCEDURE — 6370000000 HC RX 637 (ALT 250 FOR IP): Performed by: STUDENT IN AN ORGANIZED HEALTH CARE EDUCATION/TRAINING PROGRAM

## 2024-04-27 PROCEDURE — APPSS30 APP SPLIT SHARED TIME 16-30 MINUTES: Performed by: PHYSICIAN ASSISTANT

## 2024-04-27 PROCEDURE — 2580000003 HC RX 258

## 2024-04-27 PROCEDURE — 6360000002 HC RX W HCPCS: Performed by: EMERGENCY MEDICINE

## 2024-04-27 PROCEDURE — 2709999900 HC NON-CHARGEABLE SUPPLY: Performed by: NEUROLOGICAL SURGERY

## 2024-04-27 PROCEDURE — 0SB20ZZ EXCISION OF LUMBAR VERTEBRAL DISC, OPEN APPROACH: ICD-10-PCS | Performed by: NEUROLOGICAL SURGERY

## 2024-04-27 PROCEDURE — 99232 SBSQ HOSP IP/OBS MODERATE 35: CPT | Performed by: NEUROLOGICAL SURGERY

## 2024-04-27 PROCEDURE — 63030 LAMOT DCMPRN NRV RT 1 LMBR: CPT | Performed by: NEUROLOGICAL SURGERY

## 2024-04-27 PROCEDURE — 7100000001 HC PACU RECOVERY - ADDTL 15 MIN: Performed by: NEUROLOGICAL SURGERY

## 2024-04-27 PROCEDURE — 3700000001 HC ADD 15 MINUTES (ANESTHESIA): Performed by: NEUROLOGICAL SURGERY

## 2024-04-27 PROCEDURE — 6370000000 HC RX 637 (ALT 250 FOR IP): Performed by: NEUROLOGICAL SURGERY

## 2024-04-27 RX ORDER — FENTANYL CITRATE 50 UG/ML
25 INJECTION, SOLUTION INTRAMUSCULAR; INTRAVENOUS
Status: DISCONTINUED | OUTPATIENT
Start: 2024-04-27 | End: 2024-04-27

## 2024-04-27 RX ORDER — SODIUM CHLORIDE 0.9 % (FLUSH) 0.9 %
5-40 SYRINGE (ML) INJECTION PRN
Status: DISCONTINUED | OUTPATIENT
Start: 2024-04-27 | End: 2024-04-28 | Stop reason: HOSPADM

## 2024-04-27 RX ORDER — SODIUM CHLORIDE 9 MG/ML
INJECTION, SOLUTION INTRAVENOUS CONTINUOUS
Status: DISCONTINUED | OUTPATIENT
Start: 2024-04-27 | End: 2024-04-28 | Stop reason: HOSPADM

## 2024-04-27 RX ORDER — SENNA AND DOCUSATE SODIUM 50; 8.6 MG/1; MG/1
1 TABLET, FILM COATED ORAL 2 TIMES DAILY
Status: DISCONTINUED | OUTPATIENT
Start: 2024-04-27 | End: 2024-04-28 | Stop reason: HOSPADM

## 2024-04-27 RX ORDER — SODIUM CHLORIDE 9 MG/ML
INJECTION, SOLUTION INTRAVENOUS PRN
Status: DISCONTINUED | OUTPATIENT
Start: 2024-04-27 | End: 2024-04-28 | Stop reason: HOSPADM

## 2024-04-27 RX ORDER — FENTANYL CITRATE 50 UG/ML
INJECTION, SOLUTION INTRAMUSCULAR; INTRAVENOUS PRN
Status: DISCONTINUED | OUTPATIENT
Start: 2024-04-27 | End: 2024-04-27 | Stop reason: SDUPTHER

## 2024-04-27 RX ORDER — KETAMINE HCL IN NACL, ISO-OSM 100MG/10ML
SYRINGE (ML) INJECTION PRN
Status: DISCONTINUED | OUTPATIENT
Start: 2024-04-27 | End: 2024-04-27 | Stop reason: SDUPTHER

## 2024-04-27 RX ORDER — MORPHINE SULFATE 2 MG/ML
2 INJECTION, SOLUTION INTRAMUSCULAR; INTRAVENOUS
Status: DISCONTINUED | OUTPATIENT
Start: 2024-04-27 | End: 2024-04-28 | Stop reason: HOSPADM

## 2024-04-27 RX ORDER — ENOXAPARIN SODIUM 100 MG/ML
30 INJECTION SUBCUTANEOUS 2 TIMES DAILY
Status: DISCONTINUED | OUTPATIENT
Start: 2024-04-28 | End: 2024-04-28 | Stop reason: HOSPADM

## 2024-04-27 RX ORDER — PROPOFOL 10 MG/ML
INJECTION, EMULSION INTRAVENOUS PRN
Status: DISCONTINUED | OUTPATIENT
Start: 2024-04-27 | End: 2024-04-27 | Stop reason: SDUPTHER

## 2024-04-27 RX ORDER — ONDANSETRON 2 MG/ML
INJECTION INTRAMUSCULAR; INTRAVENOUS PRN
Status: DISCONTINUED | OUTPATIENT
Start: 2024-04-27 | End: 2024-04-27 | Stop reason: SDUPTHER

## 2024-04-27 RX ORDER — HYDRALAZINE HYDROCHLORIDE 20 MG/ML
10 INJECTION INTRAMUSCULAR; INTRAVENOUS
Status: DISCONTINUED | OUTPATIENT
Start: 2024-04-27 | End: 2024-04-28 | Stop reason: HOSPADM

## 2024-04-27 RX ORDER — SODIUM CHLORIDE 0.9 % (FLUSH) 0.9 %
5-40 SYRINGE (ML) INJECTION EVERY 12 HOURS SCHEDULED
Status: DISCONTINUED | OUTPATIENT
Start: 2024-04-27 | End: 2024-04-28 | Stop reason: HOSPADM

## 2024-04-27 RX ORDER — DROPERIDOL 2.5 MG/ML
0.62 INJECTION, SOLUTION INTRAMUSCULAR; INTRAVENOUS
Status: DISCONTINUED | OUTPATIENT
Start: 2024-04-27 | End: 2024-04-28 | Stop reason: HOSPADM

## 2024-04-27 RX ORDER — CYCLOBENZAPRINE HCL 10 MG
10 TABLET ORAL EVERY 12 HOURS PRN
Status: DISCONTINUED | OUTPATIENT
Start: 2024-04-27 | End: 2024-04-28 | Stop reason: HOSPADM

## 2024-04-27 RX ORDER — LIDOCAINE HYDROCHLORIDE AND EPINEPHRINE 10; 10 MG/ML; UG/ML
INJECTION, SOLUTION INFILTRATION; PERINEURAL PRN
Status: DISCONTINUED | OUTPATIENT
Start: 2024-04-27 | End: 2024-04-27 | Stop reason: HOSPADM

## 2024-04-27 RX ORDER — DIPHENHYDRAMINE HYDROCHLORIDE 50 MG/ML
12.5 INJECTION INTRAMUSCULAR; INTRAVENOUS
Status: DISCONTINUED | OUTPATIENT
Start: 2024-04-27 | End: 2024-04-28 | Stop reason: HOSPADM

## 2024-04-27 RX ORDER — MIDAZOLAM HYDROCHLORIDE 1 MG/ML
INJECTION INTRAMUSCULAR; INTRAVENOUS PRN
Status: DISCONTINUED | OUTPATIENT
Start: 2024-04-27 | End: 2024-04-27 | Stop reason: SDUPTHER

## 2024-04-27 RX ORDER — MORPHINE SULFATE 4 MG/ML
4 INJECTION, SOLUTION INTRAMUSCULAR; INTRAVENOUS
Status: DISCONTINUED | OUTPATIENT
Start: 2024-04-27 | End: 2024-04-28 | Stop reason: HOSPADM

## 2024-04-27 RX ORDER — OXYCODONE HYDROCHLORIDE 5 MG/1
10 TABLET ORAL EVERY 4 HOURS PRN
Status: DISCONTINUED | OUTPATIENT
Start: 2024-04-27 | End: 2024-04-28 | Stop reason: HOSPADM

## 2024-04-27 RX ORDER — OXYCODONE HYDROCHLORIDE 5 MG/1
5 TABLET ORAL EVERY 4 HOURS PRN
Status: DISCONTINUED | OUTPATIENT
Start: 2024-04-27 | End: 2024-04-28 | Stop reason: HOSPADM

## 2024-04-27 RX ORDER — OXYCODONE HYDROCHLORIDE 5 MG/1
5 TABLET ORAL EVERY 6 HOURS PRN
Status: DISCONTINUED | OUTPATIENT
Start: 2024-04-27 | End: 2024-04-27

## 2024-04-27 RX ORDER — ROCURONIUM BROMIDE 10 MG/ML
INJECTION, SOLUTION INTRAVENOUS PRN
Status: DISCONTINUED | OUTPATIENT
Start: 2024-04-27 | End: 2024-04-27 | Stop reason: SDUPTHER

## 2024-04-27 RX ORDER — METOCLOPRAMIDE HYDROCHLORIDE 5 MG/ML
10 INJECTION INTRAMUSCULAR; INTRAVENOUS
Status: DISCONTINUED | OUTPATIENT
Start: 2024-04-27 | End: 2024-04-28 | Stop reason: HOSPADM

## 2024-04-27 RX ORDER — ACETAMINOPHEN 325 MG/1
650 TABLET ORAL EVERY 6 HOURS
Status: DISCONTINUED | OUTPATIENT
Start: 2024-04-27 | End: 2024-04-28 | Stop reason: HOSPADM

## 2024-04-27 RX ORDER — ONDANSETRON 4 MG/1
4 TABLET, ORALLY DISINTEGRATING ORAL EVERY 8 HOURS PRN
Status: DISCONTINUED | OUTPATIENT
Start: 2024-04-27 | End: 2024-04-28 | Stop reason: HOSPADM

## 2024-04-27 RX ORDER — CEFAZOLIN SODIUM 2 G/50ML
SOLUTION INTRAVENOUS PRN
Status: DISCONTINUED | OUTPATIENT
Start: 2024-04-27 | End: 2024-04-27 | Stop reason: SDUPTHER

## 2024-04-27 RX ORDER — MORPHINE SULFATE 10 MG/ML
INJECTION, SOLUTION INTRAMUSCULAR; INTRAVENOUS PRN
Status: DISCONTINUED | OUTPATIENT
Start: 2024-04-27 | End: 2024-04-27 | Stop reason: SDUPTHER

## 2024-04-27 RX ORDER — METHYLPREDNISOLONE ACETATE 40 MG/ML
INJECTION, SUSPENSION INTRA-ARTICULAR; INTRALESIONAL; INTRAMUSCULAR; SOFT TISSUE PRN
Status: DISCONTINUED | OUTPATIENT
Start: 2024-04-27 | End: 2024-04-27 | Stop reason: HOSPADM

## 2024-04-27 RX ORDER — LIDOCAINE HYDROCHLORIDE 10 MG/ML
INJECTION, SOLUTION EPIDURAL; INFILTRATION; INTRACAUDAL; PERINEURAL PRN
Status: DISCONTINUED | OUTPATIENT
Start: 2024-04-27 | End: 2024-04-27 | Stop reason: SDUPTHER

## 2024-04-27 RX ORDER — NALOXONE HYDROCHLORIDE 0.4 MG/ML
INJECTION, SOLUTION INTRAMUSCULAR; INTRAVENOUS; SUBCUTANEOUS PRN
Status: DISCONTINUED | OUTPATIENT
Start: 2024-04-27 | End: 2024-04-28 | Stop reason: HOSPADM

## 2024-04-27 RX ORDER — DEXAMETHASONE SODIUM PHOSPHATE 10 MG/ML
INJECTION INTRAMUSCULAR; INTRAVENOUS PRN
Status: DISCONTINUED | OUTPATIENT
Start: 2024-04-27 | End: 2024-04-27 | Stop reason: SDUPTHER

## 2024-04-27 RX ORDER — ONDANSETRON 2 MG/ML
4 INJECTION INTRAMUSCULAR; INTRAVENOUS EVERY 6 HOURS PRN
Status: DISCONTINUED | OUTPATIENT
Start: 2024-04-27 | End: 2024-04-28 | Stop reason: HOSPADM

## 2024-04-27 RX ORDER — MAGNESIUM HYDROXIDE 1200 MG/15ML
LIQUID ORAL CONTINUOUS PRN
Status: DISCONTINUED | OUTPATIENT
Start: 2024-04-27 | End: 2024-04-27 | Stop reason: HOSPADM

## 2024-04-27 RX ADMIN — DOCUSATE SODIUM 50 MG AND SENNOSIDES 8.6 MG 1 TABLET: 8.6; 5 TABLET, FILM COATED ORAL at 21:06

## 2024-04-27 RX ADMIN — SODIUM CHLORIDE, PRESERVATIVE FREE 20 MG: 5 INJECTION INTRAVENOUS at 08:03

## 2024-04-27 RX ADMIN — ROCURONIUM BROMIDE 50 MG: 50 INJECTION INTRAVENOUS at 13:53

## 2024-04-27 RX ADMIN — METHOCARBAMOL 1000 MG: 500 TABLET ORAL at 08:03

## 2024-04-27 RX ADMIN — PREGABALIN 200 MG: 100 CAPSULE ORAL at 21:06

## 2024-04-27 RX ADMIN — DEXAMETHASONE SODIUM PHOSPHATE 4 MG: 4 INJECTION, SOLUTION INTRA-ARTICULAR; INTRALESIONAL; INTRAMUSCULAR; INTRAVENOUS; SOFT TISSUE at 21:08

## 2024-04-27 RX ADMIN — DEXAMETHASONE SODIUM PHOSPHATE 4 MG: 4 INJECTION, SOLUTION INTRA-ARTICULAR; INTRALESIONAL; INTRAMUSCULAR; INTRAVENOUS; SOFT TISSUE at 00:25

## 2024-04-27 RX ADMIN — SODIUM CHLORIDE: 9 INJECTION, SOLUTION INTRAVENOUS at 16:24

## 2024-04-27 RX ADMIN — PREGABALIN 200 MG: 100 CAPSULE ORAL at 08:03

## 2024-04-27 RX ADMIN — FENTANYL CITRATE 100 MCG: 50 INJECTION, SOLUTION INTRAMUSCULAR; INTRAVENOUS at 13:53

## 2024-04-27 RX ADMIN — OXYCODONE HYDROCHLORIDE 10 MG: 5 TABLET ORAL at 17:27

## 2024-04-27 RX ADMIN — LIDOCAINE HYDROCHLORIDE 50 MG: 10 INJECTION, SOLUTION EPIDURAL; INFILTRATION; INTRACAUDAL; PERINEURAL at 13:53

## 2024-04-27 RX ADMIN — SUGAMMADEX 200 MG: 100 INJECTION, SOLUTION INTRAVENOUS at 16:12

## 2024-04-27 RX ADMIN — Medication 10 MG: at 15:06

## 2024-04-27 RX ADMIN — CEFAZOLIN SODIUM 2000 MG: 2 SOLUTION INTRAVENOUS at 14:10

## 2024-04-27 RX ADMIN — MORPHINE SULFATE 2 MG: 10 INJECTION, SOLUTION INTRAMUSCULAR; INTRAVENOUS at 15:21

## 2024-04-27 RX ADMIN — ONDANSETRON 4 MG: 2 INJECTION INTRAMUSCULAR; INTRAVENOUS at 15:57

## 2024-04-27 RX ADMIN — MIDAZOLAM 2 MG: 1 INJECTION INTRAMUSCULAR; INTRAVENOUS at 13:50

## 2024-04-27 RX ADMIN — DEXAMETHASONE SODIUM PHOSPHATE 10 MG: 10 INJECTION INTRAMUSCULAR; INTRAVENOUS at 14:08

## 2024-04-27 RX ADMIN — DULOXETINE HYDROCHLORIDE 60 MG: 30 CAPSULE, DELAYED RELEASE ORAL at 08:03

## 2024-04-27 RX ADMIN — HYDROCODONE BITARTRATE AND ACETAMINOPHEN 1 TABLET: 5; 325 TABLET ORAL at 04:11

## 2024-04-27 RX ADMIN — MORPHINE SULFATE 4 MG: 10 INJECTION, SOLUTION INTRAMUSCULAR; INTRAVENOUS at 16:14

## 2024-04-27 RX ADMIN — SODIUM CHLORIDE, PRESERVATIVE FREE 20 MG: 5 INJECTION INTRAVENOUS at 21:08

## 2024-04-27 RX ADMIN — MORPHINE SULFATE 4 MG: 4 INJECTION, SOLUTION INTRAMUSCULAR; INTRAVENOUS at 00:35

## 2024-04-27 RX ADMIN — MORPHINE SULFATE 4 MG: 4 INJECTION INTRAVENOUS at 18:42

## 2024-04-27 RX ADMIN — Medication 2000 MG: at 21:15

## 2024-04-27 RX ADMIN — CARBAMAZEPINE 100 MG: 100 TABLET, EXTENDED RELEASE ORAL at 21:06

## 2024-04-27 RX ADMIN — ROCURONIUM BROMIDE 10 MG: 50 INJECTION INTRAVENOUS at 15:07

## 2024-04-27 RX ADMIN — ACETAMINOPHEN 650 MG: 325 TABLET ORAL at 17:27

## 2024-04-27 RX ADMIN — DEXAMETHASONE SODIUM PHOSPHATE 4 MG: 4 INJECTION, SOLUTION INTRA-ARTICULAR; INTRALESIONAL; INTRAMUSCULAR; INTRAVENOUS; SOFT TISSUE at 06:31

## 2024-04-27 RX ADMIN — ROCURONIUM BROMIDE 10 MG: 50 INJECTION INTRAVENOUS at 14:25

## 2024-04-27 RX ADMIN — Medication 10 MG: at 15:56

## 2024-04-27 RX ADMIN — CETIRIZINE HYDROCHLORIDE 10 MG: 10 TABLET ORAL at 08:03

## 2024-04-27 RX ADMIN — MORPHINE SULFATE 4 MG: 4 INJECTION INTRAVENOUS at 21:11

## 2024-04-27 RX ADMIN — Medication 1000 MCG: at 08:03

## 2024-04-27 RX ADMIN — FENTANYL CITRATE 25 MCG: 50 INJECTION, SOLUTION INTRAMUSCULAR; INTRAVENOUS at 11:17

## 2024-04-27 RX ADMIN — PROPOFOL 200 MG: 10 INJECTION, EMULSION INTRAVENOUS at 13:53

## 2024-04-27 RX ADMIN — Medication 30 MG: at 14:18

## 2024-04-27 RX ADMIN — MORPHINE SULFATE 4 MG: 4 INJECTION, SOLUTION INTRAMUSCULAR; INTRAVENOUS at 06:31

## 2024-04-27 ASSESSMENT — PAIN DESCRIPTION - FREQUENCY
FREQUENCY: CONTINUOUS
FREQUENCY: CONTINUOUS

## 2024-04-27 ASSESSMENT — PAIN SCALES - GENERAL
PAINLEVEL_OUTOF10: 9
PAINLEVEL_OUTOF10: 6
PAINLEVEL_OUTOF10: 8
PAINLEVEL_OUTOF10: 10
PAINLEVEL_OUTOF10: 8
PAINLEVEL_OUTOF10: 5
PAINLEVEL_OUTOF10: 6

## 2024-04-27 ASSESSMENT — PAIN SCALES - WONG BAKER
WONGBAKER_NUMERICALRESPONSE: NO HURT

## 2024-04-27 ASSESSMENT — PAIN - FUNCTIONAL ASSESSMENT
PAIN_FUNCTIONAL_ASSESSMENT: ACTIVITIES ARE NOT PREVENTED
PAIN_FUNCTIONAL_ASSESSMENT: ADULT NONVERBAL PAIN SCALE (NPVS)
PAIN_FUNCTIONAL_ASSESSMENT: ACTIVITIES ARE NOT PREVENTED

## 2024-04-27 ASSESSMENT — PAIN DESCRIPTION - ONSET: ONSET: OTHER (COMMENT)

## 2024-04-27 ASSESSMENT — PAIN DESCRIPTION - ORIENTATION
ORIENTATION: LEFT
ORIENTATION: LEFT;LOWER
ORIENTATION: LOWER
ORIENTATION: LEFT
ORIENTATION: LOWER
ORIENTATION: LEFT

## 2024-04-27 ASSESSMENT — PAIN DESCRIPTION - LOCATION
LOCATION: BACK;HIP;LEG
LOCATION: BACK
LOCATION: BACK;HIP;LEG
LOCATION: BACK;LEG;HIP
LOCATION: BACK
LOCATION: BACK
LOCATION: BACK;HAND;LEG

## 2024-04-27 ASSESSMENT — PAIN DESCRIPTION - DESCRIPTORS
DESCRIPTORS: SHARP;SHOOTING
DESCRIPTORS: SHOOTING;SHARP
DESCRIPTORS: DISCOMFORT;ACHING
DESCRIPTORS: SHARP;SHOOTING
DESCRIPTORS: ACHING;SHOOTING

## 2024-04-27 ASSESSMENT — PAIN DESCRIPTION - PAIN TYPE
TYPE: SURGICAL PAIN
TYPE: ACUTE PAIN

## 2024-04-27 NOTE — ANESTHESIA PRE PROCEDURE
Department of Anesthesiology  Preprocedure Note       Name:  Dorothea Hastings   Age:  38 y.o.  :  1985                                          MRN:  [id number]         Date:  2024      Surgeon: Surgeon(s):  Art Bautista DO    Procedure: Procedure(s):  L3-L4 MICRODISCECTOMY - ISABELLA SPINE, MICROSCOPE, C-ARM    Medications prior to admission:   Prior to Admission medications    Medication Sig Start Date End Date Taking? Authorizing Provider   predniSONE (DELTASONE) 20 MG tablet 3 tabs x 3 days, then 2 tabs x 3 days, then 1 tab x 3 days 24  El Kat APRN - CNP   HYDROcodone-acetaminophen (NORCO) 5-325 MG per tablet Take 1 tablet by mouth every 6 hours as needed for Pain for up to 3 days. Intended supply: 3 days. Take lowest dose possible to manage pain Max Daily Amount: 4 tablets 24  El Kat APRN - CNP   DULoxetine (CYMBALTA) 60 MG extended release capsule TAKE 1 CAPSULE BY MOUTH ONCE DAILY 24   El Kat APRN - CNP   ibuprofen (IBU) 600 MG tablet Take 1 tablet by mouth every 8 hours as needed for Pain 24   Goldberger, Erica B, MD   diclofenac (VOLTAREN) 75 MG EC tablet Take 1 tablet by mouth 2 times daily (with meals)  Patient not taking: Reported on 2024 1/3/24   Eric Laura MD   butalbital-acetaminophen-caffeine (FIORICET, ESGIC) -40 MG per tablet Take 1 tablet by mouth every 12 hours as needed for Headaches 10/2/23 3/13/24  Constantin Judd DO   baclofen (LIORESAL) 10 MG tablet Take 0.5 tablets by mouth 2 times daily as needed (back pain)  Patient not taking: Reported on 2024 9/13/23 5/10/24  Constantin Judd DO   cetirizine (ZYRTEC) 10 MG tablet Take 1 tablet by mouth daily 23   North, Allisan S, APRN - CNP   pregabalin (LYRICA) 200 MG capsule Take 1 capsule by mouth 3 times daily for 90 days. Max Daily Amount: 600 mg 23  El Kat, APRN - CNP   vitamin B-12

## 2024-04-27 NOTE — ANESTHESIA POSTPROCEDURE EVALUATION
POST- ANESTHESIA EVALUATION       Pt Name: Dorothea Hastings  MRN: [id number]  YOB: 1985  Date of evaluation: 4/27/2024  Time:  7:10 PM      /85   Pulse 75   Temp 97.9 °F (36.6 °C) (Oral)   Resp 16   Ht 1.702 m (5' 7\")   Wt 101.6 kg (224 lb)   SpO2 100%   BMI 35.08 kg/m²      Consciousness Level  Awake  Cardiopulmonary Status  Stable  Pain Adequately Treated YES  Nausea / Vomiting  NO  Adequate Hydration  YES  Anesthesia Related Complications NONE      Electronically signed by Blaine Roper MD on 4/27/2024 at 7:10 PM     Department of Anesthesiology  Postprocedure Note    Patient: Dorothea Hastings  MRN: [id number]  YOB: 1985  Date of evaluation: 4/27/2024    Procedure Summary       Date: 04/27/24 Room / Location: [place] [address] / [facility]    Anesthesia Start: 1348 Anesthesia Stop: 1625    Procedure: LUMBAR THREE THRU LUMBAR FOUR MICRODISCECTOMY; LEFT (Left: Spine Lumbar) Diagnosis:       Herniation of intervertebral disc at L3-L4 level      (Herniation of intervertebral disc at L3-L4 level [M51.26])    Surgeons: Art Bautista DO Responsible Provider: Blaine Roper MD    Anesthesia Type: general ASA Status: 2            Anesthesia Type: No value filed.    Jose Phase I: Jose Score: 9    Jose Phase II:      Anesthesia Post Evaluation    No notable events documented.

## 2024-04-27 NOTE — OP NOTE
Operative Note      Patient: Dorothea Hastings  YOB: 1985  MRN: [id number]    Date of Procedure: 4/27/2024    Pre-Op Diagnosis Codes:     * Herniation of intervertebral disc at L3-L4 level [M51.26]    Post-Op Diagnosis: Same    Indication for surgery.  Patient with left-sided foraminal disc herniation at L3-4 with resultant L3 radiculopathy.  Patient failed conservative measures including mobilization with PT as well as IV steroids.  Patient nonambulatory and bedbound unable to sit up due to severity of left-sided radiculopathy warranting decompression.  Extensive discussion with the patient regarding the findings on examination as well as the imaging which includes left-sided discrimination.  Surgery risks including CSF leak nerve injury instability infection and bleeding were noted.       Procedure  Left-sided L3-4 laminotomy foraminotomy and microdiscectomy at L3    Surgeon(s):  Art Bautista DO    Assistant:   * No surgical staff found *    Anesthesia: General    Estimated Blood Loss (mL): Minimal    Complications: None    Specimens:   * No specimens in log *    Implants:  * No implants in log *      Drains: * No LDAs found *    Findings:  Infection Present At Time Of Surgery (PATOS) (choose all levels that have infection present):  No infection present  Other Findings: Disc herniation at L3-4 far lateral    Detailed Description of Procedure:   Patient was consented preoperatively.  She was brought into the operative room and placed under general anesthesia.  She was flipped prone on the Anhtony table all pressure points padded arms placed on the arm boards.  Midline was marked out after sterile prepping draping and timeout is performed spinal needle was used to identify the L3-4 interspace.  I infiltrated the incision with 1 side lidocaine with epinephrine.  10 blade was used to make incision followed by Bovie taken down to the subcu cutaneous layer.  Left paramedian exposure some periosteal was

## 2024-04-28 VITALS
OXYGEN SATURATION: 99 % | RESPIRATION RATE: 16 BRPM | SYSTOLIC BLOOD PRESSURE: 143 MMHG | DIASTOLIC BLOOD PRESSURE: 94 MMHG | HEART RATE: 78 BPM | BODY MASS INDEX: 35.16 KG/M2 | TEMPERATURE: 97.6 F | HEIGHT: 67 IN | WEIGHT: 224 LBS

## 2024-04-28 LAB
ANION GAP SERPL CALCULATED.3IONS-SCNC: 9 MMOL/L (ref 9–16)
BUN SERPL-MCNC: 11 MG/DL (ref 6–20)
CALCIUM SERPL-MCNC: 7.9 MG/DL (ref 8.6–10.4)
CHLORIDE SERPL-SCNC: 105 MMOL/L (ref 98–107)
CO2 SERPL-SCNC: 24 MMOL/L (ref 20–31)
CREAT SERPL-MCNC: 0.7 MG/DL (ref 0.5–0.9)
ERYTHROCYTE [DISTWIDTH] IN BLOOD BY AUTOMATED COUNT: 13.5 % (ref 11.8–14.4)
GFR SERPL CREATININE-BSD FRML MDRD: >90 ML/MIN/1.73M2
GLUCOSE SERPL-MCNC: 118 MG/DL (ref 74–99)
HCT VFR BLD AUTO: 40 % (ref 36.3–47.1)
HGB BLD-MCNC: 12.4 G/DL (ref 11.9–15.1)
MCH RBC QN AUTO: 29.7 PG (ref 25.2–33.5)
MCHC RBC AUTO-ENTMCNC: 31 G/DL (ref 28.4–34.8)
MCV RBC AUTO: 95.9 FL (ref 82.6–102.9)
NRBC BLD-RTO: 0 PER 100 WBC
PLATELET # BLD AUTO: 347 K/UL (ref 138–453)
PMV BLD AUTO: 11 FL (ref 8.1–13.5)
POTASSIUM SERPL-SCNC: 4.1 MMOL/L (ref 3.7–5.3)
RBC # BLD AUTO: 4.17 M/UL (ref 3.95–5.11)
SODIUM SERPL-SCNC: 138 MMOL/L (ref 136–145)
WBC OTHER # BLD: 19.7 K/UL (ref 3.5–11.3)

## 2024-04-28 PROCEDURE — 2580000003 HC RX 258: Performed by: PHYSICIAN ASSISTANT

## 2024-04-28 PROCEDURE — 6370000000 HC RX 637 (ALT 250 FOR IP): Performed by: PHYSICIAN ASSISTANT

## 2024-04-28 PROCEDURE — 6360000002 HC RX W HCPCS: Performed by: PHYSICIAN ASSISTANT

## 2024-04-28 PROCEDURE — 6370000000 HC RX 637 (ALT 250 FOR IP)

## 2024-04-28 PROCEDURE — 85027 COMPLETE CBC AUTOMATED: CPT

## 2024-04-28 PROCEDURE — 80048 BASIC METABOLIC PNL TOTAL CA: CPT

## 2024-04-28 PROCEDURE — 2500000003 HC RX 250 WO HCPCS: Performed by: PHYSICIAN ASSISTANT

## 2024-04-28 PROCEDURE — A4216 STERILE WATER/SALINE, 10 ML: HCPCS | Performed by: PHYSICIAN ASSISTANT

## 2024-04-28 PROCEDURE — 97166 OT EVAL MOD COMPLEX 45 MIN: CPT

## 2024-04-28 PROCEDURE — 97162 PT EVAL MOD COMPLEX 30 MIN: CPT

## 2024-04-28 PROCEDURE — 36415 COLL VENOUS BLD VENIPUNCTURE: CPT

## 2024-04-28 PROCEDURE — 97530 THERAPEUTIC ACTIVITIES: CPT

## 2024-04-28 PROCEDURE — 97535 SELF CARE MNGMENT TRAINING: CPT

## 2024-04-28 RX ORDER — PREDNISONE 20 MG/1
TABLET ORAL
Qty: 30 TABLET | Refills: 0 | Status: SHIPPED | OUTPATIENT
Start: 2024-04-28 | End: 2024-05-13

## 2024-04-28 RX ORDER — CYCLOBENZAPRINE HCL 10 MG
10 TABLET ORAL 2 TIMES DAILY PRN
Qty: 14 TABLET | Refills: 0 | Status: SHIPPED | OUTPATIENT
Start: 2024-04-28 | End: 2024-05-08

## 2024-04-28 RX ORDER — LIDOCAINE 4 G/G
1 PATCH TOPICAL DAILY
Status: DISCONTINUED | OUTPATIENT
Start: 2024-04-28 | End: 2024-04-28 | Stop reason: HOSPADM

## 2024-04-28 RX ORDER — OXYCODONE HYDROCHLORIDE AND ACETAMINOPHEN 5; 325 MG/1; MG/1
TABLET ORAL
Qty: 56 TABLET | Refills: 0 | Status: SHIPPED | OUTPATIENT
Start: 2024-04-28 | End: 2024-05-05

## 2024-04-28 RX ORDER — FAMOTIDINE 20 MG/1
20 TABLET, FILM COATED ORAL 2 TIMES DAILY
Qty: 60 TABLET | Refills: 3 | Status: SHIPPED | OUTPATIENT
Start: 2024-04-28

## 2024-04-28 RX ADMIN — MORPHINE SULFATE 4 MG: 4 INJECTION INTRAVENOUS at 07:51

## 2024-04-28 RX ADMIN — ACETAMINOPHEN 650 MG: 325 TABLET ORAL at 09:58

## 2024-04-28 RX ADMIN — MORPHINE SULFATE 4 MG: 4 INJECTION INTRAVENOUS at 03:04

## 2024-04-28 RX ADMIN — MORPHINE SULFATE 4 MG: 4 INJECTION INTRAVENOUS at 00:43

## 2024-04-28 RX ADMIN — Medication 1000 MCG: at 08:30

## 2024-04-28 RX ADMIN — ENOXAPARIN SODIUM 30 MG: 100 INJECTION SUBCUTANEOUS at 07:51

## 2024-04-28 RX ADMIN — PREGABALIN 200 MG: 100 CAPSULE ORAL at 13:02

## 2024-04-28 RX ADMIN — CETIRIZINE HYDROCHLORIDE 10 MG: 10 TABLET ORAL at 07:51

## 2024-04-28 RX ADMIN — OXYCODONE HYDROCHLORIDE 10 MG: 5 TABLET ORAL at 00:24

## 2024-04-28 RX ADMIN — SODIUM CHLORIDE, PRESERVATIVE FREE 20 MG: 5 INJECTION INTRAVENOUS at 08:30

## 2024-04-28 RX ADMIN — ACETAMINOPHEN 650 MG: 325 TABLET ORAL at 05:31

## 2024-04-28 RX ADMIN — DEXAMETHASONE SODIUM PHOSPHATE 4 MG: 4 INJECTION, SOLUTION INTRA-ARTICULAR; INTRALESIONAL; INTRAMUSCULAR; INTRAVENOUS; SOFT TISSUE at 07:51

## 2024-04-28 RX ADMIN — Medication 2000 MG: at 01:24

## 2024-04-28 RX ADMIN — SODIUM CHLORIDE: 9 INJECTION, SOLUTION INTRAVENOUS at 03:19

## 2024-04-28 RX ADMIN — ACETAMINOPHEN 650 MG: 325 TABLET ORAL at 00:24

## 2024-04-28 RX ADMIN — MORPHINE SULFATE 4 MG: 4 INJECTION INTRAVENOUS at 13:02

## 2024-04-28 RX ADMIN — PREGABALIN 200 MG: 100 CAPSULE ORAL at 09:35

## 2024-04-28 RX ADMIN — DEXAMETHASONE SODIUM PHOSPHATE 4 MG: 4 INJECTION, SOLUTION INTRA-ARTICULAR; INTRALESIONAL; INTRAMUSCULAR; INTRAVENOUS; SOFT TISSUE at 00:24

## 2024-04-28 RX ADMIN — MORPHINE SULFATE 4 MG: 4 INJECTION INTRAVENOUS at 09:58

## 2024-04-28 RX ADMIN — CYCLOBENZAPRINE 10 MG: 10 TABLET, FILM COATED ORAL at 08:30

## 2024-04-28 RX ADMIN — DOCUSATE SODIUM 50 MG AND SENNOSIDES 8.6 MG 1 TABLET: 8.6; 5 TABLET, FILM COATED ORAL at 07:51

## 2024-04-28 RX ADMIN — DULOXETINE HYDROCHLORIDE 60 MG: 30 CAPSULE, DELAYED RELEASE ORAL at 07:51

## 2024-04-28 RX ADMIN — MORPHINE SULFATE 4 MG: 4 INJECTION INTRAVENOUS at 05:30

## 2024-04-28 ASSESSMENT — PAIN DESCRIPTION - LOCATION
LOCATION: BACK

## 2024-04-28 ASSESSMENT — PAIN SCALES - GENERAL
PAINLEVEL_OUTOF10: 6
PAINLEVEL_OUTOF10: 7
PAINLEVEL_OUTOF10: 5
PAINLEVEL_OUTOF10: 8
PAINLEVEL_OUTOF10: 6
PAINLEVEL_OUTOF10: 9
PAINLEVEL_OUTOF10: 8

## 2024-04-28 ASSESSMENT — PAIN DESCRIPTION - ORIENTATION
ORIENTATION: MID;LOWER
ORIENTATION: LOWER;UPPER
ORIENTATION: MID;LOWER
ORIENTATION: MID;LOWER

## 2024-04-28 ASSESSMENT — PAIN - FUNCTIONAL ASSESSMENT
PAIN_FUNCTIONAL_ASSESSMENT: ACTIVITIES ARE NOT PREVENTED

## 2024-04-28 ASSESSMENT — PAIN DESCRIPTION - DESCRIPTORS
DESCRIPTORS: ACHING
DESCRIPTORS: THROBBING;SHARP;SHOOTING
DESCRIPTORS: ACHING
DESCRIPTORS: ACHING

## 2024-04-28 ASSESSMENT — PAIN SCALES - WONG BAKER: WONGBAKER_NUMERICALRESPONSE: NO HURT

## 2024-04-28 NOTE — PLAN OF CARE
Dorothea Hastings requires the assistance of a wheeled walker to successfully complete daily living tasks such as: bathing, toileting, dressing and grooming.  A wheeled walker is necessary due to the patient's unsteady gait, upper body weakness, inability to  an ambulation device, and can ambulate only by pushing a walker instead of a lesser assistive device such as a cane, crutch, or standard walker.     Electronically signed by MORE Elizabeth CNP on 4/28/2024 at 1:48 PM

## 2024-04-28 NOTE — PLAN OF CARE
Problem: Pain  Goal: Verbalizes/displays adequate comfort level or baseline comfort level  4/28/2024 0503 by Melvin Ellison, RN  Outcome: Progressing     Problem: Safety - Adult  Goal: Free from fall injury  4/28/2024 0503 by Melvin Ellison, RN  Outcome: Progressing     Problem: Skin/Tissue Integrity  Goal: Absence of new skin breakdown  Description: 1.  Monitor for areas of redness and/or skin breakdown  2.  Assess vascular access sites hourly  3.  Every 4-6 hours minimum:  Change oxygen saturation probe site  4.  Every 4-6 hours:  If on nasal continuous positive airway pressure, respiratory therapy assess nares and determine need for appliance change or resting period.  4/28/2024 0503 by Melvin Ellison, RN  Outcome: Progressing

## 2024-04-28 NOTE — DISCHARGE INSTRUCTIONS
Post-Operative Instructions    You may shower starting the second day after surgery.  Wash the incision area gently with regular soap and water and lightly rinse the area when finished.  Do not soak or submerge the incision.  Carefully pat the area dry when finished and keep the incision clean and dry.  Do not apply lotion or ointments to the incision.    Your suture is all under the skin and is absorbable.  It does not need to be removed.  The skin glue will fall off on its own in 1-2 weeks.    Do not lift more than 10lbs and do not perform any strenuous work or exercise.  Avoid excessive bending or twisting of your back.  It is good to be up and walking, however.  It is safe to go up and down stairs.  It is not recommended to drive while using prescription pain medication.  Once you have weaned off prescription pain medication and you are certain that you are moving well enough to safely control the car it is okay to resume driving.    Hold all blood thinning medication for one week following surgery.     Please call for any signs of infection (redness, drainage, opening of the incision), fevers >101F, neurologic changes (new weakness, numbness), pain unresponsive to medication, or with any questions or concerns.  The office phone number is [phone].

## 2024-04-28 NOTE — DISCHARGE SUMMARY
Department of Neurosurgery                                            Discharge Summary       PATIENT NAME: Dorothea Hastings  YOB: 1985  MEDICAL RECORD NO. [id number]  DATE: 4/28/2024  PRIMARY CARE PHYSICIAN: El Kat APRN - CNP  DISCHARGE DATE:  04/28/2024  DISCHARGE DIAGNOSIS: lower back pain  Patient Active Problem List   Diagnosis Code    Chronic low back pain with sciatica M54.40, G89.29    Thyroid nodule E04.1    Stenosis of cervical spine with myelopathy (HCC) M48.02, G99.2    Cervical radiculitis M54.12    Thoracic radiculopathy M54.14    Class 1 obesity due to excess calories with serious comorbidity in adult E66.09    Cervical disc disease M50.90    Bunion of great toe of right foot M21.611    S/P cervical disc replacement Z98.890    Ulnar neuropathy of right upper extremity G56.21    Chronic right shoulder pain M25.511, G89.29    Nonintractable headache R51.9    Hemiplegic migraine without status migrainosus, not intractable G43.409    Cervical myelopathy (HCC) G95.9    S/P cubital tunnel release Z98.890    Cervical spondylosis M47.812    Lower back pain M54.50    Lumbar disc herniation with radiculopathy M51.16    Intractable back pain M54.9     DISPOSITION: Home    PROCEDURES:    Left-sided L3-4 laminotomy foraminotomy and microdiscectomy at L3     HOSPITAL COURSE     Dorothea Hastings originally presented to the hospital on 4/25/2024  2:43 PM with low back pain with radiculopathy. MRI showed Herniation of intervertebral disc at L3-L4 level . She was admitted and taken to the OR for neurosurgery for procedure listed above. Patient tolerated all procedures well.     Labs and imaging were followed daily.  At time of discharge, Dorothea Hastings was tolerating a ADULT DIET; Regular, having bowel movements, ambulating on her own accord with a wheeled walker and had adequate analgesia on oral pain medications, and had no signs of symptoms of complications.  She is

## 2024-04-28 NOTE — PLAN OF CARE
Dorothea Hastings was evaluated today and a DME order was entered for a shower chair because she requires this to successfully complete daily living tasks of bathing, grooming, and hygiene.  A wheeled walker is necessary due to the patient's unsteady gait, upper body weakness, and inability to  an ambulation device; and she can ambulate only by pushing a walker instead of a lesser assistive device such as a cane, crutch, or standard walker.  The need for this equipment was discussed with the patient and she understands and is in agreement.    Electronically signed by MORE Elizabeth CNP on 4/28/2024 at 1:49 PM

## 2024-04-30 ENCOUNTER — CARE COORDINATION (OUTPATIENT)
Dept: CARE COORDINATION | Age: 39
End: 2024-04-30

## 2024-04-30 SDOH — HEALTH STABILITY: MENTAL HEALTH
STRESS IS WHEN SOMEONE FEELS TENSE, NERVOUS, ANXIOUS, OR CAN'T SLEEP AT NIGHT BECAUSE THEIR MIND IS TROUBLED. HOW STRESSED ARE YOU?: TO SOME EXTENT

## 2024-04-30 SDOH — ECONOMIC STABILITY: FOOD INSECURITY: WITHIN THE PAST 12 MONTHS, YOU WORRIED THAT YOUR FOOD WOULD RUN OUT BEFORE YOU GOT MONEY TO BUY MORE.: NEVER TRUE

## 2024-04-30 SDOH — ECONOMIC STABILITY: INCOME INSECURITY: IN THE LAST 12 MONTHS, WAS THERE A TIME WHEN YOU WERE NOT ABLE TO PAY THE MORTGAGE OR RENT ON TIME?: NO

## 2024-04-30 SDOH — ECONOMIC STABILITY: INCOME INSECURITY: HOW HARD IS IT FOR YOU TO PAY FOR THE VERY BASICS LIKE FOOD, HOUSING, MEDICAL CARE, AND HEATING?: HARD

## 2024-04-30 SDOH — ECONOMIC STABILITY: FOOD INSECURITY: WITHIN THE PAST 12 MONTHS, THE FOOD YOU BOUGHT JUST DIDN'T LAST AND YOU DIDN'T HAVE MONEY TO GET MORE.: NEVER TRUE

## 2024-04-30 SDOH — ECONOMIC STABILITY: HOUSING INSECURITY: IN THE LAST 12 MONTHS, HOW MANY PLACES HAVE YOU LIVED?: 1

## 2024-04-30 NOTE — CARE COORDINATION
PCP referral for SDOH needs. Spoke with patient.    She had lumbar microdiscectomy surgery 4/27. Had been off work for a few weeks prior to surgery since wasn't able to walk, now will be off at least another 2 weeks and having financial struggles. She consents to a  referral.    She lives in [address] house, struggling to get around, using a walker. Writer called neurosurgery office because inpatient CM note stated will be ordered outpatient PT. Patient doesn't think she's able to go to outpatient but maybe could do home health therapy visits. Writer called neurosurgery office, they will prescribe outpatient PT or possibly home physical therapy at that time at post op visit 5/13.    Has chronic neck and right shoulder pain, arm numbness, history of cervical spine surgery. Also has headaches, depression. Follows with neurology, pain mgmt, neurosurgeon. No need for nursing care management calls at this time. Referral sent to [facility]  team.    Future Appointments   Date Time Provider Department Center   5/8/2024 10:00 AM El Kat APRN - CNP [facility]   5/10/2024  3:40 PM Queta Mohan APRN - CNP [facility]   5/13/2024  9:30 AM Le Quevedo APRN - CNP [facility]   5/22/2024  1:30 PM Constantin Judd DO Neuro [facility] -   7/3/2024  9:00 AM Art Bautista DO [facility]   8/21/2024  8:45 AM Navin Shukla MD [facility]   9/3/2024  9:00 AM Kenia Aquino DO [facility]

## 2024-05-01 ENCOUNTER — CARE COORDINATION (OUTPATIENT)
Dept: CARE COORDINATION | Age: 39
End: 2024-05-01

## 2024-05-01 ENCOUNTER — TELEPHONE (OUTPATIENT)
Dept: PRIMARY CARE CLINIC | Age: 39
End: 2024-05-01

## 2024-05-01 NOTE — TELEPHONE ENCOUNTER
Care Transitions Initial Follow Up Call    Outreach made within 2 business days of discharge: No    Patient: Skyler Hastings Patient : 1985   MRN: [id number]  Reason for Admission: There are no discharge diagnoses documented for the most recent discharge.  Discharge Date: 24       Spoke with: skyler. Patient has a office visit follow up for 24 advised to just keep that appt. Unable to change to a hospital follow up due to time spacing . Added to appt notes that pt was hospitalized     Discharge department/facility: [facility] Interactive Patient Contact:  Was patient able to fill all prescriptions: Yes  Was patient instructed to bring all medications to the follow-up visit: Yes  Is patient taking all medications as directed in the discharge summary? Yes  Does patient understand their discharge instructions: Yes  Does patient have questions or concerns that need addressed prior to 7-14 day follow up office visit: no    Scheduled appointment with PCP within 7-14 days    Follow Up  Future Appointments   Date Time Provider Department Center   2024 10:00 AM El Kat APRN - CNP [facility]   5/10/2024  3:40 PM Queta Mohan APRN - CNP [facility]   2024  9:30 AM Le Quevedo APRN - CNP [facility]   2024  1:30 PM Constantin Judd DO Neuro [facility] -   7/3/2024  9:00 AM Art Bautista DO [facility]   2024  8:45 AM [facility]   9/3/2024  9:00 AM Kenia Aquino DO [facility]       Lupe Tee MA

## 2024-05-01 NOTE — CARE COORDINATION
received the Referral from the Patient's PCP Office, writer called to introduce myself and to gauge her needs.    Patient reported to wanting to find out about what Resources were available for her as a result of being off work and needing assistance with Groceries and Finances.    There are children in the Home and Patient is not receiving SNAP Benefits at this time;  called our contact at [facility] to see how we can help Patient to apply for these added Benefits........(She is out of the office until 05/07/2024).     will connect with her once she returns to the office.

## 2024-05-02 NOTE — PROGRESS NOTES
[facility]  CDU / OBSERVATION ENCOUNTER  ATTENDING NOTE       I performed a history and physical examination of the patient and discussed management with the resident or midlevel provider. I reviewed the resident or midlevel provider's note and agree with the documented findings and plan of care. Any areas of disagreement are noted on the chart. I was personally present for the key portions of any procedures. I have documented in the chart those procedures where I was not present during the key portions. I have reviewed the nurses notes. I agree with the chief complaint, past medical history, past surgical history, allergies, medications, social and family history as documented unless otherwise noted below.    The Family history, social history, and ROS are effectively unchanged since admission unless noted elsewhere in the chart.    This patient was placed in the observation unit for reevaluation for possible admission to the hospital    Patient with left-sided back pain.  Ongoing discomfort with movement.  Patient with sudden onset with radiation down left leg associated with thigh burning and ongoing for days.  Patient was further worked up with advanced imaging.  See charting for further detail    Yimi Al MD  Attending Emergency  Physician    
   [facility]  CDU / OBSERVATION ENCOUNTER  ATTENDING NOTE       I performed a history and physical examination of the patient and discussed management with the resident or midlevel provider. I reviewed the resident or midlevel provider's note and agree with the documented findings and plan of care. Any areas of disagreement are noted on the chart. I was personally present for the key portions of any procedures. I have documented in the chart those procedures where I was not present during the key portions. I have reviewed the nurses notes. I agree with the chief complaint, past medical history, past surgical history, allergies, medications, social and family history as documented unless otherwise noted below.    The Family history, social history, and ROS are effectively unchanged since admission unless noted elsewhere in the chart.    This patient was placed in the observation unit for reevaluation for possible admission to the hospital    Patient followed by neurosurgery.  Evaluated yesterday after MRI with difficulty with movement secondary to pain.  Patient with symptoms of 2 weeks duration and conservative management recommended with reevaluation after steroids.  Patient for consideration of operative intervention given the amount of discomfort and difficulty she is in.      Thoughtful consideration of condition reviewed in neurosurgical attending attestation.    Patient having difficulty with analgesia.  Patient for plan of OR today.  No time available yet but patient has had preop labs drawn.    Yimi Al MD  Attending Emergency  Physician    
  CDU Transfer Summary        Patient:  Dorothea Hastings  YOB: 1985    MRN: [id number]   Acct: [id number]    Primary Care Physician: El Kat APRN - CNP    Admit date:  4/25/2024  2:43 PM  Transfer date: 04/27/24 01:21 PM    Transfer Diagnoses:     1.)  Lumbar disc herniation with radiculopathy           Medication List        CONTINUE taking these medications      Blood Pressure Kit  Blood pressure management     Handicap Placard Misc  by Does not apply route            ASK your doctor about these medications      baclofen 10 MG tablet  Commonly known as: LIORESAL  Take 0.5 tablets by mouth 2 times daily as needed (back pain)     butalbital-acetaminophen-caffeine -40 MG per tablet  Commonly known as: FIORICET, ESGIC  Take 1 tablet by mouth every 12 hours as needed for Headaches     carBAMazepine 200 MG extended release tablet  Commonly known as: TEGRETOL XR  TAKE 1 TABLET BY MOUTH ONCE DAILY AND TAKE 1 TABLET AT BEDTIME     cetirizine 10 MG tablet  Commonly known as: ZYRTEC  Take 1 tablet by mouth daily     diclofenac 75 MG EC tablet  Commonly known as: VOLTAREN  Take 1 tablet by mouth 2 times daily (with meals)     DULoxetine 60 MG extended release capsule  Commonly known as: CYMBALTA  TAKE 1 CAPSULE BY MOUTH ONCE DAILY     * HYDROcodone-acetaminophen 5-325 MG per tablet  Commonly known as: Norco  Take 1 tablet by mouth daily as needed for Pain for up to 30 days. Max Daily Amount: 1 tablet  Ask about: Should I take this medication?     * HYDROcodone-acetaminophen 5-325 MG per tablet  Commonly known as: Norco  Take 1 tablet by mouth every 6 hours as needed for Pain for up to 3 days. Intended supply: 3 days. Take lowest dose possible to manage pain Max Daily Amount: 4 tablets     ibuprofen 600 MG tablet  Commonly known as: IBU  Take 1 tablet by mouth every 8 hours as needed for Pain     Magnesium Oxide 400 MG Caps  Take 400 mg by mouth daily     predniSONE 20 MG tablet  Commonly 
Neurosurgery AMINATA/Resident    Daily Progress Note   Chief Complaint   Patient presents with    Back Pain     4/27/2024  8:16 AM    Chart reviewed.  No acute events overnight.  No new complaints. NPO since midnight for potential surgical intervention today if able to get OR time. Continues to complain of left leg pain and chronic right leg pain despite decadron.      Vitals:    04/27/24 0411 04/27/24 0441 04/27/24 0631 04/27/24 0737   BP:    112/70   Pulse:    63   Resp: 18 18 18 19   Temp:    97.2 °F (36.2 °C)   TempSrc:    Oral   SpO2:    99%   Weight:       Height:             PE:   AOx3   Motor   L deltoid 5/5; R deltoid 5/5  L biceps 5/5; R biceps 5/5  L triceps 5/5; R triceps 5/5  L wrist extension 5/5; R wrist extension 5/5  L intrinsics 5/5; R intrinsics 5/5      L iliopsoas 4-/5 , R iliopsoas 5/5  L quadriceps 4-/5; R quadriceps 5/5  L Dorsiflexion 4/5; R dorsiflexion 5/5  L Plantarflexion 4/5; R plantarflexion 5/5  L EHL 4/5; R EHL 5/5    Sensation diminished to light touch LLE      Lab Results   Component Value Date    WBC 17.1 (H) 04/25/2024    HGB 13.5 04/25/2024    HCT 42.8 04/25/2024     04/25/2024    CHOL 157 12/10/2020    TRIG 71 12/10/2020    HDL 61 11/26/2021    ALT 16 10/21/2022    AST 19 10/21/2022     04/25/2024    K 4.0 04/25/2024     04/25/2024    CREATININE 0.8 04/25/2024    BUN 12 04/25/2024    CO2 22 04/25/2024    TSH 0.72 11/26/2021    INR 1.1 07/31/2023    LABA1C 5.1 06/03/2021    CRP 4.3 11/26/2021    SEDRATE 10 11/26/2021         A/P  38 y.o. female with lumbar disc herniation left L3 and right L4       - continue decadron   - continue NPO for tentative plan for foraminotomy and discectomy today if OR allows   - hold all antiplatelets and anticoagulants  - neuro checks per floor protocol      Please contact neurosurgery with any changes in patients neurologic status.       Lisandra Amin PA-C  4/27/24  8:16 AM      
Neurosurgery Post op Progress Note      POD# 0    s/p left sided L3-4 laminotomy foraminotomy and microdiscectomy     SUBJECTIVE:      Patient presents with herniation of intervertebral disc at L3-4      OBJECTIVE      Physical exam   VITALS:    Vitals:    04/27/24 1711   BP: 126/85   Pulse: 75   Resp: 16   Temp: 97.9 °F (36.6 °C)   SpO2: 100%     INTAKE:    Intake/Output Summary (Last 24 hours) at 4/27/2024 1738  Last data filed at 4/27/2024 1648  Gross per 24 hour   Intake 1100 ml   Output 20 ml   Net 1080 ml            Neurological exam   alert, oriented x3, affect appropriate, no focal neurological deficits, and moves all extremities well  alert, oriented, normal speech, no focal findings or movement disorder noted.      Wound   Post op wound:    Dressing is clean/dry/intact with no signs of drainage     ASSESSMENT AND PLAN    38 y.o. female status post left sided L3-4 laminotomy foraminotomy and microdiscectomy  post op day # 0    - Analgesia: Percocet 5-325mg q4hrs PRN   - Periop Antibiotics: Ancef 1g q8hrs for 3 doses   - Activity: As tolerated  - DVT prophylaxis: SCDs  - Diet:  Advance as tolerated      Electronically signed by ADDY Ortiz on 4/27/2024 at 5:38 PM    
OBS/CDU   Attending NOTE      Patients PCP is:  El Kat, MORE - CNP        SUBJECTIVE      No acute events overnight.  Patient is still with significant left lower back pain radiating to her left leg and left medial thigh burning.  Introduction of Decadron does not appear to have affected pain.  Neurosurgery planning for microdiscectomy later today.    PHYSICAL EXAM      General: NAD, AO X 3  Heent: EMOI, PERRL  Neck: SUPPLE, NO JVD  Cardiovascular: RRR, S1S2  Pulmonary: CTAB, NO SOB  Abdomen: SOFT, NTTP, ND, +BS  Extremities: +2/4 PULSES DISTAL, NO SWELLING  Neuro / Psych: NO NUMBNESS OR TINGLING, MENTATION AT BASELINE    PERTINENT TEST /EXAMS      I have reviewed all available laboratory results.    MEDICATIONS CURRENT   fentaNYL (SUBLIMAZE) injection 25 mcg, Q2H PRN  oxyCODONE (ROXICODONE) immediate release tablet 5 mg, Q6H PRN  methocarbamol (ROBAXIN) tablet 1,000 mg, TID  dexAMETHasone (DECADRON) injection 4 mg, Q6H  famotidine (PEPCID) 20 mg in sodium chloride (PF) 0.9 % 10 mL injection, BID  0.9 % sodium chloride infusion, Continuous  carBAMazepine (TEGRETOL XR) extended release tablet 100 mg, Nightly  cetirizine (ZYRTEC) tablet 10 mg, Daily  DULoxetine (CYMBALTA) extended release capsule 60 mg, Daily  pregabalin (LYRICA) capsule 200 mg, TID  vitamin B-12 (CYANOCOBALAMIN) tablet 1,000 mcg, Daily        All medication charted and reviewed.    CONSULTS      IP CONSULT TO NEUROSURGERY  IP CONSULT TO INTERVENTIONAL RADIOLOGY    ASSESSMENT/PLAN       Dorothea Hastings is a 38 y.o. female who presents with left lower back pain radiating to the left leg, left medial thigh burning, possible weakness to left lower extremity     -MRI lumbar spine in ED showing stable moderate broad-based left lateral foraminal disc extrusion at L3-4 impinging the L3 nerve root as well as moderate bilateral neuronal foraminal stenosis at L4-5 due to hypertrophic facet disease  -WBC in ED elevated to 17.1, patient did 
Occupational Therapy    [facility]  Occupational Therapy Not Seen Note    DATE: 2024    NAME: Dorothea Hastings  MRN: [id number]   : 1985      Patient not seen this date for Occupational Therapy due to:    Other: OT will await NS consult prior to evaluating    Next Scheduled Treatment: PM or 2024    Electronically signed by REGGIE Lind on 2024 at 10:03 AM    
Physical Therapy        Physical Therapy Cancel Note      DATE: 2024    NAME: Dorothea Hastings  MRN: [id number]   : 1985      Patient not seen this date for Physical Therapy due to:    Surgery/Procedure: Awaiting surgery; will evaluate following.       Electronically signed by Roxana Jacome PT, DPT, CMPT on 2024 at 10:18 AM    
Physical Therapy        Physical Therapy Cancel Note      DATE: 2024    NAME: Dorothea Hastings  MRN: [id number]   : 1985      Patient not seen this date for Physical Therapy due to:    Other: awaiting NS consult this date. PT will check back 24.       Electronically signed by Shanelle Gant PT on 2024 at 12:11 PM     
Physical Therapy  Facility/Department: [address] ORTHO/MED SURG  Physical Therapy Initial Assessment    Name: Dorothea Hastings  : 1985  MRN: [id number]  Date of Service: 2024    Discharge Recommendations:  Patient would benefit from continued therapy after discharge   PT Equipment Recommendations  Equipment Needed: Yes  Mobility Devices: Walker  Walker: Rolling      Patient Diagnosis(es): The primary encounter diagnosis was Back strain, initial encounter. Diagnoses of Radiculopathy of lumbar region and Lumbar disc herniation with radiculopathy were also pertinent to this visit.  Past Medical History:  has a past medical history of Anxiety, Blood loss, Cervical disc disease, Cervical myopathy, Chronic back pain, GERD (gastroesophageal reflux disease), Hx of migraine headaches, Neuropathy, Thyroid nodule, Trigeminal neuralgia of right side of face, Under care of team, Under care of team, Under care of team, Weakness generalized, and Wellness examination.  Past Surgical History:  has a past surgical history that includes  section; Tubal ligation; Dilation and curettage of uterus; cervical fusion (N/A, 2021); Elbow surgery (Right, 2022); Carpal tunnel release (Right, 2022); and Lumbar spine surgery (2024).    Assessment   Body Structures, Functions, Activity Limitations Requiring Skilled Therapeutic Intervention: Decreased functional mobility ;Decreased strength;Decreased endurance;Increased pain  Assessment: The pt ambulated 20 ft with a RW x CGA. She was limited by increased back pain. She could benefit from a continuation of PT for gait , strengthening and functional mobility prior to her DC  Therapy Prognosis: Good  Decision Making: Medium Complexity  Requires PT Follow-Up: Yes  Activity Tolerance  Activity Tolerance: Patient limited by fatigue;Patient limited by endurance;Patient limited by pain     Plan   Physical Therapy Plan  General Plan: 6-7 times per week  Current 
Pt d/c home, medications sent to [facility] pharmacy, pt given d/c instructions with orders for shower chair and rolling walker printed on discharge. All belongings sent with pt. Pt wheeled down via wheelchair, home via private vehicle with . Work note given that return to work date would be determined after pt follows up outpatient with Dr. Bautista. Denies further needs at this time.   
attending  Lidocaine patch for cervical pain  Lovenox for DVT prophylaxis  Neuro checks per floor protocol     Please contact neurosurgery with any changes in patients neurologic status.       Electronically signed by MORE Elizabeth CNP on 4/28/2024 at 9:28 AM        
baseline)  ADL Assistance: Independent (pt requires increased time and effort for LB ADLs and fine motor tasks such as buttoning/zipping)  Homemaking Assistance: Needs assistance  Homemaking Responsibilities: Yes (pt reports is able to perform light homemaking tasks while seated, otherwise family performs)  Ambulation Assistance: Independent (pt reports recently has been needing to reach for furniture for external support)  Transfer Assistance: Independent  Active : Yes  Mode of Transportation: Car  Occupation: Unemployed  Type of Occupation: was a  however has not been able to work X2 weeks  Leisure & Hobbies: time with children, go to park  Additional Comments: Pt reports  is in good health and could assist PRN however does work outside of the home       Objective   Safety Devices  Type of Devices: Call light within reach;Gait belt;Nurse notified;Left in bed  Restraints  Restraints Initially in Place: No    Bed Mobility Training  Bed Mobility Training: No (pt was seated at EOB upon OT arrival and retired to seated at EOB upon OT exit)  Balance  Sitting: Without support (static and dynamic sitting at EOB for engagement in ADLs, UE assessment, and interviewing ~20 min)  Standing: With support (static and dynamic standing was performed briefly prior to/following functional mobility and during standing rest breaks throughout functional mobility. Total standing time ~4 min with CGA and utilizing RW for BUE support)  Transfer Training  Transfer Training: Yes  Overall Level of Assistance: Contact-guard assistance;Additional time;Adaptive equipment (transfers performed at EOB with CGA and use of RW. Pt exhibits appropriate hand placement with no need for additional cueing)  Interventions: Safety awareness training  Sit to Stand: Contact-guard assistance;Additional time;Adaptive equipment  Stand to Sit: Contact-guard assistance;Additional time;Adaptive equipment  Gait  Gait Training: Yes

## 2024-05-06 ENCOUNTER — TELEPHONE (OUTPATIENT)
Dept: NEUROSURGERY | Age: 39
End: 2024-05-06

## 2024-05-06 DIAGNOSIS — M54.16 LUMBAR RADICULOPATHY: Primary | ICD-10-CM

## 2024-05-06 RX ORDER — OXYCODONE HYDROCHLORIDE AND ACETAMINOPHEN 5; 325 MG/1; MG/1
1 TABLET ORAL EVERY 4 HOURS PRN
Qty: 42 TABLET | Refills: 0 | Status: SHIPPED | OUTPATIENT
Start: 2024-05-06 | End: 2024-05-13

## 2024-05-06 NOTE — TELEPHONE ENCOUNTER
Pt called and stated that she was suppose to receive a second script of prednisone since her surgery 4/27 but has not received that yet, just the one script, cannot see second script in chart. Also states since her surgery she is having pain and weakness in both thighs. States she is feeling pressure in her lower back, and pain is rated is 8/10 and is taking medication for it. States there is some swelling with her lower thigh but no where else. Please advise

## 2024-05-06 NOTE — TELEPHONE ENCOUNTER
Multiple courses of prednisone are not typically prescribed after surgery.  She was on a tapered prescription and was to stop when she ran out.  If she is still having postoperative pain, can send a refill of postop pain medications.

## 2024-05-06 NOTE — TELEPHONE ENCOUNTER
I spoke with pt and let her know multiple courses of prednisone are not typically prescribed after surgery. I also stated she was on a tapered dose and that when she is done with that she has completed the medication.Pt understood. Pt has some pain meds so she does not need a refill at this time. Pt stated she is now experiencing front right leg pain that she did not experience before surgery. Pt describes the pain as pins and needles and its painful when she touch her leg. She stated its no redness and no warmth to the touch. Please advise

## 2024-05-07 NOTE — TELEPHONE ENCOUNTER
It is not uncommon to have some nerve irritation following surgery, this will usually improve gradually. Recommend continued monitoring, can evaluate more closely at upcoming post-op visit. Notify office with any new weakness, loss of bowel/bladder control or numbness to saddle area/groin.

## 2024-05-08 ENCOUNTER — CARE COORDINATION (OUTPATIENT)
Dept: CARE COORDINATION | Age: 39
End: 2024-05-08

## 2024-05-08 ENCOUNTER — OFFICE VISIT (OUTPATIENT)
Dept: PRIMARY CARE CLINIC | Age: 39
End: 2024-05-08
Payer: COMMERCIAL

## 2024-05-08 VITALS
BODY MASS INDEX: 35.4 KG/M2 | HEART RATE: 92 BPM | DIASTOLIC BLOOD PRESSURE: 84 MMHG | SYSTOLIC BLOOD PRESSURE: 120 MMHG | OXYGEN SATURATION: 97 % | WEIGHT: 226 LBS

## 2024-05-08 DIAGNOSIS — Z09 HOSPITAL DISCHARGE FOLLOW-UP: ICD-10-CM

## 2024-05-08 DIAGNOSIS — F43.9 SITUATIONAL STRESS: Primary | ICD-10-CM

## 2024-05-08 PROCEDURE — 99214 OFFICE O/P EST MOD 30 MIN: CPT | Performed by: NURSE PRACTITIONER

## 2024-05-08 PROCEDURE — 1111F DSCHRG MED/CURRENT MED MERGE: CPT | Performed by: NURSE PRACTITIONER

## 2024-05-08 NOTE — PROGRESS NOTES
Post-Discharge Transitional Care  Follow Up      Dorothea Hastings   YOB: 1985    Date of Office Visit:  5/8/2024  Date of Hospital Admission: 4/25/24  Date of Hospital Discharge: 4/28/24  Risk of hospital readmission (high >=14%. Medium >=10%) :Readmission Risk Score: 8.5      Care management risk score Rising risk (score 2-5) and Complex Care (Scores >=6): No Risk Score On File     Non face to face  following discharge, date last encounter closed (first attempt may have been earlier): 05/01/2024    Call initiated 2 business days of discharge: No    ASSESSMENT/PLAN:   Situational stress  -     [facility] ([facility])  Hospital discharge follow-up  -     [place] DISCHARGE MEDS RECONCILED W/ CURRENT OUTPATIENT MED LIST      Medical Decision Making: moderate complexity  No follow-ups on file.         Subjective:   HPI:  Follow up of Hospital problems/diagnosis(es):   Pt had a 3-4 lumbar discectomy.  She reports she has tingling and weakness in anterior thighs and in bands around her knees.  She states this has been present since the surgery and she has notified NS.  She follows up with them again on Monday. Cramping in the right calf off and on. No swelling, no redness. She is aggravating her RUE from previous surgery because she is needing to rely on using a walker. She is going to work on filing disability. Would like to get back in with counselor.     Inpatient course: Discharge summary reviewed- see chart.    Patient Active Problem List   Diagnosis    Chronic low back pain with sciatica    Thyroid nodule    Stenosis of cervical spine with myelopathy (HCC)    Cervical radiculitis    Thoracic radiculopathy    Class 1 obesity due to excess calories with serious comorbidity in adult    Cervical disc disease    Bunion of great toe of right foot    S/P cervical disc replacement    Ulnar neuropathy of right upper extremity    Chronic right shoulder pain

## 2024-05-08 NOTE — PROGRESS NOTES
Patient is present for 3 month f/u    Patient states she had back surgery on 4/27  States she has f/u with neurosurgery on 5/13

## 2024-05-08 NOTE — CARE COORDINATION
Writer e-mailed our contact Louise Jordan at [facility] in regards to Patient and asked her to give me a call.    She called writer and writer explained the situation to her that Patient was not working at this time and needed to apply for SNAP Benefits and any other Resources that were available to her.    It was reported to have Patient fill out an application online and we will do a phone interview on this upcoming Friday 05/10/2024 at 11am.

## 2024-05-10 ENCOUNTER — CARE COORDINATION (OUTPATIENT)
Dept: CARE COORDINATION | Age: 39
End: 2024-05-10

## 2024-05-10 ENCOUNTER — OFFICE VISIT (OUTPATIENT)
Dept: PAIN MANAGEMENT | Age: 39
End: 2024-05-10
Payer: COMMERCIAL

## 2024-05-10 VITALS — OXYGEN SATURATION: 99 % | HEART RATE: 96 BPM | BODY MASS INDEX: 35.47 KG/M2 | WEIGHT: 226 LBS | HEIGHT: 67 IN

## 2024-05-10 DIAGNOSIS — M54.14 THORACIC RADICULOPATHY: ICD-10-CM

## 2024-05-10 DIAGNOSIS — M54.12 CERVICAL RADICULITIS: Primary | ICD-10-CM

## 2024-05-10 DIAGNOSIS — G56.21 ULNAR NEUROPATHY OF RIGHT UPPER EXTREMITY: ICD-10-CM

## 2024-05-10 DIAGNOSIS — M54.16 LUMBAR RADICULOPATHY: ICD-10-CM

## 2024-05-10 DIAGNOSIS — M25.511 CHRONIC RIGHT SHOULDER PAIN: ICD-10-CM

## 2024-05-10 DIAGNOSIS — G89.29 CHRONIC RIGHT SHOULDER PAIN: ICD-10-CM

## 2024-05-10 PROCEDURE — 1036F TOBACCO NON-USER: CPT | Performed by: NURSE PRACTITIONER

## 2024-05-10 PROCEDURE — 99213 OFFICE O/P EST LOW 20 MIN: CPT | Performed by: NURSE PRACTITIONER

## 2024-05-10 PROCEDURE — G8427 DOCREV CUR MEDS BY ELIG CLIN: HCPCS | Performed by: NURSE PRACTITIONER

## 2024-05-10 PROCEDURE — G8417 CALC BMI ABV UP PARAM F/U: HCPCS | Performed by: NURSE PRACTITIONER

## 2024-05-10 PROCEDURE — 1111F DSCHRG MED/CURRENT MED MERGE: CPT | Performed by: NURSE PRACTITIONER

## 2024-05-10 RX ORDER — OXYCODONE HYDROCHLORIDE AND ACETAMINOPHEN 5; 325 MG/1; MG/1
1 TABLET ORAL EVERY 4 HOURS PRN
Qty: 42 TABLET | Refills: 0 | Status: CANCELLED | OUTPATIENT
Start: 2024-05-10 | End: 2024-05-17

## 2024-05-10 NOTE — CARE COORDINATION
called patient for our scheduled 11am appointment with our contact from [facility] for a phone interview.    When we called our contact did not answer her phone,  called back and left voicemail's for her,  also e-mailed her to see if we were still on for today.    She is out sick on today and bethr conveyed this to Patient and we will get her rescheduled for her phone interview once our contact returns to work.

## 2024-05-10 NOTE — PROGRESS NOTES
Chief Complaint   Patient presents with    Neck Pain     PMH  Pt c/o chronic neck pain located on the right side with occ right arm numbness and tingling to all the fingers  Pt had cervical fusion   with Dr Aquino with worsening cervical and right arm radicular pain.   MRI imaging had shown mild myelopathic changes in cervical spine area  EMG showed ulnar nerve entrapment  Was planning on surgery for ulnar nerve entrapment but now postponed d/t recent lumbar surgery  Pt had cervical epidural injection as requested by neurosurgery but reports no relief  Given referral for PT and referral for psych eval after PT if no change in pain but not started d/t recent lumbar surgery  Pt developed severe lumbar and leg pain with no know injury. Went to ER and was admitted and on  had Left-sided L3-4 laminotomy foraminotomy and microdiscectomy at L3 with Dr Bautista   Lumbar MRI 24 moderate broad-based left lateral foraminal disc extrusion at L3-4  impinging the exiting L3 nerve root. Moderate bilateral neural foraminal stenosis at L4-5 secondary to hypertrophic facet disease.  Has appt with NS next week     HPI    Patient is here today for: neck pain   Pain level: 5/10  Character: aching  Radiating: yes R arm  Weakness or numbness: weakness  Aggravating Factors: anything  Alleviating Factors: nothing  Constant or intermitting: constant  Bladder/bowel loss: yes    Hemoglobin A1C   Date Value Ref Range Status   2021 5.1 % Final       Past Medical History, Past Surgical History, Social History, Allergies and Medications reviewed and updated in EPIC as indicated    Family History reviewed and is noncontributory.        Past Medical History:   Diagnosis Date    Anxiety     Blood loss     after     Cervical disc disease     Cervical myopathy     Chronic back pain     GERD (gastroesophageal reflux disease)     Hx of migraine headaches     Neuropathy     both legs, feet    Thyroid nodule     Trigeminal

## 2024-05-13 ENCOUNTER — OFFICE VISIT (OUTPATIENT)
Dept: NEUROSURGERY | Age: 39
End: 2024-05-13

## 2024-05-13 ENCOUNTER — CARE COORDINATION (OUTPATIENT)
Dept: CARE COORDINATION | Age: 39
End: 2024-05-13

## 2024-05-13 VITALS
DIASTOLIC BLOOD PRESSURE: 91 MMHG | HEIGHT: 67 IN | BODY MASS INDEX: 35.47 KG/M2 | HEART RATE: 104 BPM | SYSTOLIC BLOOD PRESSURE: 128 MMHG | WEIGHT: 226 LBS | OXYGEN SATURATION: 96 %

## 2024-05-13 DIAGNOSIS — K59.03 DRUG-INDUCED CONSTIPATION: ICD-10-CM

## 2024-05-13 DIAGNOSIS — M54.16 LUMBAR RADICULOPATHY: ICD-10-CM

## 2024-05-13 DIAGNOSIS — Z98.890 S/P LUMBAR DISCECTOMY: Primary | ICD-10-CM

## 2024-05-13 DIAGNOSIS — M54.12 CERVICAL RADICULOPATHY: ICD-10-CM

## 2024-05-13 PROCEDURE — 99024 POSTOP FOLLOW-UP VISIT: CPT

## 2024-05-13 RX ORDER — OXYCODONE HYDROCHLORIDE AND ACETAMINOPHEN 5; 325 MG/1; MG/1
1-2 TABLET ORAL EVERY 6 HOURS PRN
Qty: 42 TABLET | Refills: 0 | Status: SHIPPED | OUTPATIENT
Start: 2024-05-13 | End: 2024-05-20

## 2024-05-13 RX ORDER — CYCLOBENZAPRINE HCL 10 MG
10 TABLET ORAL 3 TIMES DAILY PRN
Qty: 21 TABLET | Refills: 0 | Status: SHIPPED | OUTPATIENT
Start: 2024-05-13 | End: 2024-05-20

## 2024-05-13 RX ORDER — BISACODYL 5 MG/1
5 TABLET, DELAYED RELEASE ORAL DAILY PRN
Qty: 30 TABLET | Refills: 0 | Status: SHIPPED | OUTPATIENT
Start: 2024-05-13 | End: 2024-06-12

## 2024-05-13 RX ORDER — LIDOCAINE 50 MG/G
1 PATCH TOPICAL DAILY
Qty: 30 PATCH | Refills: 0 | Status: SHIPPED | OUTPATIENT
Start: 2024-05-13 | End: 2024-06-12

## 2024-05-13 ASSESSMENT — ENCOUNTER SYMPTOMS
CONSTIPATION: 0
SHORTNESS OF BREATH: 0

## 2024-05-13 NOTE — PROGRESS NOTES
[facility] NEUROSURGERY [facility]  [address]  [address] GROUND FLOOR, [address]  [facility] [address]  Dept: [phone]    Patient:  Dorothea Hastings  YOB: 1985  Date: 5/13/24    The patient is a 38 y.o. female who presents today for consult of the following problems:     Chief Complaint   Patient presents with    Other     Post Op. Unable to sleep, Issues with urine. Leg weakness. Lower back pain. Right arm pain. Tingling in the hands. Refused prescription for shower chair and a walker. Patient currently have hemorrhoid. Numbness in left knee cap          HPI:     Dorothea Hastings is a 38 y.o. female who presents to the office for post-op evaluation s/p Left-sided L3-4 laminotomy foraminotomy and microdiscectomy at L3.      History of anterior C5-6 disc arthroplasty on 4/30/21 with residual right sided weakness from head to toe.    Today the patient presents s/p left sided L3-4 laminotomy, foraminotomy, and microdiscectomy. Following the surgery, she is using a walker to assist in activities of daily living both in the home and outside the home, she is requesting a rolling walker. The patient is lifting the non-wheeled walker while ambulating, and it is aggravating her neck, arm, leg, and back pain. Neck pain radiates bilaterally down the arms right worse than left. Back pain radiates down the right buttocks, posterior leg and into the bottom of the foot and paresthesia in the toes. Pain is described as a bruise feeling.       The patient reports she is recovering from the surgery well, and the right lower extremity and groin pain is decreasing. Denies any fever or chills. No discharge, drainage, or redness.    Patient reports weakness in the inner thigh and knees and feels swollen, and numbness just below the knee on the left. Aggravated by sitting. Alleviated by standing and position changes.       Denies bladder or bowel

## 2024-05-13 NOTE — CARE COORDINATION
received a call from Our JFS Contact Louise Jordan, in regards to a missed appointment on 05/10/2024 as she was out sick that day.    We rescheduled the appointment with Patient for this Thursday 05/16/2024 at 11am; Writer called Patient to inform her and she agreed to the appointment date and time.

## 2024-05-14 ENCOUNTER — CARE COORDINATION (OUTPATIENT)
Dept: CARE COORDINATION | Age: 39
End: 2024-05-14

## 2024-05-14 NOTE — CARE COORDINATION
Peacer went down to [employer] on this morning to drop off some Paperwork and ensure that everything is set for our phone appointment on this Thursday 05/16/2024.    Patient called  also about Rent assistance and Information on [facility]; Patient reported that she went down to [employer] to get an application as the WEBSITE is closed and they are not taking any applications online.  went to the site to see if I could print off an application for Patient and I could not.     will go down to their Office and get an application and take it to Patient on today.     also gave her the Number to [facility]

## 2024-05-16 ENCOUNTER — CARE COORDINATION (OUTPATIENT)
Dept: CARE COORDINATION | Age: 39
End: 2024-05-16

## 2024-05-16 ENCOUNTER — TELEPHONE (OUTPATIENT)
Dept: NEUROSURGERY | Age: 39
End: 2024-05-16

## 2024-05-16 NOTE — TELEPHONE ENCOUNTER
Detailed message left on vm: Please tell the patient we can provide documentation to be off work for the 12 weeks after surgery. While she is recovering from surgery, it is not an accurate time to determine her baseline status. She can discuss this at her next follow up appointment.  I ask that she call back if she need the documentation now or would rather wait until follow up appt.

## 2024-05-16 NOTE — CARE COORDINATION
Writer called Patient to do a follow up Social service call with her as we are doing a Conference call to day at 11am with [employer].    The call was completed and No other needs exist at this time; Writer will follow back up with Patient to see if any needs are present at a later date and time.

## 2024-05-16 NOTE — TELEPHONE ENCOUNTER
----- Message from MORE Gotti CNP sent at 5/13/2024  9:43 PM EDT -----  Please tell the patient we can provide documentation to be off work for the 12 weeks after surgery. While she is recovering from surgery, it is not an accurate time to determine her baseline status. She can discuss this at her next follow up appointment.

## 2024-05-17 DIAGNOSIS — M54.41 CHRONIC RIGHT-SIDED LOW BACK PAIN WITH RIGHT-SIDED SCIATICA: ICD-10-CM

## 2024-05-17 DIAGNOSIS — G89.29 CHRONIC RIGHT-SIDED LOW BACK PAIN WITH RIGHT-SIDED SCIATICA: ICD-10-CM

## 2024-05-20 RX ORDER — PREGABALIN 200 MG/1
200 CAPSULE ORAL 3 TIMES DAILY
Qty: 90 CAPSULE | Refills: 2 | OUTPATIENT
Start: 2024-05-20 | End: 2024-08-18

## 2024-05-22 ENCOUNTER — OFFICE VISIT (OUTPATIENT)
Dept: NEUROLOGY | Age: 39
End: 2024-05-22

## 2024-05-22 VITALS
SYSTOLIC BLOOD PRESSURE: 115 MMHG | BODY MASS INDEX: 35.5 KG/M2 | WEIGHT: 226.2 LBS | HEART RATE: 117 BPM | DIASTOLIC BLOOD PRESSURE: 78 MMHG | HEIGHT: 67 IN

## 2024-05-22 DIAGNOSIS — R51.9 CHRONIC NONINTRACTABLE HEADACHE, UNSPECIFIED HEADACHE TYPE: ICD-10-CM

## 2024-05-22 DIAGNOSIS — G89.29 CHRONIC RIGHT-SIDED LOW BACK PAIN WITH RIGHT-SIDED SCIATICA: ICD-10-CM

## 2024-05-22 DIAGNOSIS — G89.29 CHRONIC NONINTRACTABLE HEADACHE, UNSPECIFIED HEADACHE TYPE: ICD-10-CM

## 2024-05-22 DIAGNOSIS — M54.81 OCCIPITAL NEURALGIA OF RIGHT SIDE: ICD-10-CM

## 2024-05-22 DIAGNOSIS — M54.41 CHRONIC RIGHT-SIDED LOW BACK PAIN WITH RIGHT-SIDED SCIATICA: ICD-10-CM

## 2024-05-22 DIAGNOSIS — M25.60 JOINT STIFFNESS: Primary | ICD-10-CM

## 2024-05-22 RX ORDER — PREGABALIN 200 MG/1
200 CAPSULE ORAL 3 TIMES DAILY
Qty: 90 CAPSULE | Refills: 2 | Status: SHIPPED | OUTPATIENT
Start: 2024-05-22 | End: 2024-08-20

## 2024-05-22 ASSESSMENT — ENCOUNTER SYMPTOMS
VOMITING: 0
BACK PAIN: 1
NAUSEA: 0
COLOR CHANGE: 0

## 2024-05-22 NOTE — PROGRESS NOTES
[address]  [place] [address]  P: [phone]  F: [phone]    NEUROLOGY CLINIC NOTE     PATIENT NAME: Dorothea Hastings  PATIENT MRN: [id number]  PRIMARY CARE PHYSICIAN: El Kat, APRN - CNP    HPI:      Dorothea Hastings is a 38 y.o. Dorothea Hastings is a 37 y.o.  with past medical history of migraines, ulnar neuropathy s/p cubital tunnel release in June 2022, neuropathy of both feet, trigeminal neuralgia of right side of face, anxiety, cervical myopathy presents today for follow up. She takes Tegretol 300 mg BID, Cymbalta 60 mg ER, and Lyrica 150 mg TID for trigeminal neuralgia. She complains of increased sciatic pain on right side, radiating to the toes. She denies such complaints on the left side of the back, and these symptoms started 1 week ago. She has been having waxing and waning sciatic pain for years since her first pregnancy. She states her medications do not really help her pain. She continues to experience right hand carpal tunnel symptoms, right sided facial trigeminal neuralgia, and right shoulder and arm pain. These conditions are all chronic for her. Patient underwent cervical spine fusion at C5-6 disc arthroplasty in 4/30/21.      Previous lumbar imaging was back in 2021, which showed no evidence of spondylosis or disc bulge. She has undergone imaging of neuraxis from brain to lumbar spine over the past 2 years.     She also has chronic pain symptoms including right-sided trigeminal neuralgia which occurs every other day.  Previously, the trigeminal pain occurred daily, however decrease in frequency after increasing Tegretol dose to 300 mg twice daily.   She rates the pain at 4/10 to 9/10, describes it as sharp, stabbing pain that lasts from a couple of seconds to 30 minutes.      She also has right-sided occipital neuralgia which was treated in the ER with occipital nerve block.  She stated she found

## 2024-05-23 ENCOUNTER — HOSPITAL ENCOUNTER (OUTPATIENT)
Dept: PHYSICAL THERAPY | Facility: CLINIC | Age: 39
Setting detail: THERAPIES SERIES
Discharge: HOME | End: 2024-05-23
Payer: COMMERCIAL
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
## 2024-05-23 PROCEDURE — 97530 THERAPEUTIC ACTIVITIES: CPT

## 2024-05-23 PROCEDURE — 97163 PT EVAL HIGH COMPLEX 45 MIN: CPT

## 2024-05-23 RX ORDER — CARBAMAZEPINE 200 MG/1
TABLET, EXTENDED RELEASE ORAL
Qty: 60 TABLET | Refills: 10 | Status: CANCELLED | OUTPATIENT
Start: 2024-05-23

## 2024-05-23 RX ORDER — CARBAMAZEPINE 200 MG/1
200 TABLET, EXTENDED RELEASE ORAL 2 TIMES DAILY
Qty: 60 TABLET | Refills: 10 | Status: SHIPPED | OUTPATIENT
Start: 2024-05-23 | End: 2025-04-18

## 2024-05-23 NOTE — CONSULTS
[] [facility]  Outpatient Rehabilitation &  Therapy  [address]  [phone]  [phone] [] [facility]  Outpatient Rehabilitation &  Therapy  [address]  P: [phone]  F: [phone] [] [facility]  Outpatient Rehabilitation &  Therapy  [address]  P: [phone]  F: [phone] [x] [facility]  Outpatient Rehabilitation &  Therapy  [address]  [phone]  [phone] [] [facility]  Outpatient Rehabilitation &  Therapy  [address]   P: [phone]  F: [phone]  [] [facility]  Outpatient Rehabilitation &  Therapy  [address]  P: [phone]  F: [phone] [] [facility]  Outpatient Rehabilitation &  Therapy  [address]  P: [phone]  F: [phone] [] [facility]  Outpatient Rehabilitation &  Therapy  [address]  P: [phone]  F: [phone] [] [facility]  Outpatient Rehabilitation &  Therapy  [address]  P: [phone]  F: [phone]  [] [facility] Outpatient Rehabilitation &  Therapy  [address]  P: [phone]  F: [phone]     Physical Therapy Spine Evaluation    Date:  2024  Patient: Dorothea Hastings  : 1985 MRN: [id number]  Physician: Le Quevedo NP   Insurance: [facility]; Geraldo yr; 30/28vs; auth after 30vs; no pt resp   Medical Diagnosis: S/P lumbar discectomy   Rehab Codes: M54.5, R26.89  Onset Date: 24 (surgery)    Next 's appt.: 24    Subjective:   CC: R LBP with sciatica  HPI: Pt reports that since the surgery, she is experiencing tingling and numbness on the L side, and the radicular pain has subsided. However, the R sided back pain with sciatica has worsened since the surgery. Pain radiates from the R buttock,

## 2024-05-23 NOTE — TELEPHONE ENCOUNTER
Pharmacy requesting refill of Carbamazepine 200 mg.      Medication active on med list yes      Date of last Rx: 5/22/23 with 10 refills           Date of last appointment 5/22/24    Next Visit Date:  11/27/24

## 2024-05-30 ENCOUNTER — CARE COORDINATION (OUTPATIENT)
Dept: CARE COORDINATION | Age: 39
End: 2024-05-30

## 2024-05-30 ENCOUNTER — TELEPHONE (OUTPATIENT)
Dept: NEUROLOGY | Age: 39
End: 2024-05-30

## 2024-05-30 NOTE — CARE COORDINATION
Writer called Patient to do a follow up Social service call with her to gauge her needs if any.    Patient did not answer the phone, writer left a voicemail asking for a return call from her.    Will call back in a week or so.

## 2024-05-30 NOTE — TELEPHONE ENCOUNTER
----- Message from Jean Cortez MD sent at 5/23/2024  9:30 AM EDT -----  Can you please schedule with me with a prior authorization for occipital nerve block.  Thanks

## 2024-05-31 ENCOUNTER — HOSPITAL ENCOUNTER (OUTPATIENT)
Dept: PHYSICAL THERAPY | Facility: CLINIC | Age: 39
Setting detail: THERAPIES SERIES
Discharge: HOME | End: 2024-05-31
Payer: COMMERCIAL

## 2024-05-31 PROCEDURE — 97530 THERAPEUTIC ACTIVITIES: CPT

## 2024-05-31 PROCEDURE — 97110 THERAPEUTIC EXERCISES: CPT

## 2024-06-03 ENCOUNTER — HOSPITAL ENCOUNTER (OUTPATIENT)
Age: 39
Discharge: HOME | End: 2024-06-03
Payer: COMMERCIAL

## 2024-06-03 ENCOUNTER — HOSPITAL ENCOUNTER (OUTPATIENT)
Dept: GENERAL RADIOLOGY | Age: 39
Discharge: HOME | End: 2024-06-05
Payer: COMMERCIAL

## 2024-06-03 ENCOUNTER — HOSPITAL ENCOUNTER (OUTPATIENT)
Age: 39
Discharge: HOME | End: 2024-06-05

## 2024-06-03 ENCOUNTER — TELEPHONE (OUTPATIENT)
Dept: NEUROSURGERY | Age: 39
End: 2024-06-03

## 2024-06-03 DIAGNOSIS — M25.511 CHRONIC RIGHT SHOULDER PAIN: ICD-10-CM

## 2024-06-03 DIAGNOSIS — M25.60 JOINT STIFFNESS: ICD-10-CM

## 2024-06-03 DIAGNOSIS — G89.29 CHRONIC RIGHT SHOULDER PAIN: ICD-10-CM

## 2024-06-03 LAB
CRP SERPL HS-MCNC: 6.2 MG/L (ref 0–5)
ERYTHROCYTE [SEDIMENTATION RATE] IN BLOOD BY PHOTOMETRIC METHOD: 11 MM/HR (ref 0–20)

## 2024-06-03 PROCEDURE — 36415 COLL VENOUS BLD VENIPUNCTURE: CPT

## 2024-06-03 PROCEDURE — 73030 X-RAY EXAM OF SHOULDER: CPT

## 2024-06-03 PROCEDURE — 85652 RBC SED RATE AUTOMATED: CPT

## 2024-06-03 PROCEDURE — 86140 C-REACTIVE PROTEIN: CPT

## 2024-06-03 PROCEDURE — 86038 ANTINUCLEAR ANTIBODIES: CPT

## 2024-06-03 PROCEDURE — 86225 DNA ANTIBODY NATIVE: CPT

## 2024-06-03 NOTE — TELEPHONE ENCOUNTER
Pt called and stated she is experiencing a right side sciatic flair up. Pt is experiencing pain on the right front side of her thigh. Pt stated is painful to the touch and she feels a tingling sensation since yesterday. Pt is experiencing sharp shooting pain on right side by her incision that goes down to her buttock .Pt also stated she has a spot on her calf that is painful also. Please advise

## 2024-06-04 ENCOUNTER — HOSPITAL ENCOUNTER (OUTPATIENT)
Dept: PHYSICAL THERAPY | Facility: CLINIC | Age: 39
Setting detail: THERAPIES SERIES
Discharge: HOME | End: 2024-06-04
Payer: COMMERCIAL

## 2024-06-04 ENCOUNTER — OFFICE VISIT (OUTPATIENT)
Dept: PAIN MANAGEMENT | Age: 39
End: 2024-06-04
Payer: COMMERCIAL

## 2024-06-04 VITALS — OXYGEN SATURATION: 98 % | BODY MASS INDEX: 35.47 KG/M2 | HEART RATE: 117 BPM | WEIGHT: 226 LBS | HEIGHT: 67 IN

## 2024-06-04 DIAGNOSIS — M54.12 CERVICAL RADICULITIS: ICD-10-CM

## 2024-06-04 DIAGNOSIS — Z98.890 S/P CERVICAL DISC REPLACEMENT: ICD-10-CM

## 2024-06-04 DIAGNOSIS — G95.9 CERVICAL MYELOPATHY (HCC): ICD-10-CM

## 2024-06-04 DIAGNOSIS — M96.1 LUMBAR POST-LAMINECTOMY SYNDROME: Primary | ICD-10-CM

## 2024-06-04 LAB
ANA SER QL IA: NEGATIVE
DSDNA IGG SER QL IA: 0.7 IU/ML
NUCLEAR IGG SER IA-RTO: 0.2 U/ML

## 2024-06-04 PROCEDURE — 99214 OFFICE O/P EST MOD 30 MIN: CPT | Performed by: ANESTHESIOLOGY

## 2024-06-04 PROCEDURE — 1036F TOBACCO NON-USER: CPT | Performed by: ANESTHESIOLOGY

## 2024-06-04 PROCEDURE — 97530 THERAPEUTIC ACTIVITIES: CPT

## 2024-06-04 PROCEDURE — G8417 CALC BMI ABV UP PARAM F/U: HCPCS | Performed by: ANESTHESIOLOGY

## 2024-06-04 PROCEDURE — G8427 DOCREV CUR MEDS BY ELIG CLIN: HCPCS | Performed by: ANESTHESIOLOGY

## 2024-06-04 PROCEDURE — 97110 THERAPEUTIC EXERCISES: CPT

## 2024-06-04 ASSESSMENT — ENCOUNTER SYMPTOMS
COUGH: 0
SHORTNESS OF BREATH: 0
NAUSEA: 0
DIARRHEA: 0
CONSTIPATION: 1
VOMITING: 0

## 2024-06-04 NOTE — PROGRESS NOTES
The patient is a 38 y.o. /  female.    Chief Complaint   Patient presents with    Neck Pain    Back Pain        Neck Pain   Associated symptoms include headaches, numbness and weakness. Pertinent negatives include no chest pain or fever.     38-year-old female with history of chronic neck pain  Reports radiation of pain down right arm associated right arm numbness and paresthesia  Past history significant for cervical spine fusion in 2021  Continues to have right arm pain numbness and paresthesia  Imaging is showed some mild myelopathic changes  Also have ulnar nerve entrapment  Was treated surgically for ulnar nerve decompression  Continue to have neck and right upper extremity pain  We did a cervical epidural injection  That did not provide any relief  Neurosurgery recommended for neuromodulation trial    In the interim patient had flareup of chronic lower back pain issue with radiation down left lower extremity  The severe back pain related to the hospital  Imaging showed lumbar left-sided disc herniation at L3  Patient underwent lumbar decompression surgery with lumbar disc herniation discectomy  Reports significant improvement in left lower extremity pain  Have started physical therapy postsurgically  Now having flareup of right-sided sciatica  Describes the pain as aching throbbing burning shooting sensation aggravates with routine activity interfering with quality of life    Discussed with patient to continue with conservative measures at this time and avoid injection as she is only 5 weeks out of surgery  If the symptoms persist then will schedule for lumbar epidural steroid injection    Once lower back and lower extremity symptoms improve then we can consider reconsider for neuromodulation trial for cervical spine      Patient is here today for: neck and back pain  Pain level: 7  Character: sharp, shooting, heaviness, achy,   Radiating: yes  Weakness or numbness: yes, right arm weakness,

## 2024-06-04 NOTE — FLOWSHEET NOTE
[email or web address]  URL: [email or web address]  Date: 06/04/2024  Prepared by: Abraham Collins    Exercises  - Seated Hip Abduction with Resistance  - 1 x daily - 7 x weekly - 3 sets - 10 reps  - Seated March  - 1 x daily - 7 x weekly - 3 sets - 10 reps  - Seated Hip Adduction Isometrics with Ball  - 1 x daily - 7 x weekly - 3 sets - 10 reps  - Seated Long Arc Quad  - 1 x daily - 7 x weekly - 3 sets - 10 reps  [] Verbalizes understanding.  [] Demonstrates understanding.  [x] Needs review.  [] Demonstrates/verbalizes HEP/Ed previously given.     Plan: [x] Continue current frequency toward long and short term goals.    [] Specific Instructions for subsequent treatments: Create HEP next visit    Frequency:  2 x/week for 15 visits      Time In: 12:07pm             Time Out: 1:03pm    Electronically signed by:  ABRAHAM COLLINS PTA

## 2024-06-06 ENCOUNTER — CARE COORDINATION (OUTPATIENT)
Dept: CARE COORDINATION | Age: 39
End: 2024-06-06

## 2024-06-06 NOTE — CARE COORDINATION
Writer received a call from Patient who was reporting that she did not receive a call as Promised from [employer] in regards to Childcare.    We were able to get this troubleshot and Patient went down to [facility] [facility] to turn in some Paperwork and writer told her to ensure she has it scanned in and get a Receipt from the worker showing proof that it was scanned in.    We are working on getting Patient established with Services/Benefits with [employer]; Writer will assist her with this process.

## 2024-06-07 ENCOUNTER — HOSPITAL ENCOUNTER (OUTPATIENT)
Dept: PHYSICAL THERAPY | Facility: CLINIC | Age: 39
Setting detail: THERAPIES SERIES
Discharge: HOME | End: 2024-06-07
Payer: COMMERCIAL

## 2024-06-07 ENCOUNTER — TELEPHONE (OUTPATIENT)
Dept: NEUROSURGERY | Age: 39
End: 2024-06-07

## 2024-06-07 DIAGNOSIS — Z98.890 S/P LUMBAR DISCECTOMY: Primary | ICD-10-CM

## 2024-06-07 DIAGNOSIS — M54.16 LUMBAR RADICULOPATHY: ICD-10-CM

## 2024-06-07 PROCEDURE — 97140 MANUAL THERAPY 1/> REGIONS: CPT

## 2024-06-07 PROCEDURE — 97110 THERAPEUTIC EXERCISES: CPT

## 2024-06-07 RX ORDER — OXYCODONE HYDROCHLORIDE AND ACETAMINOPHEN 5; 325 MG/1; MG/1
1 TABLET ORAL EVERY 8 HOURS PRN
Qty: 21 TABLET | Refills: 0 | Status: SHIPPED | OUTPATIENT
Start: 2024-06-07 | End: 2024-06-14

## 2024-06-07 NOTE — TELEPHONE ENCOUNTER
Pt called and stated she needs a refill of Percocet and pt stated she is in pain with both of her arms. Pt stated her right arm is weak and left arm is in pain. She wanting to know if a wheelchair can be order for her just for the time being. Please advise

## 2024-06-07 NOTE — FLOWSHEET NOTE
[] [facility]  Outpatient Rehabilitation &  Therapy  [address]  [phone]  [phone] [] [facility]  Outpatient Rehabilitation &  Therapy  [address]  P: [phone]  F: [phone] [] [facility]  Outpatient Rehabilitation &  Therapy  [address]  P: [phone]  F: [phone] [x] [facility]  Outpatient Rehabilitation &  Therapy  [address]  [phone]  [phone] [] [facility]  Outpatient Rehabilitation &  Therapy  [address]   P: [phone]  F: [phone]  [] [facility]  Outpatient Rehabilitation &  Therapy  [address]  P: [phone]  F: [phone] [] [facility]  Outpatient Rehabilitation &  Therapy  [address]  P: [phone]  F: [phone] [] [facility]  Outpatient Rehabilitation &  Therapy  [address]  P: [phone]  F: [phone] [] [facility]  Outpatient Rehabilitation &  Therapy  [address]  P: [phone]  F: [phone]  [] [facility] Outpatient Rehabilitation &  Therapy  [address]  P: [phone]  F: [phone]     Physical Therapy Daily Treatment Note    Date:  2024  Patient Name:  Dorothea Hastings    :  1985  MRN: [id number]  Physician: Le Quevedo NP                            Insurance: [facility]; Geraldo yr; 30/28vs; auth after 30vs; no pt resp   Medical Diagnosis: S/P lumbar discectomy                         Rehab Codes: M54.5, R26.89  Onset Date: 24 (surgery)                                              Next 's appt.: 24    Visit# / total visits: 3/15    Cancels/No Shows: 0/0    Subjective:    Pain:  [x] Yes  [] No  Location: R low back/hip/LE  Pain Rating: (0-10 scale) 8/10  Pain altered Tx:  [x] No  []

## 2024-06-10 ENCOUNTER — CARE COORDINATION (OUTPATIENT)
Dept: CARE COORDINATION | Age: 39
End: 2024-06-10

## 2024-06-10 DIAGNOSIS — R10.9 FLANK PAIN: ICD-10-CM

## 2024-06-10 RX ORDER — CYCLOBENZAPRINE HCL 5 MG
5 TABLET ORAL 2 TIMES DAILY PRN
Qty: 20 TABLET | Refills: 1 | Status: SHIPPED | OUTPATIENT
Start: 2024-06-10 | End: 2024-06-30

## 2024-06-10 NOTE — TELEPHONE ENCOUNTER
Suman Olivas,    The patient is dealing with a a lot of pain while using her walker. She asked for a wheelchair. She confirmed that PT hasn't really been beneficial for her. I've sent over a med request to her PCP. I don't think she picked up her other meds from the 7th.    Please Advise

## 2024-06-10 NOTE — CARE COORDINATION
received a call from Patient in regards to Childcare Benefits that she had applied for through [facility] and her Children were dismissed from Childcare until she can provide proof that [facility] was going to take care of this Bill.     e-mailed our contact at [facility] to see where they were in the process of processing her Paperwork.     is waiting to hear back from her so I can update the Patient on the status of things with her Case.

## 2024-06-11 ENCOUNTER — HOSPITAL ENCOUNTER (OUTPATIENT)
Dept: PHYSICAL THERAPY | Facility: CLINIC | Age: 39
Setting detail: THERAPIES SERIES
Discharge: HOME | End: 2024-06-11
Payer: COMMERCIAL

## 2024-06-11 PROCEDURE — 97110 THERAPEUTIC EXERCISES: CPT

## 2024-06-11 PROCEDURE — 97140 MANUAL THERAPY 1/> REGIONS: CPT

## 2024-06-11 NOTE — FLOWSHEET NOTE
[] [facility]  Outpatient Rehabilitation &  Therapy  [address]  [phone]  [phone] [] [facility]  Outpatient Rehabilitation &  Therapy  [address]  P: [phone]  F: [phone] [] [facility] [place] [facility]  Outpatient Rehabilitation &  Therapy  [address]  P: [phone]  F: [phone] [x] [facility]  Outpatient Rehabilitation &  Therapy  [address]  [phone]  [phone] [] [facility]  Outpatient Rehabilitation &  Therapy  [address]   P: [phone]  F: [phone]  [] [facility]  Outpatient Rehabilitation &  Therapy  [address]  P: [phone]  F: [phone] [] [facility]  Outpatient Rehabilitation &  Therapy  [address]  P: [phone]  F: [phone] [] [facility]  Outpatient Rehabilitation &  Therapy  [address]  P: [phone]  F: [phone] [] [facility]  Outpatient Rehabilitation &  Therapy  [address]  P: [phone]  F: [phone]  [] [facility] Outpatient Rehabilitation &  Therapy  [address]  P: [phone]  F: [phone]     Physical Therapy Daily Treatment Note    Date:  2024  Patient Name:  Dorothea Hastings    :  1985  MRN: [id number]  Physician: Le Quevedo NP                            Insurance: [facility]; Geraldo yr; 30/28vs; auth after 30vs; no pt resp   Medical Diagnosis: S/P lumbar discectomy                         Rehab Codes: M54.5, R26.89  Onset Date: 24 (surgery)                                              Next 's appt.: 24    Visit# / total visits: 5/15    Cancels/No Shows: 0/0    Subjective:    Pain:  [x] Yes  [] No  Location: R low back/hip/LE  Pain Rating: (0-10 scale) 8/10  Pain altered Tx:  [x] No  
[] Yes  Action:  Comments: Continued high pain levels, felt decreased shooting pain after previous session however R low back pain has not decreased. Continued UE N/T and muscle spasms in UT that is bothering her more than LE and back pain    Objective:    Modalities: none  Precautions: standard; fall risk, use gait belt for balance ex  Exercises:  Exercise Reps/ Time Weight/ Level Completed Comments   Nu step 10'  L1 x   no UE   Hamstring stretch      -     Calf stretch on SB 2x30\" L2 -     Supine           Bridges  10x     limited ROM   Marches w/ TA iso 5xea      Piriformis stretch 2x20\"      SAQ 2x10 ea         Heel slides 2x5 ea     W/ strap   Hip abd/add 2x5    Limited by pain   Clamshells  2x10 Lime  -    Sidelying           Hip abd     -     Clamshells     -     Gym            Toe taps  15xea  4in x  cues for soft knee bend on standing leg    hip abd  10xea   x     STS 2x5  x Cues for increased base of support, push through heels, exhale w/ exertion   gait 2x25ft  x    Seated       Clam shells 10x lime     Ball squeezes 10x3\"  x    Marches w/ TA iso 10x  x           Square breathing Education           Transfer education with RW        Other: Pt moves in a very slow and guarded manner throughout exercises d/t pain  Manual: STM and hypervolt to IT band, piriformis and hamstrings in sidelying     Specific Instructions for next treatment:  Begin working on LE strengthening   Create HEP with SAQ, Supine clamshells,     Treatment Charges: Mins Units   []  Modalities     [x]  Ther Exercise 35 2   [x]  Manual Therapy 15 1   []  Ther Activities     []  Neuro Re-ed     []  Vasocompression     [] Gait     []  Other     Total Billable time 50 3       Assessment: [] Progressing toward goals.    [] No change.     [x] Other: Continued high pain levels today. Initiated treatment with nustep for dynamic warm up followed by standing exercises. Patient with limited ROM with hip abduction and has decreased stance time on RLE. 
No

## 2024-06-12 ENCOUNTER — TELEMEDICINE (OUTPATIENT)
Age: 39
End: 2024-06-12
Payer: COMMERCIAL

## 2024-06-12 DIAGNOSIS — F33.0 MILD EPISODE OF RECURRENT MAJOR DEPRESSIVE DISORDER (HCC): Primary | ICD-10-CM

## 2024-06-12 DIAGNOSIS — F41.9 ANXIETY: ICD-10-CM

## 2024-06-12 PROCEDURE — 90834 PSYTX W PT 45 MINUTES: CPT | Performed by: SOCIAL WORKER

## 2024-06-12 NOTE — PROGRESS NOTES
Emphasized self-care as important for managing overall health.      PLAN  Follow-up appointment scheduled on 7/2 @ 3 PM.     Dorothea Hastings, was evaluated through a synchronous (real-time) audio-video encounter. The patient (or guardian if applicable) is aware that this is a billable service, which includes applicable co-pays. This Virtual Visit was conducted with patient's (and/or legal guardian's) consent. Patient identification was verified, and a caregiver was present when appropriate.   The patient was located at Home: [address] [place] [id number]  Provider was located at Facility (Appt Dept): [address] [place] [id number]  Confirm you are appropriately licensed, registered, or certified to deliver care in the [facility] where the patient is located as indicated above. If you are not or unsure, please re-schedule the visit: Yes, I confirm.      Total time spent for this encounter:  44 minutes    --TREVOR Nielsen on 7/2/2024 at 10:11 PM    An electronic signature was used to authenticate this note.      INTERACTIVE COMPLEXITY  Is interactive complexity present?  No  Reason:  N/A  Additional Supporting Information:  N/A       Electronically signed by TREVOR Nielsen on 7/2/2024 at 10:11 PM

## 2024-06-13 ENCOUNTER — APPOINTMENT (OUTPATIENT)
Dept: PHYSICAL THERAPY | Facility: CLINIC | Age: 39
End: 2024-06-13
Payer: COMMERCIAL

## 2024-06-13 ENCOUNTER — CARE COORDINATION (OUTPATIENT)
Dept: CARE COORDINATION | Age: 39
End: 2024-06-13

## 2024-06-13 NOTE — CARE COORDINATION
Writer called Patient to do a follow up call with her to connect her with our JFS Contact as she had Questions about eligibility of Resources.    They were able to talk and get things ironed out and Patient appeared to be content with the information given to her.

## 2024-06-17 ENCOUNTER — PATIENT MESSAGE (OUTPATIENT)
Dept: NEUROLOGY | Age: 39
End: 2024-06-17

## 2024-06-17 ENCOUNTER — TELEPHONE (OUTPATIENT)
Dept: NEUROLOGY | Age: 39
End: 2024-06-17

## 2024-06-17 ENCOUNTER — OFFICE VISIT (OUTPATIENT)
Dept: NEUROSURGERY | Age: 39
End: 2024-06-17
Payer: COMMERCIAL

## 2024-06-17 VITALS
BODY MASS INDEX: 36.26 KG/M2 | HEIGHT: 67 IN | WEIGHT: 231 LBS | HEART RATE: 84 BPM | SYSTOLIC BLOOD PRESSURE: 146 MMHG | DIASTOLIC BLOOD PRESSURE: 86 MMHG

## 2024-06-17 DIAGNOSIS — M47.26 OTHER SPONDYLOSIS WITH RADICULOPATHY, LUMBAR REGION: ICD-10-CM

## 2024-06-17 DIAGNOSIS — M47.22 CERVICAL SPONDYLOSIS WITH RADICULOPATHY: Primary | ICD-10-CM

## 2024-06-17 DIAGNOSIS — M54.9 TRIGGER POINT WITH BACK PAIN: ICD-10-CM

## 2024-06-17 PROCEDURE — 99213 OFFICE O/P EST LOW 20 MIN: CPT | Performed by: NEUROLOGICAL SURGERY

## 2024-06-17 PROCEDURE — 1036F TOBACCO NON-USER: CPT | Performed by: NEUROLOGICAL SURGERY

## 2024-06-17 PROCEDURE — G8427 DOCREV CUR MEDS BY ELIG CLIN: HCPCS | Performed by: NEUROLOGICAL SURGERY

## 2024-06-17 PROCEDURE — G8417 CALC BMI ABV UP PARAM F/U: HCPCS | Performed by: NEUROLOGICAL SURGERY

## 2024-06-17 RX ORDER — OXYCODONE HYDROCHLORIDE AND ACETAMINOPHEN 5; 325 MG/1; MG/1
1 TABLET ORAL EVERY 8 HOURS PRN
Qty: 18 TABLET | Refills: 0 | Status: SHIPPED | OUTPATIENT
Start: 2024-06-17 | End: 2024-06-24

## 2024-06-17 RX ORDER — OXYCODONE HYDROCHLORIDE AND ACETAMINOPHEN 5; 325 MG/1; MG/1
1 TABLET ORAL
COMMUNITY

## 2024-06-17 NOTE — PROGRESS NOTES
encounter: 1.702 m (5' 7\").    Weight as of this encounter: 104.8 kg (231 lb).    General:  Dorothea Hastings is a 38 y.o. year old female who appears her stated age.   HEENT: Normocephalic atraumatic. Neck supple.  Chest: regular rate; pulses equal. Equal chest rise and fall  Abdomen: Soft nondistended.   Ext: DP equal with good capillary refill  Neuro    Mentation  Appropriate affect   oriented    Cranial Nerves:   Pupils equal and reactive to light  Extraocular motion intact  Face symmetric  No dysarthria  v1-3 sensation symmetric, masseter tone symmetric  Hearing symmetric and intact to finger rub    Sensation:   Abnormal sensation right side ulnar greater than median nerve.    Motor  Effort limited diffusely 4 out of 5 bilateral upper extremities.    L iliopsoas 5/5 , R iliopsoas 5/5  L quadriceps 5/5; R quadriceps 5/5  L Dorsiflexion 5/5; R dorsiflexion 5/5  L Plantarflexion 5/5; R plantarflexion 5/5  L EHL 5/5; R EHL 5/5    Reflexes  L Brachioradialis 2+/4; R brachioradialis 2+/4  L Biceps 2+/4; R Biceps 2+/4  L Triceps 2+/4; R Triceps 2+/4  L Patellar 2+/4: R Patellar 2+/4  L Achilles 2+/4; R Achilles 2+/4    hoffmans L: neg  hoffmans R: neg  Clonus L: neg  Clonus R: neg  Babinski L: up  Babinski R; up    Positive Phalen's on the right side more ulnar nerve the median nerve distribution.  No Tinel's over the carpal tunnel.  Positive Tinel's over the cubital tunnel and ulnar nerve distribution.  Positive ring finger start on the right side with diminished sensation over ulnar distribution.  Slightly diminished right-sided dorsal ulnar cutaneous branch.  Negative Froment's.  Mild Wartenberg.  No Benedictine sign right hand    R trapezius trigger point posterior to AC    Studies Review:     MRI of the cervical spine as well as CT show autofusion across the arthroplasty with some bridging above the level of the arthroplasty.  No clear-cut stenosis.    MRI lumbar spine before surgery does show a right-sided L4

## 2024-06-18 ENCOUNTER — HOSPITAL ENCOUNTER (OUTPATIENT)
Dept: PHYSICAL THERAPY | Facility: CLINIC | Age: 39
Setting detail: THERAPIES SERIES
Discharge: HOME | End: 2024-06-18
Payer: COMMERCIAL

## 2024-06-18 PROCEDURE — 97110 THERAPEUTIC EXERCISES: CPT

## 2024-06-18 NOTE — FLOWSHEET NOTE
[] Yes  Action:  Comments: Patient reports seeing Neurologist yesterday and would like to see her weaning from RW and start using cane. She was referred to another doc for evaluation regarding fibromyalgia.     Objective:    Modalities: none  Precautions: standard; fall risk, use gait belt for balance ex  Exercises:  Exercise Reps/ Time Weight/ Level Completed Comments   Nu step 10'  L1 x   no UE   Hamstring stretch  3x15\"   x     Calf stretch on SB 2x15\" L2 x     Supine           Bridges  10x     limited ROM   Marches w/ TA iso 5xea      Piriformis stretch 2x20\"      SAQ 2x10 ea         Heel slides 2x5 ea     W/ strap   Hip abd/add 2x5    Limited by pain   Clamshells  2x10 Lime  -    Sidelying           Hip abd     -     Clamshells     -     Gym            Toe taps 5xea  4in x  cues for soft knee bend on standing leg    hip abd  10xea        STS 5x  x Cues for increased base of support, push through heels, exhale w/ exertion   NBOS 2x30\"  x    Heel raises 5x      gait 2x25ft  x    Seated       Clam shells 10x lime     Ball squeezes 10x3\"  x    Marches w/ TA iso 10x  x    LAQ 8xea  x    Other: Pt moves in a very slow and guarded manner throughout exercises d/t pain  Manual: STM and hypervolt to IT band, piriformis and hamstrings in sidelying     Specific Instructions for next treatment:  Begin working on LE strengthening   Create HEP with SAQ, Supine clamshells,     Treatment Charges: Mins Units   []  Modalities     [x]  Ther Exercise 45 3   []  Manual Therapy     []  Ther Activities     []  Neuro Re-ed     []  Vasocompression     [] Gait     []  Other     Total Billable time 45 3       Assessment: [] Progressing toward goals.    [] No change.     [x] Other: Patient arrives 10 minutes late this date. Initiated treatment with nustep for dynamic warm up following standing exercises. Attempted standing exercises with unilateral support with poor tolerance due to increased pain in R glute. Continues to demonstrate slow

## 2024-06-20 ENCOUNTER — HOSPITAL ENCOUNTER (OUTPATIENT)
Dept: PHYSICAL THERAPY | Facility: CLINIC | Age: 39
Setting detail: THERAPIES SERIES
Discharge: HOME | End: 2024-06-20
Payer: COMMERCIAL

## 2024-06-20 ENCOUNTER — CARE COORDINATION (OUTPATIENT)
Dept: CARE COORDINATION | Age: 39
End: 2024-06-20

## 2024-06-20 PROCEDURE — 97110 THERAPEUTIC EXERCISES: CPT

## 2024-06-20 NOTE — CARE COORDINATION
Writer called Patient to do a follow up Social service call with her to gauge her needs.    Writer called to see if Patient had the opportunity to get the Paperwork Necessary to submit to [employer] so they can Reconsider her case with them.    Patient reported that she did not have the chance to do so yet, but would when she has the chance to do it.

## 2024-06-20 NOTE — FLOWSHEET NOTE
[] [facility]  Outpatient Rehabilitation &  Therapy  [address]  [phone]  [phone] [] [facility]  Outpatient Rehabilitation &  Therapy  [address]  P: [phone]  F: [phone] [] [facility]  Outpatient Rehabilitation &  Therapy  [address]  P: [phone]  F: [phone] [x] [facility]  Outpatient Rehabilitation &  Therapy  [address]  [phone]  [phone] [] [facility]  Outpatient Rehabilitation &  Therapy  [address]   P: [phone]  F: [phone]  [] [facility]  Outpatient Rehabilitation &  Therapy  [address]  P: [phone]  F: [phone] [] [facility]  Outpatient Rehabilitation &  Therapy  [address]  P: [phone]  F: [phone] [] [facility]  Outpatient Rehabilitation &  Therapy  [address]  P: [phone]  F: [phone] [] [facility]  Outpatient Rehabilitation &  Therapy  [address]  P: [phone]  F: [phone]  [] [facility] Outpatient Rehabilitation &  Therapy  [address]  P: [phone]  F: [phone]     Physical Therapy Daily Treatment Note    Date:  2024  Patient Name:  Dorothea Hastings    :  1985  MRN: [id number]  Physician: Le Quevedo NP                            Insurance: [facility]; Geraldo yr; 30/28vs; auth after 30vs; no pt resp   Medical Diagnosis: S/P lumbar discectomy                         Rehab Codes: M54.5, R26.89  Onset Date: 24 (surgery)                                              Next 's appt.: 24    Visit# / total visits: 7/15    Cancels/No Shows: 0/0    Subjective:    Pain:  [x] Yes  [] No  Location: R low back/hip/LE  Pain Rating: (0-10 scale) 7/10  Pain altered Tx:  [x] No

## 2024-06-26 ENCOUNTER — OFFICE VISIT (OUTPATIENT)
Dept: PRIMARY CARE CLINIC | Age: 39
End: 2024-06-26
Payer: COMMERCIAL

## 2024-06-26 VITALS
DIASTOLIC BLOOD PRESSURE: 88 MMHG | WEIGHT: 231 LBS | SYSTOLIC BLOOD PRESSURE: 122 MMHG | HEART RATE: 94 BPM | BODY MASS INDEX: 36.18 KG/M2 | OXYGEN SATURATION: 99 %

## 2024-06-26 DIAGNOSIS — G89.4 CHRONIC PAIN SYNDROME: Primary | ICD-10-CM

## 2024-06-26 PROCEDURE — G8427 DOCREV CUR MEDS BY ELIG CLIN: HCPCS | Performed by: NURSE PRACTITIONER

## 2024-06-26 PROCEDURE — 99213 OFFICE O/P EST LOW 20 MIN: CPT | Performed by: NURSE PRACTITIONER

## 2024-06-26 PROCEDURE — 1036F TOBACCO NON-USER: CPT | Performed by: NURSE PRACTITIONER

## 2024-06-26 PROCEDURE — G8417 CALC BMI ABV UP PARAM F/U: HCPCS | Performed by: NURSE PRACTITIONER

## 2024-06-26 RX ORDER — AMITRIPTYLINE HYDROCHLORIDE 10 MG/1
10 TABLET, FILM COATED ORAL NIGHTLY
Qty: 90 TABLET | Refills: 0 | Status: SHIPPED | OUTPATIENT
Start: 2024-06-26

## 2024-06-26 NOTE — PROGRESS NOTES
Dorothea Hastings (:  1985) is a 38 y.o. female,Established patient, here for evaluation of the following chief complaint(s):  Check-Up (Nerve issues)      Assessment & Plan   ASSESSMENT/PLAN:  1. Chronic pain syndrome  -     amitriptyline (ELAVIL) 10 MG tablet; Take 1 tablet by mouth nightly, Disp-90 tablet, R-0Normal      Return in about 1 month (around 2024).         Subjective   SUBJECTIVE/OBJECTIVE:  HPI  Pt reports that the NS who did her last surgery wants her to continue f/u with her primary NS.  She states she has been asked to f/u with pcp for fibromyalgia. Is on cymbalta and lyrica.  Trial of nortriptyline in the past but neuro note states pt didn't tolerate- she had only been on it for about 6 days and she isn't sure she was taking it at night time. She is willing to try another tricyclic for pain relief and sleep.   Taking with counselor is helping.     Review of Systems   Constitutional:  Negative for chills and fever.   Respiratory:  Negative for cough and shortness of breath.    Cardiovascular:  Negative for chest pain, palpitations and leg swelling.     Objective   Vitals:    24 1649   BP: 122/88   Pulse: 94   SpO2: 99%     Physical Exam  Constitutional:       Appearance: She is well-developed.   HENT:      Right Ear: External ear normal.      Left Ear: External ear normal.      Nose: Nose normal.   Cardiovascular:      Rate and Rhythm: Normal rate and regular rhythm.      Heart sounds: Normal heart sounds, S1 normal and S2 normal.   Pulmonary:      Effort: Pulmonary effort is normal. No respiratory distress.      Breath sounds: Normal breath sounds.   Musculoskeletal:         General: No deformity. Normal range of motion.      Cervical back: Full passive range of motion without pain and normal range of motion.   Skin:     General: Skin is warm and dry.   Neurological:      Mental Status: She is alert and oriented to person, place, and time.            An electronic signature

## 2024-07-02 ENCOUNTER — HOSPITAL ENCOUNTER (OUTPATIENT)
Dept: PHYSICAL THERAPY | Facility: CLINIC | Age: 39
Setting detail: THERAPIES SERIES
End: 2024-07-02

## 2024-07-03 ENCOUNTER — HOSPITAL ENCOUNTER (OUTPATIENT)
Dept: GENERAL RADIOLOGY | Age: 39
Discharge: HOME | End: 2024-07-05
Payer: COMMERCIAL

## 2024-07-03 ENCOUNTER — CARE COORDINATION (OUTPATIENT)
Dept: CARE COORDINATION | Age: 39
End: 2024-07-03

## 2024-07-03 ENCOUNTER — HOSPITAL ENCOUNTER (OUTPATIENT)
Age: 39
Discharge: HOME | End: 2024-07-05
Payer: COMMERCIAL

## 2024-07-03 ENCOUNTER — OFFICE VISIT (OUTPATIENT)
Dept: NEUROSURGERY | Age: 39
End: 2024-07-03
Payer: COMMERCIAL

## 2024-07-03 ENCOUNTER — HOSPITAL ENCOUNTER (OUTPATIENT)
Dept: PHYSICAL THERAPY | Facility: CLINIC | Age: 39
Setting detail: THERAPIES SERIES
Discharge: HOME | End: 2024-07-03
Payer: COMMERCIAL

## 2024-07-03 VITALS
BODY MASS INDEX: 36.26 KG/M2 | HEIGHT: 67 IN | DIASTOLIC BLOOD PRESSURE: 88 MMHG | WEIGHT: 231 LBS | SYSTOLIC BLOOD PRESSURE: 115 MMHG | HEART RATE: 112 BPM

## 2024-07-03 DIAGNOSIS — M51.16 LUMBAR DISC HERNIATION WITH RADICULOPATHY: Primary | ICD-10-CM

## 2024-07-03 DIAGNOSIS — M16.11 ARTHROPATHY OF RIGHT HIP: ICD-10-CM

## 2024-07-03 PROCEDURE — 99213 OFFICE O/P EST LOW 20 MIN: CPT | Performed by: NEUROLOGICAL SURGERY

## 2024-07-03 PROCEDURE — 73502 X-RAY EXAM HIP UNI 2-3 VIEWS: CPT

## 2024-07-03 PROCEDURE — G8417 CALC BMI ABV UP PARAM F/U: HCPCS | Performed by: NEUROLOGICAL SURGERY

## 2024-07-03 PROCEDURE — 97110 THERAPEUTIC EXERCISES: CPT

## 2024-07-03 PROCEDURE — 1036F TOBACCO NON-USER: CPT | Performed by: NEUROLOGICAL SURGERY

## 2024-07-03 PROCEDURE — G8427 DOCREV CUR MEDS BY ELIG CLIN: HCPCS | Performed by: NEUROLOGICAL SURGERY

## 2024-07-03 NOTE — CARE COORDINATION
Writer received a call from Patient in regards to a Notice she received from [facility] and was wondering what to do as what they were requesting, she had turned in already and she was inquiring about her situation as her Spouse has moved out and her income is 0 at this point.     called our contact at [facility] and explained he situation to her and she gave information for Patient to follow in this regard.     called her back and conveyed it to her and she will provide the Lease in the [place] her Spouse lives and she was told to ignore the Notice she received as she had turned in what was required of her.

## 2024-07-03 NOTE — FLOWSHEET NOTE
[] [facility]  Outpatient Rehabilitation &  Therapy  [address]  [phone]  [phone] [] [facility]  Outpatient Rehabilitation &  Therapy  [address]  P: [phone]  F: [phone] [] [facility]  Outpatient Rehabilitation &  Therapy  [address]  P: [phone]  F: [phone] [x] [facility]  Outpatient Rehabilitation &  Therapy  [address]  [phone]  [phone] [] [facility]  Outpatient Rehabilitation &  Therapy  [address]   P: [phone]  F: [phone]  [] [facility]  Outpatient Rehabilitation &  Therapy  [address]  P: [phone]  F: [phone] [] [facility]  Outpatient Rehabilitation &  Therapy  [address]  P: [phone]  F: [phone] [] [facility]  Outpatient Rehabilitation &  Therapy  [address]  P: [phone]  F: [phone] [] [facility]  Outpatient Rehabilitation &  Therapy  [address]  P: [phone]  F: [phone]  [] [facility] Outpatient Rehabilitation &  Therapy  [address]  P: [phone]  F: [phone]     Physical Therapy Daily Treatment Note    Date:  7/3/2024  Patient Name:  Dorothea Hastings    :  1985  MRN: [id number]  Physician: Le Quevedo NP                            Insurance: [facility]; Geraldo yr; 30/28vs; auth after 30vs; no pt resp   Medical Diagnosis: S/P lumbar discectomy                         Rehab Codes: M54.5, R26.89  Onset Date: 24 (surgery)                                              Next 's appt.: 24    Visit# / total visits: 8/15    Cancels/No Shows: 0/0    Subjective:    Pain:  [x] Yes  [] No  Location: R low back/hip/LE  Pain Rating: (0-10 scale) 8-9/10  Pain altered Tx:  [x] No

## 2024-07-03 NOTE — PROGRESS NOTES
[facility] NEUROSURGERY [facility]  [address]  [id number]  [address] GROUND FLOOR, [address]  [facility] [address]  Dept: [phone]    Patient:  Dorothea Hastings  YOB: 1985  Date: 7/3/24    The patient is a 38 y.o. female who presents today for consult of the following problems:     Chief Complaint   Patient presents with    Post-Op Check     Pt reports she is walking better, states her L side is feeling better, but using the walker is causing pain with her R side, headaches are worse also. Pt also reports she fell last night over a luggage bag.             HPI:     Dorothea Hastings is a 38 y.o. female on whom neurosurgical consultation was requested by El Kat APRN - CNP for management of left lower extremity radiculopathy status post far lateral discectomy inpatient.  Patient states that her left leg pain is completely resolved and has a small patch of numbness that is suprapatellar on the left side.  Is able to utilize the left lower extremity fairly well.  Unfortunately she had had chronic right lower extremity pain for a long period of time and this has been slowly progressing in the interim and she favors that this may be because of favoring her right leg while she had a left leg radiculopathy.  Specifically relates right paraspinal lumbar pain radiating down the posterior aspect of the hamstring terminating at the calf with some abnormal sensation in the foot as well.  Distinctly complains of right-sided deep groin pain as well.  Pain appears worse when she is upright and mobilizing as well as with pivot.  Denies any associated saddle anesthesia or bowel bladder incontinence.  Has been in physical therapy going a couple times a week as well as on multiple medications for longstanding diagnosis of fibromyalgia as well as other pain syndromes.  She specifically relays a more diffuse pattern of pain on the right side

## 2024-07-05 DIAGNOSIS — M51.16 LUMBAR DISC HERNIATION WITH RADICULOPATHY: Primary | ICD-10-CM

## 2024-07-05 DIAGNOSIS — Z98.890 S/P LUMBAR DISCECTOMY: ICD-10-CM

## 2024-07-05 RX ORDER — OXYCODONE HYDROCHLORIDE AND ACETAMINOPHEN 5; 325 MG/1; MG/1
1 TABLET ORAL EVERY 8 HOURS PRN
Qty: 16 TABLET | Refills: 0 | Status: SHIPPED | OUTPATIENT
Start: 2024-07-05 | End: 2024-07-12

## 2024-07-05 NOTE — TELEPHONE ENCOUNTER
Spoke with pt and let her know refill has been sent to the pharmacy and also stated new order for cane has been done. She stated her daughter is going to come in and  the cane order.

## 2024-07-05 NOTE — TELEPHONE ENCOUNTER
I spoke with pt and she stated she needs a refill of Percocet and pt stated she needs a new order for her cane to say DME and not under test ordered. Please advise

## 2024-07-05 NOTE — TELEPHONE ENCOUNTER
Refill sent to pharmacy. New order placed for cane, can be picked up or faxed to location of choice.

## 2024-07-08 ENCOUNTER — CARE COORDINATION (OUTPATIENT)
Dept: CARE COORDINATION | Age: 39
End: 2024-07-08

## 2024-07-08 NOTE — CARE COORDINATION
Writer called Patient to let her know what was reported by the [employer] Contact that we have and refer to for assistance.    Patient voiced understanding and said that she would do what was recommended to her to further her application process.

## 2024-07-09 ENCOUNTER — HOSPITAL ENCOUNTER (OUTPATIENT)
Dept: PHYSICAL THERAPY | Facility: CLINIC | Age: 39
Setting detail: THERAPIES SERIES
Discharge: HOME | End: 2024-07-09

## 2024-07-09 ENCOUNTER — OFFICE VISIT (OUTPATIENT)
Dept: PAIN MANAGEMENT | Age: 39
End: 2024-07-09

## 2024-07-09 VITALS — HEIGHT: 67 IN | BODY MASS INDEX: 36.26 KG/M2 | WEIGHT: 231 LBS | HEART RATE: 92 BPM | OXYGEN SATURATION: 99 %

## 2024-07-09 DIAGNOSIS — G56.21 ULNAR NEUROPATHY OF RIGHT UPPER EXTREMITY: ICD-10-CM

## 2024-07-09 DIAGNOSIS — M54.31 RIGHT SIDED SCIATICA: ICD-10-CM

## 2024-07-09 DIAGNOSIS — Z98.890 HX OF DECOMPRESSIVE LUMBAR LAMINECTOMY: Primary | ICD-10-CM

## 2024-07-09 DIAGNOSIS — Z98.890 S/P CERVICAL DISC REPLACEMENT: ICD-10-CM

## 2024-07-09 PROCEDURE — 99214 OFFICE O/P EST MOD 30 MIN: CPT | Performed by: ANESTHESIOLOGY

## 2024-07-09 PROCEDURE — G8417 CALC BMI ABV UP PARAM F/U: HCPCS | Performed by: ANESTHESIOLOGY

## 2024-07-09 PROCEDURE — 1036F TOBACCO NON-USER: CPT | Performed by: ANESTHESIOLOGY

## 2024-07-09 PROCEDURE — G8427 DOCREV CUR MEDS BY ELIG CLIN: HCPCS | Performed by: ANESTHESIOLOGY

## 2024-07-09 ASSESSMENT — ENCOUNTER SYMPTOMS
RESPIRATORY NEGATIVE: 1
BACK PAIN: 1
GASTROINTESTINAL NEGATIVE: 1

## 2024-07-09 NOTE — PROGRESS NOTES
The patient is a 38 y.o. /  female.    Chief Complaint   Patient presents with    Back Pain    Neck Pain      Patient is here today for: back/neck pain  Pain level: 8/10  Character: aching shooting  Radiating: R leg  Weakness or numbness: both  Aggravating Factors: positions  Alleviating Factors: nothing  Constant or intermitting: constant  Bladder/bowel loss: no      Past Medical History:   Diagnosis Date    Anxiety     Blood loss     after     Cervical disc disease     Cervical myopathy     Chronic back pain     GERD (gastroesophageal reflux disease)     Hx of migraine headaches     Neuropathy     both legs, feet    Thyroid nodule     Trigeminal neuralgia of right side of face     Under care of team 2022    NEUROLSURGERY - DR. GREENWOOD     Under care of team 2022    NEUROLOGY - DR. NOWAK - LAST VISIT 2022    Under care of team 2022    CARDIOLOGY - UPCOMING FIRST VISIT - DOES NOT REMEMBER NAME    Weakness generalized     due to neck issues    Wellness examination 2022    PCP - DYAN FUENTESCNP - LAST VISIT  - 2022        Past Surgical History:   Procedure Laterality Date    CARPAL TUNNEL RELEASE Right 2022    CUBITAL TUNNEL RELEASE performed by Kenia Greenwood DO at [facility] OR    CERVICAL FUSION N/A 2021    ANTERIOR C5-6 DISC ARTHROPLASTY performed by Kenia Greenwood DO at [facility] OR     SECTION      x3    DILATION AND CURETTAGE OF UTERUS      ELBOW SURGERY Right 2022    CUBITAL TUNNEL RELEASE (Right Arm Upper)    LUMBAR SPINE SURGERY  2024    LUMBAR THREE THRU LUMBAR FOUR MICRODISCECTOMY; LEFT - Left    LUMBAR SPINE SURGERY Left 2024    LUMBAR THREE THRU LUMBAR FOUR MICRODISCECTOMY; LEFT performed by Art Bautista DO at [facility] OR    TUBAL LIGATION         Social History     Socioeconomic History    Marital status:      Spouse name: None    Number of children: None    Years of education: None    Highest education level: None

## 2024-07-09 NOTE — FLOWSHEET NOTE
[] [facility]  Outpatient Rehabilitation &  Therapy  [address]    [phone]  F: [phone]   [] [facility]  Outpatient Rehabilitation &  Therapy  [address]   [address]  P: [phone]  F: [phone]  [] [facility]  Outpatient Rehabilitation &  Therapy  [address]  P: [phone]  F: [phone] [x] [facility]  Outpatient Rehabilitation &  Therapy  [address]  P: [phone]  F: [phone]  [] [facility]  Outpatient Rehabilitation &  Therapy  [address]   P: [phone]  F: [phone]   [] [facility]   Outpatient Rehabilitation & Therapy  [address]  P: [phone]   F: [phone]     Physical Therapy Cancel/No Show note    Date: 2024  Patient: Dorothea Hastings  : 1985  MRN: [id number]    Cancels/No Shows to date:     For today's appointment patient:    [x]  Cancelled    [] Rescheduled appointment    [] No-show     Reason given by patient:    []  Patient ill    []  Conflicting appointment    [] No transportation      [] Conflict with work    [] No reason given    [] Weather related    [] COVID-19    [x] Other:      Comments:  Pt had to  sick kid from school. Next appt conf.      [x] Next appointment was confirmed    Electronically signed by: Talia Rdz

## 2024-07-24 ENCOUNTER — CARE COORDINATION (OUTPATIENT)
Dept: CARE COORDINATION | Age: 39
End: 2024-07-24

## 2024-07-24 NOTE — CARE COORDINATION
Peacer called Patient to do a follow up Social service call with her to gauge her needs.    Patient did not answer her Phone, Peacer left her a voicemail asking for a return call from her.    Peacer will follow backup with Patient in a week or so.

## 2024-08-09 ENCOUNTER — CARE COORDINATION (OUTPATIENT)
Dept: CARE COORDINATION | Age: 39
End: 2024-08-09

## 2024-08-09 NOTE — CARE COORDINATION
Writer called Patient to do a follow up Social service call with her in regards to MEDICAID/[employer].    Patient reported that she was trying to get her MEDICAL Insurance back through [employer] as she and her Spouse are  and he no longer lives in the home so it is just her and the kids.    Patient said that she would complete the application today and let writer know so I can contact our [employer] Contact to see if the application can be processed.   on chart

## 2024-08-14 ENCOUNTER — CARE COORDINATION (OUTPATIENT)
Dept: CARE COORDINATION | Age: 39
End: 2024-08-14

## 2024-08-28 ENCOUNTER — CARE COORDINATION (OUTPATIENT)
Dept: CARE COORDINATION | Age: 39
End: 2024-08-28

## 2024-08-28 NOTE — CARE COORDINATION
received a call from Patient in regards to her [employer]/MEDICAID application,  had reached out to her and she did not answer the phone.     was telling her that her process was started from the beginning as she did a brand new application with updated information.    She reported that she missed the call from them and would have to call back and get rescheduled for another phone interview.

## 2024-09-03 ENCOUNTER — OFFICE VISIT (OUTPATIENT)
Dept: NEUROSURGERY | Age: 39
End: 2024-09-03

## 2024-09-03 VITALS
HEART RATE: 75 BPM | HEIGHT: 67 IN | SYSTOLIC BLOOD PRESSURE: 108 MMHG | WEIGHT: 220.4 LBS | BODY MASS INDEX: 34.59 KG/M2 | DIASTOLIC BLOOD PRESSURE: 88 MMHG

## 2024-09-03 DIAGNOSIS — M54.12 CERVICAL RADICULOPATHY: ICD-10-CM

## 2024-09-03 DIAGNOSIS — Z98.890 S/P CERVICAL DISC REPLACEMENT: Primary | ICD-10-CM

## 2024-09-03 PROCEDURE — 99214 OFFICE O/P EST MOD 30 MIN: CPT | Performed by: NEUROLOGICAL SURGERY

## 2024-09-11 ENCOUNTER — CARE COORDINATION (OUTPATIENT)
Dept: CARE COORDINATION | Age: 39
End: 2024-09-11

## 2024-09-18 ENCOUNTER — CARE COORDINATION (OUTPATIENT)
Dept: CARE COORDINATION | Age: 39
End: 2024-09-18

## 2024-09-19 ENCOUNTER — CARE COORDINATION (OUTPATIENT)
Dept: CARE COORDINATION | Age: 39
End: 2024-09-19

## 2024-09-20 ENCOUNTER — APPOINTMENT (OUTPATIENT)
Dept: GENERAL RADIOLOGY | Age: 39
End: 2024-09-20

## 2024-09-20 ENCOUNTER — HOSPITAL ENCOUNTER (EMERGENCY)
Age: 39
Discharge: HOME | End: 2024-09-21
Attending: EMERGENCY MEDICINE

## 2024-09-20 VITALS
HEIGHT: 67 IN | DIASTOLIC BLOOD PRESSURE: 110 MMHG | BODY MASS INDEX: 34.52 KG/M2 | TEMPERATURE: 97.7 F | OXYGEN SATURATION: 97 % | HEART RATE: 99 BPM | SYSTOLIC BLOOD PRESSURE: 139 MMHG | RESPIRATION RATE: 19 BRPM

## 2024-09-20 DIAGNOSIS — Z98.890 NECK PAIN WITH HISTORY OF CERVICAL SPINAL SURGERY: ICD-10-CM

## 2024-09-20 DIAGNOSIS — M54.2 NECK PAIN WITH HISTORY OF CERVICAL SPINAL SURGERY: ICD-10-CM

## 2024-09-20 DIAGNOSIS — R07.89 ATYPICAL CHEST PAIN: Primary | ICD-10-CM

## 2024-09-20 LAB
ANION GAP SERPL CALCULATED.3IONS-SCNC: 14 MMOL/L (ref 9–17)
BASOPHILS # BLD: 0.1 K/UL (ref 0–0.2)
BASOPHILS NFR BLD: 1 % (ref 0–2)
BUN SERPL-MCNC: 12 MG/DL (ref 6–20)
CALCIUM SERPL-MCNC: 9.1 MG/DL (ref 8.6–10.4)
CHLORIDE SERPL-SCNC: 101 MMOL/L (ref 98–107)
CO2 SERPL-SCNC: 22 MMOL/L (ref 20–31)
CREAT SERPL-MCNC: 1 MG/DL (ref 0.5–0.9)
EOSINOPHIL # BLD: 0.3 K/UL (ref 0–0.4)
EOSINOPHILS RELATIVE PERCENT: 4 % (ref 1–4)
ERYTHROCYTE [DISTWIDTH] IN BLOOD BY AUTOMATED COUNT: 14.6 % (ref 12.5–15.4)
GFR, ESTIMATED: 73 ML/MIN/1.73M2
GLUCOSE SERPL-MCNC: 125 MG/DL (ref 70–99)
HCT VFR BLD AUTO: 41.3 % (ref 36–46)
HGB BLD-MCNC: 13.6 G/DL (ref 12–16)
LYMPHOCYTES NFR BLD: 3.2 K/UL (ref 1–4.8)
LYMPHOCYTES RELATIVE PERCENT: 36 % (ref 24–44)
MAGNESIUM SERPL-MCNC: 2.3 MG/DL (ref 1.6–2.6)
MCH RBC QN AUTO: 29 PG (ref 26–34)
MCHC RBC AUTO-ENTMCNC: 32.8 G/DL (ref 31–37)
MCV RBC AUTO: 88.4 FL (ref 80–100)
MONOCYTES NFR BLD: 0.7 K/UL (ref 0.1–1.2)
MONOCYTES NFR BLD: 7 % (ref 2–11)
NEUTROPHILS NFR BLD: 52 % (ref 36–66)
NEUTS SEG NFR BLD: 4.7 K/UL (ref 1.8–7.7)
PLATELET # BLD AUTO: 395 K/UL (ref 140–450)
PMV BLD AUTO: 9.6 FL (ref 6–12)
POTASSIUM SERPL-SCNC: 3.3 MMOL/L (ref 3.7–5.3)
RBC # BLD AUTO: 4.68 M/UL (ref 4–5.2)
SODIUM SERPL-SCNC: 137 MMOL/L (ref 135–144)
TROPONIN I SERPL HS-MCNC: <6 NG/L (ref 0–14)
WBC OTHER # BLD: 9 K/UL (ref 3.5–11)

## 2024-09-20 PROCEDURE — 96375 TX/PRO/DX INJ NEW DRUG ADDON: CPT | Performed by: EMERGENCY MEDICINE

## 2024-09-20 PROCEDURE — 6360000002 HC RX W HCPCS

## 2024-09-20 PROCEDURE — 93005 ELECTROCARDIOGRAM TRACING: CPT

## 2024-09-20 PROCEDURE — 84484 ASSAY OF TROPONIN QUANT: CPT

## 2024-09-20 PROCEDURE — 85025 COMPLETE CBC W/AUTO DIFF WBC: CPT

## 2024-09-20 PROCEDURE — 71045 X-RAY EXAM CHEST 1 VIEW: CPT

## 2024-09-20 PROCEDURE — 96374 THER/PROPH/DIAG INJ IV PUSH: CPT | Performed by: EMERGENCY MEDICINE

## 2024-09-20 PROCEDURE — 83735 ASSAY OF MAGNESIUM: CPT

## 2024-09-20 PROCEDURE — 80048 BASIC METABOLIC PNL TOTAL CA: CPT

## 2024-09-20 PROCEDURE — 6370000000 HC RX 637 (ALT 250 FOR IP)

## 2024-09-20 PROCEDURE — 99285 EMERGENCY DEPT VISIT HI MDM: CPT | Performed by: EMERGENCY MEDICINE

## 2024-09-20 RX ORDER — ORPHENADRINE CITRATE 30 MG/ML
60 INJECTION INTRAMUSCULAR; INTRAVENOUS ONCE
Status: COMPLETED | OUTPATIENT
Start: 2024-09-20 | End: 2024-09-20

## 2024-09-20 RX ORDER — MORPHINE SULFATE 4 MG/ML
4 INJECTION, SOLUTION INTRAMUSCULAR; INTRAVENOUS ONCE
Status: COMPLETED | OUTPATIENT
Start: 2024-09-20 | End: 2024-09-20

## 2024-09-20 RX ORDER — NITROGLYCERIN 0.4 MG/1
0.4 TABLET SUBLINGUAL EVERY 5 MIN PRN
Status: ACTIVE | OUTPATIENT
Start: 2024-09-20 | End: 2024-09-20

## 2024-09-20 RX ORDER — ASPIRIN 81 MG/1
324 TABLET, CHEWABLE ORAL ONCE
Status: COMPLETED | OUTPATIENT
Start: 2024-09-20 | End: 2024-09-20

## 2024-09-20 RX ADMIN — ORPHENADRINE CITRATE 60 MG: 30 INJECTION, SOLUTION INTRAMUSCULAR; INTRAVENOUS at 23:51

## 2024-09-20 RX ADMIN — ASPIRIN 324 MG: 81 TABLET, CHEWABLE ORAL at 22:55

## 2024-09-20 RX ADMIN — MORPHINE SULFATE 4 MG: 4 INJECTION INTRAVENOUS at 22:56

## 2024-09-20 ASSESSMENT — PAIN SCALES - GENERAL: PAINLEVEL_OUTOF10: 9

## 2024-09-20 ASSESSMENT — HEART SCORE: ECG: NORMAL

## 2024-09-20 ASSESSMENT — PAIN DESCRIPTION - LOCATION: LOCATION: CHEST

## 2024-09-20 ASSESSMENT — PAIN - FUNCTIONAL ASSESSMENT: PAIN_FUNCTIONAL_ASSESSMENT: 0-10

## 2024-09-21 LAB
EKG ATRIAL RATE: 98 BPM
EKG P AXIS: 56 DEGREES
EKG P-R INTERVAL: 190 MS
EKG Q-T INTERVAL: 344 MS
EKG QRS DURATION: 78 MS
EKG QTC CALCULATION (BAZETT): 439 MS
EKG R AXIS: 26 DEGREES
EKG T AXIS: 73 DEGREES
EKG VENTRICULAR RATE: 98 BPM

## 2024-09-24 ENCOUNTER — HOSPITAL ENCOUNTER (OUTPATIENT)
Age: 39
Discharge: HOME | End: 2024-09-26

## 2024-09-24 ENCOUNTER — HOSPITAL ENCOUNTER (OUTPATIENT)
Dept: GENERAL RADIOLOGY | Age: 39
Discharge: HOME | End: 2024-09-26

## 2024-09-24 DIAGNOSIS — M54.12 CERVICAL RADICULOPATHY: ICD-10-CM

## 2024-09-24 DIAGNOSIS — Z98.890 S/P CERVICAL DISC REPLACEMENT: ICD-10-CM

## 2024-09-24 PROCEDURE — 72040 X-RAY EXAM NECK SPINE 2-3 VW: CPT

## 2024-09-30 ENCOUNTER — APPOINTMENT (OUTPATIENT)
Dept: CT IMAGING | Age: 39
End: 2024-09-30

## 2024-09-30 ENCOUNTER — TELEPHONE (OUTPATIENT)
Dept: NEUROSURGERY | Age: 39
End: 2024-09-30

## 2024-09-30 ENCOUNTER — HOSPITAL ENCOUNTER (EMERGENCY)
Age: 39
Discharge: HOME | End: 2024-09-30
Attending: EMERGENCY MEDICINE

## 2024-09-30 VITALS
BODY MASS INDEX: 33.74 KG/M2 | HEART RATE: 94 BPM | TEMPERATURE: 98.2 F | WEIGHT: 215 LBS | SYSTOLIC BLOOD PRESSURE: 157 MMHG | DIASTOLIC BLOOD PRESSURE: 105 MMHG | HEIGHT: 67 IN | RESPIRATION RATE: 23 BRPM | OXYGEN SATURATION: 98 %

## 2024-09-30 DIAGNOSIS — M62.838 NECK MUSCLE SPASM: ICD-10-CM

## 2024-09-30 DIAGNOSIS — M79.601 RIGHT ARM PAIN: ICD-10-CM

## 2024-09-30 DIAGNOSIS — G89.29 CHRONIC NECK PAIN: Primary | ICD-10-CM

## 2024-09-30 DIAGNOSIS — M54.2 CHRONIC NECK PAIN: Primary | ICD-10-CM

## 2024-09-30 PROCEDURE — 96375 TX/PRO/DX INJ NEW DRUG ADDON: CPT

## 2024-09-30 PROCEDURE — 72125 CT NECK SPINE W/O DYE: CPT

## 2024-09-30 PROCEDURE — 70450 CT HEAD/BRAIN W/O DYE: CPT

## 2024-09-30 PROCEDURE — 99284 EMERGENCY DEPT VISIT MOD MDM: CPT

## 2024-09-30 PROCEDURE — 96374 THER/PROPH/DIAG INJ IV PUSH: CPT

## 2024-09-30 PROCEDURE — 2580000003 HC RX 258

## 2024-09-30 PROCEDURE — 6360000002 HC RX W HCPCS

## 2024-09-30 RX ORDER — DIPHENHYDRAMINE HYDROCHLORIDE 50 MG/ML
25 INJECTION INTRAMUSCULAR; INTRAVENOUS ONCE
Status: COMPLETED | OUTPATIENT
Start: 2024-09-30 | End: 2024-09-30

## 2024-09-30 RX ORDER — CYCLOBENZAPRINE HCL 10 MG
10 TABLET ORAL 3 TIMES DAILY PRN
Qty: 21 TABLET | Refills: 0 | Status: SHIPPED | OUTPATIENT
Start: 2024-09-30 | End: 2024-10-10

## 2024-09-30 RX ORDER — SODIUM CHLORIDE, SODIUM LACTATE, POTASSIUM CHLORIDE, AND CALCIUM CHLORIDE .6; .31; .03; .02 G/100ML; G/100ML; G/100ML; G/100ML
1000 INJECTION, SOLUTION INTRAVENOUS ONCE
Status: COMPLETED | OUTPATIENT
Start: 2024-09-30 | End: 2024-09-30

## 2024-09-30 RX ORDER — PROCHLORPERAZINE EDISYLATE 5 MG/ML
10 INJECTION INTRAMUSCULAR; INTRAVENOUS ONCE
Status: COMPLETED | OUTPATIENT
Start: 2024-09-30 | End: 2024-09-30

## 2024-09-30 RX ADMIN — SODIUM CHLORIDE, POTASSIUM CHLORIDE, SODIUM LACTATE AND CALCIUM CHLORIDE 1000 ML: 600; 310; 30; 20 INJECTION, SOLUTION INTRAVENOUS at 17:15

## 2024-09-30 RX ADMIN — PROCHLORPERAZINE EDISYLATE 10 MG: 5 INJECTION INTRAMUSCULAR; INTRAVENOUS at 17:15

## 2024-09-30 RX ADMIN — DIPHENHYDRAMINE HYDROCHLORIDE 25 MG: 50 INJECTION INTRAMUSCULAR; INTRAVENOUS at 17:15

## 2024-09-30 ASSESSMENT — ENCOUNTER SYMPTOMS
ABDOMINAL PAIN: 0
SHORTNESS OF BREATH: 0
VOMITING: 0
NAUSEA: 1

## 2024-09-30 ASSESSMENT — PAIN DESCRIPTION - LOCATION: LOCATION: NECK

## 2024-09-30 ASSESSMENT — PAIN SCALES - GENERAL: PAINLEVEL_OUTOF10: 8

## 2024-09-30 ASSESSMENT — PAIN - FUNCTIONAL ASSESSMENT: PAIN_FUNCTIONAL_ASSESSMENT: 0-10
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
## 2024-09-30 NOTE — DISCHARGE INSTRUCTIONS
Belinda Dumont is a 67 year old female presenting to the walk-in clinic today c/o cough, body aches and tired x 2 days. Denies fever or shortness of breath.     Swabs/Specimens collected during rooming process:  COVID PCR - rapid Flu test.     PPE worn during room process  Writer: N95, Face shield/eye protection, gown, gloves  Patient: masked     Patient would like communication of their results via:    [employer]   Take your medication as indicated and prescribed.  You are prescribed Flexeril which is a muscle relaxer.  Do not drive for 6 hours after you take this medication.  For pain use acetaminophen (Tylenol) or ibuprofen (Motrin / Advil), unless prescribed medications that have acetaminophen or ibuprofen (or similar medications) in it.  You can take over the counter acetaminophen tablets (1 - 2 tablets of the 500-mg strength every 6 hours) or ibuprofen tablets (3 tablets every 6 hours).    PLEASE RETURN TO THE EMERGENCY DEPARTMENT IMMEDIATELY for worsening of pain, decrease sensation to arms or legs, inability to move arms or legs, shortness of breath, severe chest pain, excessive nausea or vomiting, notice any bruising to your abdomen or have increase in abdominal pain, or if you develop any concerning symptoms such as: high fever not relieved by acetaminophen (Tylenol) and/or ibuprofen (Motrin / Advil), chills, feeling of your heart fluttering or racing, persistent nausea and/or vomiting, vomiting up blood, blood in your stool, loss of consciousness, numbness, weakness or tingling in the arms or legs or change in color of the extremities, changes in mental status, persistent headache, blurry vision, loss of bladder / bowel control, unable to follow up with your physician, or other any other care or concern.

## 2024-09-30 NOTE — TELEPHONE ENCOUNTER
Pt called and stated that her arm pain and face pain have not gone away, states she was seen in the ER 1.5 weeks ago and they did stroke protocol on her and it did not show anything. She believes that the pain and symptoms are worse, informed pt if she is concerned with her symptoms to go be evaluated. Will also document the information, incase she wants to move her appt up.

## 2024-09-30 NOTE — ED PROVIDER NOTES
Note Started: 5:40 PM EDT         [facility]     Emergency Department     Faculty Attestation    I performed a history and physical examination of the patient and discussed management with the resident. I reviewed the resident’s note and agree with the documented findings and plan of care. Any areas of disagreement are noted on the chart. I was personally present for the key portions of any procedures. I have documented in the chart those procedures where I was not present during the key portions. I have reviewed the emergency nurses triage note. I agree with the chief complaint, past medical history, past surgical history, allergies, medications, social and family history as documented unless otherwise noted below.        For Physician Assistant/ Nurse Practitioner cases/documentation I have personally evaluated this patient and have completed at least one if not all key elements of the E/M (history, physical exam, and MDM). Additional findings are as noted.  I have personally seen and evaluated the patient.  I find the patient's history and physical exam are consistent with the NP/PA documentation.  I agree with the care provided, treatment rendered, disposition and follow-up plan.    38-year-old female with history of cervical radiculopathy requiring surgery in 2021 by Dr. Aquino presenting with shooting pain down her neck and into her right arm.  States that it feels like she could shoot laser beams out of her fingers.  No position makes it feel better or worse.  She has had occipital nerve blocks in the past which has helped.  She is now having a headache as well.  No syncope or neurologic changes.  Does have weakened  strength in the right hand due to pain.    Exam:  General : Laying on the bed and awake, alert  CV : normal rate and regular rhythm  Lungs : Breathing comfortably on room air with no tachypnea, hypoxia, or increased work of breathing  Neuro: Awake, alert, answering

## 2024-10-01 ENCOUNTER — CLINICAL DOCUMENTATION (OUTPATIENT)
Dept: PHYSICAL THERAPY | Facility: CLINIC | Age: 39
End: 2024-10-01

## 2024-10-01 NOTE — FLOWSHEET NOTE
[] [facility]  Outpatient Rehabilitation &  Therapy  [address]  [phone]  [phone] [] [facility]  Outpatient Rehabilitation &  Therapy  [address]  P: [phone]  F: [phone] [x] [facility]  Outpatient Rehabilitation &  Therapy  [address]  P: [phone]  F: [phone] [] [facility]  Outpatient Rehabilitation &  Therapy  [address]  [phone]  [phone] [] [facility]  Outpatient Rehabilitation &  Therapy  [address]   P: [phone]  F: [phone]  [] [facility]  Outpatient Rehabilitation &  Therapy  [address]  P: [phone]  F: [phone] [] [facility]  Outpatient Rehabilitation &  Therapy  [address]  P: [phone]  F: [phone] [] [facility]  Outpatient Rehabilitation &  Therapy  [address]  P: [phone]  F: [phone] [] [facility]  Outpatient Rehabilitation &  Therapy  [address]  P: [phone]  F: [phone]  [] [facility] Outpatient Rehabilitation &  Therapy  [address]  P: [phone]  F: [phone]     Physical Therapy Discharge Note    Date: 10/1/2024      Patient: Dorothea Hastings  : 1985  MRN: [id number]    Physician: Le Quevedo NP                            Insurance: [facility]; Geraldo yr; 30/28vs; auth after 30vs; no pt resp   Medical Diagnosis: S/P lumbar discectomy                         Rehab Codes: M54.5, R26.89  Onset Date: 24 (surgery)                                              Next 's appt.: 24     Visit# / total visits: 8/15                      Cancels/No Shows: 0/0  Date of initial visit: 24                Date of final visit: 7/3/24      Discharge Status:     [] Pt recovered

## 2024-10-01 NOTE — ED PROVIDER NOTES
[facility] ED  Emergency Department Encounter  Emergency Medicine Resident     Pt Name:Dorothea Hastings  MRN: [id number]  Birthdate 1985  Date of evaluation: 24  PCP:  El Kat APRN - CNP  Note Started: 11:49 PM EDT      CHIEF COMPLAINT       Chief Complaint   Patient presents with    Neck Pain     X1 week,        HISTORY OF PRESENT ILLNESS  (Location/Symptom, Timing/Onset, Context/Setting, Quality, Duration, Modifying Factors, Severity.)      Dorothea Hastings is a 38 y.o. female past medical history of cervical myopathy, chronic back pain, migraine headaches, right-sided trigeminal neuralgia who presents with 1 week of worsening right-sided neck pain, right arm pain and weakness secondary to pain.  Patient reports recent stroke workup, cervical MRI scheduled this week.    PAST MEDICAL / SURGICAL / SOCIAL / FAMILY HISTORY      has a past medical history of Anxiety, Blood loss, Cervical disc disease, Cervical myopathy, Chronic back pain, GERD (gastroesophageal reflux disease), Hx of migraine headaches, Neuropathy, Thyroid nodule, Trigeminal neuralgia of right side of face, Under care of team, Under care of team, Under care of team, Weakness generalized, and Wellness examination.     has a past surgical history that includes  section; Tubal ligation; Dilation and curettage of uterus; cervical fusion (N/A, 2021); Elbow surgery (Right, 2022); Carpal tunnel release (Right, 2022); Lumbar spine surgery (2024); and Lumbar spine surgery (Left, 2024).    Social History     Socioeconomic History    Marital status:      Spouse name: Not on file    Number of children: Not on file    Years of education: Not on file    Highest education level: Not on file   Occupational History    Not on file   Tobacco Use    Smoking status: Former     Current packs/day: 0.00     Types: Cigarettes     Start date:      Quit date:      Years since

## 2024-10-05 ENCOUNTER — HOSPITAL ENCOUNTER (OUTPATIENT)
Dept: MRI IMAGING | Age: 39
Discharge: HOME | End: 2024-10-07
Attending: NEUROLOGICAL SURGERY

## 2024-10-05 DIAGNOSIS — M54.12 CERVICAL RADICULOPATHY: ICD-10-CM

## 2024-10-05 DIAGNOSIS — Z98.890 S/P CERVICAL DISC REPLACEMENT: ICD-10-CM

## 2024-10-05 PROCEDURE — 73220 MRI UPPR EXTREMITY W/O&W/DYE: CPT

## 2024-10-05 PROCEDURE — 72141 MRI NECK SPINE W/O DYE: CPT

## 2024-10-05 PROCEDURE — A9579 GAD-BASE MR CONTRAST NOS,1ML: HCPCS | Performed by: NEUROLOGICAL SURGERY

## 2024-10-05 PROCEDURE — 6360000004 HC RX CONTRAST MEDICATION: Performed by: NEUROLOGICAL SURGERY

## 2024-10-05 RX ADMIN — GADOTERIDOL 20 ML: 279.3 INJECTION, SOLUTION INTRAVENOUS at 10:01

## 2024-10-08 DIAGNOSIS — G89.29 CHRONIC RIGHT-SIDED LOW BACK PAIN WITH RIGHT-SIDED SCIATICA: ICD-10-CM

## 2024-10-08 DIAGNOSIS — M54.41 CHRONIC RIGHT-SIDED LOW BACK PAIN WITH RIGHT-SIDED SCIATICA: ICD-10-CM

## 2024-10-08 NOTE — TELEPHONE ENCOUNTER
PT called stated she is in pain in the middle of her shoulder blade and down to her left arm and left pinky.Please advise

## 2024-10-09 NOTE — TELEPHONE ENCOUNTER
I spoke with pt and let pt know to keep her appt with Dr. Aquino on 10/15 so they can review her MRI. Pt is always requesting for a refill of lyrica.

## 2024-10-11 RX ORDER — PREGABALIN 200 MG/1
200 CAPSULE ORAL 3 TIMES DAILY
Qty: 90 CAPSULE | Refills: 2 | Status: SHIPPED | OUTPATIENT
Start: 2024-10-11 | End: 2025-01-09

## 2024-10-15 ENCOUNTER — OFFICE VISIT (OUTPATIENT)
Dept: NEUROSURGERY | Age: 39
End: 2024-10-15

## 2024-10-15 VITALS
BODY MASS INDEX: 33.43 KG/M2 | TEMPERATURE: 97.2 F | HEART RATE: 76 BPM | OXYGEN SATURATION: 97 % | HEIGHT: 67 IN | SYSTOLIC BLOOD PRESSURE: 122 MMHG | WEIGHT: 213 LBS | DIASTOLIC BLOOD PRESSURE: 85 MMHG

## 2024-10-15 DIAGNOSIS — G56.21 CUBITAL TUNNEL SYNDROME ON RIGHT: ICD-10-CM

## 2024-10-15 DIAGNOSIS — Z98.890 S/P CERVICAL DISC REPLACEMENT: Primary | ICD-10-CM

## 2024-10-15 DIAGNOSIS — G54.0 THORACIC OUTLET SYNDROME: ICD-10-CM

## 2024-10-15 PROCEDURE — 99214 OFFICE O/P EST MOD 30 MIN: CPT | Performed by: NEUROLOGICAL SURGERY

## 2024-10-15 NOTE — PROGRESS NOTES
Department of Neurosurgery                                                      Follow up visit      History Obtained From:  patient    CHIEF COMPLAINT:         Chief Complaint   Patient presents with    Results     MRI AND XRAY    Orders     Walking cane    Medication Refill     discuss       HISTORY OF PRESENT ILLNESS:       The patient is a 39 y.o. female who presents for follow up for S/P cervical disc replacement (04/30/2021) and cervical radiculopathy.    Patient reports that she has begun experiencing symptoms in the left arm. She says that in her left upper extremity that her pain will travel from the shoulder around the armpit, and will travel to the middle of her palm into the middle and 4th finger. She states she feels a warm burning sensation on the dorsum of her hand. She exclaims that symptoms are similar between the right and left upper extremity, but symptoms and numbness particularly are more pronounced on the right compared to left. She describes the pain at the shoulders as a tearing sensation, likening it to having her arms pulled from her body. She does note that she has more pain in the left palm, with worsened sensation in the right palm. She now expresses that she is able to provoke her pain on both sides by turning her head to the opposite direction. By turning her head to the left, she provokes a sharp shooting pain that will travel from the trapezius down the right upper extremity. Patient notes that she has an upcoming consultation with Dr. Shukla in pain management.    She has a past of left cubital tunnel surgery which had provided relief to pain and numbness around the epicondyle, but otherwise did not provide marked relief to her other left upper extremity symptoms.    She has been having pains in her chest that generally occur with deep inspiration. She notes that these pains also occur when she coughs, sneezes, or laughs. She says that this is the most prominent of her

## 2024-10-17 ENCOUNTER — CARE COORDINATION (OUTPATIENT)
Dept: CARE COORDINATION | Age: 39
End: 2024-10-17

## 2024-10-17 NOTE — CARE COORDINATION
Writer called Patient to do a follow up Social service call with her to ensure that her Benefits through [employer] had Re-started.    As we had a phone Interview with our [employer] Contact and she was able to help assist with this matter.    Patient reported that things were going well at the moment and she did not need anything at this time, Peacer will follow back up with her in a few weeks.

## 2024-10-21 DIAGNOSIS — M54.12 CERVICAL RADICULOPATHY: ICD-10-CM

## 2024-10-21 DIAGNOSIS — M48.02 STENOSIS OF CERVICAL SPINE WITH MYELOPATHY (HCC): ICD-10-CM
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
## 2024-10-21 DIAGNOSIS — G89.4 CHRONIC PAIN SYNDROME: ICD-10-CM

## 2024-10-21 DIAGNOSIS — G99.2 STENOSIS OF CERVICAL SPINE WITH MYELOPATHY (HCC): ICD-10-CM

## 2024-10-22 RX ORDER — DULOXETIN HYDROCHLORIDE 60 MG/1
60 CAPSULE, DELAYED RELEASE ORAL DAILY
Qty: 90 CAPSULE | Refills: 1 | Status: SHIPPED | OUTPATIENT
Start: 2024-10-22

## 2024-10-22 RX ORDER — AMITRIPTYLINE HYDROCHLORIDE 10 MG/1
10 TABLET ORAL NIGHTLY
Qty: 90 TABLET | Refills: 1 | Status: SHIPPED | OUTPATIENT
Start: 2024-10-22

## 2024-10-25 RX ORDER — CARBAMAZEPINE 200 MG/1
200 TABLET, EXTENDED RELEASE ORAL 2 TIMES DAILY
Qty: 60 TABLET | Refills: 10 | Status: SHIPPED | OUTPATIENT
Start: 2024-10-25 | End: 2025-09-20

## 2024-11-11 ENCOUNTER — CARE COORDINATION (OUTPATIENT)
Dept: CARE COORDINATION | Age: 39
End: 2024-11-11

## 2024-11-11 NOTE — CARE COORDINATION
Writer called Patient to do a follow up Social service call with her to gauge her needs and see how things were going.    Patient reported that she has her MEDICAID back active and was doing Ok, She inquired about how to go about getting Therapy/Counseling for herself.    Writer will call [employer] and have a list mailed out to Patient of Providers that are IN-Network with her Insurance.    Writer was able to Contact [employer] and they gave Writer information for Patient and writer forwarded that information to her on today.    Will follow back up in a week or so.

## 2024-11-21 ENCOUNTER — OFFICE VISIT (OUTPATIENT)
Dept: NEUROSURGERY | Age: 39
End: 2024-11-21
Payer: COMMERCIAL

## 2024-11-21 ENCOUNTER — TELEPHONE (OUTPATIENT)
Age: 39
End: 2024-11-21

## 2024-11-21 VITALS
BODY MASS INDEX: 33.59 KG/M2 | HEART RATE: 84 BPM | WEIGHT: 214 LBS | SYSTOLIC BLOOD PRESSURE: 126 MMHG | DIASTOLIC BLOOD PRESSURE: 86 MMHG | HEIGHT: 67 IN

## 2024-11-21 DIAGNOSIS — M47.26 OTHER SPONDYLOSIS WITH RADICULOPATHY, LUMBAR REGION: ICD-10-CM

## 2024-11-21 DIAGNOSIS — G89.29 CHRONIC RIGHT-SIDED LOW BACK PAIN WITH RIGHT-SIDED SCIATICA: ICD-10-CM

## 2024-11-21 DIAGNOSIS — M79.7 FIBROMYALGIA: Primary | ICD-10-CM

## 2024-11-21 DIAGNOSIS — M51.16 LUMBAR DISC HERNIATION WITH RADICULOPATHY: ICD-10-CM

## 2024-11-21 DIAGNOSIS — M54.41 CHRONIC RIGHT-SIDED LOW BACK PAIN WITH RIGHT-SIDED SCIATICA: ICD-10-CM

## 2024-11-21 PROCEDURE — G8484 FLU IMMUNIZE NO ADMIN: HCPCS

## 2024-11-21 PROCEDURE — G8427 DOCREV CUR MEDS BY ELIG CLIN: HCPCS

## 2024-11-21 PROCEDURE — 99214 OFFICE O/P EST MOD 30 MIN: CPT

## 2024-11-21 PROCEDURE — G8417 CALC BMI ABV UP PARAM F/U: HCPCS

## 2024-11-21 PROCEDURE — 1036F TOBACCO NON-USER: CPT

## 2024-11-21 NOTE — PROGRESS NOTES
masseter tone symmetric  Hearing symmetric    Sensation: decreased right foot and ankle    Motor  L deltoid 5/5; R deltoid 5/5  L biceps 5/5; R biceps 5/5  L triceps 5/5; R triceps 5/5  L wrist extension 5/5; R wrist extension 5/5  L intrinsics 5/5; R intrinsics 5/5     L hip flexion 5/5 , R hip flexion 5/5  L knee extension 5/5; R knee extension 5/5  L Dorsiflexion 5/5; R dorsiflexion 5/5  L Plantarflexion 5/5; R plantarflexion 5/5  L EHL 5/5; R EHL 5/5    Reflexes  L Brachioradialis 2+/4; R brachioradialis 2+/4  L Biceps 2+/4; R Biceps 2+/4  L Triceps 2+/4; R Triceps 2+/4  L Patellar 2+/4: R Patellar 2+/4  L Achilles 2+/4; R Achilles 2+/4    hoffmans L: neg  hoffmans R: neg  Clonus L: neg  Clonus R: neg  Babinski L: neg  Babinski R: neg    +SLR and LAWANDA right   +SLR left  -LAWANDA left    Studies Review:     4/25/24 MRI Lumbar Spine  FINDINGS:  BONES/ALIGNMENT: There is normal alignment of the spine. The vertebral body  heights are maintained. The bone marrow signal appears unremarkable.     SPINAL CORD: The conus terminates normally.     SOFT TISSUES: No paraspinal mass identified.     L1-L2: There is no significant disc herniation, spinal canal stenosis or  neural foraminal narrowing.     L2-L3: There is no significant disc herniation, spinal canal stenosis or  neural foraminal narrowing.     L3-L4: Moderate broad-based left lateral foraminal disc extrusion unchanged  impinging the exiting L3 nerve root.  Central canal and right neural foramen  patent.     L4-L5: Hypertrophic facet disease causing moderate narrowing of the neural  foramina bilaterally.  Central canal patent.     L5-S1: There is no significant disc herniation, spinal canal stenosis or  neural foraminal narrowing.     IMPRESSION:  1. Stable moderate broad-based left lateral foraminal disc extrusion at L3-4  impinging the exiting L3 nerve root.  2. Moderate bilateral neural foraminal stenosis at L4-5 secondary to  hypertrophic facet disease.

## 2024-11-21 NOTE — PATIENT INSTRUCTIONS
Per Dr. Bautista's note on 7/3/24: Will obtain a new MRI of the lumbar spine considering progression of potential right-sided radicular symptoms refractory to physical therapy. In addition we will obtain hip x-rays of the right side and pain management referral.     Requesting from pain management in sequence trochanteric bursa injection followed by transforaminal injection at L4 as a diagnostic maneuver.      Call central scheduling to schedule Lumbar -[phone]

## 2024-11-25 ASSESSMENT — ENCOUNTER SYMPTOMS: BACK PAIN: 1

## 2024-11-26 ENCOUNTER — CARE COORDINATION (OUTPATIENT)
Dept: CARE COORDINATION | Age: 39
End: 2024-11-26

## 2024-11-27 ENCOUNTER — OFFICE VISIT (OUTPATIENT)
Dept: NEUROLOGY | Age: 39
End: 2024-11-27
Payer: COMMERCIAL

## 2024-11-27 VITALS — DIASTOLIC BLOOD PRESSURE: 77 MMHG | SYSTOLIC BLOOD PRESSURE: 103 MMHG | HEART RATE: 81 BPM

## 2024-11-27 DIAGNOSIS — M54.10 RADICULAR PAIN: Primary | ICD-10-CM

## 2024-11-27 DIAGNOSIS — R51.9 OCCIPITAL HEADACHE: ICD-10-CM

## 2024-11-27 DIAGNOSIS — F43.9 SITUATIONAL STRESS: Primary | ICD-10-CM

## 2024-11-27 PROCEDURE — 99214 OFFICE O/P EST MOD 30 MIN: CPT

## 2024-11-27 PROCEDURE — G8484 FLU IMMUNIZE NO ADMIN: HCPCS

## 2024-11-27 PROCEDURE — G8427 DOCREV CUR MEDS BY ELIG CLIN: HCPCS

## 2024-11-27 PROCEDURE — 1036F TOBACCO NON-USER: CPT

## 2024-11-27 PROCEDURE — G8417 CALC BMI ABV UP PARAM F/U: HCPCS

## 2024-11-27 RX ORDER — DICLOFENAC SODIUM 75 MG/1
75 TABLET, DELAYED RELEASE ORAL 2 TIMES DAILY WITH MEALS
COMMUNITY
Start: 2024-01-03

## 2024-11-27 RX ORDER — BACLOFEN 10 MG/1
10 TABLET ORAL 3 TIMES DAILY
COMMUNITY

## 2024-11-27 RX ORDER — IBUPROFEN 600 MG/1
600 TABLET, FILM COATED ORAL EVERY 8 HOURS PRN
COMMUNITY
Start: 2024-02-16

## 2024-11-27 RX ORDER — LIDOCAINE 50 MG/G
PATCH TOPICAL
COMMUNITY
Start: 2024-05-13

## 2024-11-27 RX ORDER — MAGNESIUM OXIDE 400 MG/1
400 TABLET ORAL 2 TIMES DAILY
COMMUNITY

## 2024-11-27 RX ORDER — VERAPAMIL HYDROCHLORIDE 120 MG/1
120 TABLET, FILM COATED, EXTENDED RELEASE ORAL
COMMUNITY

## 2024-11-27 NOTE — PROGRESS NOTES
[address], [address] [address]  [place] [address]  P: [phone]  F: [phone]    NEUROLOGY CLINIC NOTE     PATIENT NAME: Dorothea Hastings  PATIENT MRN: [id number]  PRIMARY CARE PHYSICIAN: El Kat, APRN - CNP    HPI:      oDrothea Hastings is a 39 y.o. Dorothea Hastings is a 37 y.o.  with past medical history of migraines, ulnar neuropathy s/p cubital tunnel release in June 2022, neuropathy of both feet, trigeminal neuralgia of right side of face, anxiety, cervical myopathy presents today for follow up. She takes Tegretol 300 mg BID, Cymbalta 60 mg ER, and Lyrica 150 mg TID for trigeminal neuralgia. She complains of increased sciatic pain on right side, radiating to the toes. She denies such complaints on the left side of the back, and these symptoms started 1 week ago. She has been having waxing and waning sciatic pain for years since her first pregnancy. She states her medications do not really help her pain. She continues to experience right hand carpal tunnel symptoms, right sided facial trigeminal neuralgia, and right shoulder and arm pain. These conditions are all chronic for her. Patient underwent cervical spine fusion at C5-6 disc arthroplasty in 4/30/21.      Previous lumbar imaging was back in 2021, which showed no evidence of spondylosis or disc bulge. She has undergone imaging of neuraxis from brain to lumbar spine over the past 2 years.     She also has chronic pain symptoms including right-sided trigeminal neuralgia which occurs every other day.  Previously, the trigeminal pain occurred daily, however decrease in frequency after increasing Tegretol dose to 300 mg twice daily.   She rates the pain at 4/10 to 9/10, describes it as sharp, stabbing pain that lasts from a couple of seconds to 30 minutes.      She also has right-sided occipital neuralgia which was treated in the ER with occipital nerve block.  She stated she found

## 2024-11-29 ENCOUNTER — OFFICE VISIT (OUTPATIENT)
Age: 39
End: 2024-11-29

## 2024-11-29 VITALS
TEMPERATURE: 97.9 F | OXYGEN SATURATION: 98 % | HEIGHT: 67 IN | SYSTOLIC BLOOD PRESSURE: 117 MMHG | DIASTOLIC BLOOD PRESSURE: 89 MMHG | HEART RATE: 87 BPM | BODY MASS INDEX: 33.65 KG/M2 | WEIGHT: 214.4 LBS

## 2024-11-29 DIAGNOSIS — R20.2 NUMBNESS AND TINGLING: ICD-10-CM

## 2024-11-29 DIAGNOSIS — M79.641 PAIN IN BOTH HANDS: ICD-10-CM

## 2024-11-29 DIAGNOSIS — E55.9 VITAMIN D DEFICIENCY: ICD-10-CM

## 2024-11-29 DIAGNOSIS — M79.10 MYALGIA: ICD-10-CM

## 2024-11-29 DIAGNOSIS — M79.7 FIBROMYALGIA: Primary | ICD-10-CM

## 2024-11-29 DIAGNOSIS — M79.642 PAIN IN BOTH HANDS: ICD-10-CM

## 2024-11-29 DIAGNOSIS — R20.0 NUMBNESS AND TINGLING: ICD-10-CM

## 2024-11-29 ASSESSMENT — ENCOUNTER SYMPTOMS: BACK PAIN: 1

## 2024-11-29 NOTE — PROGRESS NOTES
Reason for Referral: Fibromyalgia    Le Quevedo, APRN - CNP  [address] [place] [address]    Chief Complaint   Patient presents with    Establish Care     Fibromyalgia       HISTORY OF PRESENT ILLNESS: Ms.Charay AYAKA Hastings is a 39 y.o. female with a medical history remarkable for DJD s/sp apine surgeries, depression referred for evaluation of fibromyalgia.    Patient reports history of spine surgeries due to degenerative disc disease.  She feels that ever since her surgery she never recovered.  She has struggled with diffuse pain throughout her body, skin sensitive to touch, simple things like blankets or her clothes hurting her body, difficulty walking, weakness.  She is following with neurology, neurosurgery and pain management.  Initially the pain used to involve her right side more than the left but now her left side is also being involved.  She has difficulty walking and has been using a walker.  Also mentions that cold temperatures make her experience a burning sensation.  Due to her sensitivity to touch she does not even let her kids hug her.  Her symptoms seem to fluctuate and on bad days she feels as if she is bedbound and has to rely on her family members for simple tasks.  It is extremely difficult for her to walk down or use restroom.  Also complains of her hands getting stuck intermittently.    She has tried physical therapy in past but did not notice any improvement.      Previous Rheumatology workup     Past Medical,Family, and Social History reviewed.  Patient's PMH/PSH,SH,PSYCH Hx, MEDs, ALLERGIES, and ROS were all reviewed and updated in the appropriate sections.    Family Hx:  N/AA    PAST MEDICAL HISTORY:  Past Medical History:   Diagnosis Date    Anxiety     Blood loss     after     Cervical disc disease     Cervical myopathy     Chronic back pain     GERD (gastroesophageal reflux disease)     Hx of migraine headaches     Neuropathy     both legs, feet

## 2024-11-29 NOTE — PROGRESS NOTES
Review of Systems   Constitutional:  Positive for activity change, diaphoresis and fatigue.   Musculoskeletal:  Positive for arthralgias, back pain, gait problem, neck pain and neck stiffness.   Neurological:  Positive for headaches.   Psychiatric/Behavioral:  Positive for sleep disturbance.    All other systems reviewed and are negative.

## 2024-12-02 ENCOUNTER — HOSPITAL ENCOUNTER (OUTPATIENT)
Dept: PHYSICAL THERAPY | Age: 39
Setting detail: THERAPIES SERIES
Discharge: HOME | End: 2024-12-02

## 2024-12-02 NOTE — FLOWSHEET NOTE
[x] [facility]  Outpatient Rehabilitation &  Therapy  [address]  [phone]  [phone] [] [facility]  Outpatient Rehabilitation &  Therapy  [address]  P: [phone]  F: [phone] [] [facility]  Outpatient Rehabilitation &  Therapy  [address]  P: [phone]  F: [phone] [] [facility]  Outpatient Rehabilitation &  Therapy  [address]  [phone]  [phone] [] [facility]  Outpatient Rehabilitation &  Therapy  [address]   P: [phone]  F: [phone]  [] [facility]  Outpatient Rehabilitation &  Therapy  [address]  P: [phone]  F: [phone] [] [facility]  Outpatient Rehabilitation &  Therapy  [address]  P: [phone]  F: [phone] [] [facility]  Outpatient Rehabilitation &  Therapy  [address]  P: [phone]  F: [phone] [] [facility]  Outpatient Rehabilitation &  Therapy  [address]  P: [phone]  F: [phone]  [] [facility] Outpatient Rehabilitation &  Therapy  [address]  P: [phone]  F: [phone]     Therapy Cancel/No Show note    Date: 2024  Patient: Dorothea Hastings  : 1985  MRN: [id number]    Cancels/No Shows to date: 0    For today's appointment patient:    [x]  Cancelled    [] Rescheduled appointment    [] No-show     Reason given by patient:    []  Patient ill    []  Conflicting appointment    [] No transportation      [] Conflict with work    [] No reason given    [] Weather related    [] COVID-19    [x] Other:      Comments:  Patient had arrived but needed to go get her daughter.  Rescheduled to 2024      [] Next appointment was confirmed    Electronically signed by: Rayna Rios, PT

## 2024-12-04 ENCOUNTER — TELEPHONE (OUTPATIENT)
Dept: NEUROLOGY | Age: 39
End: 2024-12-04

## 2024-12-04 NOTE — TELEPHONE ENCOUNTER
The patient was referred from [facility] for an ONB.  Please do prior auth.  Thank you, Tram

## 2024-12-09 ENCOUNTER — HOSPITAL ENCOUNTER (OUTPATIENT)
Dept: PHYSICAL THERAPY | Age: 39
Setting detail: THERAPIES SERIES
Discharge: HOME | End: 2024-12-09
Payer: COMMERCIAL

## 2024-12-09 PROCEDURE — 97110 THERAPEUTIC EXERCISES: CPT

## 2024-12-09 PROCEDURE — 97162 PT EVAL MOD COMPLEX 30 MIN: CPT

## 2024-12-09 NOTE — CONSULTS
[x] [facility]  Outpatient Rehabilitation &  Therapy  [address]  [phone]  [phone] [] [facility]  Outpatient Rehabilitation &  Therapy  [address]  P: [phone]  F: [phone] [] [facility]  Outpatient Rehabilitation &  Therapy  [address]  P: [phone]  F: [phone] [] [facility]  Outpatient Rehabilitation &  Therapy  [address]  [phone]  [phone] [] [facility]  Outpatient Rehabilitation &  Therapy  [address]   P: [phone]  F: [phone]  [] [facility]  Outpatient Rehabilitation &  Therapy  [address]  P: [phone]  F: [phone] [] [facility]  Outpatient Rehabilitation &  Therapy  [address]  P: [phone]  F: [phone] [] [facility]  Outpatient Rehabilitation &  Therapy  [address]  P: [phone]  F: [phone] [] [facility]  Outpatient Rehabilitation &  Therapy  [address]  P: [phone]  F: [phone]  [] [facility] Outpatient Rehabilitation &  Therapy  [address]  P: [phone]  F: [phone]     Physical Therapy General Evaluation    Date:  2024  Patient: Dorothea Hastings  : 1985  MRN: [id number]  Physician: Dr. Aquino      Insurance: [facility] ( vs remain, auth after eval)  Medical Diagnosis: G54.0 (ICD-10-CM) - Thoracic outlet syndrome     Rehab Codes: M54.2, M79.621, M79.622, M62.838, M62.81, R29.3  Onset Date: 10/15/24                                  Next 's appt: 25    Subjective:   CC: Pt arrives for physical therapy evaluation of chronic bilateral arm pain, cites this has been since her cervical fusion surgery in  - pain in RUE through fingertips, L shoulder pain and fingers.

## 2024-12-17 ENCOUNTER — HOSPITAL ENCOUNTER (OUTPATIENT)
Dept: PHYSICAL THERAPY | Age: 39
Setting detail: THERAPIES SERIES
Discharge: HOME | End: 2024-12-17
Payer: COMMERCIAL

## 2024-12-17 ENCOUNTER — HOSPITAL ENCOUNTER (OUTPATIENT)
Age: 39
Discharge: HOME | End: 2024-12-17
Payer: COMMERCIAL

## 2024-12-17 ENCOUNTER — HOSPITAL ENCOUNTER (OUTPATIENT)
Dept: GENERAL RADIOLOGY | Age: 39
Discharge: HOME | End: 2024-12-19
Payer: COMMERCIAL

## 2024-12-17 ENCOUNTER — HOSPITAL ENCOUNTER (OUTPATIENT)
Age: 39
Discharge: HOME | End: 2024-12-19
Payer: COMMERCIAL

## 2024-12-17 ENCOUNTER — TELEPHONE (OUTPATIENT)
Age: 39
End: 2024-12-17

## 2024-12-17 DIAGNOSIS — E55.9 VITAMIN D DEFICIENCY: Primary | ICD-10-CM

## 2024-12-17 DIAGNOSIS — E55.9 VITAMIN D DEFICIENCY: ICD-10-CM

## 2024-12-17 DIAGNOSIS — M79.10 MYALGIA: ICD-10-CM

## 2024-12-17 DIAGNOSIS — M79.641 PAIN IN BOTH HANDS: ICD-10-CM

## 2024-12-17 DIAGNOSIS — M79.642 PAIN IN BOTH HANDS: ICD-10-CM

## 2024-12-17 DIAGNOSIS — M79.7 FIBROMYALGIA: ICD-10-CM

## 2024-12-17 LAB
25(OH)D3 SERPL-MCNC: 10.5 NG/ML (ref 30–100)
ALBUMIN SERPL-MCNC: 4.1 G/DL (ref 3.5–5.2)
ALBUMIN/GLOB SERPL: 1.6 {RATIO} (ref 1–2.5)
ALP SERPL-CCNC: 118 U/L (ref 35–104)
ALT SERPL-CCNC: 17 U/L (ref 10–35)
AST SERPL-CCNC: 16 U/L (ref 10–35)
BILIRUB DIRECT SERPL-MCNC: 0.1 MG/DL (ref 0–0.2)
BILIRUB INDIRECT SERPL-MCNC: 0.2 MG/DL (ref 0–1)
BILIRUB SERPL-MCNC: 0.3 MG/DL (ref 0–1.2)
GLOBULIN SER CALC-MCNC: 2.5 G/DL
PROT SERPL-MCNC: 6.6 G/DL (ref 6.6–8.7)
TSH SERPL DL<=0.05 MIU/L-ACNC: 0.44 UIU/ML (ref 0.27–4.2)

## 2024-12-17 PROCEDURE — 97110 THERAPEUTIC EXERCISES: CPT

## 2024-12-17 PROCEDURE — 36415 COLL VENOUS BLD VENIPUNCTURE: CPT

## 2024-12-17 PROCEDURE — 73130 X-RAY EXAM OF HAND: CPT

## 2024-12-17 PROCEDURE — 82306 VITAMIN D 25 HYDROXY: CPT

## 2024-12-17 PROCEDURE — 84443 ASSAY THYROID STIM HORMONE: CPT

## 2024-12-17 PROCEDURE — 80076 HEPATIC FUNCTION PANEL: CPT

## 2024-12-17 RX ORDER — ERGOCALCIFEROL 1.25 MG/1
50000 CAPSULE, LIQUID FILLED ORAL WEEKLY
Qty: 12 CAPSULE | Refills: 0 | Status: SHIPPED | OUTPATIENT
Start: 2024-12-17

## 2024-12-17 NOTE — TELEPHONE ENCOUNTER
Pt called back and we went over results, she has no questions at this time, writer updated her pharmacy list, Pt wants to use [facility] in [place]

## 2024-12-17 NOTE — FLOWSHEET NOTE
[x] [facility]  Outpatient Rehabilitation &  Therapy  [address]  [phone]  [phone] [] [facility]  Outpatient Rehabilitation &  Therapy  [address]  P: [phone]  F: [phone] [] [facility]  Outpatient Rehabilitation &  Therapy  [address]  P: [phone]  F: [phone] [] [facility]  Outpatient Rehabilitation &  Therapy  [address]  [phone]  [phone] [] [facility]  Outpatient Rehabilitation &  Therapy  [address]   P: [phone]  F: [phone]  [] [facility]  Outpatient Rehabilitation &  Therapy  [address]  P: [phone]  F: [phone] [] [facility]  Outpatient Rehabilitation &  Therapy  [address]  P: [phone]  F: [phone] [] [facility]  Outpatient Rehabilitation &  Therapy  [address]  P: [phone]  F: [phone] [] [facility]  Outpatient Rehabilitation &  Therapy  [address]  P: [phone]  F: [phone]  [] [facility] Outpatient Rehabilitation &  Therapy  [address]  P: [phone]  F: [phone]     Physical Therapy Daily Treatment Note    Date:  2024  Patient Name:  Dorothea Hastings    :  1985  MRN: [id number]   Physician: Dr. Aquino                               Insurance: [facility] ( vs remain, auth after eval)  Medical Diagnosis: G54.0 (ICD-10-CM) - Thoracic outlet syndrome                        Rehab Codes: M54.2, M79.621, M79.622, M62.838, M62.81, R29.3  Onset Date: 10/15/24                                  Next 's appt: 25   Visit# / total visits: ; Progress note for STG reassessment due at visit # 8     Cancels/No Shows: 0/0    Subjective:    Pain:  [x] Yes  [] No Location:  thoracic

## 2024-12-20 NOTE — TELEPHONE ENCOUNTER
Attempted to complete PA on [employer]--received a duplicate error. Will contact [employer] directly.

## 2024-12-23 ENCOUNTER — HOSPITAL ENCOUNTER (OUTPATIENT)
Dept: PHYSICAL THERAPY | Age: 39
Setting detail: THERAPIES SERIES
Discharge: HOME | End: 2024-12-23
Payer: COMMERCIAL

## 2024-12-23 NOTE — FLOWSHEET NOTE
[x] [facility]  Outpatient Rehabilitation &  Therapy  [address]  [phone]  [phone] [] [facility]  Outpatient Rehabilitation &  Therapy  [address]  P: [phone]  F: [phone] [] [facility]  Outpatient Rehabilitation &  Therapy  [address]  P: [phone]  F: [phone] [] [facility]  Outpatient Rehabilitation &  Therapy  [address]  [phone]  [phone] [] [facility]  Outpatient Rehabilitation &  Therapy  [address]   P: [phone]  F: [phone]  [] [facility]  Outpatient Rehabilitation &  Therapy  [address]  P: [phone]  F: [phone] [] [facility]  Outpatient Rehabilitation &  Therapy  [address]  P: [phone]  F: [phone] [] [facility]  Outpatient Rehabilitation &  Therapy  [address]  P: [phone]  F: [phone] [] [facility]  Outpatient Rehabilitation &  Therapy  [address]  P: [phone]  F: [phone]  [] [facility] Outpatient Rehabilitation &  Therapy  [address]  P: [phone]  F: [phone]     Therapy Cancel/No Show note    Date: 2024  Patient: Dorothea Hastings  : 1985  MRN: [id number]    Cancels/No Shows to date: 0    For today's appointment patient:    [x]  Cancelled    [] Rescheduled appointment    [] No-show     Reason given by patient:    []  Patient ill    []  Conflicting appointment    [] No transportation      [] Conflict with work    [] No reason given    [] Weather related    [] COVID-19    [x] Other:      Comments:  Sinus infection      [x] Next appointment was confirmed    Electronically signed by: Kati Clark PTA

## 2024-12-30 ENCOUNTER — HOSPITAL ENCOUNTER (OUTPATIENT)
Dept: PHYSICAL THERAPY | Age: 39
Setting detail: THERAPIES SERIES
Discharge: HOME | End: 2024-12-30
Payer: COMMERCIAL

## 2024-12-30 PROCEDURE — 97110 THERAPEUTIC EXERCISES: CPT

## 2024-12-30 NOTE — FLOWSHEET NOTE
[x] [facility]  Outpatient Rehabilitation &  Therapy  [address]  [phone]  [phone] [] [facility]  Outpatient Rehabilitation &  Therapy  [address]  P: [phone]  F: [phone] [] [facility]  Outpatient Rehabilitation &  Therapy  [address]  P: [phone]  F: [phone] [] [facility]  Outpatient Rehabilitation &  Therapy  [address]  [phone]  [phone] [] [facility]  Outpatient Rehabilitation &  Therapy  [address]   P: [phone]  F: [phone]  [] [facility]  Outpatient Rehabilitation &  Therapy  [address]  P: [phone]  F: [phone] [] [facility]  Outpatient Rehabilitation &  Therapy  [address]  P: [phone]  F: [phone] [] [facility]  Outpatient Rehabilitation &  Therapy  [address]  P: [phone]  F: [phone] [] [facility]  Outpatient Rehabilitation &  Therapy  [address]  P: [phone]  F: [phone]  [] [facility] Outpatient Rehabilitation &  Therapy  [address]  P: [phone]  F: [phone]     Physical Therapy Daily Treatment Note    Date:  2024  Patient Name:  Dorothea Hastings    :  1985  MRN: [id number]   Physician: Dr. Aquino                               Insurance: [facility] ( vs remain, auth after eval)  Medical Diagnosis: G54.0 (ICD-10-CM) - Thoracic outlet syndrome                        Rehab Codes: M54.2, M79.621, M79.622, M62.838, M62.81, R29.3  Onset Date: 10/15/24                                  Next 's appt: 25   Visit# / total visits: ; Progress note for STG reassessment due at visit # 8     Cancels/No Shows: 1/0    Subjective:    Pain:  [x] Yes  [] No Location:  thoracic

## 2025-01-06 ENCOUNTER — HOSPITAL ENCOUNTER (OUTPATIENT)
Dept: PHYSICAL THERAPY | Age: 40
Setting detail: THERAPIES SERIES
Discharge: HOME | End: 2025-01-06

## 2025-01-06 NOTE — FLOWSHEET NOTE
[x] [facility]  Outpatient Rehabilitation &  Therapy  [address]  [phone]  [phone] [] [facility]  Outpatient Rehabilitation &  Therapy  [address]  P: [phone]  F: [phone] [] [facility]  Outpatient Rehabilitation &  Therapy  [address]  P: [phone]  F: [phone] [] [facility]  Outpatient Rehabilitation &  Therapy  [address]  [phone]  [phone] [] [facility]  Outpatient Rehabilitation &  Therapy  [address]   P: [phone]  F: [phone]  [] [facility]  Outpatient Rehabilitation &  Therapy  [address]  P: [phone]  F: [phone] [] [facility]  Outpatient Rehabilitation &  Therapy  [address]  P: [phone]  F: [phone] [] [facility]  Outpatient Rehabilitation &  Therapy  [address]  P: [phone]  F: [phone] [] [facility]  Outpatient Rehabilitation &  Therapy  [address]  P: [phone]  F: [phone]  [] [facility] Outpatient Rehabilitation &  Therapy  [address]  P: [phone]  F: [phone]     Therapy Cancel/No Show note    Date: 2025  Patient: Dorothea Hastings  : 1985  MRN: [id number]    Cancels/No Shows to date: 20    For today's appointment patient:    [x]  Cancelled    [] Rescheduled appointment    [] No-show     Reason given by patient:    []  Patient ill    []  Conflicting appointment    [] No transportation      [] Conflict with work    [] No reason given    [] Weather related    [] COVID-19    [x] Other:      Comments:  \"too much pain\"      [x] Next appointment was confirmed    Electronically signed by: Essie Cash, PT

## 2025-01-15 ENCOUNTER — HOSPITAL ENCOUNTER (OUTPATIENT)
Age: 40
Setting detail: THERAPIES SERIES
Discharge: HOME | End: 2025-01-15
Payer: COMMERCIAL

## 2025-01-15 PROCEDURE — 97110 THERAPEUTIC EXERCISES: CPT

## 2025-01-15 NOTE — FLOWSHEET NOTE
session.  Pt educated on importance of addressing HEP daily,  pt voiced understanding. Pt presents with  broader area pain  complaints  than traditional CHRISTY clients at this time.      [] Other:  [x] Patient would continue to benefit from skilled physical therapy services in order to: progress neck/shoulder ROM and strength, improve resting cervical/thoracic posture, increase postural endurance and strength to tolerate prolonged positions, improving lifting tolerance, and overall increase functional use of BUEs.        STG/LTG  LTG: (to be met in 8 treatments)  ? Pain: No more than 6/10 max pain in BUEs with all ADL/IADLs  ? ROM: Cervical AROM to improve by at least 20deg all planes to indicate progressive improvement towards typical AROM, reduce stiffness/pain  ? Strength: Grossly 4/5 BUEs with no more than minimal pain reported with all testing to allow reduced pain with lifting, ADLs  ? Function:   Pt able to complete dressing/bathing using RUE with at least 25% improvement in pain  NDI to no more than 60% functionally impaired to indicate subjective improvement in neck function since beginning PT  UEFI to at least 25% function to indicate subjective improvement in BUE function since beginning PT  Patient to be independent with home exercise program as demonstrated by performance with correct form without cues.           Patient goals: less pain    Pt. Education:  [x] Yes  [] No  [x] Reviewed Prior HEP/Ed  Method of Education: [x] Verbal  [x] Demo  [x] Written-   Comprehension of Education:  [x] Verbalizes understanding.  [x] Demonstrates understanding.  [] Needs review.  [x] Demonstrates/verbalizes HEP/Ed previously given.     Access Code: [email or web address]  URL: [email or web address]  Date: 12/09/2024  Prepared by: Essie Cash     Exercises  - Seated Shoulder Rolls  - 1 x daily - 7 x weekly - 3 sets - 10 reps  - Seated Bilateral Shoulder Flexion Towel Slide at Table Top  - 1 x daily - 7 x weekly - 3 sets - 10

## 2025-01-27 ENCOUNTER — HOSPITAL ENCOUNTER (OUTPATIENT)
Dept: MRI IMAGING | Age: 40
Discharge: HOME | End: 2025-01-29
Attending: NEUROLOGICAL SURGERY
Payer: COMMERCIAL

## 2025-01-27 DIAGNOSIS — M51.16 LUMBAR DISC HERNIATION WITH RADICULOPATHY: ICD-10-CM

## 2025-01-27 PROCEDURE — 72148 MRI LUMBAR SPINE W/O DYE: CPT

## 2025-01-28 ENCOUNTER — HOSPITAL ENCOUNTER (OUTPATIENT)
Dept: GENERAL RADIOLOGY | Age: 40
Discharge: HOME | End: 2025-01-30
Payer: COMMERCIAL

## 2025-01-28 ENCOUNTER — OFFICE VISIT (OUTPATIENT)
Dept: NEUROSURGERY | Age: 40
End: 2025-01-28
Payer: COMMERCIAL

## 2025-01-28 ENCOUNTER — HOSPITAL ENCOUNTER (OUTPATIENT)
Age: 40
Discharge: HOME | End: 2025-01-30
Payer: COMMERCIAL

## 2025-01-28 VITALS
HEIGHT: 67 IN | SYSTOLIC BLOOD PRESSURE: 122 MMHG | DIASTOLIC BLOOD PRESSURE: 72 MMHG | BODY MASS INDEX: 34.25 KG/M2 | HEART RATE: 88 BPM | WEIGHT: 218.2 LBS

## 2025-01-28 DIAGNOSIS — Z98.890 S/P CERVICAL DISC REPLACEMENT: ICD-10-CM

## 2025-01-28 DIAGNOSIS — G56.21 CUBITAL TUNNEL SYNDROME ON RIGHT: ICD-10-CM

## 2025-01-28 DIAGNOSIS — M54.12 CERVICAL RADICULOPATHY: ICD-10-CM

## 2025-01-28 DIAGNOSIS — M53.2X2 CERVICAL SPINE INSTABILITY: Primary | ICD-10-CM

## 2025-01-28 DIAGNOSIS — Z98.890 S/P LUMBAR DISCECTOMY: ICD-10-CM

## 2025-01-28 PROCEDURE — 72040 X-RAY EXAM NECK SPINE 2-3 VW: CPT

## 2025-01-28 PROCEDURE — 99214 OFFICE O/P EST MOD 30 MIN: CPT | Performed by: NEUROLOGICAL SURGERY

## 2025-01-28 PROCEDURE — 1036F TOBACCO NON-USER: CPT | Performed by: NEUROLOGICAL SURGERY

## 2025-01-28 PROCEDURE — G8427 DOCREV CUR MEDS BY ELIG CLIN: HCPCS | Performed by: NEUROLOGICAL SURGERY

## 2025-01-28 PROCEDURE — G8417 CALC BMI ABV UP PARAM F/U: HCPCS | Performed by: NEUROLOGICAL SURGERY

## 2025-01-28 RX ORDER — VILAZODONE HYDROCHLORIDE 20 MG/1
20 TABLET ORAL DAILY
COMMUNITY
Start: 2025-01-08

## 2025-01-28 RX ORDER — OXYCODONE AND ACETAMINOPHEN 5; 325 MG/1; MG/1
1 TABLET ORAL EVERY 12 HOURS PRN
COMMUNITY
Start: 2025-02-01 | End: 2025-03-03

## 2025-01-28 NOTE — PROGRESS NOTES
duplication  
upper limb was deferred, as patient's symptoms are significantly worse on the right.    ASSESSMENT AND PLAN:       Patient Active Problem List   Diagnosis    Chronic low back pain with sciatica    Thyroid nodule    Stenosis of cervical spine with myelopathy (HCC)    Cervical radiculopathy    Thoracic radiculopathy    Class 1 obesity due to excess calories with serious comorbidity in adult    Cervical disc disease    Bunion of great toe of right foot    S/P cervical disc replacement    Cubital tunnel syndrome on right    Chronic right shoulder pain    Nonintractable headache    Hemiplegic migraine without status migrainosus, not intractable    Cervical myelopathy (HCC)    S/P lumbar discectomy    Cervical spondylosis    Lower back pain    Lumbar disc herniation with radiculopathy    Intractable back pain    Lumbar post-laminectomy syndrome    Right sided sciatica    Thoracic outlet syndrome    Cervical spine instability         A/P:  This is a 39 y.o. female with Cervical spine instability  Cervical radiculopathy  -     XR CERVICAL SPINE FLEXION AND EXTENSION; Future  -     DME - DURABLE MEDICAL EQUIPMENT  S/P cervical disc replacement  S/P lumbar discectomy  Cubital tunnel syndrome on right  Comments:  recurrent  This is a complicated patient with multiple areas of pain in her face neck arm on the right side.    After very careful history it seems her main source of her pain starts in her neck and radiates down her shoulders and arms associated with flexion and contralateral side bend.  I thought she has a degree of thoracic outlet syndrome where her pain was centered at the thoracic outlet in her last visit but today she was more consistently clear that the pain starts in this middle of her neck.    We did another flexion-extension x-ray which shows I the C6 level there is abnormal hyper mobility of that segment with about 13 degrees of motion, whereas the other adjacent levels has less than 5 to 6 degrees of

## 2025-01-29 ENCOUNTER — HOSPITAL ENCOUNTER (OUTPATIENT)
Age: 40
Setting detail: THERAPIES SERIES
Discharge: HOME | End: 2025-01-29
Payer: COMMERCIAL

## 2025-01-29 PROCEDURE — 97110 THERAPEUTIC EXERCISES: CPT

## 2025-01-29 NOTE — FLOWSHEET NOTE
[x] [facility]  Outpatient Rehabilitation &  Therapy  [address]  [phone]  [phone] [] [facility]  Outpatient Rehabilitation &  Therapy  [address]  P: [phone]  F: [phone] [] [facility]  Outpatient Rehabilitation &  Therapy  [address]  P: [phone]  F: [phone] [] [facility]  Outpatient Rehabilitation &  Therapy  [address]  [phone]  [phone] [] [facility]  Outpatient Rehabilitation &  Therapy  [address]   P: [phone]  F: [phone]  [] [facility]  Outpatient Rehabilitation &  Therapy  [address]  P: [phone]  F: [phone] [] [facility]  Outpatient Rehabilitation &  Therapy  [address]  P: [phone]  F: [phone] [] [facility]  Outpatient Rehabilitation &  Therapy  [address]  P: [phone]  F: [phone] [] [facility]  Outpatient Rehabilitation &  Therapy  [address]  P: [phone]  F: [phone]  [] [facility] Outpatient Rehabilitation &  Therapy  [address]  P: [phone]  F: [phone]     Physical Therapy Daily Treatment Note    Date:  2025  Patient Name:  Dorothea Hastings    :  1985  MRN: [id number]   Physician: Dr. Aquino                               Insurance: [facility] ( vs remain, auth after eval)  Medical Diagnosis: G54.0 (ICD-10-CM) - Thoracic outlet syndrome                        Rehab Codes: M54.2, M79.621, M79.622, M62.838, M62.81, R29.3  Onset Date: 10/15/24                                  Next 's appt: 25   Visit# / total visits: ; Progress note for STG reassessment due at visit # 8     Cancels/No Shows:     Subjective:    Pain:  [x] Yes  [] No Location:  thoracic

## 2025-02-03 ENCOUNTER — OFFICE VISIT (OUTPATIENT)
Dept: NEUROSURGERY | Age: 40
End: 2025-02-03
Payer: COMMERCIAL

## 2025-02-03 VITALS
BODY MASS INDEX: 34.56 KG/M2 | HEIGHT: 67 IN | SYSTOLIC BLOOD PRESSURE: 134 MMHG | WEIGHT: 220.2 LBS | HEART RATE: 75 BPM | DIASTOLIC BLOOD PRESSURE: 91 MMHG

## 2025-02-03 DIAGNOSIS — M51.16 LUMBAR DISC DISEASE WITH RADICULOPATHY: Primary | ICD-10-CM

## 2025-02-03 PROCEDURE — 1036F TOBACCO NON-USER: CPT | Performed by: NEUROLOGICAL SURGERY

## 2025-02-03 PROCEDURE — G8417 CALC BMI ABV UP PARAM F/U: HCPCS | Performed by: NEUROLOGICAL SURGERY

## 2025-02-03 PROCEDURE — G8427 DOCREV CUR MEDS BY ELIG CLIN: HCPCS | Performed by: NEUROLOGICAL SURGERY

## 2025-02-03 PROCEDURE — 99214 OFFICE O/P EST MOD 30 MIN: CPT | Performed by: NEUROLOGICAL SURGERY

## 2025-02-03 NOTE — PROGRESS NOTES
[facility]  [address]  [id number]  [address]OUND FLOOR, [address]  [facility] [address]  Dept: [phone]    Patient:  Dorothea Hastings  YOB: 1985  Date: 2/3/25    The patient is a 39 y.o. female who presents today for consult of the following problems:     Chief Complaint   Patient presents with    Back Pain     Patient stated she is still having lower back pain             HPI:     Dorothea Hastings is a 39 y.o. female on whom neurosurgical consultation was requested by El Kat APRN - CNP for management of significant left-sided pain appears to follow approximate L3-L4 dermatomal distribution as well as partial S1.  Patient still has a significant degree of pain that radiates down the left paraspinal region into the left buttock partially with numbness in the thigh but mainly pain involving the posterior hamstring into the calf and partially to the foot and the plantar surface.  In similar fashion she has intermittent but less intense pain into the right leg that she had previously that is occurring in approximately L4 distribution appears to follow the anterior aspect of the shin with some numbness as well as the posterior aspect of the leg into the foot as well.  It appears that the left leg symptoms have been completely resolved when I initially saw her but unfortunately they returned approximately mid November when she had seen my nurse practitioner 6 days prior to this.  At this juncture the left leg appears to be the worst leg.  She has seen pain management and ultimately did see Dr. Kent at [place] later and there has been discussion regarding potential SCS of the cervical and lumbar spine.  She has been through physical therapy and multiple interventional procedures thus far without any significant benefit.  The pain is significantly impairing her quality of life and the left leg

## 2025-02-21 ENCOUNTER — HOSPITAL ENCOUNTER (OUTPATIENT)
Age: 40
Setting detail: OBSERVATION
Discharge: HOME | End: 2025-02-23
Attending: EMERGENCY MEDICINE | Admitting: EMERGENCY MEDICINE
Payer: COMMERCIAL

## 2025-02-21 ENCOUNTER — APPOINTMENT (OUTPATIENT)
Dept: CT IMAGING | Age: 40
End: 2025-02-21
Payer: COMMERCIAL

## 2025-02-21 DIAGNOSIS — M54.14 THORACIC RADICULOPATHY: ICD-10-CM

## 2025-02-21 DIAGNOSIS — R20.0 NUMBNESS AND TINGLING OF RIGHT ARM: Primary | ICD-10-CM

## 2025-02-21 DIAGNOSIS — R20.2 NUMBNESS AND TINGLING OF RIGHT ARM: Primary | ICD-10-CM

## 2025-02-21 DIAGNOSIS — M54.12 CERVICAL RADICULOPATHY: ICD-10-CM

## 2025-02-21 LAB
ALBUMIN SERPL-MCNC: 3.8 G/DL (ref 3.5–5.2)
ALBUMIN/GLOB SERPL: 1.4 {RATIO} (ref 1–2.5)
ALP SERPL-CCNC: 117 U/L (ref 35–104)
ALT SERPL-CCNC: 20 U/L (ref 10–35)
ANION GAP SERPL CALCULATED.3IONS-SCNC: 10 MMOL/L (ref 9–16)
AST SERPL-CCNC: 22 U/L (ref 10–35)
BASOPHILS # BLD: 0.07 K/UL (ref 0–0.2)
BASOPHILS NFR BLD: 1 % (ref 0–2)
BILIRUB SERPL-MCNC: <0.2 MG/DL (ref 0–1.2)
BUN SERPL-MCNC: 18 MG/DL (ref 6–20)
CALCIUM SERPL-MCNC: 8.5 MG/DL (ref 8.6–10.4)
CHLORIDE SERPL-SCNC: 108 MMOL/L (ref 98–107)
CO2 SERPL-SCNC: 22 MMOL/L (ref 20–31)
CREAT SERPL-MCNC: 0.8 MG/DL (ref 0.6–0.9)
CRP SERPL HS-MCNC: 6.6 MG/L (ref 0–5)
EOSINOPHIL # BLD: 0.23 K/UL (ref 0–0.44)
EOSINOPHILS RELATIVE PERCENT: 2 % (ref 1–4)
ERYTHROCYTE [DISTWIDTH] IN BLOOD BY AUTOMATED COUNT: 14.9 % (ref 11.8–14.4)
ERYTHROCYTE [SEDIMENTATION RATE] IN BLOOD BY PHOTOMETRIC METHOD: 13 MM/HR (ref 0–20)
GFR, ESTIMATED: >90 ML/MIN/1.73M2
GLUCOSE SERPL-MCNC: 169 MG/DL (ref 74–99)
HCG SERPL QL: NEGATIVE
HCT VFR BLD AUTO: 38.2 % (ref 36.3–47.1)
HGB BLD-MCNC: 12.4 G/DL (ref 11.9–15.1)
IMM GRANULOCYTES # BLD AUTO: 0.03 K/UL (ref 0–0.3)
IMM GRANULOCYTES NFR BLD: 0 %
LYMPHOCYTES NFR BLD: 3.65 K/UL (ref 1.1–3.7)
LYMPHOCYTES RELATIVE PERCENT: 34 % (ref 24–43)
MCH RBC QN AUTO: 28.5 PG (ref 25.2–33.5)
MCHC RBC AUTO-ENTMCNC: 32.5 G/DL (ref 28.4–34.8)
MCV RBC AUTO: 87.8 FL (ref 82.6–102.9)
MONOCYTES NFR BLD: 0.67 K/UL (ref 0.1–1.2)
MONOCYTES NFR BLD: 6 % (ref 3–12)
NEUTROPHILS NFR BLD: 57 % (ref 36–65)
NEUTS SEG NFR BLD: 6.09 K/UL (ref 1.5–8.1)
NRBC BLD-RTO: 0 PER 100 WBC
PLATELET # BLD AUTO: 359 K/UL (ref 138–453)
PMV BLD AUTO: 11 FL (ref 8.1–13.5)
POTASSIUM SERPL-SCNC: 3.7 MMOL/L (ref 3.7–5.3)
PROT SERPL-MCNC: 6.6 G/DL (ref 6.6–8.7)
RBC # BLD AUTO: 4.35 M/UL (ref 3.95–5.11)
RBC # BLD: ABNORMAL 10*6/UL
SODIUM SERPL-SCNC: 140 MMOL/L (ref 136–145)
WBC OTHER # BLD: 10.7 K/UL (ref 3.5–11.3)

## 2025-02-21 PROCEDURE — 80053 COMPREHEN METABOLIC PANEL: CPT

## 2025-02-21 PROCEDURE — 99285 EMERGENCY DEPT VISIT HI MDM: CPT

## 2025-02-21 PROCEDURE — 96374 THER/PROPH/DIAG INJ IV PUSH: CPT

## 2025-02-21 PROCEDURE — 85652 RBC SED RATE AUTOMATED: CPT

## 2025-02-21 PROCEDURE — 86140 C-REACTIVE PROTEIN: CPT

## 2025-02-21 PROCEDURE — 84703 CHORIONIC GONADOTROPIN ASSAY: CPT

## 2025-02-21 PROCEDURE — 85025 COMPLETE CBC W/AUTO DIFF WBC: CPT

## 2025-02-21 PROCEDURE — 6360000002 HC RX W HCPCS

## 2025-02-21 PROCEDURE — 72125 CT NECK SPINE W/O DYE: CPT

## 2025-02-21 PROCEDURE — 72128 CT CHEST SPINE W/O DYE: CPT

## 2025-02-21 RX ORDER — MORPHINE SULFATE 4 MG/ML
4 INJECTION INTRAVENOUS ONCE
Status: COMPLETED | OUTPATIENT
Start: 2025-02-21 | End: 2025-02-21

## 2025-02-21 RX ADMIN — MORPHINE SULFATE 4 MG: 4 INJECTION INTRAVENOUS at 21:36

## 2025-02-22 PROBLEM — R20.2 NUMBNESS AND TINGLING OF RIGHT ARM: Status: ACTIVE | Noted: 2025-02-22

## 2025-02-22 PROBLEM — R20.0 NUMBNESS AND TINGLING OF RIGHT ARM: Status: ACTIVE | Noted: 2025-02-22

## 2025-02-22 PROCEDURE — 6360000002 HC RX W HCPCS

## 2025-02-22 PROCEDURE — 96375 TX/PRO/DX INJ NEW DRUG ADDON: CPT

## 2025-02-22 PROCEDURE — 6370000000 HC RX 637 (ALT 250 FOR IP)

## 2025-02-22 PROCEDURE — 96376 TX/PRO/DX INJ SAME DRUG ADON: CPT

## 2025-02-22 PROCEDURE — G0378 HOSPITAL OBSERVATION PER HR: HCPCS

## 2025-02-22 PROCEDURE — 2500000003 HC RX 250 WO HCPCS

## 2025-02-22 RX ORDER — OXYCODONE AND ACETAMINOPHEN 5; 325 MG/1; MG/1
1 TABLET ORAL EVERY 6 HOURS PRN
Status: DISCONTINUED | OUTPATIENT
Start: 2025-02-22 | End: 2025-02-23 | Stop reason: HOSPADM

## 2025-02-22 RX ORDER — ENOXAPARIN SODIUM 100 MG/ML
40 INJECTION SUBCUTANEOUS DAILY
Status: DISCONTINUED | OUTPATIENT
Start: 2025-02-22 | End: 2025-02-23 | Stop reason: HOSPADM

## 2025-02-22 RX ORDER — IBUPROFEN 600 MG/1
600 TABLET, FILM COATED ORAL EVERY 8 HOURS PRN
Status: DISCONTINUED | OUTPATIENT
Start: 2025-02-22 | End: 2025-02-23 | Stop reason: HOSPADM

## 2025-02-22 RX ORDER — OXYCODONE HYDROCHLORIDE 5 MG/1
5 TABLET ORAL EVERY 6 HOURS PRN
Status: DISCONTINUED | OUTPATIENT
Start: 2025-02-22 | End: 2025-02-23 | Stop reason: HOSPADM

## 2025-02-22 RX ORDER — BACLOFEN 10 MG/1
10 TABLET ORAL 3 TIMES DAILY
Status: DISCONTINUED | OUTPATIENT
Start: 2025-02-22 | End: 2025-02-23 | Stop reason: HOSPADM

## 2025-02-22 RX ORDER — LIDOCAINE 4 G/G
1 PATCH TOPICAL DAILY
Status: DISCONTINUED | OUTPATIENT
Start: 2025-02-22 | End: 2025-02-22

## 2025-02-22 RX ORDER — DULOXETIN HYDROCHLORIDE 30 MG/1
60 CAPSULE, DELAYED RELEASE ORAL DAILY
Status: DISCONTINUED | OUTPATIENT
Start: 2025-02-22 | End: 2025-02-23 | Stop reason: HOSPADM

## 2025-02-22 RX ORDER — PREGABALIN 100 MG/1
200 CAPSULE ORAL 3 TIMES DAILY
Status: DISCONTINUED | OUTPATIENT
Start: 2025-02-22 | End: 2025-02-23 | Stop reason: HOSPADM

## 2025-02-22 RX ORDER — ONDANSETRON 2 MG/ML
4 INJECTION INTRAMUSCULAR; INTRAVENOUS EVERY 6 HOURS PRN
Status: DISCONTINUED | OUTPATIENT
Start: 2025-02-22 | End: 2025-02-23 | Stop reason: HOSPADM

## 2025-02-22 RX ORDER — BACLOFEN 10 MG/1
10 TABLET ORAL 3 TIMES DAILY
Status: DISCONTINUED | OUTPATIENT
Start: 2025-02-22 | End: 2025-02-22

## 2025-02-22 RX ORDER — POTASSIUM CHLORIDE 1500 MG/1
40 TABLET, EXTENDED RELEASE ORAL PRN
Status: DISCONTINUED | OUTPATIENT
Start: 2025-02-22 | End: 2025-02-23 | Stop reason: HOSPADM

## 2025-02-22 RX ORDER — SODIUM CHLORIDE 0.9 % (FLUSH) 0.9 %
5-40 SYRINGE (ML) INJECTION PRN
Status: DISCONTINUED | OUTPATIENT
Start: 2025-02-22 | End: 2025-02-23 | Stop reason: HOSPADM

## 2025-02-22 RX ORDER — SODIUM CHLORIDE 0.9 % (FLUSH) 0.9 %
5-40 SYRINGE (ML) INJECTION EVERY 12 HOURS SCHEDULED
Status: DISCONTINUED | OUTPATIENT
Start: 2025-02-22 | End: 2025-02-23 | Stop reason: HOSPADM

## 2025-02-22 RX ORDER — MAGNESIUM SULFATE IN WATER 40 MG/ML
2000 INJECTION, SOLUTION INTRAVENOUS PRN
Status: DISCONTINUED | OUTPATIENT
Start: 2025-02-22 | End: 2025-02-23 | Stop reason: HOSPADM

## 2025-02-22 RX ORDER — ACETAMINOPHEN 650 MG/1
650 SUPPOSITORY RECTAL EVERY 6 HOURS PRN
Status: DISCONTINUED | OUTPATIENT
Start: 2025-02-22 | End: 2025-02-23 | Stop reason: HOSPADM

## 2025-02-22 RX ORDER — AMITRIPTYLINE HYDROCHLORIDE 10 MG/1
10 TABLET ORAL NIGHTLY
Status: DISCONTINUED | OUTPATIENT
Start: 2025-02-22 | End: 2025-02-23 | Stop reason: HOSPADM

## 2025-02-22 RX ORDER — VILAZODONE HYDROCHLORIDE 40 MG/1
20 TABLET ORAL DAILY
Status: DISCONTINUED | OUTPATIENT
Start: 2025-02-22 | End: 2025-02-23

## 2025-02-22 RX ORDER — MULTIVITAMIN WITH IRON
1000 TABLET ORAL DAILY
Status: DISCONTINUED | OUTPATIENT
Start: 2025-02-22 | End: 2025-02-23 | Stop reason: HOSPADM

## 2025-02-22 RX ORDER — ERGOCALCIFEROL 1.25 MG/1
50000 CAPSULE, LIQUID FILLED ORAL WEEKLY
Status: DISCONTINUED | OUTPATIENT
Start: 2025-02-22 | End: 2025-02-23 | Stop reason: HOSPADM

## 2025-02-22 RX ORDER — LIDOCAINE 4 G/G
1 PATCH TOPICAL ONCE
Status: COMPLETED | OUTPATIENT
Start: 2025-02-22 | End: 2025-02-22

## 2025-02-22 RX ORDER — FAMOTIDINE 20 MG/1
20 TABLET, FILM COATED ORAL 2 TIMES DAILY
Status: DISCONTINUED | OUTPATIENT
Start: 2025-02-22 | End: 2025-02-23 | Stop reason: HOSPADM

## 2025-02-22 RX ORDER — POLYETHYLENE GLYCOL 3350 17 G/17G
17 POWDER, FOR SOLUTION ORAL DAILY PRN
Status: DISCONTINUED | OUTPATIENT
Start: 2025-02-22 | End: 2025-02-23 | Stop reason: HOSPADM

## 2025-02-22 RX ORDER — MORPHINE SULFATE 4 MG/ML
4 INJECTION INTRAVENOUS ONCE
Status: COMPLETED | OUTPATIENT
Start: 2025-02-22 | End: 2025-02-22

## 2025-02-22 RX ORDER — SODIUM CHLORIDE 9 MG/ML
INJECTION, SOLUTION INTRAVENOUS PRN
Status: DISCONTINUED | OUTPATIENT
Start: 2025-02-22 | End: 2025-02-23 | Stop reason: HOSPADM

## 2025-02-22 RX ORDER — BUTALBITAL, ACETAMINOPHEN AND CAFFEINE 50; 325; 40 MG/1; MG/1; MG/1
1 TABLET ORAL EVERY 12 HOURS PRN
Status: DISCONTINUED | OUTPATIENT
Start: 2025-02-22 | End: 2025-02-23 | Stop reason: HOSPADM

## 2025-02-22 RX ORDER — IBUPROFEN 400 MG/1
200 TABLET, FILM COATED ORAL
Status: DISCONTINUED | OUTPATIENT
Start: 2025-02-22 | End: 2025-02-23 | Stop reason: HOSPADM

## 2025-02-22 RX ORDER — ACETAMINOPHEN 325 MG/1
650 TABLET ORAL EVERY 6 HOURS PRN
Status: DISCONTINUED | OUTPATIENT
Start: 2025-02-22 | End: 2025-02-23 | Stop reason: HOSPADM

## 2025-02-22 RX ORDER — CARBAMAZEPINE 100 MG/1
200 TABLET, EXTENDED RELEASE ORAL 2 TIMES DAILY
Status: DISCONTINUED | OUTPATIENT
Start: 2025-02-22 | End: 2025-02-23 | Stop reason: HOSPADM

## 2025-02-22 RX ORDER — VERAPAMIL HYDROCHLORIDE 120 MG/1
120 TABLET, FILM COATED, EXTENDED RELEASE ORAL NIGHTLY
Status: DISCONTINUED | OUTPATIENT
Start: 2025-02-22 | End: 2025-02-23 | Stop reason: HOSPADM

## 2025-02-22 RX ORDER — LIDOCAINE 4 G/G
1 PATCH TOPICAL DAILY
Status: DISCONTINUED | OUTPATIENT
Start: 2025-02-22 | End: 2025-02-23 | Stop reason: HOSPADM

## 2025-02-22 RX ORDER — ONDANSETRON 4 MG/1
4 TABLET, ORALLY DISINTEGRATING ORAL EVERY 8 HOURS PRN
Status: DISCONTINUED | OUTPATIENT
Start: 2025-02-22 | End: 2025-02-23 | Stop reason: HOSPADM

## 2025-02-22 RX ORDER — CETIRIZINE HYDROCHLORIDE 10 MG/1
10 TABLET ORAL DAILY
Status: DISCONTINUED | OUTPATIENT
Start: 2025-02-22 | End: 2025-02-23 | Stop reason: HOSPADM

## 2025-02-22 RX ORDER — LANOLIN ALCOHOL/MO/W.PET/CERES
400 CREAM (GRAM) TOPICAL 2 TIMES DAILY
Status: DISCONTINUED | OUTPATIENT
Start: 2025-02-22 | End: 2025-02-23 | Stop reason: HOSPADM

## 2025-02-22 RX ORDER — POTASSIUM CHLORIDE 7.45 MG/ML
10 INJECTION INTRAVENOUS PRN
Status: DISCONTINUED | OUTPATIENT
Start: 2025-02-22 | End: 2025-02-23 | Stop reason: HOSPADM

## 2025-02-22 RX ORDER — KETOROLAC TROMETHAMINE 15 MG/ML
15 INJECTION, SOLUTION INTRAMUSCULAR; INTRAVENOUS EVERY 6 HOURS PRN
Status: DISCONTINUED | OUTPATIENT
Start: 2025-02-22 | End: 2025-02-23 | Stop reason: HOSPADM

## 2025-02-22 RX ORDER — DEXAMETHASONE SODIUM PHOSPHATE 4 MG/ML
10 INJECTION, SOLUTION INTRA-ARTICULAR; INTRALESIONAL; INTRAMUSCULAR; INTRAVENOUS; SOFT TISSUE ONCE
Status: COMPLETED | OUTPATIENT
Start: 2025-02-22 | End: 2025-02-22

## 2025-02-22 RX ORDER — PREGABALIN 100 MG/1
200 CAPSULE ORAL 3 TIMES DAILY
Status: DISCONTINUED | OUTPATIENT
Start: 2025-02-22 | End: 2025-02-22

## 2025-02-22 RX ORDER — KETOROLAC TROMETHAMINE 30 MG/ML
30 INJECTION, SOLUTION INTRAMUSCULAR; INTRAVENOUS ONCE
Status: COMPLETED | OUTPATIENT
Start: 2025-02-22 | End: 2025-02-22

## 2025-02-22 RX ADMIN — POLYETHYLENE GLYCOL 3350 17 G: 17 POWDER, FOR SOLUTION ORAL at 20:03

## 2025-02-22 RX ADMIN — BACLOFEN 10 MG: 10 TABLET ORAL at 02:56

## 2025-02-22 RX ADMIN — HYDROMORPHONE HYDROCHLORIDE 0.5 MG: 1 INJECTION, SOLUTION INTRAMUSCULAR; INTRAVENOUS; SUBCUTANEOUS at 23:56

## 2025-02-22 RX ADMIN — ACETAMINOPHEN 650 MG: 325 TABLET ORAL at 08:38

## 2025-02-22 RX ADMIN — SODIUM CHLORIDE, PRESERVATIVE FREE 10 ML: 5 INJECTION INTRAVENOUS at 08:41

## 2025-02-22 RX ADMIN — KETOROLAC TROMETHAMINE 30 MG: 30 INJECTION, SOLUTION INTRAMUSCULAR; INTRAVENOUS at 00:58

## 2025-02-22 RX ADMIN — OXYCODONE HYDROCHLORIDE 5 MG: 5 TABLET ORAL at 19:43

## 2025-02-22 RX ADMIN — BACLOFEN 10 MG: 10 TABLET ORAL at 08:38

## 2025-02-22 RX ADMIN — DEXAMETHASONE SODIUM PHOSPHATE 10 MG: 4 INJECTION INTRA-ARTICULAR; INTRALESIONAL; INTRAMUSCULAR; INTRAVENOUS; SOFT TISSUE at 17:09

## 2025-02-22 RX ADMIN — KETOROLAC TROMETHAMINE 15 MG: 15 INJECTION, SOLUTION INTRAMUSCULAR; INTRAVENOUS at 19:45

## 2025-02-22 RX ADMIN — BACLOFEN 10 MG: 10 TABLET ORAL at 13:10

## 2025-02-22 RX ADMIN — SODIUM CHLORIDE, PRESERVATIVE FREE 10 ML: 5 INJECTION INTRAVENOUS at 19:47

## 2025-02-22 RX ADMIN — CARBAMAZEPINE 200 MG: 100 TABLET, EXTENDED RELEASE ORAL at 19:45

## 2025-02-22 RX ADMIN — DULOXETINE HYDROCHLORIDE 60 MG: 30 CAPSULE, DELAYED RELEASE ORAL at 08:38

## 2025-02-22 RX ADMIN — ACETAMINOPHEN 650 MG: 325 TABLET ORAL at 19:43

## 2025-02-22 RX ADMIN — PREGABALIN 200 MG: 100 CAPSULE ORAL at 13:10

## 2025-02-22 RX ADMIN — BACLOFEN 10 MG: 10 TABLET ORAL at 19:44

## 2025-02-22 RX ADMIN — PREGABALIN 200 MG: 100 CAPSULE ORAL at 08:38

## 2025-02-22 RX ADMIN — PREGABALIN 200 MG: 100 CAPSULE ORAL at 02:56

## 2025-02-22 RX ADMIN — PREGABALIN 200 MG: 100 CAPSULE ORAL at 19:44

## 2025-02-22 RX ADMIN — OXYCODONE HYDROCHLORIDE 5 MG: 5 TABLET ORAL at 08:38

## 2025-02-22 RX ADMIN — HYDROMORPHONE HYDROCHLORIDE 0.5 MG: 1 INJECTION, SOLUTION INTRAMUSCULAR; INTRAVENOUS; SUBCUTANEOUS at 14:05

## 2025-02-22 RX ADMIN — MORPHINE SULFATE 4 MG: 4 INJECTION INTRAVENOUS at 00:58

## 2025-02-22 RX ADMIN — CARBAMAZEPINE 200 MG: 100 TABLET, EXTENDED RELEASE ORAL at 08:38

## 2025-02-22 ASSESSMENT — PAIN DESCRIPTION - LOCATION
LOCATION: SHOULDER;ARM;NECK
LOCATION: ARM;SHOULDER;NECK
LOCATION: BACK;NECK
LOCATION: NECK;ARM

## 2025-02-22 ASSESSMENT — PAIN SCALES - WONG BAKER
WONGBAKER_NUMERICALRESPONSE: NO HURT

## 2025-02-22 ASSESSMENT — PAIN SCALES - GENERAL
PAINLEVEL_OUTOF10: 6
PAINLEVEL_OUTOF10: 9
PAINLEVEL_OUTOF10: 9
PAINLEVEL_OUTOF10: 10
PAINLEVEL_OUTOF10: 8
PAINLEVEL_OUTOF10: 9
PAINLEVEL_OUTOF10: 5

## 2025-02-22 ASSESSMENT — PAIN DESCRIPTION - ORIENTATION
ORIENTATION: RIGHT
ORIENTATION: UPPER;MID
ORIENTATION: RIGHT

## 2025-02-22 ASSESSMENT — PAIN DESCRIPTION - DESCRIPTORS
DESCRIPTORS: SHARP;STABBING
DESCRIPTORS: SHARP;SHOOTING

## 2025-02-22 ASSESSMENT — PAIN - FUNCTIONAL ASSESSMENT
PAIN_FUNCTIONAL_ASSESSMENT: PREVENTS OR INTERFERES SOME ACTIVE ACTIVITIES AND ADLS
PAIN_FUNCTIONAL_ASSESSMENT: PREVENTS OR INTERFERES SOME ACTIVE ACTIVITIES AND ADLS

## 2025-02-22 NOTE — ED PROVIDER NOTES
[facility] OBSERVATION UNIT  Emergency Department Encounter  Emergency Medicine Resident     Pt Name:Dorothea Hastings  MRN: [id number]  Birthdate 1985  Date of evaluation: 25  PCP:  El Kat APRN - CNP  Note Started: 12:05 AM EST      CHIEF COMPLAINT       Chief Complaint   Patient presents with    Back Pain    Shoulder Pain     R shoulder       HISTORY OF PRESENT ILLNESS  (Location/Symptom, Timing/Onset, Context/Setting, Quality, Duration, Modifying Factors, Severity.)      Dorothea Hastings is a 39 y.o. female who presents with neck and right arm pain that started approximately 4 hours ago while she was driving.  Patient with significant past medical history of cervical disc disease cervical myopathy with previous cervical spine surgery.  States she has had some weakness in the right hand but feeling significant sharp shooting pain going from her neck down the right arm with significant weakness numbness and tingling.  Has taken her home medications including her Percocet her Cymbalta and her Lyrica without any improvements.  Pain was sudden onset with no trauma.  Is feeling slightly warm but denies any fevers, chills, chest pain, shortness of breath, abdominal pain, nausea or vomiting.  Patient is tearful secondary to pain that appears to be intermittent but worse with any movement.  Patient does follow with Dr. Aquino.  Denies any loss of bowel or bladder control, saddle anesthesia    PAST MEDICAL / SURGICAL / SOCIAL / FAMILY HISTORY      has a past medical history of Anxiety, Blood loss, Cervical disc disease, Cervical myopathy, Chronic back pain, GERD (gastroesophageal reflux disease), Hx of migraine headaches, Neuropathy, Thyroid nodule, Trigeminal neuralgia of right side of face, Under care of team, Under care of team, Under care of team, Weakness generalized, and Wellness examination.     has a past surgical history that includes  section; Tubal ligation; Dilation and curettage  Physically Abused: Not on file     Sexually Abused: Not on file   Housing Stability: Low Risk  (4/30/2024)    Housing Stability Vital Sign     Unable to Pay for Housing in the Last Year: No     Number of Places Lived in the Last Year: 1     Unstable Housing in the Last Year: No       Family History   Problem Relation Age of Onset    Diabetes Mother     High Blood Pressure Mother     Other Mother         Tremor    Seizures Mother     Heart Disease Father     Hypertension Father     Cancer Sister         Lymphoblastic lymphoma    Diabetes Maternal Uncle     Heart Attack Maternal Grandmother     Diabetes Maternal Grandmother     Kidney Disease Maternal Grandmother     Breast Cancer Paternal Aunt        Allergies:  Bee venom and Doxycycline    Home Medications:  Prior to Admission medications    Medication Sig Start Date End Date Taking? Authorizing Provider   vilazodone HCl (VIIBRYD) 20 MG TABS Take 1 tablet by mouth daily 1/8/25   Jimy Ramachandran MD   oxyCODONE-acetaminophen (PERCOCET) 5-325 MG per tablet Take 1 tablet by mouth every 12 hours as needed. 2/1/25 3/3/25  Jimy Ramachandran MD   vitamin D (ERGOCALCIFEROL) 1.25 MG (24440 UT) CAPS capsule Take 1 capsule by mouth once a week 12/17/24   Racquel Quezada MD   baclofen (LIORESAL) 10 MG tablet Take 1 tablet by mouth 3 times daily    Jimy Ramachandran MD   diclofenac (VOLTAREN) 75 MG EC tablet Take 1 tablet by mouth 2 times daily (with meals) 1/3/24   Jimy Ramachandran MD   ibuprofen (ADVIL;MOTRIN) 600 MG tablet Take 1 tablet by mouth every 8 hours as needed 2/16/24   Jimy Ramachandran MD   lidocaine (LIDODERM) 5 % apply 1 patch TO THE AFFECTED AREA DAILY. LEAVE ON FOR 12 HOURS AND THEN OFF FOR 12 HOURS. 5/13/24   Jimy Ramachandran MD   magnesium oxide (MAG-OX) 400 MG tablet Take 1 tablet by mouth 2 times daily  Patient not taking: Reported on 11/29/2024    Jimy Ramachandran MD   verapamil (CALAN SR) 120 MG extended release tablet Take

## 2025-02-22 NOTE — CONSULTS
Department of Neurosurgery                                            Resident Consult Note      Reason for Consult:  New on chronic R arm pain, numbness, weakness  Requesting Physician:  Dr. Ely  Neurosurgeon:   [] Dr. Bautista  [x] Dr. Kennedy  [] Dr. Aquino      History Obtained From:  patient, electronic medical record    CHIEF COMPLAINT:         Chief Complaint   Patient presents with    Back Pain    Shoulder Pain     R shoulder       HISTORY OF PRESENT ILLNESS:       The patient is a 39 y.o. female who presents with acute new on chronic right-sided arm pain.    She came in and complained of midline tenderness of the C5/C6 vertebrae, as well as stabbing pain down the right side of the posterior arm, radiating down the course of the radial nerve and into the radial ulnar and median supplies of the right arm.    Patient states this began earlier today, in the morning and early afternoon.  The patient was driving when she felt this pain, there was no predisposing movements or trauma to the event.    She continue to take her normal pain medications as prescribed by her pain management doctor, however had continuous pain that is unabated by medications and presented for further management.    She has had a previous surgery to this area before by Dr. Aquino.  Namely a C5-C6 disc arthroplasty in 2021.  She has been following in the office for this, as well as a lumbar procedure performed by another surgeon.    She has new weakness of right hand grasp with weakness of the cardinal movements of the right hand.  She has consistent pain despite her home medications prescribed by pain management, as well as morphine 4 mg however she does state that the morphine helps her somewhat.    She is scheduled for EMG testing on 3/2025 as well as future arthroplasty of C4/C5 and revision of the C5/C6 arthroplasty with a [employer] Prestige LP graft.  She is here for further evaluation.      PAST MEDICAL HISTORY :       Past  planning afterwards.    She does present with midline tenderness as well as shooting pains down the right arm and some weakness.  Due to this, she will be brought in for observation with evaluation by neurosurgery in the morning with potential MRI.    Patient care will be discussed with attending, will reevaluated patient along with attending.      - Neurosurgical intervention pending imaging findings and review by attending neurosurgeon  - CTLS recommendations: Avoid overuse  - HOB: 30 degrees   - Obtain MRI of cervical spine if necessitated by attending review of imaging   - Neuro checks per protocol      Additional recommendations may follow    Please contact neurosurgery with any changes in patients neurologic status.     Thank you for your consult.       Donnell Silva MD   NS pager [phone]  2/21/2025  11:53 PM

## 2025-02-22 NOTE — ED NOTES
Pt arrives to ED 03 via triage with family.   Pt co back pain that is radiating down R shoulder.   Pt states that for the past 4 hours she has been experiencing this pain.   Pt states that she has attempted to take her prescribed pain medications, but nothing has helped.   Pt states that she follows with a neurologist.   Pt states that she has been able to manage her pain with her prescribed medications, but they are no longer working.   Pt states that the pain began when she was driving, but pt denies any injury.   Pt states she has previous neck surgeries.   Pt respirations are even and unlabored, pt is alert and oriented X 4, speaking in complete sentences, bed is in the lowest position, call light is within reach.  Will continue to follow plan of care.

## 2025-02-22 NOTE — PROGRESS NOTES
[facility]  CDU / OBSERVATION ENCOUNTER  ATTENDING NOTE       I performed a history and physical examination of the patient and discussed management with the resident or midlevel provider. I reviewed the resident or midlevel provider's note and agree with the documented findings and plan of care. Any areas of disagreement are noted on the chart. I was personally present for the key portions of any procedures. I have documented in the chart those procedures where I was not present during the key portions. I have reviewed the nurses notes. I agree with the chief complaint, past medical history, past surgical history, allergies, medications, social and family history as documented unless otherwise noted below.    The Family history, social history, and ROS are effectively unchanged since admission unless noted elsewhere in the chart.    This patient was placed in the observation unit for reevaluation for possible admission to the hospital    Patient admitted to the ETU for right sided neck pain radiating down her right arm.  She says she also has been having weakness and numbness to the right arm.  She states that she has been having weakness to the right arm which has been ongoing.  She does follow with Dr. Richey.  She states that the pain in her neck is new since yesterday.  She says that she was not doing anything in particular when the pain suddenly started.  Patient does appear to be in significant pain this morning and does have diminished strength and sensation to the right upper extremity compared to the left.  CT scan of the cervical spine shows disc protrusion at C6-7.  Neurosurgery is consulted and are currently awaiting attending recommendations.  Will continue treating patient's pain.    Kristina Kimball MD  Attending Emergency  Physician

## 2025-02-22 NOTE — H&P
[facility]  CDU / OBSERVATION ENCOUNTER  Physician NOTE     Pt Name: Dorothea Hastings  MRN: [id number]  Birthdate 1985  Date of evaluation: 2/22/25  Patient's PCP is :  El Kat APRN - CNP    CHIEF COMPLAINT       Chief Complaint   Patient presents with    Back Pain    Shoulder Pain     R shoulder         HISTORY OF PRESENT ILLNESS    Dorothea Hastings is a 39 y.o. female with past medical history of cervical disc disease, cervical myopathy, chronic back pain, history of migraine headaches, trigeminal neuralgia of the right side of the face, presented to emergency department with a complaint of neck pain that radiates from the bottom of the neck to the middle of the shoulder blade on the right side, persisting from some time but worsened acutely yesterday.  Initially, the pain was dull but has frequently progressed to sharp, debilitating pain, described as \"like a knife\".  The acute episode was severe enough that it limited her ability to drive.  The patient describes right arm weakness yet she maintains functionality.  The pain significantly affects daily activities, and she sustained the most severe pain sitting on her couch for 4 hours, anticipating rest would help.  The patient reports attempt to manage the pain with her normal medication routine including Lyrica and Cymbalta, but found them insufficient.  She also reports tenderness in the chest area and under the arm, which is notably sensitive.  Her condition disrupted her ability to swallow, necessitating concerted effort to do so.  The symptoms prompted her current presentation in the emergency department.    Patient's lab work in ER shows CRP 6.6, ESR 13, CMP and CBC within the normal limits, hCG qualitative negative, CT thoracic spine no acute fracture deformity, collapse or subluxation, CT cervical spine with no acute fracture or traumatic malalignment, unchanged interbody fusion device at C5-6 6 causing artifact  radiation dose to as low as reasonably achievable. COMPARISON: CT cervical spine 09/30/2024 and cervical spine x-rays 01/28/2025 HISTORY: ORDERING SYSTEM PROVIDED HISTORY: neck pain with numbness and tingling down right arm with weakness, sudden onset. previous surgery TECHNOLOGIST PROVIDED HISTORY: neck pain with numbness and tingling down right arm with weakness, sudden onset. previous surgery Decision Support Exception - unselect if not a suspected or confirmed emergency medical condition->Emergency Medical Condition (MA) Is the patient pregnant?->No Reason for Exam: neck pain with numbness and tingling down right arm with weakness, sudden onset. previous surgery FINDINGS: BONES/ALIGNMENT: There is unchanged mild reversal of the normal cervical lordosis.  There is an unchanged interbody fusion device at C5-C6 causing artifact degrading images at this level.  Cervical vertebral body heights are within normal limits.  Facet joints are normally aligned.  There is no acute fracture or traumatic malalignment. DEGENERATIVE CHANGES: Prominent anterior spondylosis at C4-C5, unchanged. The cervical spinal canal at C5-C6 is not well visualized/evaluated due to artifact from the interbody fusion device.  At C6-C7, there is a broad-based disc protrusion slightly more prominent to the left of midline causing mild cord flattening (axial image 56).  There is no significant nerve root canal stenosis in the cervical spine. SOFT TISSUES: There is no prevertebral soft tissue swelling.     1. No acute fracture or traumatic malalignment of the cervical spine. 2. Unchanged interbody fusion device at C5-C6 causing artifact obscuring visualization of the spinal canal at this level. 3. Broad-based disc protrusion at C6-C7 causing mild cord flattening. If there are neurologic symptoms, MRI could be performed for further evaluation.       LABS:  I have reviewed and interpreted all available lab results.  Labs Reviewed   CBC WITH AUTO

## 2025-02-22 NOTE — ED NOTES
ED to inpatient nurses report      Chief Complaint:  Chief Complaint   Patient presents with    Back Pain    Shoulder Pain     R shoulder     Present to ED from: Home    MOA:     LOC: A&O x4  Mobility: Ambulatory  Oxygen Baseline: RA    Current needs required: None  Pending ED orders: Pain meds, giving at this time  Present condition: Resting in bed, NAD noted    Why did the patient come to the ED?   Neck/R shoulder pain  What is the plan?   Neuro surg attending to see in the morning, MRI?  Any procedures or intervention occur?   Labs, CT  Any safety concerns??    Mental Status:       Psych Assessment:      Vital signs   Vitals:    02/21/25 2118 02/21/25 2341 02/22/25 0021 02/22/25 0024   BP: (!) 163/120  (!) 131/95    Pulse: 96      Resp: 20      Temp: 98.1 °F (36.7 °C)      SpO2: 97% 100%  100%        Vitals:  Patient Vitals for the past 24 hrs:   BP Temp Pulse Resp SpO2   02/22/25 0024 -- -- -- -- 100 %   02/22/25 0021 (!) 131/95 -- -- -- --   02/21/25 2341 -- -- -- -- 100 %   02/21/25 2118 (!) 163/120 98.1 °F (36.7 °C) 96 20 97 %      Visit Vitals  BP (!) 131/95   Pulse 96   Temp 98.1 °F (36.7 °C)   Resp 20   SpO2 100%        LDAs:   Peripheral IV 02/21/25 Right Antecubital (Active)       Ambulatory Status:  Presents to emergency department  because of falls (Syncope, seizure, or loss of consciousness): No, Age > 70: No, Altered Mental Status, Intoxication with alcohol or substance confusion (Disorientation, impaired judgment, poor safety awaremess, or inability to follow instructions): No, Impaired Mobility: Ambulates or transfers with assistive devices or assistance; Unable to ambulate or transer.: Yes, Nursing Judgement: Yes    Diagnosis:  DISPOSITION Admitted 02/22/2025 12:42:59 AM   Final diagnoses:   None        Consults:  IP CONSULT TO NEUROSURGERY     Treatment Team:   Treatment Team:   Yimi Al MD Siadati-Fini, Niloufar, MD Hoeflinger, Brian, MD Fisher, Alexa, RN    Treatment:  ED Course as  OR     SECTION      x3    DILATION AND CURETTAGE OF UTERUS      ELBOW SURGERY Right 2022    CUBITAL TUNNEL RELEASE (Right Arm Upper)    LUMBAR SPINE SURGERY  2024    LUMBAR THREE THRU LUMBAR FOUR MICRODISCECTOMY; LEFT - Left    LUMBAR SPINE SURGERY Left 2024    LUMBAR THREE THRU LUMBAR FOUR MICRODISCECTOMY; LEFT performed by Art Bautista DO at [facility] OR    TUBAL LIGATION         PAST MEDICAL HISTORY       Past Medical History:   Diagnosis Date    Anxiety     Blood loss     after     Cervical disc disease     Cervical myopathy     Chronic back pain     GERD (gastroesophageal reflux disease)     Hx of migraine headaches     Neuropathy     both legs, feet    Thyroid nodule     Trigeminal neuralgia of right side of face     Under care of team 2022    NEUROLSURGERY - DR. GREENWOOD     Under care of team 2022    NEUROLOGY - DR. NOWAK - LAST VISIT 2022    Under care of team 2022    CARDIOLOGY - UPCOMING FIRST VISIT - DOES NOT REMEMBER NAME    Weakness generalized     due to neck issues    Wellness examination 2022    PCP - DYAN FUENTES CNP - LAST VISIT  - 2022       Labs:  Labs Reviewed   CBC WITH AUTO DIFFERENTIAL - Abnormal; Notable for the following components:       Result Value    RDW 14.9 (*)     All other components within normal limits   COMPREHENSIVE METABOLIC PANEL - Abnormal; Notable for the following components:    Chloride 108 (*)     Glucose 169 (*)     Calcium 8.5 (*)     Alkaline Phosphatase 117 (*)     All other components within normal limits   C-REACTIVE PROTEIN - Abnormal; Notable for the following components:    CRP 6.6 (*)     All other components within normal limits   HCG, SERUM, QUALITATIVE   SEDIMENTATION RATE       Electronically signed by Indigo Stoddard RN on 2025 at 12:51 AM

## 2025-02-22 NOTE — CARE COORDINATION
Case Management Assessment  Initial Observation Evaluation    Date/Time of Evaluation: 2/22/2025 9:44 AM  Assessment Completed by: Nat Aguila RN    If patient is discharged prior to next notation, then this note serves as note for discharge by case management.    Patient Name: Dorothea Hastings                   YOB: 1985  Diagnosis: Numbness and tingling of right arm [R20.0, R20.2]                   Date / Time: 2/21/2025  9:11 PM      CM Services requested for transitional needs.     Patient Admission Status: Observation   Readmission Risk (Low < 19, Mod (19-27), High > 27): Readmission Risk Score: 8.5    Current PCP: El Kat APRN - CNP  PCP verified by CM? (P) Yes       History Provided by: (P) Patient  Patient Orientation: (P) Alert and Oriented    Patient Cognition: (P) Alert      ADLS  Prior functional level: (P) Assistance with the following:, Housework, Shopping  Current functional level: (P) Assistance with the following:, Housework, Shopping  Family can provide assistance at DC: (P) Yes  Would you like Case Management to discuss the discharge plan with any other family members/significant others, and if so, who? (P) No    Financial    [facility] / Plan: [facility] HEALTH PLAN / Product Type: *No Product type* /       Transportation/Food Security/Housing Addressed:  No issues identified.     Equipment needs: None    Case Management Services Information Letter Provided [x]    Transition plan: From home independent, has children at home that help with household duties, plan is to return, denies any needs

## 2025-02-22 NOTE — ED PROVIDER NOTES
[facility] EMERGENCY DEPARTMENT     Emergency Department     Faculty Attestation        I performed a history and physical examination of the patient and discussed management with the resident. I reviewed the resident’s note and agree with the documented findings and plan of care. Any areas of disagreement are noted on the chart. I was personally present for the key portions of any procedures. I have documented in the chart those procedures where I was not present during the key portions. I have reviewed the emergency nurses triage note. I agree with the chief complaint, past medical history, past surgical history, allergies, medications, social and family history as documented unless otherwise noted below.    For mid-level providers such as nurse practitioners as well as physicians assistants:    I have personally seen and evaluated the patient.    I find the patient's history and physical exam are consistent with NP/PA documentation.  I agree with the care provided, treatment rendered, disposition, & follow-up plan.     Additional findings are as noted.    Vital Signs: BP (!) 163/120   Pulse 96   Temp 98.1 °F (36.7 °C)   Resp 20   SpO2 97%   PCP:  El Kat, APRN - CNP    Pertinent Comments:     Acute on chronic radicular symptoms down right arm.  She is right-hand dominant she has had surgery before and is closely by her neurosurgeon she denies any falls or trauma due to worsening of symptoms obtain CT imaging of the cervical spine, discussion with neurosurgery pain control, reassessment      Critical Care  None          Dani Ely MD    Attending Emergency Medicine Physician            Marcos Ely MD  02/21/25 [id number]

## 2025-02-23 VITALS
TEMPERATURE: 97.5 F | OXYGEN SATURATION: 100 % | DIASTOLIC BLOOD PRESSURE: 99 MMHG | BODY MASS INDEX: 35.61 KG/M2 | RESPIRATION RATE: 20 BRPM | SYSTOLIC BLOOD PRESSURE: 116 MMHG | WEIGHT: 226.85 LBS | HEART RATE: 85 BPM | HEIGHT: 67 IN

## 2025-02-23 PROCEDURE — 2500000003 HC RX 250 WO HCPCS

## 2025-02-23 PROCEDURE — G0378 HOSPITAL OBSERVATION PER HR: HCPCS

## 2025-02-23 PROCEDURE — 96376 TX/PRO/DX INJ SAME DRUG ADON: CPT

## 2025-02-23 PROCEDURE — 93005 ELECTROCARDIOGRAM TRACING: CPT | Performed by: EMERGENCY MEDICINE

## 2025-02-23 PROCEDURE — 6370000000 HC RX 637 (ALT 250 FOR IP): Performed by: STUDENT IN AN ORGANIZED HEALTH CARE EDUCATION/TRAINING PROGRAM

## 2025-02-23 PROCEDURE — 6370000000 HC RX 637 (ALT 250 FOR IP)

## 2025-02-23 PROCEDURE — 6360000002 HC RX W HCPCS: Performed by: STUDENT IN AN ORGANIZED HEALTH CARE EDUCATION/TRAINING PROGRAM

## 2025-02-23 PROCEDURE — 96375 TX/PRO/DX INJ NEW DRUG ADDON: CPT

## 2025-02-23 PROCEDURE — 20552 NJX 1/MLT TRIGGER POINT 1/2: CPT

## 2025-02-23 PROCEDURE — 6360000002 HC RX W HCPCS

## 2025-02-23 PROCEDURE — 96372 THER/PROPH/DIAG INJ SC/IM: CPT

## 2025-02-23 RX ORDER — METHOCARBAMOL 750 MG/1
750 TABLET, FILM COATED ORAL ONCE
Status: COMPLETED | OUTPATIENT
Start: 2025-02-23 | End: 2025-02-23

## 2025-02-23 RX ORDER — METOCLOPRAMIDE HYDROCHLORIDE 5 MG/ML
10 INJECTION INTRAMUSCULAR; INTRAVENOUS ONCE
Status: COMPLETED | OUTPATIENT
Start: 2025-02-23 | End: 2025-02-23

## 2025-02-23 RX ORDER — CETIRIZINE HYDROCHLORIDE 10 MG/1
10 TABLET ORAL DAILY
Qty: 30 TABLET | Refills: 0 | Status: SHIPPED | OUTPATIENT
Start: 2025-02-23 | End: 2025-03-25

## 2025-02-23 RX ORDER — DIPHENHYDRAMINE HYDROCHLORIDE 50 MG/ML
25 INJECTION INTRAMUSCULAR; INTRAVENOUS EVERY 6 HOURS PRN
Status: DISCONTINUED | OUTPATIENT
Start: 2025-02-23 | End: 2025-02-23 | Stop reason: HOSPADM

## 2025-02-23 RX ORDER — POLYETHYLENE GLYCOL 3350 17 G/17G
17 POWDER, FOR SOLUTION ORAL DAILY PRN
Qty: 30 PACKET | Refills: 0 | Status: SHIPPED | OUTPATIENT
Start: 2025-02-23 | End: 2025-03-25

## 2025-02-23 RX ORDER — METHOCARBAMOL 750 MG/1
750 TABLET, FILM COATED ORAL 4 TIMES DAILY
Qty: 20 TABLET | Refills: 0 | Status: SHIPPED | OUTPATIENT
Start: 2025-02-23 | End: 2025-02-28

## 2025-02-23 RX ORDER — BUPIVACAINE HYDROCHLORIDE 5 MG/ML
30 INJECTION, SOLUTION PERINEURAL ONCE
Status: COMPLETED | OUTPATIENT
Start: 2025-02-23 | End: 2025-02-23

## 2025-02-23 RX ORDER — IBUPROFEN 600 MG/1
600 TABLET, FILM COATED ORAL EVERY 6 HOURS PRN
Qty: 120 TABLET | Refills: 0 | Status: SHIPPED | OUTPATIENT
Start: 2025-02-23

## 2025-02-23 RX ORDER — VILAZODONE HYDROCHLORIDE 40 MG/1
20 TABLET ORAL DAILY
Status: DISCONTINUED | OUTPATIENT
Start: 2025-02-23 | End: 2025-02-23 | Stop reason: HOSPADM

## 2025-02-23 RX ORDER — OXYCODONE HYDROCHLORIDE 5 MG/1
5 TABLET ORAL EVERY 6 HOURS PRN
Qty: 9 TABLET | Refills: 0 | Status: SHIPPED | OUTPATIENT
Start: 2025-02-23 | End: 2025-02-26

## 2025-02-23 RX ORDER — ONDANSETRON 4 MG/1
4 TABLET, ORALLY DISINTEGRATING ORAL EVERY 8 HOURS PRN
Qty: 6 TABLET | Refills: 0 | Status: SHIPPED | OUTPATIENT
Start: 2025-02-23

## 2025-02-23 RX ORDER — ACETAMINOPHEN 325 MG/1
650 TABLET ORAL EVERY 4 HOURS PRN
Qty: 120 TABLET | Refills: 0 | Status: SHIPPED | OUTPATIENT
Start: 2025-02-23 | End: 2025-03-05

## 2025-02-23 RX ORDER — KETOROLAC TROMETHAMINE 30 MG/ML
30 INJECTION, SOLUTION INTRAMUSCULAR; INTRAVENOUS ONCE
Status: COMPLETED | OUTPATIENT
Start: 2025-02-23 | End: 2025-02-23

## 2025-02-23 RX ADMIN — OXYCODONE HYDROCHLORIDE 5 MG: 5 TABLET ORAL at 13:17

## 2025-02-23 RX ADMIN — OXYCODONE HYDROCHLORIDE 5 MG: 5 TABLET ORAL at 03:22

## 2025-02-23 RX ADMIN — SODIUM CHLORIDE, PRESERVATIVE FREE 10 ML: 5 INJECTION INTRAVENOUS at 08:35

## 2025-02-23 RX ADMIN — ENOXAPARIN SODIUM 40 MG: 100 INJECTION SUBCUTANEOUS at 08:35

## 2025-02-23 RX ADMIN — HYDROMORPHONE HYDROCHLORIDE 0.5 MG: 1 INJECTION, SOLUTION INTRAMUSCULAR; INTRAVENOUS; SUBCUTANEOUS at 04:38

## 2025-02-23 RX ADMIN — HYDROMORPHONE HYDROCHLORIDE 0.5 MG: 1 INJECTION, SOLUTION INTRAMUSCULAR; INTRAVENOUS; SUBCUTANEOUS at 14:55

## 2025-02-23 RX ADMIN — HYDROMORPHONE HYDROCHLORIDE 0.5 MG: 1 INJECTION, SOLUTION INTRAMUSCULAR; INTRAVENOUS; SUBCUTANEOUS at 08:37

## 2025-02-23 RX ADMIN — BUPIVACAINE HYDROCHLORIDE 150 MG: 5 INJECTION, SOLUTION PERINEURAL at 16:14

## 2025-02-23 RX ADMIN — ACETAMINOPHEN 650 MG: 325 TABLET ORAL at 03:22

## 2025-02-23 RX ADMIN — DULOXETINE HYDROCHLORIDE 60 MG: 30 CAPSULE, DELAYED RELEASE ORAL at 08:35

## 2025-02-23 RX ADMIN — VILAZODONE HYDROCHLORIDE 20 MG: 40 TABLET, FILM COATED ORAL at 14:57

## 2025-02-23 RX ADMIN — ACETAMINOPHEN 650 MG: 325 TABLET ORAL at 13:18

## 2025-02-23 RX ADMIN — PREGABALIN 200 MG: 100 CAPSULE ORAL at 13:11

## 2025-02-23 RX ADMIN — BACLOFEN 10 MG: 10 TABLET ORAL at 08:36

## 2025-02-23 RX ADMIN — CARBAMAZEPINE 200 MG: 100 TABLET, EXTENDED RELEASE ORAL at 08:35

## 2025-02-23 RX ADMIN — METOCLOPRAMIDE HYDROCHLORIDE 10 MG: 5 INJECTION INTRAMUSCULAR; INTRAVENOUS at 11:33

## 2025-02-23 RX ADMIN — KETOROLAC TROMETHAMINE 15 MG: 15 INJECTION, SOLUTION INTRAMUSCULAR; INTRAVENOUS at 03:22

## 2025-02-23 RX ADMIN — METHOCARBAMOL 750 MG: 750 TABLET ORAL at 11:33

## 2025-02-23 RX ADMIN — PREGABALIN 200 MG: 100 CAPSULE ORAL at 08:35

## 2025-02-23 RX ADMIN — KETOROLAC TROMETHAMINE 30 MG: 30 INJECTION, SOLUTION INTRAMUSCULAR; INTRAVENOUS at 11:33

## 2025-02-23 ASSESSMENT — PAIN DESCRIPTION - DESCRIPTORS
DESCRIPTORS: SHARP;STABBING
DESCRIPTORS: SHARP;STABBING
DESCRIPTORS: TIGHTNESS
DESCRIPTORS: SHARP;SHOOTING
DESCRIPTORS: ACHING
DESCRIPTORS: SHOOTING;STABBING
DESCRIPTORS: SHARP;SHOOTING;DULL
DESCRIPTORS: ACHING

## 2025-02-23 ASSESSMENT — PAIN SCALES - WONG BAKER
WONGBAKER_NUMERICALRESPONSE: HURTS A LITTLE BIT
WONGBAKER_NUMERICALRESPONSE: NO HURT
WONGBAKER_NUMERICALRESPONSE: HURTS LITTLE MORE
WONGBAKER_NUMERICALRESPONSE: NO HURT
WONGBAKER_NUMERICALRESPONSE: HURTS LITTLE MORE
WONGBAKER_NUMERICALRESPONSE: HURTS A LITTLE BIT
WONGBAKER_NUMERICALRESPONSE: NO HURT

## 2025-02-23 ASSESSMENT — PAIN SCALES - GENERAL
PAINLEVEL_OUTOF10: 6
PAINLEVEL_OUTOF10: 9
PAINLEVEL_OUTOF10: 8
PAINLEVEL_OUTOF10: 5
PAINLEVEL_OUTOF10: 4
PAINLEVEL_OUTOF10: 8
PAINLEVEL_OUTOF10: 6
PAINLEVEL_OUTOF10: 7
PAINLEVEL_OUTOF10: 6

## 2025-02-23 ASSESSMENT — PAIN - FUNCTIONAL ASSESSMENT
PAIN_FUNCTIONAL_ASSESSMENT: ACTIVITIES ARE NOT PREVENTED
PAIN_FUNCTIONAL_ASSESSMENT: PREVENTS OR INTERFERES SOME ACTIVE ACTIVITIES AND ADLS
PAIN_FUNCTIONAL_ASSESSMENT: PREVENTS OR INTERFERES SOME ACTIVE ACTIVITIES AND ADLS
PAIN_FUNCTIONAL_ASSESSMENT: ACTIVITIES ARE NOT PREVENTED
PAIN_FUNCTIONAL_ASSESSMENT: PREVENTS OR INTERFERES SOME ACTIVE ACTIVITIES AND ADLS

## 2025-02-23 ASSESSMENT — PAIN DESCRIPTION - ORIENTATION
ORIENTATION: RIGHT
ORIENTATION: MID;RIGHT
ORIENTATION: RIGHT

## 2025-02-23 ASSESSMENT — PAIN DESCRIPTION - LOCATION
LOCATION: CHEST
LOCATION: LEG;KNEE
LOCATION: SHOULDER;NECK;ARM
LOCATION: NECK;SHOULDER;ARM
LOCATION: NECK
LOCATION: NECK

## 2025-02-23 NOTE — PROGRESS NOTES
Patient talking very loudly on phone when writer goes to check on patietn at that time patient complains of pain and asking for her pain medications. Patient sit up at bedside and Patient starts c/o chest pain mid right side of chest when inhaling, patient also c/o double vision,  Patient denies any new numbness or tingling at this time.  Observation resident on call notified via perfect serve and resident confirmed to get an EKG . EKG completed and showed normal sinus rhythm, normal ecg and resident notified or results of EKG . No new orders at this time. Patient did state after ambulating to the bathroom across the goldsmith from her room that the chest pain has subsided, but that she is still having the headache and after further investigating that the pain is in her mid right chest right shoulder and arm, radiating up right neck and into head. Per patient her headache is more blurriness that is coming and going to bilateral eyes at times and headache to right side of head and behind her ear.

## 2025-02-23 NOTE — DISCHARGE SUMMARY
CDU Discharge Summary        Patient:  Dorothea Hastings  YOB: 1985    MRN: [id number]   Acct: [id number]    Primary Care Physician: El Kat APRN - CNP    Admit date:  2/21/2025  9:11 PM  Discharge date: 2/23/2025  6:15 PM    Discharge Diagnoses:     1.)  Patient had intractable right upper extremity, shoulder, and right sided chest/back pain with acute onset due to suspected combinations of causes, to include pre-existing neurologic disease, muscle spasms, and a tension headache/migraine.  Treated with multimodal pain control, evaluation by neurosurgery, imaging, migraine cocktail, muscle relaxants, trigger point injection.  Patient's symptoms are improved with the plan to follow-up with her neurosurgeon, follow-up for trigger point injections, and follow-up with her primary care team for continued care.    Follow-up:  Call today/tomorrow for a follow up appointment with El Kat APRN - CNP , or return to the Emergency Room with worsening symptoms    Stressed to patient the importance of following up with primary care doctor for further workup/management of symptoms.  Pt verbalizes understanding and agrees with plan.    Discharge Medication Changes:       Medication List        START taking these medications      acetaminophen 325 MG tablet  Commonly known as: Aminofen  Take 2 tablets by mouth every 4 hours as needed for Pain     methocarbamol 750 MG tablet  Commonly known as: Robaxin-750  Take 1 tablet by mouth 4 times daily for 5 days     naloxone 4 MG/0.1ML Liqd nasal spray  1 spray by Nasal route as needed for Opioid Reversal     ondansetron 4 MG disintegrating tablet  Commonly known as: ZOFRAN-ODT  Take 1 tablet by mouth every 8 hours as needed for Nausea or Vomiting     oxyCODONE 5 MG immediate release tablet  Commonly known as: Roxicodone  Take 1 tablet by mouth every 6 hours as needed for Pain for up to 3 days. Intended supply: 3 days. Take lowest dose possible  down right arm with weakness, sudden onset. previous surgery FINDINGS: BONES/ALIGNMENT: There is unchanged mild reversal of the normal cervical lordosis.  There is an unchanged interbody fusion device at C5-C6 causing artifact degrading images at this level.  Cervical vertebral body heights are within normal limits.  Facet joints are normally aligned.  There is no acute fracture or traumatic malalignment. DEGENERATIVE CHANGES: Prominent anterior spondylosis at C4-C5, unchanged. The cervical spinal canal at C5-C6 is not well visualized/evaluated due to artifact from the interbody fusion device.  At C6-C7, there is a broad-based disc protrusion slightly more prominent to the left of midline causing mild cord flattening (axial image 56).  There is no significant nerve root canal stenosis in the cervical spine. SOFT TISSUES: There is no prevertebral soft tissue swelling.     1. No acute fracture or traumatic malalignment of the cervical spine. 2. Unchanged interbody fusion device at C5-C6 causing artifact obscuring visualization of the spinal canal at this level. 3. Broad-based disc protrusion at C6-C7 causing mild cord flattening. If there are neurologic symptoms, MRI could be performed for further evaluation.           Physical Exam:    General appearance - NAD, AOx 3, resting comfortably compared to previous evaluations  Lungs -CTAB, no R/R/R  Heart - RRR, no M/R/G  Abdomen - Soft, NT/ND  Neurological -  MAEx4 though still cautious with RUE due to pain, No focal motor deficit, improving sensory loss/paresthesias of R hand  Extremities - Cap refil <2 sec in all ext., no edema  Skin -warm, dry      Hospital Course:  Clinical course has improved, labs and imaging reviewed.     Dorothea Hastings originally presented to the hospital on 2/21/2025  9:11 PM with right-sided chest, back, shoulder, arm and hand pain and paresthesias.  At that time it was determined that She required further observation and neurosurgery

## 2025-02-23 NOTE — PROCEDURES
PROCEDURE NOTE  Date: 2/23/2025   Name: Dorothea Hastings  YOB: 1985    Procedures    Procedure: Trigger point injection    Time out: Patient's full name, date of birth, and planned procedure confirmed with the patient.  Verbal consent obtained.  Patient has capacity to make this decision at this time.    Preparation: The area over the myofascial spasm was prepped with chlorhexidine.    Anesthesia:  Local anesthesia, 2 cc 0.5% bupivacaine, 1 cc 1% lidocaine    Procedure description:  The procedure risks, hazards and alternatives were discussed with the patient and a proper consent was obtained. The area over the myofascial spasm was prepped with alcohol utilizing sterile technique. After isolating it between two palpating fingertips a 27-gauge 1.5\" needle was placed in the center of the myofascial spasm over her R trapezius and a negative aspiration was performed. Then 2 cc of bupivacaine 0.5% and 1 cc of 1% lidocaine was injected into a single trigger point. The patient tolerated the procedure well without any apparent difficulties or complications. They were feeling relief by the time the block had set.    Complications: None    Lorraine Kearns MD  Emergency Medicine PGY-1

## 2025-02-23 NOTE — PROGRESS NOTES
OBS/CDU   Progress NOTE      Patients PCP is:  El Kat, APRN - CNP        SUBJECTIVE      Overnight, patient reporting chest pain with inhalation, EKG obtained and no changes. Improvement in symptoms after ambulation to bathroom. Pt also reported a HA at that time with some blurred vision that is transient in nature. This morning, patient reports that she has had transient vision blurriness which flares when her pain flares up.  Patient still with continued pain, reports Dilaudid \"makes her care less about the pain\" but does not fix the pain.    PHYSICAL EXAM      General: Appears uncomfortable, shoulders obstruct upward, and unwilling to move right upper extremity due to pain, AO X 3  Heent: EOMI, PERRL  Neck: Tension of right paraspinous and trapezius muscles, improved after migraine cocktail and one-time dose of muscle relaxant  Cardiovascular: RRR, S1S2  Pulmonary: CTAB, NO SOB  Abdomen: SOFT, NTTP, ND, +BS  Extremities: +2/4 PULSES DISTAL, NO SWELLING  Neuro / Psych: Paresthesias in ulnar and radial nerve distribution of right hand, MENTATION AT BASELINE, some weakness of her right hand when her pain flares up    PERTINENT TEST /EXAMS      I have reviewed all available laboratory results.    MEDICATIONS CURRENT   [Held by provider] amitriptyline (ELAVIL) tablet 10 mg, Nightly  [Held by provider] butalbital-acetaminophen-caffeine (FIORICET, ESGIC) per tablet 1 tablet, Q12H PRN  carBAMazepine (TEGRETOL XR) extended release tablet 200 mg, BID  [Held by provider] cetirizine (ZYRTEC) tablet 10 mg, Daily  [Held by provider] ibuprofen (ADVIL;MOTRIN) tablet 200 mg, TID WC  DULoxetine (CYMBALTA) extended release capsule 60 mg, Daily  [Held by provider] famotidine (PEPCID) tablet 20 mg, BID  [Held by provider] ibuprofen (ADVIL;MOTRIN) tablet 600 mg, Q8H PRN  [Held by provider] lidocaine 4 % external patch 1 patch, Daily  [Held by provider] magnesium oxide (MAG-OX) tablet 400 mg, BID  [Held by provider]  oxyCODONE-acetaminophen (PERCOCET) 5-325 MG per tablet 1 tablet, Q6H PRN  [Held by provider] verapamil (CALAN SR) extended release tablet 120 mg, Nightly  [Held by provider] vilazodone HCl (VIIBRYD) TABS 20 mg, Daily  [Held by provider] vitamin B-12 (CYANOCOBALAMIN) tablet 1,000 mcg, Daily  [Held by provider] vitamin D (ERGOCALCIFEROL) capsule 50,000 Units, Weekly  sodium chloride flush 0.9 % injection 5-40 mL, 2 times per day  sodium chloride flush 0.9 % injection 5-40 mL, PRN  0.9 % sodium chloride infusion, PRN  potassium chloride (KLOR-CON M) extended release tablet 40 mEq, PRN   Or  potassium bicarb-citric acid (EFFER-K) effervescent tablet 40 mEq, PRN   Or  potassium chloride 10 mEq/100 mL IVPB (Peripheral Line), PRN  magnesium sulfate 2000 mg in 50 mL IVPB premix, PRN  enoxaparin (LOVENOX) injection 40 mg, Daily  ondansetron (ZOFRAN-ODT) disintegrating tablet 4 mg, Q8H PRN   Or  ondansetron (ZOFRAN) injection 4 mg, Q6H PRN  polyethylene glycol (GLYCOLAX) packet 17 g, Daily PRN  acetaminophen (TYLENOL) tablet 650 mg, Q6H PRN   Or  acetaminophen (TYLENOL) suppository 650 mg, Q6H PRN  oxyCODONE (ROXICODONE) immediate release tablet 5 mg, Q6H PRN  ketorolac (TORADOL) injection 15 mg, Q6H PRN  baclofen (LIORESAL) tablet 10 mg, TID  pregabalin (LYRICA) capsule 200 mg, TID  HYDROmorphone (DILAUDID) injection 0.5 mg, Q4H PRN        All medication charted and reviewed.    CONSULTS      IP CONSULT TO NEUROSURGERY    ASSESSMENT/PLAN       Dorothea Hastings is a 39 y.o. female with a PMH notable for multiple neurologic conditions who presents with neck pain radiating from the base of her neck to her R shoulder blade.     Neck and shoulder pain  Neurosurgery consulted given pt's previous C5-C6 arthroplasty. No acute surgical intervention recommended.  PT/OT consulted  After evaluating patient, suspect that this is a combination of a migraine flareup (patient has previous history of these and has had occipital blocks in

## 2025-02-23 NOTE — DISCHARGE INSTRUCTIONS
You were seen in the emergency department and ultimately admitted to the observation unit for right sided pain, nerve burning, and generalized weakness.  You were seen by our neurosurgery team, who recommended imaging, and based off of the findings, recommended outpatient follow-up with Dr. Aquino.  Additionally, you were treated for migraines with the use of a migraine cocktail (Toradol, Benadryl, and an antinausea medication), and you received a trigger point injection of local anesthetic (lidocaine and bupivacaine mixed).  These appear to have helped your symptoms, and you are being discharged home with continued Robaxin (muscle relaxant) to see if this helps with your pain and discomfort.  Please call your neurosurgery team tomorrow for follow-up/to let them know you were recently admitted to the hospital.  Additionally, please call your primary care team to let them know of your recent hospitalization and the medications you were started on so that you have continued care in the outpatient setting.  Finally, please still attend your appointments for both your back and trigger point injections.  If you have any concerns or questions, please do not hesitate to reach out or return to the emergency department for reevaluation.    You are being sent home on a number of medications that have changed/may be new to you.  You can continue your home Percocet (Tylenol with oxycodone).  This is a twice a day medication.  You are also being sent home with every 4 hour Tylenol, which keeps you below the daily maximum of Tylenol/acetaminophen.  You are also being sent home with a couple of breakthrough oxycodone, which can be used to supplement your pain control.  Finally, we are stopping your baclofen at this time and starting you on cyclobenzaprine (Robaxin).    Your recommended pain regimen is as follows:  Ibuprofen 600 every 6 hours  Acetaminophen 650 every 4 hours  Robaxin every 6 hours for muscle spasms  Continue your home

## 2025-02-24 NOTE — PROGRESS NOTES
Visit Information    Have you changed or started any medications since your last visit including any over-the-counter medicines, vitamins, or herbal medicines? no   Are you having any side effects from any of your medications? -  no  Have you stopped taking any of your medications? Is so, why? -  no    Have you seen any other physician or provider since your last visit? No  Have you had any other diagnostic tests since your last visit? No  Have you been seen in the emergency room and/or had an admission to a hospital since we last saw you? No  Have you had your routine dental cleaning in the past 6 months? no    Have you activated your [employer] account? If not, what are your barriers?  Yes     Patient Care Team:  Irma Casillas PA-C as PCP - General (Physician Assistant)  Irma Casillas PA-C as PCP - [facility] Provider    Medical History Review  Past Medical, Family, and Social History reviewed and does not contribute to the patient presenting condition    Health Maintenance   Topic Date Due    Hepatitis C screen  Never done    Varicella vaccine (1 of 2 - 2-dose childhood series) Never done    Flu vaccine (1) 09/01/2021    Cervical cancer screen  04/29/2022    DTaP/Tdap/Td vaccine (2 - Td or Tdap) 09/10/2029    COVID-19 Vaccine  Completed    HIV screen  Completed    Hepatitis A vaccine  Aged Out    Hepatitis B vaccine  Aged Out    Hib vaccine  Aged Out    Meningococcal (ACWY) vaccine  Aged Out    Pneumococcal 0-64 years Vaccine  Aged Out No

## 2025-02-24 NOTE — PROGRESS NOTES
[facility]  CDU / OBSERVATION ENCOUNTER  ATTENDING NOTE         I performed a history and physical examination of the patient and discussed management with the resident or midlevel provider. I reviewed the resident or midlevel provider's note and agree with the documented findings and plan of care. Any areas of disagreement are noted on the chart. I was personally present for the key portions of any procedures. I have documented in the chart those procedures where I was not present during the key portions. I have reviewed the nurses notes. I agree with the chief complaint, past medical history, past surgical history, allergies, medications, social and family history as documented unless otherwise noted below.    The Family history, social history, and ROS are effectively unchanged since admission unless noted elsewhere in the chart.     This patient was placed in the observation unit for reevaluation for possible admission to the hospital     Patient with no fever or tenderness.  Patient received trigger point injection with pain relief..  Patient discharged.  Significantly improved condition.       Yimi Al MD  Attending Emergency  Physician

## 2025-02-25 LAB
EKG ATRIAL RATE: 74 BPM
EKG ATRIAL RATE: 77 BPM
EKG P AXIS: 50 DEGREES
EKG P AXIS: 50 DEGREES
EKG P-R INTERVAL: 204 MS
EKG P-R INTERVAL: 206 MS
EKG Q-T INTERVAL: 366 MS
EKG Q-T INTERVAL: 370 MS
EKG QRS DURATION: 80 MS
EKG QRS DURATION: 82 MS
EKG QTC CALCULATION (BAZETT): 410 MS
EKG QTC CALCULATION (BAZETT): 414 MS
EKG R AXIS: 44 DEGREES
EKG R AXIS: 44 DEGREES
EKG T AXIS: 62 DEGREES
EKG T AXIS: 63 DEGREES
EKG VENTRICULAR RATE: 74 BPM
EKG VENTRICULAR RATE: 77 BPM

## 2025-02-25 PROCEDURE — 93010 ELECTROCARDIOGRAM REPORT: CPT | Performed by: INTERNAL MEDICINE

## 2025-03-04 ENCOUNTER — OFFICE VISIT (OUTPATIENT)
Dept: NEUROSURGERY | Age: 40
End: 2025-03-04
Payer: COMMERCIAL

## 2025-03-04 VITALS
HEIGHT: 67 IN | SYSTOLIC BLOOD PRESSURE: 128 MMHG | DIASTOLIC BLOOD PRESSURE: 92 MMHG | BODY MASS INDEX: 35.53 KG/M2 | WEIGHT: 226.4 LBS | HEART RATE: 88 BPM

## 2025-03-04 DIAGNOSIS — R20.2 PARESTHESIA OF UPPER EXTREMITY: ICD-10-CM

## 2025-03-04 DIAGNOSIS — M79.7 FIBROMYALGIA: ICD-10-CM

## 2025-03-04 DIAGNOSIS — M54.12 CERVICAL RADICULOPATHY: Primary | ICD-10-CM

## 2025-03-04 DIAGNOSIS — Z98.890 S/P CERVICAL DISC REPLACEMENT: ICD-10-CM

## 2025-03-04 PROCEDURE — G8427 DOCREV CUR MEDS BY ELIG CLIN: HCPCS | Performed by: NURSE PRACTITIONER

## 2025-03-04 PROCEDURE — G8417 CALC BMI ABV UP PARAM F/U: HCPCS | Performed by: NURSE PRACTITIONER

## 2025-03-04 PROCEDURE — 99214 OFFICE O/P EST MOD 30 MIN: CPT | Performed by: NURSE PRACTITIONER

## 2025-03-04 PROCEDURE — 1036F TOBACCO NON-USER: CPT | Performed by: NURSE PRACTITIONER

## 2025-03-04 RX ORDER — OXYCODONE AND ACETAMINOPHEN 5; 325 MG/1; MG/1
TABLET ORAL
COMMUNITY
Start: 2025-03-03

## 2025-03-04 NOTE — PROGRESS NOTES
ensure the accuracy of this automated transcription, some errors in transcription may have occurred.

## 2025-03-10 DIAGNOSIS — G89.29 CHRONIC RIGHT-SIDED LOW BACK PAIN WITH RIGHT-SIDED SCIATICA: ICD-10-CM

## 2025-03-10 DIAGNOSIS — M54.41 CHRONIC RIGHT-SIDED LOW BACK PAIN WITH RIGHT-SIDED SCIATICA: ICD-10-CM

## 2025-03-11 RX ORDER — PREGABALIN 200 MG/1
200 CAPSULE ORAL 3 TIMES DAILY
Qty: 90 CAPSULE | Refills: 2 | Status: SHIPPED | OUTPATIENT
Start: 2025-03-11 | End: 2025-06-09

## 2025-03-26 ENCOUNTER — TELEPHONE (OUTPATIENT)
Dept: NEUROSURGERY | Age: 40
End: 2025-03-26

## 2025-03-26 ENCOUNTER — HOSPITAL ENCOUNTER (OUTPATIENT)
Age: 40
Setting detail: SPECIMEN
Discharge: HOME | End: 2025-03-26

## 2025-03-26 ENCOUNTER — OFFICE VISIT (OUTPATIENT)
Dept: PRIMARY CARE CLINIC | Age: 40
End: 2025-03-26
Payer: COMMERCIAL

## 2025-03-26 VITALS
TEMPERATURE: 97.6 F | WEIGHT: 224 LBS | BODY MASS INDEX: 35.16 KG/M2 | SYSTOLIC BLOOD PRESSURE: 135 MMHG | OXYGEN SATURATION: 100 % | HEART RATE: 92 BPM | DIASTOLIC BLOOD PRESSURE: 88 MMHG | HEIGHT: 67 IN

## 2025-03-26 DIAGNOSIS — L85.3 DRY SKIN: ICD-10-CM

## 2025-03-26 DIAGNOSIS — Z91.89 AT HIGH RISK FOR BREAST CANCER: ICD-10-CM

## 2025-03-26 DIAGNOSIS — N89.8 ITCHING IN THE VAGINAL AREA: ICD-10-CM

## 2025-03-26 DIAGNOSIS — G95.9 CERVICAL MYELOPATHY (HCC): ICD-10-CM

## 2025-03-26 DIAGNOSIS — R35.0 URINATION FREQUENCY: ICD-10-CM

## 2025-03-26 DIAGNOSIS — N89.8 ITCHING IN THE VAGINAL AREA: Primary | ICD-10-CM

## 2025-03-26 DIAGNOSIS — Z13.1 SCREENING FOR DIABETES MELLITUS: ICD-10-CM

## 2025-03-26 PROCEDURE — G8417 CALC BMI ABV UP PARAM F/U: HCPCS | Performed by: NURSE PRACTITIONER

## 2025-03-26 PROCEDURE — 1036F TOBACCO NON-USER: CPT | Performed by: NURSE PRACTITIONER

## 2025-03-26 PROCEDURE — G8427 DOCREV CUR MEDS BY ELIG CLIN: HCPCS | Performed by: NURSE PRACTITIONER

## 2025-03-26 PROCEDURE — 99214 OFFICE O/P EST MOD 30 MIN: CPT | Performed by: NURSE PRACTITIONER

## 2025-03-26 SDOH — ECONOMIC STABILITY: FOOD INSECURITY: WITHIN THE PAST 12 MONTHS, YOU WORRIED THAT YOUR FOOD WOULD RUN OUT BEFORE YOU GOT MONEY TO BUY MORE.: NEVER TRUE

## 2025-03-26 SDOH — ECONOMIC STABILITY: FOOD INSECURITY: WITHIN THE PAST 12 MONTHS, THE FOOD YOU BOUGHT JUST DIDN'T LAST AND YOU DIDN'T HAVE MONEY TO GET MORE.: NEVER TRUE

## 2025-03-26 ASSESSMENT — PATIENT HEALTH QUESTIONNAIRE - PHQ9: DEPRESSION UNABLE TO ASSESS: PT REFUSES

## 2025-03-26 NOTE — TELEPHONE ENCOUNTER
Spoke with [facility], patient is scheduled for trigger point injections on 04/03/25.

## 2025-03-26 NOTE — PROGRESS NOTES
[address] [facility] [address] [facility] [address]   4/2/2025    Dorothea Hastings is a 39 y.o. female who presents today for her medical conditions and/or complaints as noted below.    Dorothea Hastings is scheduled today for Urinary Tract Infection    HPI:     History of Present Illness  The patient is a 39-year-old female who presents for evaluation of UTI symptoms, breast discoloration, and pain management.    She reports experiencing dysuria, particularly during her menstrual cycle. She has a history of urinary tract infections, with the most recent episode occurring in 09/2024, which was managed with medication. Uncertainty is expressed regarding the recurrence of symptoms, possibly due to inadequate hydration or other factors. Sharp, intermittent pain in her side is reported, attributed to straining secondary to constipation from oxycodone use. Sexual activity has been abstinent for over a year, and no vaginal discharge is reported. Previous treatment for UTI included Bactrim and Pyridium.    Biweekly injections for pain management have not been effective. She tries not to use her walker but needs to go get it because it took her a long time to get into this building today. An upcoming appointment with Dr. harrison is scheduled for 04/03/2025. A slipped disc in her lower back has been diagnosed, for which surgery was performed. Despite this, pain radiating down her arm continues. Tegretol has been prescribed by her neurologist, whom she last saw on 02/26/2025. All nerve medications are taken regularly, but oxycodone does not alleviate her sharp pain. Sporadic flare-ups of shooting pain occur even when adhering to her medication regimen. A prolonged hospital stay was recently required due to these flare-ups. Heat application for pain relief has been tried, but ice cannot be used due to a burning sensation on her skin. She has been advised to have a nidia placed in her neck and lower spine but is

## 2025-03-26 NOTE — TELEPHONE ENCOUNTER
Attempted contacting patient to confirm appointment scheduled with Dr. Aquino on 4/3/25.  Left voicemail requesting a call back to find out if she has been able to complete EMG ordered.

## 2025-03-27 LAB
BACTERIA URNS QL MICRO: ABNORMAL
BILIRUB UR QL STRIP: NEGATIVE
CASTS #/AREA URNS LPF: ABNORMAL /LPF (ref 0–8)
CLARITY UR: CLEAR
COLOR UR: YELLOW
EPI CELLS #/AREA URNS HPF: ABNORMAL /HPF (ref 0–5)
GLUCOSE UR STRIP-MCNC: NEGATIVE MG/DL
HGB UR QL STRIP.AUTO: NEGATIVE
KETONES UR STRIP-MCNC: NEGATIVE MG/DL
LEUKOCYTE ESTERASE UR QL STRIP: ABNORMAL
MICROORGANISM SPEC CULT: NORMAL
NITRITE UR QL STRIP: NEGATIVE
PH UR STRIP: 7 [PH] (ref 5–8)
PROT UR STRIP-MCNC: NEGATIVE MG/DL
RBC #/AREA URNS HPF: ABNORMAL /HPF (ref 0–4)
SERVICE CMNT-IMP: NORMAL
SP GR UR STRIP: 1.01 (ref 1–1.03)
SPECIMEN DESCRIPTION: NORMAL
UROBILINOGEN UR STRIP-ACNC: NORMAL EU/DL (ref 0–1)
WBC #/AREA URNS HPF: ABNORMAL /HPF (ref 0–5)

## 2025-03-28 ENCOUNTER — RESULTS FOLLOW-UP (OUTPATIENT)
Dept: FAMILY MEDICINE CLINIC | Age: 40
End: 2025-03-28

## 2025-04-04 ENCOUNTER — TELEMEDICINE (OUTPATIENT)
Dept: NEUROSURGERY | Age: 40
End: 2025-04-04
Payer: COMMERCIAL

## 2025-04-04 ENCOUNTER — TELEPHONE (OUTPATIENT)
Dept: NEUROSURGERY | Age: 40
End: 2025-04-04

## 2025-04-04 DIAGNOSIS — M54.12 CERVICAL RADICULOPATHY: Primary | ICD-10-CM

## 2025-04-04 DIAGNOSIS — Z98.890 S/P CERVICAL DISC REPLACEMENT: ICD-10-CM

## 2025-04-04 PROCEDURE — G8427 DOCREV CUR MEDS BY ELIG CLIN: HCPCS | Performed by: NEUROLOGICAL SURGERY

## 2025-04-04 PROCEDURE — 99214 OFFICE O/P EST MOD 30 MIN: CPT | Performed by: NEUROLOGICAL SURGERY

## 2025-04-04 NOTE — PROGRESS NOTES
please re-schedule the visit: Yes, I confirm.       Total time spent on this encounter:  20    --Kenia Aquino, DO on 4/4/2025     An electronic signature was used to authenticate this note.

## 2025-05-22 ENCOUNTER — OFFICE VISIT (OUTPATIENT)
Dept: NEUROSURGERY | Age: 40
End: 2025-05-22
Payer: COMMERCIAL

## 2025-05-22 VITALS
HEART RATE: 101 BPM | WEIGHT: 232.2 LBS | HEIGHT: 67 IN | BODY MASS INDEX: 36.44 KG/M2 | SYSTOLIC BLOOD PRESSURE: 130 MMHG | OXYGEN SATURATION: 96 % | DIASTOLIC BLOOD PRESSURE: 96 MMHG

## 2025-05-22 DIAGNOSIS — Z98.890 S/P CERVICAL DISC REPLACEMENT: ICD-10-CM

## 2025-05-22 DIAGNOSIS — M54.12 CERVICAL RADICULOPATHY: ICD-10-CM

## 2025-05-22 DIAGNOSIS — G56.21 ULNAR NEUROPATHY AT ELBOW OF RIGHT UPPER EXTREMITY: ICD-10-CM

## 2025-05-22 DIAGNOSIS — G44.86 CERVICOGENIC HEADACHE: Primary | ICD-10-CM

## 2025-05-22 PROCEDURE — 99214 OFFICE O/P EST MOD 30 MIN: CPT | Performed by: NEUROLOGICAL SURGERY

## 2025-05-22 PROCEDURE — G8417 CALC BMI ABV UP PARAM F/U: HCPCS | Performed by: NEUROLOGICAL SURGERY

## 2025-05-22 PROCEDURE — G8427 DOCREV CUR MEDS BY ELIG CLIN: HCPCS | Performed by: NEUROLOGICAL SURGERY

## 2025-05-22 PROCEDURE — 1036F TOBACCO NON-USER: CPT | Performed by: NEUROLOGICAL SURGERY

## 2025-05-22 NOTE — PROGRESS NOTES
Department of Neurosurgery                                                      Follow up visit      History Obtained From:  patient    CHIEF COMPLAINT:         Chief Complaint   Patient presents with    Cervical radiculopathy     1 month follow up        HISTORY OF PRESENT ILLNESS:       The patient is a 39 y.o. female who presents for follow up for Cervical radiculopathy, paraesthesia upper extremity, fibromyalgia, s/p cubital tunnel release right on 06/29/2022 for right cubital syndrome, and  L3-4 microdiscectomy left on 04/27/2024 for a herniation of intervertebral disc at L3-L4 level by Dr. Art olvera.     The patient reports receiving a right anterior middle scalene U/S guided trigger point injection with Dr. Kent on April 3, 2025. Which were painful to her for one week. However, she experienced  two days of pain relief from her right trapezius area. Once she began using her arm again the pain returned. It used to be intermittent pain, but is now persistent. Now, her left thumb and pointer finger have been experiencing pain and \"getting stuck\", she denies shooting pain. Previously no left arm symptoms.    The patient has been experiencing right hand pain and tremors. Some episodes are so severe she it affects her ADLs. She denies undergoing therapy. Therapy made her symptoms more painful, including sharp shooting pain. She also experiences pain that shoots into her right palm, pain is worse in her thumb and index fingers. She endorses a constant tingling sensation. Intermittent sharp shooting pain. No feeling in her elbow, which she hits a lot, then it will cause sharp shooting pain to her right hand.    The patient reports neck pain in the middle of her neck which is painful to touch. Lifting her arms, holding items, and gripping objects exacerbate her pain. Neck pain causes her to experience breakthrough headaches, her right eye will water. Her headaches may be triggered by stretching her

## 2025-05-27 NOTE — PROGRESS NOTES
[address] [facility] [address]  [place] [address]  P: [phone]  F: [phone]    NEUROLOGY CLINIC NOTE     PATIENT NAME: Dorothea Hastings  PATIENT MRN: [id number]  PRIMARY CARE PHYSICIAN: El Kat APRN - CNP    HPI:      Dorothea Hastings is a 39-year-old female presenting to [facility] care for complex chronic pain management and post-surgical spine conditions. She has an extensive history including cervical radiculopathy, cervical myelopathy, fibromyalgia, post-laminectomy syndrome, ulnar neuropathy s/p cubital tunnel release (June 2022), L3-4 lumbar microdiscectomy (April 2024), and cervical disc arthroplasty at C5-6 (April 2021). She continues to experience persistent right-sided sciatica, right upper extremity neuropathic pain, and facial trigeminal neuralgia, along with new symptoms of left leg sciatica and left shoulder pain. Her pain is described as constant, sharp, and electric, significantly interfering with her activities of daily living and work performance. Despite multiple interventions, including medication adjustments and interventional procedures, her pain remains poorly controlled.  She is currently taking Lyrica 300 mg twice daily, Cymbalta 60 mg daily, Tegretol 300 mg twice daily, amitriptyline 25 mg at night, and oxycodone-acetaminophen 5-325 mg as needed for severe pain. She has trialed various therapies including physical therapy, nerve blocks, and fluoroscopy-guided injections with limited or short-term relief. Notably, she experienced two days of relief following a right scalene trigger point injection but developed recurrent pain with arm use. She has undergone multiple imaging studies showing mild spondylosis and myelomalacia at C6 but no significant spinal stenosis. An EMG was mildly abnormal, showing possible bilateral S1 radiculopathies without ongoing denervation.  Her current care plan includes continuing her

## 2025-05-28 ENCOUNTER — OFFICE VISIT (OUTPATIENT)
Dept: NEUROLOGY | Age: 40
End: 2025-05-28

## 2025-05-28 VITALS
HEIGHT: 67 IN | WEIGHT: 235.1 LBS | BODY MASS INDEX: 36.9 KG/M2 | DIASTOLIC BLOOD PRESSURE: 85 MMHG | SYSTOLIC BLOOD PRESSURE: 137 MMHG | HEART RATE: 109 BPM

## 2025-05-28 DIAGNOSIS — G99.2 STENOSIS OF CERVICAL SPINE WITH MYELOPATHY (HCC): ICD-10-CM

## 2025-05-28 DIAGNOSIS — M54.41 CHRONIC LOW BACK PAIN WITH RIGHT-SIDED SCIATICA, UNSPECIFIED BACK PAIN LATERALITY: Primary | ICD-10-CM

## 2025-05-28 DIAGNOSIS — M51.16 LUMBAR DISC HERNIATION WITH RADICULOPATHY: ICD-10-CM

## 2025-05-28 DIAGNOSIS — G43.409 HEMIPLEGIC MIGRAINE WITHOUT STATUS MIGRAINOSUS, NOT INTRACTABLE: ICD-10-CM

## 2025-05-28 DIAGNOSIS — G44.86 CERVICOGENIC HEADACHE: ICD-10-CM

## 2025-05-28 DIAGNOSIS — M96.1 LUMBAR POST-LAMINECTOMY SYNDROME: ICD-10-CM

## 2025-05-28 DIAGNOSIS — M48.02 STENOSIS OF CERVICAL SPINE WITH MYELOPATHY (HCC): ICD-10-CM

## 2025-05-28 DIAGNOSIS — G89.29 CHRONIC RIGHT-SIDED LOW BACK PAIN WITH RIGHT-SIDED SCIATICA: ICD-10-CM

## 2025-05-28 DIAGNOSIS — M54.12 CERVICAL RADICULOPATHY: ICD-10-CM

## 2025-05-28 DIAGNOSIS — M54.41 CHRONIC RIGHT-SIDED LOW BACK PAIN WITH RIGHT-SIDED SCIATICA: ICD-10-CM

## 2025-05-28 DIAGNOSIS — G89.29 CHRONIC LOW BACK PAIN WITH RIGHT-SIDED SCIATICA, UNSPECIFIED BACK PAIN LATERALITY: Primary | ICD-10-CM

## 2025-05-28 RX ORDER — GABAPENTIN 100 MG/1
300 CAPSULE ORAL 3 TIMES DAILY
Qty: 270 CAPSULE | Refills: 3 | Status: SHIPPED | OUTPATIENT
Start: 2025-05-28 | End: 2025-09-25

## 2025-05-28 RX ORDER — PRAZOSIN HYDROCHLORIDE 2 MG/1
2 CAPSULE ORAL
COMMUNITY
Start: 2025-05-22

## 2025-05-28 RX ORDER — DULOXETIN HYDROCHLORIDE 60 MG/1
60 CAPSULE, DELAYED RELEASE ORAL DAILY
COMMUNITY

## 2025-05-28 RX ORDER — PRAZOSIN HYDROCHLORIDE 1 MG/1
1 CAPSULE ORAL 2 TIMES DAILY
COMMUNITY
Start: 2025-05-22

## 2025-05-28 RX ORDER — TOPIRAMATE 50 MG/1
100 TABLET, FILM COATED ORAL 2 TIMES DAILY
Qty: 60 TABLET | Refills: 3 | Status: SHIPPED | OUTPATIENT
Start: 2025-05-28 | End: 2025-05-29

## 2025-05-29 ENCOUNTER — TELEPHONE (OUTPATIENT)
Dept: NEUROLOGY | Age: 40
End: 2025-05-29

## 2025-05-29 DIAGNOSIS — G43.409 HEMIPLEGIC MIGRAINE WITHOUT STATUS MIGRAINOSUS, NOT INTRACTABLE: ICD-10-CM

## 2025-05-29 RX ORDER — TOPIRAMATE 50 MG/1
100 TABLET, FILM COATED ORAL 2 TIMES DAILY
Qty: 120 TABLET | Refills: 2 | Status: CANCELLED | OUTPATIENT
Start: 2025-05-29

## 2025-05-29 RX ORDER — TOPIRAMATE 50 MG/1
100 TABLET, FILM COATED ORAL 2 TIMES DAILY
Qty: 120 TABLET | Refills: 5 | Status: SHIPPED | OUTPATIENT
Start: 2025-05-29

## 2025-05-29 NOTE — TELEPHONE ENCOUNTER
[facility] called in stating for her medication TOPAMAX Take 2 tablets by mouth 2 times daily. Quantity needs to be 120 60 tablets is only 15 days.     Order pended correctly below

## 2025-07-19 ENCOUNTER — HOSPITAL ENCOUNTER (OUTPATIENT)
Age: 40
Setting detail: OBSERVATION
Discharge: HOME | End: 2025-07-21
Attending: EMERGENCY MEDICINE | Admitting: EMERGENCY MEDICINE
Payer: COMMERCIAL

## 2025-07-19 ENCOUNTER — APPOINTMENT (OUTPATIENT)
Dept: GENERAL RADIOLOGY | Age: 40
End: 2025-07-19
Payer: COMMERCIAL

## 2025-07-19 ENCOUNTER — APPOINTMENT (OUTPATIENT)
Dept: CT IMAGING | Age: 40
End: 2025-07-19
Payer: COMMERCIAL

## 2025-07-19 DIAGNOSIS — R29.810 FACIAL DROOP: ICD-10-CM

## 2025-07-19 DIAGNOSIS — M54.12 CERVICAL RADICULOPATHY: ICD-10-CM

## 2025-07-19 DIAGNOSIS — E07.9 THYROID LESION: ICD-10-CM

## 2025-07-19 DIAGNOSIS — M54.12 CERVICAL RADICULAR PAIN: Primary | ICD-10-CM

## 2025-07-19 PROBLEM — M54.10 RADICULOPATHY: Status: ACTIVE | Noted: 2025-07-19

## 2025-07-19 LAB
ANION GAP SERPL CALCULATED.3IONS-SCNC: 12 MMOL/L (ref 9–16)
BASOPHILS # BLD: 0.07 K/UL (ref 0–0.2)
BASOPHILS NFR BLD: 1 % (ref 0–2)
BUN SERPL-MCNC: 13 MG/DL (ref 6–20)
CALCIUM SERPL-MCNC: 8.8 MG/DL (ref 8.6–10.4)
CHLORIDE SERPL-SCNC: 103 MMOL/L (ref 98–107)
CO2 SERPL-SCNC: 23 MMOL/L (ref 20–31)
CREAT SERPL-MCNC: 0.9 MG/DL (ref 0.6–0.9)
EOSINOPHIL # BLD: 0.24 K/UL (ref 0–0.44)
EOSINOPHILS RELATIVE PERCENT: 3 % (ref 1–4)
ERYTHROCYTE [DISTWIDTH] IN BLOOD BY AUTOMATED COUNT: 14.3 % (ref 11.8–14.4)
GFR, ESTIMATED: 83 ML/MIN/1.73M2
GLUCOSE SERPL-MCNC: 86 MG/DL (ref 74–99)
HCT VFR BLD AUTO: 40.2 % (ref 36.3–47.1)
HGB BLD-MCNC: 13.3 G/DL (ref 11.9–15.1)
IMM GRANULOCYTES # BLD AUTO: 0.03 K/UL (ref 0–0.3)
IMM GRANULOCYTES NFR BLD: 0 %
LYMPHOCYTES NFR BLD: 2.95 K/UL (ref 1.1–3.7)
LYMPHOCYTES RELATIVE PERCENT: 33 % (ref 24–43)
MCH RBC QN AUTO: 29.8 PG (ref 25.2–33.5)
MCHC RBC AUTO-ENTMCNC: 33.1 G/DL (ref 28.4–34.8)
MCV RBC AUTO: 89.9 FL (ref 82.6–102.9)
MONOCYTES NFR BLD: 0.7 K/UL (ref 0.1–1.2)
MONOCYTES NFR BLD: 8 % (ref 3–12)
NEUTROPHILS NFR BLD: 55 % (ref 36–65)
NEUTS SEG NFR BLD: 5.1 K/UL (ref 1.5–8.1)
NRBC BLD-RTO: 0 PER 100 WBC
PLATELET # BLD AUTO: 369 K/UL (ref 138–453)
PMV BLD AUTO: 10.5 FL (ref 8.1–13.5)
POTASSIUM SERPL-SCNC: 3.7 MMOL/L (ref 3.7–5.3)
RBC # BLD AUTO: 4.47 M/UL (ref 3.95–5.11)
SODIUM SERPL-SCNC: 138 MMOL/L (ref 136–145)
TROPONIN I SERPL HS-MCNC: <6 NG/L (ref 0–14)
WBC OTHER # BLD: 9.1 K/UL (ref 3.5–11.3)

## 2025-07-19 PROCEDURE — 6370000000 HC RX 637 (ALT 250 FOR IP)

## 2025-07-19 PROCEDURE — 84484 ASSAY OF TROPONIN QUANT: CPT

## 2025-07-19 PROCEDURE — 96374 THER/PROPH/DIAG INJ IV PUSH: CPT

## 2025-07-19 PROCEDURE — 70450 CT HEAD/BRAIN W/O DYE: CPT

## 2025-07-19 PROCEDURE — 70496 CT ANGIOGRAPHY HEAD: CPT

## 2025-07-19 PROCEDURE — 96375 TX/PRO/DX INJ NEW DRUG ADDON: CPT

## 2025-07-19 PROCEDURE — 6360000002 HC RX W HCPCS

## 2025-07-19 PROCEDURE — 72125 CT NECK SPINE W/O DYE: CPT

## 2025-07-19 PROCEDURE — 80048 BASIC METABOLIC PNL TOTAL CA: CPT

## 2025-07-19 PROCEDURE — 85025 COMPLETE CBC W/AUTO DIFF WBC: CPT

## 2025-07-19 PROCEDURE — 93005 ELECTROCARDIOGRAM TRACING: CPT

## 2025-07-19 PROCEDURE — 99285 EMERGENCY DEPT VISIT HI MDM: CPT

## 2025-07-19 PROCEDURE — 99205 OFFICE O/P NEW HI 60 MIN: CPT | Performed by: PSYCHIATRY & NEUROLOGY

## 2025-07-19 PROCEDURE — 6360000004 HC RX CONTRAST MEDICATION

## 2025-07-19 PROCEDURE — G0378 HOSPITAL OBSERVATION PER HR: HCPCS

## 2025-07-19 PROCEDURE — 71046 X-RAY EXAM CHEST 2 VIEWS: CPT

## 2025-07-19 RX ORDER — KETOROLAC TROMETHAMINE 30 MG/ML
30 INJECTION, SOLUTION INTRAMUSCULAR; INTRAVENOUS ONCE
Status: COMPLETED | OUTPATIENT
Start: 2025-07-19 | End: 2025-07-19

## 2025-07-19 RX ORDER — KETOROLAC TROMETHAMINE 15 MG/ML
15 INJECTION, SOLUTION INTRAMUSCULAR; INTRAVENOUS ONCE
Status: COMPLETED | OUTPATIENT
Start: 2025-07-19 | End: 2025-07-19

## 2025-07-19 RX ORDER — GABAPENTIN 300 MG/1
300 CAPSULE ORAL ONCE
Status: COMPLETED | OUTPATIENT
Start: 2025-07-19 | End: 2025-07-19

## 2025-07-19 RX ORDER — METHOCARBAMOL 500 MG/1
750 TABLET, FILM COATED ORAL ONCE
Status: COMPLETED | OUTPATIENT
Start: 2025-07-19 | End: 2025-07-19

## 2025-07-19 RX ORDER — ORPHENADRINE CITRATE 30 MG/ML
60 INJECTION INTRAMUSCULAR; INTRAVENOUS ONCE
Status: COMPLETED | OUTPATIENT
Start: 2025-07-19 | End: 2025-07-19

## 2025-07-19 RX ORDER — LIDOCAINE 4 G/G
1 PATCH TOPICAL DAILY
Status: DISCONTINUED | OUTPATIENT
Start: 2025-07-19 | End: 2025-07-21 | Stop reason: HOSPADM

## 2025-07-19 RX ORDER — ACETAMINOPHEN 500 MG
1000 TABLET ORAL ONCE
Status: COMPLETED | OUTPATIENT
Start: 2025-07-19 | End: 2025-07-19

## 2025-07-19 RX ORDER — IOPAMIDOL 755 MG/ML
75 INJECTION, SOLUTION INTRAVASCULAR
Status: COMPLETED | OUTPATIENT
Start: 2025-07-19 | End: 2025-07-19

## 2025-07-19 RX ORDER — DIAZEPAM 5 MG/1
5 TABLET ORAL ONCE
Status: COMPLETED | OUTPATIENT
Start: 2025-07-19 | End: 2025-07-19

## 2025-07-19 RX ORDER — DEXAMETHASONE SODIUM PHOSPHATE 10 MG/ML
10 INJECTION, SOLUTION INTRAMUSCULAR; INTRAVENOUS ONCE
Status: COMPLETED | OUTPATIENT
Start: 2025-07-19 | End: 2025-07-19

## 2025-07-19 RX ADMIN — KETOROLAC TROMETHAMINE 15 MG: 15 INJECTION, SOLUTION INTRAMUSCULAR; INTRAVENOUS at 23:08

## 2025-07-19 RX ADMIN — HYDROMORPHONE HYDROCHLORIDE 0.5 MG: 1 INJECTION, SOLUTION INTRAMUSCULAR; INTRAVENOUS; SUBCUTANEOUS at 21:18

## 2025-07-19 RX ADMIN — ACETAMINOPHEN 1000 MG: 500 TABLET ORAL at 16:48

## 2025-07-19 RX ADMIN — GABAPENTIN 300 MG: 300 CAPSULE ORAL at 23:07

## 2025-07-19 RX ADMIN — KETOROLAC TROMETHAMINE 30 MG: 30 INJECTION, SOLUTION INTRAMUSCULAR at 16:50

## 2025-07-19 RX ADMIN — DEXAMETHASONE SODIUM PHOSPHATE 10 MG: 10 INJECTION INTRAMUSCULAR; INTRAVENOUS at 23:11

## 2025-07-19 RX ADMIN — DIAZEPAM 5 MG: 5 TABLET ORAL at 17:51

## 2025-07-19 RX ADMIN — IOPAMIDOL 75 ML: 755 INJECTION, SOLUTION INTRAVENOUS at 19:14

## 2025-07-19 RX ADMIN — ORPHENADRINE CITRATE 60 MG: 60 INJECTION INTRAMUSCULAR; INTRAVENOUS at 23:08

## 2025-07-19 RX ADMIN — METHOCARBAMOL 750 MG: 500 TABLET ORAL at 16:48

## 2025-07-19 ASSESSMENT — LIFESTYLE VARIABLES
HOW MANY STANDARD DRINKS CONTAINING ALCOHOL DO YOU HAVE ON A TYPICAL DAY: PATIENT DOES NOT DRINK
HOW OFTEN DO YOU HAVE A DRINK CONTAINING ALCOHOL: NEVER

## 2025-07-19 ASSESSMENT — PAIN - FUNCTIONAL ASSESSMENT: PAIN_FUNCTIONAL_ASSESSMENT: 0-10

## 2025-07-19 ASSESSMENT — PAIN SCALES - GENERAL
PAINLEVEL_OUTOF10: 3
PAINLEVEL_OUTOF10: 7
PAINLEVEL_OUTOF10: 8

## 2025-07-19 ASSESSMENT — PAIN DESCRIPTION - LOCATION: LOCATION: NECK

## 2025-07-19 ASSESSMENT — PAIN DESCRIPTION - ORIENTATION: ORIENTATION: RIGHT

## 2025-07-19 NOTE — CONSULTS
Department of Neurology/Telestroke/Stroke  Resident Consult Note  Stroke Alert @ 7:01pm  Arrival at bedside @ 7:04pm    Reason for Consult:  ED Stroke Consult  Requesting Physician:  Ricardo Jacobo MD  Endovascular Neurosurgeon:   Arthur Smith MD    Stroke Team:  Arthur Smith MD      History Obtained From:  patient, electronic medical record    CHIEF COMPLAINT:       Chest pain radiating to right hand along with heaviness and right leg pain.    HISTORY OF PRESENT ILLNESS:       The patient is a 39 y.o. female with past medical history of cervical myelopathy, neuropathic pain, trigeminal neuralgia on the right side who presents chest pain with worsening of right arm pain and right leg pain which started when patient was driving a car and turned her head to the right side to look back.  Patient stated that today at 2:15 PM, patient was driving car and when she was turning her head to the right side she experienced sudden worsening of her right arm pain which is also associated with some chest pain on the right side and radiating to the fingertips on the right side.  Patient also complained of heaviness and some weakness in the right side of the entire arm.  Patient also stated that she has back pain associated with sharp radiating pain towards her right leg which also feels heavy and weak.  Patient denied losing consciousness while this happened.  She stopped the car and and came to the ED for further workup.  Patient stated that she had difficulty to walk after this happened.  Patient also stated that she had some headache.  Patient denied difficulty in speech.  Patient denied any history of seizures.  Patient stated that she is well compliant with the home medications regarding her cervical neck issues which are long-term.  Patient stated that this episode was worse and she had to come in.  Stroke alert was called in ED due to these reasons.  Initial NIH was 3, likely due to chronic weakness and neuropathy issues.

## 2025-07-20 ENCOUNTER — APPOINTMENT (OUTPATIENT)
Dept: MRI IMAGING | Age: 40
End: 2025-07-20
Payer: COMMERCIAL

## 2025-07-20 LAB
ANION GAP SERPL CALCULATED.3IONS-SCNC: 11 MMOL/L (ref 9–16)
BASOPHILS # BLD: <0.03 K/UL (ref 0–0.2)
BASOPHILS NFR BLD: 0 % (ref 0–2)
BUN SERPL-MCNC: 14 MG/DL (ref 6–20)
CALCIUM SERPL-MCNC: 8.7 MG/DL (ref 8.6–10.4)
CHLORIDE SERPL-SCNC: 107 MMOL/L (ref 98–107)
CO2 SERPL-SCNC: 17 MMOL/L (ref 20–31)
CREAT SERPL-MCNC: 0.7 MG/DL (ref 0.6–0.9)
EOSINOPHIL # BLD: <0.03 K/UL (ref 0–0.44)
EOSINOPHILS RELATIVE PERCENT: 0 % (ref 1–4)
ERYTHROCYTE [DISTWIDTH] IN BLOOD BY AUTOMATED COUNT: 14.3 % (ref 11.8–14.4)
GFR, ESTIMATED: >90 ML/MIN/1.73M2
GLUCOSE SERPL-MCNC: 147 MG/DL (ref 74–99)
HCT VFR BLD AUTO: 41.5 % (ref 36.3–47.1)
HGB BLD-MCNC: 13.3 G/DL (ref 11.9–15.1)
IMM GRANULOCYTES # BLD AUTO: <0.03 K/UL (ref 0–0.3)
IMM GRANULOCYTES NFR BLD: 0 %
LYMPHOCYTES NFR BLD: 1.05 K/UL (ref 1.1–3.7)
LYMPHOCYTES RELATIVE PERCENT: 15 % (ref 24–43)
MCH RBC QN AUTO: 29.4 PG (ref 25.2–33.5)
MCHC RBC AUTO-ENTMCNC: 32 G/DL (ref 28.4–34.8)
MCV RBC AUTO: 91.6 FL (ref 82.6–102.9)
MONOCYTES NFR BLD: 0.13 K/UL (ref 0.1–1.2)
MONOCYTES NFR BLD: 2 % (ref 3–12)
NEUTROPHILS NFR BLD: 83 % (ref 36–65)
NEUTS SEG NFR BLD: 5.84 K/UL (ref 1.5–8.1)
NRBC BLD-RTO: 0 PER 100 WBC
PLATELET # BLD AUTO: 362 K/UL (ref 138–453)
PMV BLD AUTO: 10.9 FL (ref 8.1–13.5)
POTASSIUM SERPL-SCNC: 5.3 MMOL/L (ref 3.7–5.3)
RBC # BLD AUTO: 4.53 M/UL (ref 3.95–5.11)
SODIUM SERPL-SCNC: 135 MMOL/L (ref 136–145)
WBC OTHER # BLD: 7.1 K/UL (ref 3.5–11.3)

## 2025-07-20 PROCEDURE — 6370000000 HC RX 637 (ALT 250 FOR IP)

## 2025-07-20 PROCEDURE — G0378 HOSPITAL OBSERVATION PER HR: HCPCS

## 2025-07-20 PROCEDURE — 2500000003 HC RX 250 WO HCPCS

## 2025-07-20 PROCEDURE — 36415 COLL VENOUS BLD VENIPUNCTURE: CPT

## 2025-07-20 PROCEDURE — 6360000002 HC RX W HCPCS

## 2025-07-20 PROCEDURE — 96375 TX/PRO/DX INJ NEW DRUG ADDON: CPT

## 2025-07-20 PROCEDURE — 96372 THER/PROPH/DIAG INJ SC/IM: CPT

## 2025-07-20 PROCEDURE — 72141 MRI NECK SPINE W/O DYE: CPT

## 2025-07-20 PROCEDURE — 85025 COMPLETE CBC W/AUTO DIFF WBC: CPT

## 2025-07-20 PROCEDURE — 80048 BASIC METABOLIC PNL TOTAL CA: CPT

## 2025-07-20 PROCEDURE — 96376 TX/PRO/DX INJ SAME DRUG ADON: CPT

## 2025-07-20 RX ORDER — ENOXAPARIN SODIUM 100 MG/ML
30 INJECTION SUBCUTANEOUS 2 TIMES DAILY
Status: DISCONTINUED | OUTPATIENT
Start: 2025-07-20 | End: 2025-07-21 | Stop reason: HOSPADM

## 2025-07-20 RX ORDER — ONDANSETRON 2 MG/ML
4 INJECTION INTRAMUSCULAR; INTRAVENOUS EVERY 6 HOURS PRN
Status: DISCONTINUED | OUTPATIENT
Start: 2025-07-20 | End: 2025-07-21 | Stop reason: HOSPADM

## 2025-07-20 RX ORDER — ACETAMINOPHEN 325 MG/1
650 TABLET ORAL EVERY 6 HOURS PRN
Status: DISCONTINUED | OUTPATIENT
Start: 2025-07-20 | End: 2025-07-21 | Stop reason: HOSPADM

## 2025-07-20 RX ORDER — SODIUM CHLORIDE 0.9 % (FLUSH) 0.9 %
5-40 SYRINGE (ML) INJECTION PRN
Status: DISCONTINUED | OUTPATIENT
Start: 2025-07-20 | End: 2025-07-21 | Stop reason: HOSPADM

## 2025-07-20 RX ORDER — ORPHENADRINE CITRATE 30 MG/ML
60 INJECTION INTRAMUSCULAR; INTRAVENOUS EVERY 12 HOURS PRN
Status: DISCONTINUED | OUTPATIENT
Start: 2025-07-20 | End: 2025-07-21 | Stop reason: HOSPADM

## 2025-07-20 RX ORDER — OXYCODONE HYDROCHLORIDE 5 MG/1
5 TABLET ORAL EVERY 4 HOURS PRN
Status: DISCONTINUED | OUTPATIENT
Start: 2025-07-20 | End: 2025-07-20

## 2025-07-20 RX ORDER — FAMOTIDINE 20 MG/1
20 TABLET, FILM COATED ORAL 2 TIMES DAILY
Status: DISCONTINUED | OUTPATIENT
Start: 2025-07-20 | End: 2025-07-21 | Stop reason: HOSPADM

## 2025-07-20 RX ORDER — DULOXETIN HYDROCHLORIDE 30 MG/1
60 CAPSULE, DELAYED RELEASE ORAL DAILY
Status: DISCONTINUED | OUTPATIENT
Start: 2025-07-20 | End: 2025-07-21 | Stop reason: HOSPADM

## 2025-07-20 RX ORDER — OXYCODONE HYDROCHLORIDE 5 MG/1
10 TABLET ORAL EVERY 4 HOURS PRN
Refills: 0 | Status: DISCONTINUED | OUTPATIENT
Start: 2025-07-20 | End: 2025-07-21 | Stop reason: HOSPADM

## 2025-07-20 RX ORDER — POTASSIUM CHLORIDE 7.45 MG/ML
10 INJECTION INTRAVENOUS PRN
Status: DISCONTINUED | OUTPATIENT
Start: 2025-07-20 | End: 2025-07-21 | Stop reason: HOSPADM

## 2025-07-20 RX ORDER — CARBAMAZEPINE 100 MG/1
200 TABLET, EXTENDED RELEASE ORAL 2 TIMES DAILY
Status: DISCONTINUED | OUTPATIENT
Start: 2025-07-20 | End: 2025-07-21 | Stop reason: HOSPADM

## 2025-07-20 RX ORDER — KETOROLAC TROMETHAMINE 15 MG/ML
15 INJECTION, SOLUTION INTRAMUSCULAR; INTRAVENOUS EVERY 6 HOURS PRN
Status: DISCONTINUED | OUTPATIENT
Start: 2025-07-20 | End: 2025-07-21 | Stop reason: HOSPADM

## 2025-07-20 RX ORDER — VILAZODONE HYDROCHLORIDE 40 MG/1
20 TABLET ORAL DAILY
Status: DISCONTINUED | OUTPATIENT
Start: 2025-07-20 | End: 2025-07-21 | Stop reason: HOSPADM

## 2025-07-20 RX ORDER — MAGNESIUM SULFATE IN WATER 40 MG/ML
2000 INJECTION, SOLUTION INTRAVENOUS PRN
Status: DISCONTINUED | OUTPATIENT
Start: 2025-07-20 | End: 2025-07-21 | Stop reason: HOSPADM

## 2025-07-20 RX ORDER — SODIUM CHLORIDE 0.9 % (FLUSH) 0.9 %
5-40 SYRINGE (ML) INJECTION EVERY 12 HOURS SCHEDULED
Status: DISCONTINUED | OUTPATIENT
Start: 2025-07-20 | End: 2025-07-21 | Stop reason: HOSPADM

## 2025-07-20 RX ORDER — GABAPENTIN 300 MG/1
300 CAPSULE ORAL 3 TIMES DAILY
Status: DISCONTINUED | OUTPATIENT
Start: 2025-07-20 | End: 2025-07-21 | Stop reason: HOSPADM

## 2025-07-20 RX ORDER — LORAZEPAM 1 MG/1
1 TABLET ORAL ONCE
Status: COMPLETED | OUTPATIENT
Start: 2025-07-20 | End: 2025-07-20

## 2025-07-20 RX ORDER — POLYETHYLENE GLYCOL 3350 17 G/17G
17 POWDER, FOR SOLUTION ORAL DAILY PRN
Status: DISCONTINUED | OUTPATIENT
Start: 2025-07-20 | End: 2025-07-21 | Stop reason: HOSPADM

## 2025-07-20 RX ORDER — POTASSIUM CHLORIDE 1500 MG/1
40 TABLET, EXTENDED RELEASE ORAL PRN
Status: DISCONTINUED | OUTPATIENT
Start: 2025-07-20 | End: 2025-07-21 | Stop reason: HOSPADM

## 2025-07-20 RX ORDER — ONDANSETRON 4 MG/1
4 TABLET, ORALLY DISINTEGRATING ORAL EVERY 8 HOURS PRN
Status: DISCONTINUED | OUTPATIENT
Start: 2025-07-20 | End: 2025-07-21 | Stop reason: HOSPADM

## 2025-07-20 RX ORDER — PRAZOSIN HYDROCHLORIDE 1 MG/1
1 CAPSULE ORAL 2 TIMES DAILY
Status: DISCONTINUED | OUTPATIENT
Start: 2025-07-20 | End: 2025-07-21 | Stop reason: HOSPADM

## 2025-07-20 RX ORDER — ACETAMINOPHEN 650 MG/1
650 SUPPOSITORY RECTAL EVERY 6 HOURS PRN
Status: DISCONTINUED | OUTPATIENT
Start: 2025-07-20 | End: 2025-07-21 | Stop reason: HOSPADM

## 2025-07-20 RX ORDER — SODIUM CHLORIDE 9 MG/ML
INJECTION, SOLUTION INTRAVENOUS PRN
Status: DISCONTINUED | OUTPATIENT
Start: 2025-07-20 | End: 2025-07-21 | Stop reason: HOSPADM

## 2025-07-20 RX ADMIN — LORAZEPAM 1 MG: 1 TABLET ORAL at 12:42

## 2025-07-20 RX ADMIN — GABAPENTIN 300 MG: 300 CAPSULE ORAL at 20:32

## 2025-07-20 RX ADMIN — DULOXETINE 60 MG: 30 CAPSULE, DELAYED RELEASE ORAL at 07:59

## 2025-07-20 RX ADMIN — POLYETHYLENE GLYCOL 3350 17 G: 17 POWDER, FOR SOLUTION ORAL at 20:25

## 2025-07-20 RX ADMIN — ENOXAPARIN SODIUM 30 MG: 100 INJECTION SUBCUTANEOUS at 00:55

## 2025-07-20 RX ADMIN — FAMOTIDINE 20 MG: 20 TABLET, FILM COATED ORAL at 08:00

## 2025-07-20 RX ADMIN — OXYCODONE 5 MG: 5 TABLET ORAL at 02:42

## 2025-07-20 RX ADMIN — FAMOTIDINE 20 MG: 20 TABLET, FILM COATED ORAL at 20:32

## 2025-07-20 RX ADMIN — ENOXAPARIN SODIUM 30 MG: 100 INJECTION SUBCUTANEOUS at 07:59

## 2025-07-20 RX ADMIN — OXYCODONE 5 MG: 5 TABLET ORAL at 12:06

## 2025-07-20 RX ADMIN — VILAZODONE HYDROCHLORIDE 20 MG: 40 TABLET ORAL at 08:00

## 2025-07-20 RX ADMIN — ENOXAPARIN SODIUM 30 MG: 100 INJECTION SUBCUTANEOUS at 20:26

## 2025-07-20 RX ADMIN — PRAZOSIN HYDROCHLORIDE 1 MG: 1 CAPSULE ORAL at 00:55

## 2025-07-20 RX ADMIN — ACETAMINOPHEN 650 MG: 325 TABLET ORAL at 09:45

## 2025-07-20 RX ADMIN — KETOROLAC TROMETHAMINE 15 MG: 15 INJECTION, SOLUTION INTRAMUSCULAR; INTRAVENOUS at 20:27

## 2025-07-20 RX ADMIN — KETOROLAC TROMETHAMINE 15 MG: 15 INJECTION, SOLUTION INTRAMUSCULAR; INTRAVENOUS at 07:32

## 2025-07-20 RX ADMIN — HYDROMORPHONE HYDROCHLORIDE 0.5 MG: 1 INJECTION, SOLUTION INTRAMUSCULAR; INTRAVENOUS; SUBCUTANEOUS at 09:49

## 2025-07-20 RX ADMIN — Medication 3 MG: at 20:32

## 2025-07-20 RX ADMIN — SODIUM CHLORIDE, PRESERVATIVE FREE 10 ML: 5 INJECTION INTRAVENOUS at 22:20

## 2025-07-20 RX ADMIN — CARBAMAZEPINE 200 MG: 100 TABLET, EXTENDED RELEASE ORAL at 00:54

## 2025-07-20 RX ADMIN — PRAZOSIN HYDROCHLORIDE 1 MG: 1 CAPSULE ORAL at 08:00

## 2025-07-20 RX ADMIN — GABAPENTIN 300 MG: 300 CAPSULE ORAL at 12:05

## 2025-07-20 RX ADMIN — CARBAMAZEPINE 200 MG: 100 TABLET, EXTENDED RELEASE ORAL at 08:00

## 2025-07-20 RX ADMIN — Medication 3 MG: at 00:54

## 2025-07-20 RX ADMIN — HYDROMORPHONE HYDROCHLORIDE 0.5 MG: 1 INJECTION, SOLUTION INTRAMUSCULAR; INTRAVENOUS; SUBCUTANEOUS at 22:17

## 2025-07-20 RX ADMIN — SODIUM CHLORIDE, PRESERVATIVE FREE 10 ML: 5 INJECTION INTRAVENOUS at 08:01

## 2025-07-20 RX ADMIN — GABAPENTIN 300 MG: 300 CAPSULE ORAL at 07:59

## 2025-07-20 RX ADMIN — FAMOTIDINE 20 MG: 20 TABLET, FILM COATED ORAL at 00:55

## 2025-07-20 RX ADMIN — SODIUM CHLORIDE, PRESERVATIVE FREE 10 ML: 5 INJECTION INTRAVENOUS at 20:27

## 2025-07-20 RX ADMIN — PRAZOSIN HYDROCHLORIDE 1 MG: 1 CAPSULE ORAL at 20:32

## 2025-07-20 RX ADMIN — OXYCODONE 10 MG: 5 TABLET ORAL at 18:20

## 2025-07-20 RX ADMIN — HYDROMORPHONE HYDROCHLORIDE 0.5 MG: 1 INJECTION, SOLUTION INTRAMUSCULAR; INTRAVENOUS; SUBCUTANEOUS at 16:56

## 2025-07-20 RX ADMIN — CARBAMAZEPINE 200 MG: 100 TABLET, EXTENDED RELEASE ORAL at 20:32

## 2025-07-20 ASSESSMENT — PAIN SCALES - WONG BAKER
WONGBAKER_NUMERICALRESPONSE: NO HURT

## 2025-07-20 ASSESSMENT — PAIN DESCRIPTION - LOCATION: LOCATION: NECK;ARM

## 2025-07-20 ASSESSMENT — PAIN DESCRIPTION - ONSET: ONSET: ON-GOING

## 2025-07-20 ASSESSMENT — PAIN DESCRIPTION - PAIN TYPE: TYPE: ACUTE PAIN

## 2025-07-20 ASSESSMENT — PAIN DESCRIPTION - DESCRIPTORS
DESCRIPTORS: SHARP
DESCRIPTORS: SHARP;SHOOTING
DESCRIPTORS: SHARP;SHOOTING;TINGLING
DESCRIPTORS: SHARP;SHOOTING;TINGLING;NUMBNESS

## 2025-07-20 ASSESSMENT — PAIN SCALES - GENERAL
PAINLEVEL_OUTOF10: 7
PAINLEVEL_OUTOF10: 7
PAINLEVEL_OUTOF10: 8
PAINLEVEL_OUTOF10: 5
PAINLEVEL_OUTOF10: 7
PAINLEVEL_OUTOF10: 0
PAINLEVEL_OUTOF10: 7
PAINLEVEL_OUTOF10: 5
PAINLEVEL_OUTOF10: 8
PAINLEVEL_OUTOF10: 5
PAINLEVEL_OUTOF10: 5

## 2025-07-20 ASSESSMENT — PAIN DESCRIPTION - ORIENTATION
ORIENTATION: RIGHT;UPPER;LOWER
ORIENTATION: RIGHT
ORIENTATION: RIGHT;LOWER;UPPER
ORIENTATION: RIGHT

## 2025-07-20 ASSESSMENT — PAIN DESCRIPTION - FREQUENCY: FREQUENCY: CONTINUOUS

## 2025-07-20 NOTE — PLAN OF CARE
Problem: Discharge Planning  Goal: Discharge to home or other facility with appropriate resources  7/20/2025 0854 by Miriam Wolfe, RN  Outcome: Progressing  7/20/2025 0248 by Minna Wheat RN  Outcome: Progressing     Problem: Pain  Goal: Verbalizes/displays adequate comfort level or baseline comfort level  7/20/2025 0854 by Miriam Wolfe, RN  Outcome: Progressing  7/20/2025 0248 by Minna Wheat RN  Outcome: Progressing     Problem: Safety - Adult  Goal: Free from fall injury  7/20/2025 0854 by Miriam Wolfe, RN  Outcome: Progressing  7/20/2025 0248 by Minna Wheat, RN  Outcome: Progressing

## 2025-07-20 NOTE — PROGRESS NOTES
[facility]     Emergency/Trauma Note    PATIENT NAME: Dorothea Hastings    Shift date: 7/19/2025  Shift day: Saturday   Shift # 2    Room # 34/34   Name: Dorothea Hastings            Age: 39 y.o.  Gender: female          Hinduism: Non-Cheondoism   Place of Roman Catholic: Unknown    Trauma/Incident type: Stroke Alert  Admit Date & Time: 7/19/2025  4:24 PM  TRAUMA NAME: N/A    ADVANCE DIRECTIVES IN CHART?  No    NAME OF DECISION MAKER: Eliceo Hastings [phone]    RELATIONSHIP OF DECISION MAKER TO PATIENT: Spouse/    PATIENT/EVENT DESCRIPTION:  Dorothea Hastings is a 39 y.o. female who arrived from as a stroke alert. Pt to be admitted to 34/34.         SPIRITUAL ASSESSMENT-INTERVENTION-OUTCOME:  Pt is nn-Jew who expressed no concerns or need for spiritual health. Pt was alert and calm. Pts daughter at bedside. Writer offered support in future in wanted. Pt expressed gratitude fro visit.      PATIENT BELONGINGS:  Writer did not handle belongings    ANY BELONGINGS OF SIGNIFICANT VALUE NOTED:  Unknown    REGISTRATION STAFF NOTIFIED?  No      WHAT IS YOUR SPIRITUAL CARE PLAN FOR THIS PATIENT?:   Spiritual care available 24/7 via perfect serve    Electronically signed by Roberto Gauthier,Chaplain Resident, on 7/19/2025 at 8:17 PM.  [place]  [phone]

## 2025-07-20 NOTE — ED NOTES
CT completed, pt back to ED room 34. Pt placed on full cardiac monitor, call light within reach.  Pt is alert and oriented x4, speaking in complete sentences.   
Pt presents to ED with c/o right sided chest, shoulder and arm pain after turning to grab something in the back seat of her car.   Pt reports it happened an hour prior to coming in.  Pt reports seeing a doctor for cervical issues.  Patient alert and oriented x4, talking in complete sentences. Respirations even and unlabored. Call light in reach, all needs met at this time.      
Pt removed self from continuous pulse ox. Pt educated on importance of remaining on full telemetry and pulse ox. Pt verbalized understanding. Pt placed back on continuous pulse ox.   
Pt to CT   
Report given to AUBREE Johnson. All questions answered.   
Report received from AUBREE Guallpa. All questions addressed.   
Spoke with Dr. Silva regarding c-collar or c-spine precautions. Per, Dr. Silva, not necessary at this time.   
The following labs were labeled with appropriate pt sticker and tubed to lab:     [x] Blue     [x] Lavender   [] on ice  [x] Green/yellow  [x] Green/black [] on ice  [] Grey  [] on ice  [] Yellow  [] Red  [] Pink  [] Type/ Screen  [] ABG  [] VBG    [] COVID-19 swab    [] Rapid  [] PCR  [] Flu swab  [] Peds Viral Panel     [] Urine Sample  [] Fecal Sample  [] Pelvic Cultures  [] Blood Cultures  [] X 2  [] STREP Cultures  [] Wound Cultures    
Writer entered ED room to check on pt, pt is tearful and c/o pain in neck following neurosurgery assessment.   
Financial Resource Strain (CARDIA)     Difficulty of Paying Living Expenses: Not hard at all   Food Insecurity: No Food Insecurity (5/7/2025)    Received from [facility]    Hunger Vital Sign     Within the past 12 months, you worried that your food would run out before you got the money to buy more.: Never true   Transportation Needs: No Transportation Needs (5/7/2025)    Received from [facility]    Transportation     In the past 12 months, has lack of transportation kept you from medical appointments or from getting medications?: No   Stress: Stress Concern Present (4/30/2024)    [facility] Occupational Health - Occupational Stress Questionnaire     Feeling of Stress : To some extent   Intimate Partner Violence: Unknown (5/7/2025)    Received from [facility]    Humiliation, Afraid, Rape, and Kick questionnaire     Fear of Current or Ex-Partner: No   Housing Stability: Low Risk  (5/7/2025)    Received from [facility]    Housing Stability Vital Sign     In the last 12 months, was there a time when you were not able to pay the mortgage or rent on time?: No     At any time in the past 12 months, were you homeless or living in a shelter (including now)?: No       FAMILY HISTORY       Family History   Problem Relation Age of Onset    Diabetes Mother     High Blood Pressure Mother     Other Mother         Tremor    Seizures Mother     Heart Disease Father     Hypertension Father     Cancer Sister         Lymphoblastic lymphoma    Diabetes Maternal Uncle     Heart Attack Maternal Grandmother     Diabetes Maternal Grandmother     Kidney Disease Maternal Grandmother     Breast Cancer Paternal Aunt        ALLERGIES     Bee venom and Doxycycline    CURRENT MEDICATIONS       Previous Medications    ACETAMINOPHEN (AMINOFEN) 325 MG TABLET    Take 2 tablets by mouth every 4 hours as needed for Pain    CARBAMAZEPINE (TEGRETOL XR) 200 MG EXTENDED RELEASE TABLET

## 2025-07-20 NOTE — PROGRESS NOTES
Took over care of patient, resting quietly with eyes closed breathing easy. Call light within reach

## 2025-07-20 NOTE — PLAN OF CARE
--  NO ACUTE NEUROSURGICAL INTERVENTION AT THIS TIME    NEUROSURGERY TO SIGN OFF     Please contact Neurosurgery with any questions.    PATIENT TO FOLLOW UP IN CLINIC: follow up with Dr. Aquino. Appointment already made  ---  Follow-up with Neurosurgery  [facility]  [facility] [facility] [address])  [address]  Call [phone] for an appointment.  --

## 2025-07-20 NOTE — CONSULTS
Department of Neurosurgery                                             Resident Consult Note      Reason for Consult:  \"Patient of Dr. Aquino, previous cervical operation, presents with right neck, right pectoral and right posterior scapular pain and stiffness as well as right arm heaviness and facial weakness after turning head in the car\"  Requesting Physician:  Dr. Silva  Neurosurgeon:   Dr. Michele Aquino  x Dr. Artur Kennedy    Neurosurgery notified of consult 1947  Neurosurgery arrival to bedside @2330    History Obtained From:  patient, ED resident, chart review    CHIEF COMPLAINT:         Chief Complaint   Patient presents with    Neck Pain     Turned head quickly to right  and immediately felt numbness to right neck and also sharp pains that radiate to middle of fingers     Headache       HISTORY OF PRESENT ILLNESS:       The patient is a 39 y.o. female past medical history of cervical myelopathy with radiculopathy, previous C5-C6 arthroplasty by Dr. Aquino, chronic back pain, right trigeminal neuralgia, migraine who presents with acute worsening of her chronic right-sided radiculopathy symptoms.  Patient reports she was driving her car when she turned her head to the right and had a sudden sharp pain in the right side of her neck and had some numbness radiating to the arm and fingers.  Patient does have baseline paresthesias and weakness to the right arm because of her history as above and is actually scheduled to have a revision surgery with Dr. Aquino next month.  She has had no new trauma to the area.  No vision changes.  Does complain of a headache.  No nausea or vomiting.    Patient was seen as a stroke alert in the emergency department and was given an NIH of 3 for sensory loss, right upper extremity and right lower extremity drift.  Stroke team ultimately signed off after negative imaging.  CT cervical spine does not show any acute injury or abnormality.  Patient ultimately admitted to

## 2025-07-20 NOTE — CARE COORDINATION
Case Management Assessment  Initial Evaluation    Date/Time of Evaluation: 7/20/2025 10:47 AM  Assessment Completed by: ERICKA HAM RN    If patient is discharged prior to next notation, then this note serves as note for discharge by case management.    Patient Name: Dorothea Hastings                   YOB: 1985  Diagnosis: Facial droop [R29.810]  Radiculopathy [M54.10]  Cervical radicular pain [M54.12]  Thyroid lesion [E07.9]                   Date / Time: 7/19/2025  4:24 PM    Patient Admission Status: Observation   Readmission Risk (Low < 19, Mod (19-27), High > 27): No data recorded  Current PCP: El Kat APRN - CNP  PCP verified by CM? Yes    Chart Reviewed: Yes      History Provided by: Patient  Patient Orientation: Alert and Oriented    Patient Cognition: Alert    Hospitalization in the last 30 days (Readmission):  No    If yes, Readmission Assessment in CM Navigator will be completed.    Advance Directives:      Code Status: Full Code   Patient's Primary Decision Maker is:        Discharge Planning:    Patient lives with: Spouse/Significant Other, Children Type of Home: House  Primary Care Giver: Self  Patient Support Systems include: Children   Current Financial resources:    Current community resources:    Current services prior to admission: None            Current DME:              Type of Home Care services:  OT, PT, Aide Services    ADLS  Prior functional level: Independent in ADLs/IADLs  Current functional level: Independent in ADLs/IADLs    PT AM-PAC:   /24  OT AM-PAC:   /24    Family can provide assistance at DC: No  Would you like Case Management to discuss the discharge plan with any other family members/significant others, and if so, who? No  Plans to Return to Present Housing: Yes  Other Identified Issues/Barriers to RETURNING to current housing: none  Potential Assistance needed at discharge: Outpatient PT/OT, Home Care            Potential DME:    Patient

## 2025-07-20 NOTE — ED PROVIDER NOTES
[facility] EMERGENCY DEPARTMENT  Emergency Department Encounter  Emergency Medicine Resident     Pt Name:Dorothea Hastings  MRN: [id number]  Birthdate 1985  Date of evaluation: 7/19/25  PCP:  El Kat APRN - CNP  Note Started: 4:37 PM EDT      CHIEF COMPLAINT       Chief Complaint   Patient presents with    Neck Pain     Turned head quickly to right  and immediately felt numbness to right neck and also sharp pains that radiate to middle of fingers     Headache       HISTORY OF PRESENT ILLNESS  (Location/Symptom, Timing/Onset, Context/Setting, Quality, Duration, Modifying Factors, Severity.)      Dorothea Hastings is a 39 y.o. female with significant pertinent history of previous neurological surgery to the cervical spine, cervical disc replacement, had MRI of the brachial plexus on the right side done in October 2024 showing no acute abnormalities, CT of the cervical spine performed 2/21/2025 with no acute abnormality who presents with pain after turning her head to the right side, no trauma, and began to feel a tightness on the right side of her neck as well as pain along the anterior portion of the chest along the breast as well as the posterior portion along the scapula.    Patient states that this happened about an hour or so ago, now she feels that her head is caught and she has significant pain and stiffness to the right side of the neck as well as the aforementioned areas.    Has no shortness of breath, no chest pains, no lightheadedness or dizziness, no vision changes, no focal neurologic deficits, does state that she is pain shooting from the neck all the way down to her to fingers, middle finger and ring finger.    Review of systems otherwise reassuring for no acute process    PAST MEDICAL / SURGICAL / SOCIAL / FAMILY HISTORY      has a past medical history of Anxiety, Blood loss, Cervical disc disease, Cervical myopathy, Chronic back pain, GERD (gastroesophageal reflux 
     [facility]     Emergency Department     Faculty Note/ Attestation      Pt Name: Dorothea Hastings                                       MRN: [id number]  Birthdate 1985  Date of evaluation: 7/19/2025    Patients PCP:    El Kat APRN - CNP    Note Started: 5:21 PM EDT      Attestation  I performed a history and physical examination of the patient and discussed management with the resident. I reviewed the resident’s note and agree with the documented findings and plan of care. Any areas of disagreement are noted on the chart. I was personally present for the key portions of any procedures. I have documented in the chart those procedures where I was not present during the key portions. I have reviewed the emergency nurses triage note. I agree with the chief complaint, past medical history, past surgical history, allergies, medications, social and family history as documented unless otherwise noted below.    For Physician Assistant/ Nurse Practitioner cases/documentation I have personally evaluated this patient and have completed at least one if not all key elements of the E/M (history, physical exam, and MDM). Additional findings are as noted.      Initial Screens:        Amauri Coma Scale  Eye Opening: Spontaneous  Best Verbal Response: Oriented  Best Motor Response: Obeys commands  [place]gate Coma Scale Score: 15    Vitals:    Vitals:    07/19/25 1612   BP: (!) 128/91   Pulse: 86   Resp: 16   Temp: 98.2 °F (36.8 °C)   SpO2: 100%       CHIEF COMPLAINT       Chief Complaint   Patient presents with    Neck Pain     Turned head quickly to right  and immediately felt numbness to right neck and also sharp pains that radiate to middle of fingers     Headache             DIAGNOSTIC RESULTS             RADIOLOGY:   XR CHEST (2 VW)   Final Result   No acute cardiopulmonary process.               LABS:  Labs Reviewed   CBC WITH AUTO DIFFERENTIAL   BASIC METABOLIC PANEL   TROPONIN 
CONTRAST  IMPRESSION:  No convincing acute intracranial abnormality.   [AS]   2116 Patient reevaluated reports worsening pain in the chest wall when exhaling radiating up to the neck and down the right arm.  Will give Dilaudid as patient has had Tylenol Toradol Robaxin, Valium and lidocaine patch. [AS]   2140 CT CERVICAL SPINE WO CONTRAST  IMPRESSION:  No acute abnormality of the cervical spine.     Large bridging osteophyte C4-5 and disc spacer at C5-6.     Calcified right thyroid lesion.  Recommend follow-up nonemergent thyroid  ultrasound.   [AS]   2255 Discussed with neurosurgery, patient's pain is still uncontrolled, will admit to observation for pain control and neurosurgical evaluation in the morning.  Patient is agreeable for plan [AS]      ED Course User Index  [AS] Abby Ferrer DO  [WH] Donnell Silva MD       OUTSTANDING TASKS / RECOMMENDATIONS:    Reevaluation  Neurosurgery recommendations  Possible discharge     FINAL IMPRESSION:     1. Cervical radicular pain    2. Facial droop    3. Thyroid lesion        DISPOSITION:         DISPOSITION:  []  Discharge   []  Transfer -    []  Admission -     []  Against Medical Advice   []  Eloped   FOLLOW-UP: No follow-up provider specified.   DISCHARGE MEDICATIONS: New Prescriptions    No medications on file           Abby Ferrer DO  Emergency Medicine Resident  [facility]

## 2025-07-21 VITALS
TEMPERATURE: 97.9 F | HEART RATE: 70 BPM | RESPIRATION RATE: 18 BRPM | SYSTOLIC BLOOD PRESSURE: 111 MMHG | OXYGEN SATURATION: 99 % | HEIGHT: 67 IN | BODY MASS INDEX: 36.88 KG/M2 | DIASTOLIC BLOOD PRESSURE: 85 MMHG | WEIGHT: 235 LBS

## 2025-07-21 LAB
ANION GAP SERPL CALCULATED.3IONS-SCNC: 9 MMOL/L (ref 9–16)
BASOPHILS # BLD: 0.08 K/UL (ref 0–0.2)
BASOPHILS NFR BLD: 1 % (ref 0–2)
BUN SERPL-MCNC: 15 MG/DL (ref 6–20)
CALCIUM SERPL-MCNC: 8.3 MG/DL (ref 8.6–10.4)
CHLORIDE SERPL-SCNC: 107 MMOL/L (ref 98–107)
CO2 SERPL-SCNC: 22 MMOL/L (ref 20–31)
CREAT SERPL-MCNC: 0.7 MG/DL (ref 0.6–0.9)
EKG ATRIAL RATE: 79 BPM
EKG P AXIS: 43 DEGREES
EKG P-R INTERVAL: 188 MS
EKG Q-T INTERVAL: 352 MS
EKG QRS DURATION: 80 MS
EKG QTC CALCULATION (BAZETT): 403 MS
EKG R AXIS: 12 DEGREES
EKG T AXIS: 52 DEGREES
EKG VENTRICULAR RATE: 79 BPM
EOSINOPHIL # BLD: 0.1 K/UL (ref 0–0.44)
EOSINOPHILS RELATIVE PERCENT: 1 % (ref 1–4)
ERYTHROCYTE [DISTWIDTH] IN BLOOD BY AUTOMATED COUNT: 14.4 % (ref 11.8–14.4)
GFR, ESTIMATED: >90 ML/MIN/1.73M2
GLUCOSE SERPL-MCNC: 98 MG/DL (ref 74–99)
HCT VFR BLD AUTO: 37 % (ref 36.3–47.1)
HGB BLD-MCNC: 11.8 G/DL (ref 11.9–15.1)
IMM GRANULOCYTES # BLD AUTO: <0.03 K/UL (ref 0–0.3)
IMM GRANULOCYTES NFR BLD: 0 %
LYMPHOCYTES NFR BLD: 3.78 K/UL (ref 1.1–3.7)
LYMPHOCYTES RELATIVE PERCENT: 36 % (ref 24–43)
MCH RBC QN AUTO: 29.1 PG (ref 25.2–33.5)
MCHC RBC AUTO-ENTMCNC: 31.9 G/DL (ref 28.4–34.8)
MCV RBC AUTO: 91.4 FL (ref 82.6–102.9)
MONOCYTES NFR BLD: 0.73 K/UL (ref 0.1–1.2)
MONOCYTES NFR BLD: 7 % (ref 3–12)
NEUTROPHILS NFR BLD: 55 % (ref 36–65)
NEUTS SEG NFR BLD: 5.78 K/UL (ref 1.5–8.1)
NRBC BLD-RTO: 0 PER 100 WBC
PLATELET # BLD AUTO: 303 K/UL (ref 138–453)
PMV BLD AUTO: 10.7 FL (ref 8.1–13.5)
POTASSIUM SERPL-SCNC: 3.9 MMOL/L (ref 3.7–5.3)
RBC # BLD AUTO: 4.05 M/UL (ref 3.95–5.11)
SODIUM SERPL-SCNC: 138 MMOL/L (ref 136–145)
WBC OTHER # BLD: 10.5 K/UL (ref 3.5–11.3)

## 2025-07-21 PROCEDURE — 93010 ELECTROCARDIOGRAM REPORT: CPT | Performed by: INTERNAL MEDICINE

## 2025-07-21 PROCEDURE — 96375 TX/PRO/DX INJ NEW DRUG ADDON: CPT

## 2025-07-21 PROCEDURE — 6360000002 HC RX W HCPCS

## 2025-07-21 PROCEDURE — 6370000000 HC RX 637 (ALT 250 FOR IP)

## 2025-07-21 PROCEDURE — 36415 COLL VENOUS BLD VENIPUNCTURE: CPT

## 2025-07-21 PROCEDURE — G0378 HOSPITAL OBSERVATION PER HR: HCPCS

## 2025-07-21 PROCEDURE — 96376 TX/PRO/DX INJ SAME DRUG ADON: CPT

## 2025-07-21 PROCEDURE — 96372 THER/PROPH/DIAG INJ SC/IM: CPT

## 2025-07-21 PROCEDURE — 80048 BASIC METABOLIC PNL TOTAL CA: CPT

## 2025-07-21 PROCEDURE — 85025 COMPLETE CBC W/AUTO DIFF WBC: CPT

## 2025-07-21 PROCEDURE — 2500000003 HC RX 250 WO HCPCS

## 2025-07-21 RX ORDER — OXYCODONE AND ACETAMINOPHEN 5; 325 MG/1; MG/1
1 TABLET ORAL EVERY 6 HOURS PRN
Qty: 28 TABLET | Refills: 0 | Status: SHIPPED | OUTPATIENT
Start: 2025-07-21 | End: 2025-07-21 | Stop reason: HOSPADM

## 2025-07-21 RX ORDER — OXYCODONE AND ACETAMINOPHEN 7.5; 325 MG/1; MG/1
1 TABLET ORAL EVERY 6 HOURS PRN
Qty: 28 TABLET | Refills: 0 | Status: SHIPPED | OUTPATIENT
Start: 2025-07-21 | End: 2025-07-28

## 2025-07-21 RX ADMIN — OXYCODONE 10 MG: 5 TABLET ORAL at 03:59

## 2025-07-21 RX ADMIN — ORPHENADRINE CITRATE 60 MG: 60 INJECTION INTRAMUSCULAR; INTRAVENOUS at 04:12

## 2025-07-21 RX ADMIN — GABAPENTIN 300 MG: 300 CAPSULE ORAL at 08:17

## 2025-07-21 RX ADMIN — OXYCODONE 10 MG: 5 TABLET ORAL at 08:15

## 2025-07-21 RX ADMIN — HYDROMORPHONE HYDROCHLORIDE 0.5 MG: 1 INJECTION, SOLUTION INTRAMUSCULAR; INTRAVENOUS; SUBCUTANEOUS at 09:45

## 2025-07-21 RX ADMIN — ENOXAPARIN SODIUM 30 MG: 100 INJECTION SUBCUTANEOUS at 08:19

## 2025-07-21 RX ADMIN — DULOXETINE 60 MG: 30 CAPSULE, DELAYED RELEASE ORAL at 08:15

## 2025-07-21 RX ADMIN — FAMOTIDINE 20 MG: 20 TABLET, FILM COATED ORAL at 08:15

## 2025-07-21 RX ADMIN — VILAZODONE HYDROCHLORIDE 20 MG: 40 TABLET ORAL at 08:16

## 2025-07-21 RX ADMIN — ACETAMINOPHEN 650 MG: 325 TABLET ORAL at 03:59

## 2025-07-21 RX ADMIN — SODIUM CHLORIDE, PRESERVATIVE FREE 10 ML: 5 INJECTION INTRAVENOUS at 08:21

## 2025-07-21 RX ADMIN — KETOROLAC TROMETHAMINE 15 MG: 15 INJECTION, SOLUTION INTRAMUSCULAR; INTRAVENOUS at 04:05

## 2025-07-21 RX ADMIN — PRAZOSIN HYDROCHLORIDE 1 MG: 1 CAPSULE ORAL at 08:17

## 2025-07-21 RX ADMIN — CARBAMAZEPINE 200 MG: 100 TABLET, EXTENDED RELEASE ORAL at 08:15

## 2025-07-21 RX ADMIN — KETOROLAC TROMETHAMINE 15 MG: 15 INJECTION, SOLUTION INTRAMUSCULAR; INTRAVENOUS at 09:46

## 2025-07-21 ASSESSMENT — PAIN SCALES - WONG BAKER
WONGBAKER_NUMERICALRESPONSE: NO HURT
WONGBAKER_NUMERICALRESPONSE: NO HURT
WONGBAKER_NUMERICALRESPONSE: HURTS LITTLE MORE
WONGBAKER_NUMERICALRESPONSE: NO HURT

## 2025-07-21 ASSESSMENT — PAIN DESCRIPTION - ORIENTATION
ORIENTATION: RIGHT;LOWER;UPPER
ORIENTATION: RIGHT;LOWER;UPPER
ORIENTATION: RIGHT
ORIENTATION: RIGHT

## 2025-07-21 ASSESSMENT — PAIN DESCRIPTION - DESCRIPTORS
DESCRIPTORS: SHOOTING
DESCRIPTORS: THROBBING;SHOOTING
DESCRIPTORS: SHOOTING;THROBBING
DESCRIPTORS: STABBING;SHOOTING

## 2025-07-21 ASSESSMENT — PAIN SCALES - GENERAL
PAINLEVEL_OUTOF10: 9
PAINLEVEL_OUTOF10: 8
PAINLEVEL_OUTOF10: 7
PAINLEVEL_OUTOF10: 8

## 2025-07-21 ASSESSMENT — PAIN DESCRIPTION - LOCATION
LOCATION: ARM
LOCATION: ARM

## 2025-07-21 ASSESSMENT — PAIN DESCRIPTION - ONSET: ONSET: AWAKENED FROM SLEEP

## 2025-07-21 ASSESSMENT — PAIN DESCRIPTION - PAIN TYPE: TYPE: ACUTE PAIN

## 2025-07-21 ASSESSMENT — PAIN DESCRIPTION - FREQUENCY: FREQUENCY: CONTINUOUS

## 2025-07-21 NOTE — DISCHARGE INSTR - COC
radiculopathy M54.14    Class 1 obesity due to excess calories with serious comorbidity in adult E66.811, E66.09    Cervical disc disease M50.90    Bunion of great toe of right foot M21.611    S/P cervical disc replacement Z98.890    Ulnar neuropathy at elbow of right upper extremity G56.21    Chronic right shoulder pain M25.511, G89.29    Cervicogenic headache G44.86    Hemiplegic migraine without status migrainosus, not intractable G43.409    Cervical myelopathy (HCC) G95.9    S/P lumbar discectomy Z98.890    Cervical spondylosis M47.812    Lower back pain M54.50    Lumbar disc herniation with radiculopathy M51.16    Intractable back pain M54.9    Lumbar post-laminectomy syndrome M96.1    Right sided sciatica M54.31    Thoracic outlet syndrome G54.0    Cervical spine instability M53.2X2    Numbness and tingling of right arm R20.0, R20.2    Radiculopathy M54.10       Isolation/Infection:   Isolation            No Isolation          Patient Infection Status    None to display              Nurse Assessment:  Last Vital Signs: /89   Pulse 83   Temp 97.9 °F (36.6 °C) (Oral)   Resp 18   Ht 1.702 m (5' 7\")   Wt 106.6 kg (235 lb)   SpO2 100%   BMI 36.81 kg/m²     Last documented pain score (0-10 scale): Pain Level: 8  Last Weight:   Wt Readings from Last 1 Encounters:   07/20/25 106.6 kg (235 lb)     Mental Status:  {IP PT MENTAL STATUS:64013}    IV Access:  { MEREDITH IV ACCESS:958817522}    Nursing Mobility/ADLs:  Walking   {P DME ADLs:032028120}  Transfer  {P DME ADLs:787653114}  Bathing  {P DME ADLs:914456041}  Dressing  {CHP DME ADLs:968529002}  Toileting  {P DME ADLs:707577774}  Feeding  {P DME ADLs:405599633}  Med Admin  {P DME ADLs:471606099}  Med Delivery   { MEREDITH MED Delivery:713827539}    Wound Care Documentation and Therapy:  Incision 06/29/22 Radial Proximal;Right (Active)   Number of days: 1117       Incision 04/27/24 Back Lower;Medial (Active)   Number of days: 449       Incision

## 2025-07-21 NOTE — PROGRESS NOTES
[facility]  CDU / OBSERVATION ENCOUNTER  ATTENDING NOTE       I performed a history and physical examination of the patient and discussed management with the resident or midlevel provider. I reviewed the resident or midlevel provider's note and agree with the documented findings and plan of care. Any areas of disagreement are noted on the chart. I was personally present for the key portions of any procedures. I have documented in the chart those procedures where I was not present during the key portions. I have reviewed the nurses notes. I agree with the chief complaint, past medical history, past surgical history, allergies, medications, social and family history as documented unless otherwise noted below.    The Family history, social history, and ROS are effectively unchanged since admission unless noted elsewhere in the chart.    This patient was placed in the observation unit for reevaluation for possible admission to the hospital    The patient is a 39-year-old female who presents for evaluation of acute on chronic right cervical radiculopathy.  She previously underwent a C5/6 arthroplasty by Dr. Aquino and later underwent a ulnar nerve decompression.  She has continued to have radiculopathy and weakness of the right upper extremity.  She also has a chronic right facial weakness.  The patient underwent a repeat MRI which is at her baseline.  Neurosurgery evaluated the patient and has signed off.  The patient suspects that she is having a flare of her chronic pain secondary to a gap in her pain management injections because of an upcoming procedure. Will continue with supportive care. The patient is requesting a rolling walker with a seat. Will place the DME order. Plan for discharge.    Dorothea Hastings was evaluated today and a DME order was entered for a wheeled walker with seat because she requires this to successfully complete daily living tasks of eating, bathing, toileting,

## 2025-07-21 NOTE — PLAN OF CARE
Problem: Discharge Planning  Goal: Discharge to home or other facility with appropriate resources  Outcome: Progressing     Problem: Pain  Goal: Verbalizes/displays adequate comfort level or baseline comfort level  Outcome: Progressing     Problem: Safety - Adult  Goal: Free from fall injury  Outcome: Progressing  Flowsheets (Taken 7/21/2025 4257)  Free From Fall Injury: Instruct family/caregiver on patient safety

## 2025-07-21 NOTE — PLAN OF CARE
Problem: Discharge Planning  Goal: Discharge to home or other facility with appropriate resources  7/21/2025 1239 by Wendi Laguerre RN  Outcome: Adequate for Discharge  7/21/2025 0429 by Connie Pozo RN  Outcome: Progressing     Problem: Pain  Goal: Verbalizes/displays adequate comfort level or baseline comfort level  7/21/2025 1239 by Wendi Laguerre RN  Outcome: Adequate for Discharge  7/21/2025 0429 by Connie Pozo RN  Outcome: Progressing     Problem: Safety - Adult  Goal: Free from fall injury  7/21/2025 1239 by Wendi Laguerre RN  Outcome: Adequate for Discharge  7/21/2025 0429 by Connie Pozo RN  Outcome: Progressing  Flowsheets (Taken 7/21/2025 0427)  Free From Fall Injury: Instruct family/caregiver on patient safety

## 2025-07-21 NOTE — DISCHARGE SUMMARY
Support Exception - unselect if not a suspected or confirmed emergency medical condition->Emergency Medical Condition (MA) Is the patient pregnant?->No Reason for Exam: concern for lid lag and facial droop on the right side, concern for stroke FINDINGS: BRAIN/VENTRICLES: There is no acute intracranial hemorrhage, mass effect or midline shift.  No abnormal extra-axial fluid collection.  The gray-white differentiation is maintained without evidence of an acute infarct.  There is no evidence of hydrocephalus. ORBITS: The visualized portion of the orbits demonstrate no acute abnormality. SINUSES: The visualized paranasal sinuses and mastoid air cells demonstrate no acute abnormality. SOFT TISSUES/SKULL:  No acute abnormality of the visualized skull or soft tissues.     No convincing acute intracranial abnormality. These results were sent to the CORE Team on 7/19/2025 at 7:48 pm to be communicated to the referring/covering health care provider/office.     CTA HEAD NECK W CONTRAST  Result Date: 7/19/2025  EXAMINATION: CTA OF THE HEAD AND NECK WITH CONTRAST 7/19/2025 7:04 pm: TECHNIQUE: CTA of the head and neck was performed with the administration of intravenous contrast. Multiplanar reformatted images are provided for review.  MIP images are provided for review. Stenosis of the internal carotid arteries measured using NASCET criteria. Automated exposure control, iterative reconstruction, and/or weight based adjustment of the mA/kV was utilized to reduce the radiation dose to as low as reasonably achievable. This scan was analyzed using Viz.ai contact LVO. Identification of suspected findings is not for diagnostic use beyond notification. Viz LVO is limited to analysis of imaging data and should not be used in-lieu of full patient evaluation or relied upon to make or confirm diagnosis.  3D reconstructed images were performed on a separate workstation and provided for review. COMPARISON: 07/31/2023. HISTORY: ORDERING

## 2025-07-21 NOTE — PROGRESS NOTES
[facility]  CDU / OBSERVATION ENCOUNTER  ATTENDING NOTE         I performed a history and physical examination of the patient and discussed management with the resident or midlevel provider. I reviewed the resident or midlevel provider's note and agree with the documented findings and plan of care. Any areas of disagreement are noted on the chart. I was personally present for the key portions of any procedures. I have documented in the chart those procedures where I was not present during the key portions. I have reviewed the nurses notes. I agree with the chief complaint, past medical history, past surgical history, allergies, medications, social and family history as documented unless otherwise noted below.    The Family history, social history, and ROS are effectively unchanged since admission unless noted elsewhere in the chart.     This patient was placed in the observation unit for reevaluation for possible admission to the hospital     Patient admitted for evaluation of radiculopathy after head turning episode earlier in the day which resulted in significant pain and exacerbation of radicular symptoms.  Patient known by neurosurgery.  Patient seen by neurosurgical service.  Patient was with neurologic deficit but this is noted to be an old deficit with acute exacerbation of pain.  Patient had MRI done which was at baseline.    Neurosurgery has seen and evaluated the patient and signed off after review of the MRI and prior neuroexams.  Patient is improving with some comfort but will require ongoing analgesics and reassessment.    Patient is at apparent neurologic baseline.  Patient for symptom relief and reevaluation in with probable discharge once symptoms controlled       Yimi Al MD  Attending Emergency  Physician

## 2025-07-21 NOTE — H&P
OBS/CDU   Progress NOTE      Patients PCP is:  El Kat, APRN - CNP        SUBJECTIVE      Dorothea Hastings is a 39 y.o. female who presents with worsening of her cervical radiculopathy.     PHYSICAL EXAM      General: NAD, AO X 3  Heent: EOMI, PERRL  Neck: SUPPLE, NO JVD  Cardiovascular: RRR, S1S2  Pulmonary: CTAB, NO SOB  Abdomen: SOFT, NTTP, ND, +BS  Extremities: +2/4 PULSES DISTAL, NO SWELLING  Neuro / Psych: NO NUMBNESS OR TINGLING, MENTATION AT BASELINE    PERTINENT TEST /EXAMS      I have reviewed all available laboratory results.    MEDICATIONS CURRENT   carBAMazepine (TEGRETOL XR) extended release tablet 200 mg, BID  DULoxetine (CYMBALTA) extended release capsule 60 mg, Daily  gabapentin (NEURONTIN) capsule 300 mg, TID  prazosin (MINIPRESS) capsule 1 mg, BID  vilazodone HCl (VIIBRYD) TABS 20 mg, Daily  famotidine (PEPCID) tablet 20 mg, BID  sodium chloride flush 0.9 % injection 5-40 mL, 2 times per day  sodium chloride flush 0.9 % injection 5-40 mL, PRN  0.9 % sodium chloride infusion, PRN  potassium chloride (KLOR-CON M) extended release tablet 40 mEq, PRN   Or  potassium bicarb-citric acid (EFFER-K) effervescent tablet 40 mEq, PRN   Or  potassium chloride 10 mEq/100 mL IVPB (Peripheral Line), PRN  magnesium sulfate 2000 mg in 50 mL IVPB premix, PRN  enoxaparin Sodium (LOVENOX) injection 30 mg, BID  ondansetron (ZOFRAN-ODT) disintegrating tablet 4 mg, Q8H PRN   Or  ondansetron (ZOFRAN) injection 4 mg, Q6H PRN  polyethylene glycol (GLYCOLAX) packet 17 g, Daily PRN  acetaminophen (TYLENOL) tablet 650 mg, Q6H PRN   Or  acetaminophen (TYLENOL) suppository 650 mg, Q6H PRN  melatonin tablet 3 mg, Nightly  ketorolac (TORADOL) injection 15 mg, Q6H PRN  HYDROmorphone (DILAUDID) injection 0.5 mg, Q4H PRN  orphenadrine (NORFLEX) injection 60 mg, Q12H PRN  oxyCODONE (ROXICODONE) immediate release tablet 10 mg, Q4H PRN  lidocaine 4 % external patch 1 patch, Daily        All medication charted and 
shift.     1. No flow limiting stenosis or dissection of the cervical carotid/vertebral arteries. 2. No significant stenosis or large vessel occlusion of the [id number].     XR CHEST (2 VW)  Result Date: 7/19/2025  EXAMINATION: TWO XRAY VIEWS OF THE CHEST 7/19/2025 5:02 pm COMPARISON: 20 September 2024 HISTORY: ORDERING SYSTEM PROVIDED HISTORY: seemingly musculoskeletal chest pain in 39 year old female, r/o acs TECHNOLOGIST PROVIDED HISTORY: seemingly musculoskeletal chest pain in 39 year old female, r/o acs FINDINGS: Two-view chest is submitted demonstrating overlying cardiac monitoring electrodes and hardware at the base of the neck.  Heart size is normal.  No vascular congestion, focal consolidation, effusion, or pneumothorax is noted. Osseous and mediastinal structures are age-appropriate.     No acute cardiopulmonary process.       LABS:  I have reviewed and interpreted all available lab results.  Labs Reviewed   BASIC METABOLIC PANEL W/ REFLEX TO MG FOR LOW K - Abnormal; Notable for the following components:       Result Value    Sodium 135 (*)     CO2 17 (*)     Glucose 147 (*)     All other components within normal limits   CBC WITH AUTO DIFFERENTIAL - Abnormal; Notable for the following components:    Neutrophils % 83 (*)     Lymphocytes % 15 (*)     Monocytes % 2 (*)     Eosinophils % 0 (*)     Lymphocytes Absolute 1.05 (*)     All other components within normal limits   CBC WITH AUTO DIFFERENTIAL   BASIC METABOLIC PANEL   TROPONIN         CDU IMPRESSION / PLAN      Dorothea Hastings is a 39 y.o. female who presents with worsening of her cervical radiculopathy.      Extensive imaging was done to rule out stroke carotid artery dissection and cervical fracture.  Based on patient's current symptoms and her history of current symptoms it appears that this is a worsening of her chronic radiculopathy.  Based on negative imaging neurosurgery does not believe there is anything they can do acutely at this

## 2025-08-01 RX ORDER — SODIUM CHLORIDE, SODIUM LACTATE, POTASSIUM CHLORIDE, CALCIUM CHLORIDE 600; 310; 30; 20 MG/100ML; MG/100ML; MG/100ML; MG/100ML
INJECTION, SOLUTION INTRAVENOUS CONTINUOUS
OUTPATIENT
Start: 2025-08-01

## 2025-08-11 ENCOUNTER — HOSPITAL ENCOUNTER (OUTPATIENT)
Dept: PREADMISSION TESTING | Age: 40
Discharge: HOME | End: 2025-08-15
Payer: COMMERCIAL

## 2025-08-11 VITALS
SYSTOLIC BLOOD PRESSURE: 119 MMHG | DIASTOLIC BLOOD PRESSURE: 92 MMHG | WEIGHT: 230 LBS | RESPIRATION RATE: 16 BRPM | BODY MASS INDEX: 36.1 KG/M2 | HEART RATE: 96 BPM | HEIGHT: 67 IN | TEMPERATURE: 98.1 F | OXYGEN SATURATION: 98 %

## 2025-08-11 LAB
ABO + RH BLD: NORMAL
ANION GAP SERPL CALCULATED.3IONS-SCNC: 10 MMOL/L (ref 9–16)
ARM BAND NUMBER: NORMAL
BACTERIA URNS QL MICRO: NORMAL
BILIRUB UR QL STRIP: NEGATIVE
BLOOD BANK SAMPLE EXPIRATION: NORMAL
BLOOD GROUP ANTIBODIES SERPL: NEGATIVE
BUN SERPL-MCNC: 9 MG/DL (ref 6–20)
CALCIUM SERPL-MCNC: 9.1 MG/DL (ref 8.6–10.4)
CASTS #/AREA URNS LPF: NORMAL /LPF (ref 0–8)
CHLORIDE SERPL-SCNC: 107 MMOL/L (ref 98–107)
CLARITY UR: CLEAR
CO2 SERPL-SCNC: 25 MMOL/L (ref 20–31)
COLOR UR: YELLOW
CREAT SERPL-MCNC: 0.8 MG/DL (ref 0.6–0.9)
EPI CELLS #/AREA URNS HPF: NORMAL /HPF (ref 0–5)
ERYTHROCYTE [DISTWIDTH] IN BLOOD BY AUTOMATED COUNT: 14.3 % (ref 11.8–14.4)
GFR, ESTIMATED: >90 ML/MIN/1.73M2
GLUCOSE SERPL-MCNC: 107 MG/DL (ref 74–99)
GLUCOSE UR STRIP-MCNC: NEGATIVE MG/DL
HCT VFR BLD AUTO: 40.3 % (ref 36.3–47.1)
HGB BLD-MCNC: 13 G/DL (ref 11.9–15.1)
HGB UR QL STRIP.AUTO: ABNORMAL
KETONES UR STRIP-MCNC: ABNORMAL MG/DL
LEUKOCYTE ESTERASE UR QL STRIP: ABNORMAL
MCH RBC QN AUTO: 29.1 PG (ref 25.2–33.5)
MCHC RBC AUTO-ENTMCNC: 32.3 G/DL (ref 28.4–34.8)
MCV RBC AUTO: 90.4 FL (ref 82.6–102.9)
NITRITE UR QL STRIP: NEGATIVE
NRBC BLD-RTO: 0 PER 100 WBC
PH UR STRIP: 5.5 [PH] (ref 5–8)
PLATELET # BLD AUTO: 340 K/UL (ref 138–453)
PMV BLD AUTO: 10.7 FL (ref 8.1–13.5)
POTASSIUM SERPL-SCNC: 4.2 MMOL/L (ref 3.7–5.3)
PROT UR STRIP-MCNC: NEGATIVE MG/DL
RBC # BLD AUTO: 4.46 M/UL (ref 3.95–5.11)
RBC #/AREA URNS HPF: NORMAL /HPF (ref 0–4)
SODIUM SERPL-SCNC: 142 MMOL/L (ref 136–145)
SP GR UR STRIP: 1.02 (ref 1–1.03)
UROBILINOGEN UR STRIP-ACNC: NORMAL EU/DL (ref 0–1)
WBC #/AREA URNS HPF: NORMAL /HPF (ref 0–5)
WBC OTHER # BLD: 6.5 K/UL (ref 3.5–11.3)

## 2025-08-11 PROCEDURE — 80048 BASIC METABOLIC PNL TOTAL CA: CPT

## 2025-08-11 PROCEDURE — 85027 COMPLETE CBC AUTOMATED: CPT

## 2025-08-11 PROCEDURE — 86901 BLOOD TYPING SEROLOGIC RH(D): CPT

## 2025-08-11 PROCEDURE — 81001 URINALYSIS AUTO W/SCOPE: CPT

## 2025-08-11 PROCEDURE — 86850 RBC ANTIBODY SCREEN: CPT

## 2025-08-11 PROCEDURE — 86900 BLOOD TYPING SEROLOGIC ABO: CPT

## 2025-08-11 PROCEDURE — 36415 COLL VENOUS BLD VENIPUNCTURE: CPT

## 2025-08-11 RX ORDER — PRAZOSIN HYDROCHLORIDE 1 MG/1
1 CAPSULE ORAL 2 TIMES DAILY
COMMUNITY

## 2025-08-11 RX ORDER — OXYCODONE AND ACETAMINOPHEN 5; 325 MG/1; MG/1
1 TABLET ORAL EVERY 4 HOURS PRN
COMMUNITY

## 2025-08-11 ASSESSMENT — PAIN DESCRIPTION - PAIN TYPE: TYPE: CHRONIC PAIN

## 2025-08-11 ASSESSMENT — PAIN DESCRIPTION - LOCATION: LOCATION: NECK

## 2025-08-11 ASSESSMENT — PAIN SCALES - GENERAL: PAINLEVEL_OUTOF10: 7

## 2025-08-11 ASSESSMENT — PAIN DESCRIPTION - FREQUENCY: FREQUENCY: CONTINUOUS

## 2025-08-11 ASSESSMENT — PAIN DESCRIPTION - DESCRIPTORS: DESCRIPTORS: TIGHTNESS;SHOOTING

## 2025-08-13 ENCOUNTER — TELEPHONE (OUTPATIENT)
Dept: NEUROSURGERY | Age: 40
End: 2025-08-13

## 2025-08-22 LAB
ABO + RH BLD: NORMAL
ARM BAND NUMBER: NORMAL
BLOOD BANK SAMPLE EXPIRATION: NORMAL
BLOOD GROUP ANTIBODIES SERPL: NEGATIVE

## (undated) DEVICE — 3M™ IOBAN™ 2 ANTIMICROBIAL INCISE DRAPE 6650EZ: Brand: IOBAN™ 2

## (undated) DEVICE — 3.0MM PRECISION NEURO (MATCH HEAD)

## (undated) DEVICE — SUTURE VICRYL SZ 2-0 L18IN ABSRB UD CT-1 L36MM 1/2 CIR J839D

## (undated) DEVICE — ADHESIVE SKIN CLSR 0.7ML TOP DERMBND ADV

## (undated) DEVICE — Z DISCONTINUED USE 2220295 SUTURE VICRYL SZ 0 L18IN ABSRB UD L36MM CT-1 1/2 CIR J840D

## (undated) DEVICE — LIQUIBAND RAPID ADHESIVE 36/CS 0.8ML: Brand: MEDLINE

## (undated) DEVICE — DISPOSABLE IRRIGATION BIPOLAR CORD, M1000 TYPE: Brand: KIRWAN

## (undated) DEVICE — BLADE ES ELASTOMERIC COAT INSUL DURABLE BEND UPTO 90DEG

## (undated) DEVICE — GLOVE ORANGE PI 7   MSG9070

## (undated) DEVICE — SUTURE PROL SZ 3-0 L30IN NONABSORBABLE BLU L26MM SH 1/2 CIR 8832H

## (undated) DEVICE — BLADE ES L4IN INSUL EDGE

## (undated) DEVICE — C-ARMOR C-ARM EQUIPMENT COVERS CLEAR STERILE UNIVERSAL FIT 12 PER CASE: Brand: C-ARMOR

## (undated) DEVICE — SPONGE,NEURO,0.5"X0.5",XR,STRL,10/PK: Brand: MEDLINE

## (undated) DEVICE — APPLICATOR MEDICATED 26 CC SOLUTION HI LT ORNG CHLORAPREP

## (undated) DEVICE — TUBING, SUCTION, 1/4" X 20', STRAIGHT: Brand: MEDLINE INDUSTRIES, INC.

## (undated) DEVICE — GOWN,AURORA,NONREINFORCED,LARGE: Brand: MEDLINE

## (undated) DEVICE — TOTAL TRAY, 16FR 10ML SIL FOLEY, URN: Brand: MEDLINE

## (undated) DEVICE — ELECTRODE PT RET AD L9FT HI MOIST COND ADH HYDRGEL CORDED

## (undated) DEVICE — SUTURE VCRL SZ 3-0 L18IN ABSRB UD L26MM SH 1/2 CIR J864D

## (undated) DEVICE — AGENT HEMOSTATIC SURGIFLOW MATRIX KIT W/THROMBIN

## (undated) DEVICE — BANDAGE,GAUZE,CONFORMING,3"X75",STRL,LF: Brand: MEDLINE

## (undated) DEVICE — SPONGE LAP W18XL18IN WHT COT 4 PLY FLD STRUNG RADPQ DISP ST 2 PER PACK

## (undated) DEVICE — STRAP ARMBRD W1.5XL32IN FOAM STR YET SFT W/ HK AND LOOP

## (undated) DEVICE — PROTECTOR ULN NRV PUR FOAM HK LOOP STRP ANATOMICALLY

## (undated) DEVICE — STOCKINETTE,DOUBLE PLY,4X48,STERILE: Brand: MEDLINE

## (undated) DEVICE — PACK PROCEDURE SURG LUMBAR SPINE SVMMC

## (undated) DEVICE — GAUZE,SPONGE,FLUFF,6"X6.75",STRL,5/TRAY: Brand: MEDLINE

## (undated) DEVICE — 3M™ STERI-STRIP™ COMPOUND BENZOIN TINCTURE 40 BAGS/CARTON 4 CARTONS/CASE C1544: Brand: 3M™ STERI-STRIP™

## (undated) DEVICE — THE STERILE LIGHT HANDLE COVER IS USED WITH STERIS SURGICAL LIGHTING AND VISUALIZATION SYSTEMS.

## (undated) DEVICE — Device

## (undated) DEVICE — NEEDLE HYPO 25GA L1.5IN BLU POLYPR HUB S STL REG BVL STR

## (undated) DEVICE — GLOVE SURG SZ 65 THK91MIL LTX FREE SYN POLYISOPRENE

## (undated) DEVICE — SYRINGE IRRIG 60ML SFT PLIABLE BLB EZ TO GRP 1 HND USE W/

## (undated) DEVICE — Device: Brand: CLASSIC-CUF

## (undated) DEVICE — DRAPE MICSCP W117XL305CM DIA68MM LENS W VARI LENS2 FOR LEICA

## (undated) DEVICE — STRAP,POSITIONING,KNEE/BODY,FOAM,4X60": Brand: MEDLINE

## (undated) DEVICE — GARMENT,MEDLINE,DVT,INT,CALF,MED, GEN2: Brand: MEDLINE

## (undated) DEVICE — DRAPE,LAP,CHOLE,W/TROUGHS,STERILE: Brand: MEDLINE

## (undated) DEVICE — STRIP,CLOSURE,WOUND,MEDI-STRIP,1/2X4: Brand: MEDLINE

## (undated) DEVICE — CATHETER IV 14GA L1.25IN TEF STR HUB INTROCAN SFTY

## (undated) DEVICE — PATIENT RETURN ELECTRODE, SINGLE-USE, CONTACT QUALITY MONITORING, ADULT, WITH 9FT CORD, FOR PATIENTS WEIGING OVER 33LBS. (15KG): Brand: MEGADYNE

## (undated) DEVICE — APPLICATOR MEDICATED 10.5 CC SOLUTION HI LT ORNG CHLORAPREP

## (undated) DEVICE — GLOVE ORANGE PI 8   MSG9080

## (undated) DEVICE — SPONGE LAP W18XL18IN WHT COT 4 PLY FLD STRUNG RADPQ DISP ST

## (undated) DEVICE — STVZ LUMBAR SPINE PACK: Brand: MEDLINE INDUSTRIES, INC.

## (undated) DEVICE — C-ARM: Brand: UNBRANDED

## (undated) DEVICE — E-Z CLEAN, NON-STICK, PTFE COATED, ELECTROSURGICAL BLADE ELECTRODE, MODIFIED EXTENDED INSULATION, 2.5 INCH (6.35 CM): Brand: MEGADYNE

## (undated) DEVICE — MARKER,SKIN,WI/RULER AND LABELS: Brand: MEDLINE

## (undated) DEVICE — SYRINGE MED 10ML TRNSLUC BRL PLUNG BLK MRK POLYPR CTRL

## (undated) DEVICE — CONNECTOR TBNG WHT PLAS SUCT STR 5IN1 LTWT W/ M CONN

## (undated) DEVICE — BNDG,ELSTC,MATRIX,STRL,2"X5YD,LF,HOOK&LP: Brand: MEDLINE

## (undated) DEVICE — INTENDED FOR TISSUE SEPARATION, AND OTHER PROCEDURES THAT REQUIRE A SHARP SURGICAL BLADE TO PUNCTURE OR CUT.: Brand: BARD-PARKER ® CARBON RIB-BACK BLADES

## (undated) DEVICE — DRAPE,THYROID,SOFT,STERILE: Brand: MEDLINE

## (undated) DEVICE — DRAPE MICSCP W117XL305CM DIA65MM LENS W VARI LENS2 FOR LEICA

## (undated) DEVICE — SVMMC HND

## (undated) DEVICE — COVER,MAYO STAND,XL,STERILE: Brand: MEDLINE

## (undated) DEVICE — GOWN,SIRUS,NONRNF,SETINSLV,XL,20/CS: Brand: MEDLINE

## (undated) DEVICE — SUTURE MONOCRYL SZ 4-0 L18IN ABSRB UD L19MM PS-2 3/8 CIR PRIM Y496G

## (undated) DEVICE — DRAPE,REIN 53X77,STERILE: Brand: MEDLINE

## (undated) DEVICE — LARGE BLUNT, DUAL PACK: Brand: LINA SKIN HOOK™

## (undated) DEVICE — GLOVE SURG SZ 75 CRM LTX FREE POLYISOPRENE POLYMER BEAD ANTI

## (undated) DEVICE — STERILE POLYISOPRENE POWDER-FREE SURGICAL GLOVES WITH EMOLLIENT COATING: Brand: PROTEXIS

## (undated) DEVICE — NEEDLE SPNL 18GA L3.5IN W/ QNCKE SHARPER BVL DURA CLICK

## (undated) DEVICE — TUBING, SUCTION, 9/32" X 20', STRAIGHT: Brand: MEDLINE INDUSTRIES, INC.

## (undated) DEVICE — SPONGE,PEANUT,XRAY,ST,SM,3/8",5/CARD: Brand: MEDLINE INDUSTRIES, INC.

## (undated) DEVICE — CONTAINER,SPECIMEN,4OZ,OR STRL: Brand: MEDLINE

## (undated) DEVICE — HAND LEAD

## (undated) DEVICE — TUBING AMB

## (undated) DEVICE — IMMOBILIZER HND L W24.13XL21.59CM ALUM RADPQ ALUMI-HND

## (undated) DEVICE — NEEDLE, QUINCKE, 18GX3.5": Brand: MEDLINE